# Patient Record
Sex: MALE | Race: WHITE | Employment: FULL TIME | URBAN - METROPOLITAN AREA
[De-identification: names, ages, dates, MRNs, and addresses within clinical notes are randomized per-mention and may not be internally consistent; named-entity substitution may affect disease eponyms.]

---

## 2019-05-08 ENCOUNTER — HOSPITAL ENCOUNTER (EMERGENCY)
Age: 59
Discharge: HOME | End: 2019-05-08
Attending: EMERGENCY MEDICINE
Payer: COMMERCIAL

## 2019-05-08 ENCOUNTER — APPOINTMENT (OUTPATIENT)
Dept: GENERAL RADIOLOGY | Age: 59
End: 2019-05-08
Payer: COMMERCIAL

## 2019-05-08 VITALS
TEMPERATURE: 98.1 F | OXYGEN SATURATION: 94 % | SYSTOLIC BLOOD PRESSURE: 168 MMHG | BODY MASS INDEX: 38.36 KG/M2 | HEIGHT: 76 IN | RESPIRATION RATE: 16 BRPM | DIASTOLIC BLOOD PRESSURE: 94 MMHG | WEIGHT: 315 LBS | HEART RATE: 83 BPM

## 2019-05-08 DIAGNOSIS — M54.31 SCIATICA OF RIGHT SIDE: Primary | ICD-10-CM

## 2019-05-08 PROCEDURE — 96372 THER/PROPH/DIAG INJ SC/IM: CPT

## 2019-05-08 PROCEDURE — 6360000002 HC RX W HCPCS: Performed by: EMERGENCY MEDICINE

## 2019-05-08 PROCEDURE — 72110 X-RAY EXAM L-2 SPINE 4/>VWS: CPT

## 2019-05-08 PROCEDURE — 6370000000 HC RX 637 (ALT 250 FOR IP): Performed by: EMERGENCY MEDICINE

## 2019-05-08 PROCEDURE — 99283 EMERGENCY DEPT VISIT LOW MDM: CPT

## 2019-05-08 RX ORDER — KETOROLAC TROMETHAMINE 10 MG/1
10 TABLET, FILM COATED ORAL EVERY 6 HOURS PRN
Qty: 20 TABLET | Refills: 0 | Status: SHIPPED | OUTPATIENT
Start: 2019-05-08 | End: 2019-06-04 | Stop reason: CLARIF

## 2019-05-08 RX ORDER — METHYLPREDNISOLONE 4 MG/1
TABLET ORAL
Qty: 1 KIT | Refills: 0 | Status: SHIPPED | OUTPATIENT
Start: 2019-05-08 | End: 2019-05-14

## 2019-05-08 RX ORDER — KETOROLAC TROMETHAMINE 30 MG/ML
30 INJECTION, SOLUTION INTRAMUSCULAR; INTRAVENOUS ONCE
Status: COMPLETED | OUTPATIENT
Start: 2019-05-08 | End: 2019-05-08

## 2019-05-08 RX ORDER — PREDNISONE 20 MG/1
60 TABLET ORAL ONCE
Status: COMPLETED | OUTPATIENT
Start: 2019-05-08 | End: 2019-05-08

## 2019-05-08 RX ADMIN — PREDNISONE 60 MG: 20 TABLET ORAL at 11:19

## 2019-05-08 RX ADMIN — KETOROLAC TROMETHAMINE 30 MG: 30 INJECTION, SOLUTION INTRAMUSCULAR; INTRAVENOUS at 11:19

## 2019-05-08 ASSESSMENT — PAIN DESCRIPTION - LOCATION: LOCATION: BACK;LEG;SACRUM

## 2019-05-08 ASSESSMENT — PAIN DESCRIPTION - DESCRIPTORS: DESCRIPTORS: ACHING

## 2019-05-08 ASSESSMENT — PAIN SCALES - GENERAL
PAINLEVEL_OUTOF10: 8
PAINLEVEL_OUTOF10: 8

## 2019-05-08 ASSESSMENT — PAIN DESCRIPTION - PAIN TYPE: TYPE: ACUTE PAIN

## 2019-05-08 ASSESSMENT — PAIN DESCRIPTION - ORIENTATION: ORIENTATION: RIGHT;LOWER;LEFT

## 2019-05-08 NOTE — ED PROVIDER NOTES
HPI:  5/8/19,   Time: 10:40 AM         Shaquille Mathis is a 60 y.o. male presenting to the ED for back pain, beginning over 2 weeks ago. The complaint has been persistent, moderate in severity, and worsened by movement of the back. The patient states that he fell at home a couple of weeks ago and has had the back pain since then. He states the pain radiates down the back of the legs    ROS:   Pertinent positives and negatives are stated within HPI, all other systems reviewed and are negative.  --------------------------------------------- PAST HISTORY ---------------------------------------------  Past Medical History:  has a past medical history of Hypertension. Past Surgical History:  has a past surgical history that includes Total shoulder arthroplasty. Social History:      Family History: family history is not on file. The patients home medications have been reviewed. Allergies: Patient has no known allergies. -------------------------------------------------- RESULTS -------------------------------------------------  All laboratory and radiology results have been personally reviewed by myself   LABS:  No results found for this visit on 05/08/19. RADIOLOGY:  Interpreted by Radiologist.  XR LUMBAR SPINE (MIN 4 VIEWS)   Final Result      No evidence of fracture or dislocation of the lumbar spine.                            ------------------------- NURSING NOTES AND VITALS REVIEWED ---------------------------   The nursing notes within the ED encounter and vital signs as below have been reviewed.    BP (!) 168/94   Pulse 83   Temp 98.1 °F (36.7 °C) (Oral)   Resp 16   Ht 6' 4\" (1.93 m)   Wt (!) 340 lb (154.2 kg)   SpO2 94%   BMI 41.39 kg/m²   Oxygen Saturation Interpretation: Normal      ---------------------------------------------------PHYSICAL EXAM--------------------------------------      Constitutional/General: Alert and oriented x3, well appearing, non toxic in NAD  Head:

## 2019-05-14 ENCOUNTER — OFFICE VISIT (OUTPATIENT)
Dept: FAMILY MEDICINE CLINIC | Age: 59
End: 2019-05-14
Payer: COMMERCIAL

## 2019-05-14 ENCOUNTER — HOSPITAL ENCOUNTER (OUTPATIENT)
Age: 59
Discharge: HOME | End: 2019-05-16
Payer: COMMERCIAL

## 2019-05-14 VITALS
SYSTOLIC BLOOD PRESSURE: 144 MMHG | WEIGHT: 315 LBS | DIASTOLIC BLOOD PRESSURE: 86 MMHG | HEIGHT: 76 IN | BODY MASS INDEX: 38.36 KG/M2 | OXYGEN SATURATION: 98 % | HEART RATE: 93 BPM

## 2019-05-14 DIAGNOSIS — I10 ESSENTIAL HYPERTENSION: Primary | ICD-10-CM

## 2019-05-14 DIAGNOSIS — E78.2 MIXED HYPERLIPIDEMIA: ICD-10-CM

## 2019-05-14 DIAGNOSIS — F17.200 NICOTINE DEPENDENCE, UNCOMPLICATED, UNSPECIFIED NICOTINE PRODUCT TYPE: ICD-10-CM

## 2019-05-14 DIAGNOSIS — E66.01 MORBID OBESITY WITH BMI OF 40.0-44.9, ADULT (HCC): ICD-10-CM

## 2019-05-14 DIAGNOSIS — M54.40 BACK PAIN OF LUMBAR REGION WITH SCIATICA: ICD-10-CM

## 2019-05-14 DIAGNOSIS — I10 ESSENTIAL HYPERTENSION: ICD-10-CM

## 2019-05-14 LAB
ALBUMIN SERPL-MCNC: 4.5 G/DL (ref 3.5–5.2)
ALP BLD-CCNC: 50 U/L (ref 40–129)
ALT SERPL-CCNC: 28 U/L (ref 0–40)
ANION GAP SERPL CALCULATED.3IONS-SCNC: 14 MMOL/L (ref 7–16)
AST SERPL-CCNC: 15 U/L (ref 0–39)
BILIRUB SERPL-MCNC: 0.6 MG/DL (ref 0–1.2)
BUN BLDV-MCNC: 19 MG/DL (ref 6–20)
CALCIUM SERPL-MCNC: 9.6 MG/DL (ref 8.6–10.2)
CHLORIDE BLD-SCNC: 103 MMOL/L (ref 98–107)
CHOLESTEROL, TOTAL: 236 MG/DL (ref 0–199)
CO2: 25 MMOL/L (ref 22–29)
CREAT SERPL-MCNC: 1 MG/DL (ref 0.7–1.2)
GFR AFRICAN AMERICAN: >60
GFR NON-AFRICAN AMERICAN: >60 ML/MIN/1.73
GLUCOSE BLD-MCNC: 97 MG/DL (ref 74–99)
HBA1C MFR BLD: 6.5 % (ref 4–5.6)
HCT VFR BLD CALC: 57.5 % (ref 37–54)
HDLC SERPL-MCNC: 46 MG/DL
HEMOGLOBIN: 18.6 G/DL (ref 12.5–16.5)
LDL CHOLESTEROL CALCULATED: 156 MG/DL (ref 0–99)
MCH RBC QN AUTO: 31.2 PG (ref 26–35)
MCHC RBC AUTO-ENTMCNC: 32.3 % (ref 32–34.5)
MCV RBC AUTO: 96.5 FL (ref 80–99.9)
PDW BLD-RTO: 14 FL (ref 11.5–15)
PLATELET # BLD: 204 E9/L (ref 130–450)
PMV BLD AUTO: 11.3 FL (ref 7–12)
POTASSIUM SERPL-SCNC: 4.5 MMOL/L (ref 3.5–5)
RBC # BLD: 5.96 E12/L (ref 3.8–5.8)
SODIUM BLD-SCNC: 142 MMOL/L (ref 132–146)
TOTAL PROTEIN: 7.3 G/DL (ref 6.4–8.3)
TRIGL SERPL-MCNC: 168 MG/DL (ref 0–149)
TSH SERPL DL<=0.05 MIU/L-ACNC: 2.06 UIU/ML (ref 0.27–4.2)
VITAMIN D 25-HYDROXY: 27 NG/ML (ref 30–100)
VLDLC SERPL CALC-MCNC: 34 MG/DL
WBC # BLD: 8.4 E9/L (ref 4.5–11.5)

## 2019-05-14 PROCEDURE — 4004F PT TOBACCO SCREEN RCVD TLK: CPT | Performed by: FAMILY MEDICINE

## 2019-05-14 PROCEDURE — 85027 COMPLETE CBC AUTOMATED: CPT

## 2019-05-14 PROCEDURE — 80053 COMPREHEN METABOLIC PANEL: CPT

## 2019-05-14 PROCEDURE — 3017F COLORECTAL CA SCREEN DOC REV: CPT | Performed by: FAMILY MEDICINE

## 2019-05-14 PROCEDURE — G8417 CALC BMI ABV UP PARAM F/U: HCPCS | Performed by: FAMILY MEDICINE

## 2019-05-14 PROCEDURE — 80061 LIPID PANEL: CPT

## 2019-05-14 PROCEDURE — 99204 OFFICE O/P NEW MOD 45 MIN: CPT | Performed by: FAMILY MEDICINE

## 2019-05-14 PROCEDURE — G8427 DOCREV CUR MEDS BY ELIG CLIN: HCPCS | Performed by: FAMILY MEDICINE

## 2019-05-14 PROCEDURE — 83036 HEMOGLOBIN GLYCOSYLATED A1C: CPT

## 2019-05-14 PROCEDURE — 84443 ASSAY THYROID STIM HORMONE: CPT

## 2019-05-14 PROCEDURE — 82306 VITAMIN D 25 HYDROXY: CPT

## 2019-05-14 RX ORDER — LISINOPRIL AND HYDROCHLOROTHIAZIDE 12.5; 1 MG/1; MG/1
1 TABLET ORAL DAILY
Qty: 30 TABLET | Refills: 1 | Status: SHIPPED | OUTPATIENT
Start: 2019-05-14 | End: 2019-05-14

## 2019-05-14 RX ORDER — BLOOD PRESSURE TEST KIT
KIT MISCELLANEOUS
Qty: 1 KIT | Refills: 0 | Status: SHIPPED
Start: 2019-05-14 | End: 2021-06-16

## 2019-05-14 RX ORDER — LISINOPRIL 10 MG/1
10 TABLET ORAL DAILY
Qty: 30 TABLET | Refills: 1 | Status: SHIPPED | OUTPATIENT
Start: 2019-05-14 | End: 2019-06-18

## 2019-05-14 ASSESSMENT — ENCOUNTER SYMPTOMS
COUGH: 0
ABDOMINAL DISTENTION: 0
NAUSEA: 0
VOMITING: 0
WHEEZING: 0
CHEST TIGHTNESS: 0
ABDOMINAL PAIN: 0
SINUS PRESSURE: 0
DIARRHEA: 0
EYE DISCHARGE: 0
BACK PAIN: 1
SORE THROAT: 0
RHINORRHEA: 0
CONSTIPATION: 0
EYE PAIN: 0
COLOR CHANGE: 0
SHORTNESS OF BREATH: 0

## 2019-05-14 ASSESSMENT — PATIENT HEALTH QUESTIONNAIRE - PHQ9
SUM OF ALL RESPONSES TO PHQ9 QUESTIONS 1 & 2: 0
1. LITTLE INTEREST OR PLEASURE IN DOING THINGS: 0
SUM OF ALL RESPONSES TO PHQ QUESTIONS 1-9: 0
SUM OF ALL RESPONSES TO PHQ QUESTIONS 1-9: 0
2. FEELING DOWN, DEPRESSED OR HOPELESS: 0

## 2019-05-14 NOTE — PROGRESS NOTES
[facility])  Family Medicine Outpatient        SUBJECTIVE:  CC: had concerns including New Patient (patient here today to establish care; ) and Back Pain (patient went to the ED for lower back pain radiating into left buttock; patient states the pain comes and goes). [id number] presented to the clinic for a new patient visit. Amanda Cartagena is a 60year old male presenting to the office today to establish as a new patient. He has a history of hypertension and has not been on treatment since 2016. He reports he was on a couple of medications in the past that he reports not tolerating including a \"pee pill. .. And the last one was recalled. \" On speaking to [facility] the patient was previously on Valsartan. He denies any vision changes, headaches, nausea, or vomiting. He was seen in the ED 5/8/19 reporting 2 weeks of back pain with sciatica on his right leg after tripping over his mom's walker and over the recliner to the floor. He denies any loc. In the ED a Lumbar xr was performed which showed moderate degenerative endplate changes at the thoracolumbar junction. The patient finished his steroid burst yesterday and is using Toradol prn. Lumbar XR 5/8/19  FINDINGS:       Osseous structures are well mineralized. There is no evidence of   fracture or dislocation. Vertebral body height is well-maintained. Moderate degenerative endplate changes are seen at the thoracolumbar   junction.           Impression       No evidence of fracture or dislocation of the lumbar spine. Review of Systems   Constitutional: Negative for activity change, appetite change, chills, fatigue, fever and unexpected weight change. HENT: Negative for congestion, postnasal drip, rhinorrhea, sinus pressure, sneezing and sore throat. Eyes: Negative for pain and discharge. Respiratory: Negative for cough, chest tightness, shortness of breath and wheezing. Cardiovascular: Negative for chest pain and palpitations. Gastrointestinal: Negative for abdominal distention, abdominal pain, constipation, diarrhea, nausea and vomiting. Endocrine: Negative for cold intolerance and heat intolerance. Genitourinary: Negative for decreased urine volume, frequency and urgency. Musculoskeletal: Positive for back pain. Negative for arthralgias and gait problem. Skin: Negative for color change and rash. Allergic/Immunologic: Negative for environmental allergies. Neurological: Negative for dizziness, seizures, syncope, weakness, numbness and headaches. Psychiatric/Behavioral: Negative for agitation and behavioral problems. Outpatient Medications Marked as Taking for the 5/14/19 encounter (Office Visit) with Mukund Morales MD   Medication Sig Dispense Refill    Blood Pressure KIT XL cuff 1 kit as directed 1 kit 0    lisinopril (PRINIVIL;ZESTRIL) 10 MG tablet Take 1 tablet by mouth daily 30 tablet 1    ketorolac (TORADOL) 10 MG tablet Take 1 tablet by mouth every 6 hours as needed for Pain 20 tablet 0    methylPREDNISolone (MEDROL, TERESA,) 4 MG tablet Take by mouth. 1 kit 0       Past Medical History:   Diagnosis Date    Hypertension        Past Surgical History:   Procedure Laterality Date    SHOULDER ARTHROPLASTY         History reviewed. No pertinent family history. No Known Allergies    Social History:  TOBACCO:   reports that he has been smoking cigarettes. He has never used smokeless tobacco.  ETOH:   reports that he drinks alcohol. OBJECTIVE    VS: BP (!) 144/86   Pulse 93   Ht 6' 4\" (1.93 m)   Wt (!) 348 lb 3.2 oz (157.9 kg)   SpO2 98%   BMI 42.38 kg/m²   Physical Exam   Constitutional: He is oriented to person, place, and time. He appears well-developed. No distress. Morbidly obese   HENT:   Head: Normocephalic and atraumatic. Right Ear: External ear normal.   Left Ear: External ear normal.   Mouth/Throat: Oropharynx is clear and moist.   Eyes: Pupils are equal, round, and reactive to light. EOM are normal.   Neck: Normal range of motion. Neck supple. Cardiovascular: Normal rate, regular rhythm and normal heart sounds. Exam reveals no gallop and no friction rub. Pulmonary/Chest: Effort normal and breath sounds normal.   Abdominal: Soft. Bowel sounds are normal.   Musculoskeletal: Normal range of motion. He exhibits no edema, tenderness or deformity. Neurological: He is alert and oriented to person, place, and time. No cranial nerve deficit or sensory deficit. Skin: Skin is warm and dry. He is not diaphoretic. Psychiatric: He has a normal mood and affect. His behavior is normal.         ASSESSMENT/PLAN:  1. Essential hypertension  Elevated; 186/86 initially improved to 144/86. Will obtain baseline labs at this time. Patient previously on Valsartan 160 mg/qd, but reports made him feel \"high\" and it was recalled. Presumed previously on hctz \"pee pill\" in the past as well. Will initiate Lisinopril 10 mg/qd at this time as well as order patient a XL bp cuff. Call with any concerns or f/u with bp in 3 weeks. Patient counseled on red flag symptoms and if they occur go to the ED.  - CBC; Future  - Comprehensive Metabolic Panel; Future  - Hemoglobin A1C; Future  - Lipid Panel; Future  - TSH without Reflex; Future  - Blood Pressure KIT; XL cuff 1 kit as directed  Dispense: 1 kit; Refill: 0  - lisinopril (PRINIVIL;ZESTRIL) 10 MG tablet; Take 1 tablet by mouth daily  Dispense: 30 tablet; Refill: 1    2. Nicotine dependence, uncomplicated, unspecified nicotine product type  Patient is smoking daily. Encouraged smoking cessation. In pre-contemplation phase at this time. 3. Back pain of lumbar region with sciatica  S/p mechanical fall at the end of April. Improved s/p steroid burst with prn Toradol. Benign exam today. Moderate chronic degenerative at endplate at throacolumbar junction on xr. Patient provided home exercises to perform as tolerated.  If symptoms recur or become bothersome, patient to call and will place referral to PT. Patient counseled on red flag symptoms and if they occur to go to the ED. 4. Morbid obesity with BMI of 40.0-44.9, adult ([address]  - CBC; Future  - Comprehensive Metabolic Panel; Future  - Hemoglobin A1C; Future  - Lipid Panel; Future  - TSH without Reflex; Future  - Vitamin D 25 Hydroxy; Future    5. Mixed hyperlipidemia  Lipid panel from /2017 showed cholesterol 216, hdl 41, ldl 155, tg 102. Recheck at this time. I have reviewed my findings and recommendations with Miguelito Gomez MD  5/14/2019 10:45 AM      Please note this report has been partially produced using speech recognition software  and may contain errors related to that system including grammar, punctuation and spelling as well as words and phrases that may seem inappropriate. If there are questions or concerns please feel free to contact me to clarify.

## 2019-05-14 NOTE — PATIENT INSTRUCTIONS
please click on the \"Sign Up Now\" link. Current as of: September 20, 2018  Content Version: 12.0  © 5848-8095 Healthwise, Incorporated. Care instructions adapted under license by [facility] ([facility]). If you have questions about a medical condition or this instruction, always ask your healthcare professional. [address] any warranty or liability for your use of this information.

## 2019-06-04 ENCOUNTER — OFFICE VISIT (OUTPATIENT)
Dept: FAMILY MEDICINE CLINIC | Age: 59
End: 2019-06-04
Payer: COMMERCIAL

## 2019-06-04 VITALS
WEIGHT: 315 LBS | OXYGEN SATURATION: 94 % | HEIGHT: 76 IN | HEART RATE: 57 BPM | BODY MASS INDEX: 38.36 KG/M2 | SYSTOLIC BLOOD PRESSURE: 148 MMHG | DIASTOLIC BLOOD PRESSURE: 92 MMHG | RESPIRATION RATE: 20 BRPM | TEMPERATURE: 98.5 F

## 2019-06-04 DIAGNOSIS — E78.2 MIXED HYPERLIPIDEMIA: ICD-10-CM

## 2019-06-04 DIAGNOSIS — E08.65 DIABETES MELLITUS DUE TO UNDERLYING CONDITION WITH HYPERGLYCEMIA, WITHOUT LONG-TERM CURRENT USE OF INSULIN (HCC): ICD-10-CM

## 2019-06-04 DIAGNOSIS — E55.9 VITAMIN D DEFICIENCY: ICD-10-CM

## 2019-06-04 DIAGNOSIS — F17.200 NICOTINE DEPENDENCE, UNCOMPLICATED, UNSPECIFIED NICOTINE PRODUCT TYPE: ICD-10-CM

## 2019-06-04 DIAGNOSIS — M51.34 DEGENERATIVE DISC DISEASE, THORACIC: ICD-10-CM

## 2019-06-04 DIAGNOSIS — I10 ESSENTIAL HYPERTENSION: Primary | ICD-10-CM

## 2019-06-04 DIAGNOSIS — D75.1 POLYCYTHEMIA: ICD-10-CM

## 2019-06-04 PROCEDURE — 99214 OFFICE O/P EST MOD 30 MIN: CPT | Performed by: FAMILY MEDICINE

## 2019-06-04 PROCEDURE — G8417 CALC BMI ABV UP PARAM F/U: HCPCS | Performed by: FAMILY MEDICINE

## 2019-06-04 PROCEDURE — G8427 DOCREV CUR MEDS BY ELIG CLIN: HCPCS | Performed by: FAMILY MEDICINE

## 2019-06-04 PROCEDURE — 4004F PT TOBACCO SCREEN RCVD TLK: CPT | Performed by: FAMILY MEDICINE

## 2019-06-04 PROCEDURE — 3017F COLORECTAL CA SCREEN DOC REV: CPT | Performed by: FAMILY MEDICINE

## 2019-06-04 RX ORDER — ACETAMINOPHEN 500 MG
1000 TABLET ORAL 3 TIMES DAILY PRN
Qty: 90 TABLET | Refills: 0 | Status: SHIPPED | OUTPATIENT
Start: 2019-06-04 | End: 2019-11-12 | Stop reason: ALTCHOICE

## 2019-06-04 RX ORDER — METFORMIN HYDROCHLORIDE 500 MG/1
TABLET, EXTENDED RELEASE ORAL
Qty: 120 TABLET | Refills: 1 | Status: SHIPPED | OUTPATIENT
Start: 2019-06-04 | End: 2019-10-22 | Stop reason: SDUPTHER

## 2019-06-04 RX ORDER — PRAVASTATIN SODIUM 40 MG
40 TABLET ORAL DAILY
Qty: 90 TABLET | Refills: 1 | Status: SHIPPED | OUTPATIENT
Start: 2019-06-04 | End: 2019-10-22 | Stop reason: SDUPTHER

## 2019-06-04 RX ORDER — AMLODIPINE BESYLATE 5 MG/1
5 TABLET ORAL DAILY
Qty: 30 TABLET | Refills: 3 | Status: SHIPPED | OUTPATIENT
Start: 2019-06-04 | End: 2019-10-22 | Stop reason: SDUPTHER

## 2019-06-04 RX ORDER — CHOLECALCIFEROL (VITAMIN D3) 50 MCG
2000 TABLET ORAL DAILY
Qty: 30 TABLET | Refills: 3 | Status: SHIPPED | OUTPATIENT
Start: 2019-06-04 | End: 2019-10-22 | Stop reason: SDUPTHER

## 2019-06-04 ASSESSMENT — ENCOUNTER SYMPTOMS
NAUSEA: 0
ABDOMINAL DISTENTION: 0
BACK PAIN: 1
SHORTNESS OF BREATH: 0
RHINORRHEA: 0
SORE THROAT: 0
EYE DISCHARGE: 0
WHEEZING: 0
COUGH: 0
SINUS PRESSURE: 0
CHEST TIGHTNESS: 0
VOMITING: 0
EYE PAIN: 0
ABDOMINAL PAIN: 0

## 2019-06-04 NOTE — PATIENT INSTRUCTIONS
Patient Education        Learning About Diabetes Food Guidelines  Your Care Instructions    Meal planning is important to manage diabetes. It helps keep your blood sugar at a target level (which you set with your doctor). You don't have to eat special foods. You can eat what your family eats, including sweets once in a while. But you do have to pay attention to how often you eat and how much you eat of certain foods. You may want to work with a dietitian or a certified diabetes educator (CDE) to help you plan meals and snacks. A dietitian or CDE can also help you lose weight if that is one of your goals. What should you know about eating carbs? Managing the amount of carbohydrate (carbs) you eat is an important part of healthy meals when you have diabetes. Carbohydrate is found in many foods. · Learn which foods have carbs. And learn the amounts of carbs in different foods. ? Bread, cereal, pasta, and rice have about 15 grams of carbs in a serving. A serving is 1 slice of bread (1 ounce), ½ cup of cooked cereal, or 1/3 cup of cooked pasta or rice. ? Fruits have 15 grams of carbs in a serving. A serving is 1 small fresh fruit, such as an apple or orange; ½ of a banana; ½ cup of cooked or canned fruit; ½ cup of fruit juice; 1 cup of melon or raspberries; or 2 tablespoons of dried fruit. ? Milk and no-sugar-added yogurt have 15 grams of carbs in a serving. A serving is 1 cup of milk or 2/3 cup of no-sugar-added yogurt. ? Starchy vegetables have 15 grams of carbs in a serving. A serving is ½ cup of mashed potatoes or sweet potato; 1 cup winter squash; ½ of a small baked potato; ½ cup of cooked beans; or ½ cup cooked corn or green peas. · Learn how much carbs to eat each day and at each meal. A dietitian or CDE can teach you how to keep track of the amount of carbs you eat. This is called carbohydrate counting. · If you are not sure how to count carbohydrate grams, use the Plate Method to plan meals.  It is a good, quick way to make sure that you have a balanced meal. It also helps you spread carbs throughout the day. ? Divide your plate by types of foods. Put non-starchy vegetables on half the plate, meat or other protein food on one-quarter of the plate, and a grain or starchy vegetable in the final quarter of the plate. To this you can add a small piece of fruit and 1 cup of milk or yogurt, depending on how many carbs you are supposed to eat at a meal.  · Try to eat about the same amount of carbs at each meal. Do not \"save up\" your daily allowance of carbs to eat at one meal.  · Proteins have very little or no carbs per serving. Examples of proteins are beef, chicken, turkey, fish, eggs, tofu, cheese, cottage cheese, and peanut butter. A serving size of meat is 3 ounces, which is about the size of a deck of cards. Examples of meat substitute serving sizes (equal to 1 ounce of meat) are 1/4 cup of cottage cheese, 1 egg, 1 tablespoon of peanut butter, and ½ cup of tofu. How can you eat out and still eat healthy? · Learn to estimate the serving sizes of foods that have carbohydrate. If you measure food at home, it will be easier to estimate the amount in a serving of restaurant food. · If the meal you order has too much carbohydrate (such as potatoes, corn, or baked beans), ask to have a low-carbohydrate food instead. Ask for a salad or green vegetables. · If you use insulin, check your blood sugar before and after eating out to help you plan how much to eat in the future. · If you eat more carbohydrate at a meal than you had planned, take a walk or do other exercise. This will help lower your blood sugar. What else should you know? · Limit saturated fat, such as the fat from meat and dairy products. This is a healthy choice because people who have diabetes are at higher risk of heart disease. So choose lean cuts of meat and nonfat or low-fat dairy products.  Use olive or canola oil instead of butter or shortening when cooking. · Don't skip meals. Your blood sugar may drop too low if you skip meals and take insulin or certain medicines for diabetes. · Check with your doctor before you drink alcohol. Alcohol can cause your blood sugar to drop too low. Alcohol can also cause a bad reaction if you take certain diabetes medicines. Follow-up care is a key part of your treatment and safety. Be sure to make and go to all appointments, and call your doctor if you are having problems. It's also a good idea to know your test results and keep a list of the medicines you take. Where can you learn more? Go to [email or web address] org and sign in to your [employer] account. Enter F280 in the [employer] box to learn more about \"Learning About Diabetes Food Guidelines. \"     If you do not have an account, please click on the \"Sign Up Now\" link. Current as of: July 25, 2018  Content Version: 12.0  © 2574-4752 Healthwise, Incorporated. Care instructions adapted under license by [facility] ([facility]). If you have questions about a medical condition or this instruction, always ask your healthcare professional. [place] [address] any warranty or liability for your use of this information.

## 2019-06-04 NOTE — PROGRESS NOTES
[facility])  Family Medicine Outpatient        SUBJECTIVE:  CC: had concerns including Hypertension (Pt here to follow up on HTN) and Discuss Labs (Pt also here to review lab results). [address] presented to the clinic for a routine visit. Alistair Perkins is a 60year old male presenting to the office today to follow up on his blood pressure and blood work. He reports doing well. He notes that his back pain has improved, but that it is still somewhat bothersome. He notes, \"eating Aleve\" for the pain at least 4 tabs a day. He denies any further radiation down his legs. He reports tolerating the Lisinopril initiated last appointment and taking it daily. He does not report any vision changes or headaches. Review of Systems   Constitutional: Negative for activity change, chills and fatigue. HENT: Negative for rhinorrhea, sinus pressure and sore throat. Eyes: Negative for pain and discharge. Respiratory: Negative for cough, chest tightness, shortness of breath and wheezing. Cardiovascular: Negative for chest pain and palpitations. Gastrointestinal: Negative for abdominal distention, abdominal pain, nausea and vomiting. Musculoskeletal: Positive for back pain. Negative for arthralgias and gait problem. Neurological: Negative for dizziness, seizures, syncope, weakness, numbness and headaches. Psychiatric/Behavioral: Positive for agitation. Negative for behavioral problems and suicidal ideas.        Outpatient Medications Marked as Taking for the 6/4/19 encounter (Office Visit) with Noa Clemens MD   Medication Sig Dispense Refill    metFORMIN (GLUCOPHAGE-XR) 500 MG extended release tablet Take 1 tablet bid for 2 weeks, then increase to 2 tablets bid 120 tablet 1    pravastatin (PRAVACHOL) 40 MG tablet Take 1 tablet by mouth daily 90 tablet 1    Cholecalciferol (VITAMIN D) 2000 units TABS tablet Take 1 tablet by mouth daily 30 tablet 3    acetaminophen (TYLENOL) 500 MG tablet Take 2 tablets by mouth 3 times daily as needed for Pain 90 tablet 0    amLODIPine (NORVASC) 5 MG tablet Take 1 tablet by mouth daily 30 tablet 3    Blood Pressure KIT XL cuff 1 kit as directed 1 kit 0    lisinopril (PRINIVIL;ZESTRIL) 10 MG tablet Take 1 tablet by mouth daily 30 tablet 1       I have reviewed all pertinent PMHx, PSHx, FamHx, SocialHx, medications, and allergies and updated history as appropriate. OBJECTIVE    VS: BP (!) 148/92   Pulse 57   Temp 98.5 °F (36.9 °C) (Temporal)   Resp 20   Ht 6' 4\" (1.93 m)   Wt (!) 349 lb (158.3 kg)   SpO2 94%   BMI 42.48 kg/m²   Physical Exam   Constitutional: He is oriented to person, place, and time. He appears well-developed. No distress. Morbidly obese   HENT:   Head: Normocephalic and atraumatic. Eyes: Pupils are equal, round, and reactive to light. EOM are normal.   Neck: Normal range of motion. Neck supple. Cardiovascular: Normal rate, regular rhythm and normal heart sounds. Pulmonary/Chest: Effort normal and breath sounds normal.   Abdominal: Soft. Bowel sounds are normal.   Musculoskeletal: Normal range of motion. He exhibits no tenderness. Neurological: He is alert and oriented to person, place, and time. No sensory deficit. Skin: Skin is warm and dry. He is not diaphoretic. Psychiatric: He has a normal mood and affect. His behavior is normal.       ASSESSMENT/PLAN:  1. Essential hypertension  Elevated; 162/102 initially improved to 148/92. Continue Lisinopril 10 mg/qd at this time. Will also add Norvasc 5 mg/qd. Patient counseled on red flag symptoms and if they occur to go to the ED. Patient advised to take blood pressure daily and return to the office in 2 weeks for review and further titration as needed. Cr 1.0 5/14/19. Patient advised no Nsaids and to stop smoking!  - amLODIPine (NORVASC) 5 MG tablet; Take 1 tablet by mouth daily  Dispense: 30 tablet; Refill: 3    2.  Mixed hyperlipidemia  Lipid panel 5/14/19 showed cholesterol 236, hdl 46, ldl 156, and . Patient's ascvd risk is 41.6%. Heavily counseled patient on findings and possible sequela. Patient advised to initiate lifestyle modifications. Offered to send patient to nutritionist, but he declined at this time. Initiate Pravastatin at this time. Repeat lipid panel in 6m-1 year. - pravastatin (PRAVACHOL) 40 MG tablet; Take 1 tablet by mouth daily  Dispense: 90 tablet; Refill: 1    3. Diabetes mellitus due to underlying condition with hyperglycemia, without long-term current use of insulin ([facility] [address]  hgba1c 6.5% on labs 5/2019. Patient counseled on diagnosis and all questions answered. Initiate Metformin titration at this time. Recommend repeat hgba1c and microalbumin:cr in 3m. Patient is on an ace inhibitor and statin. - metFORMIN (GLUCOPHAGE-XR) 500 MG extended release tablet; Take 1 tablet bid for 2 weeks, then increase to 2 tablets bid  Dispense: 120 tablet; Refill: 1    4. Vitamin D deficiency  Mild 27. Recommend Vitamin D 2,000 u/ qd at this time. - Cholecalciferol (VITAMIN D) 2000 units TABS tablet; Take 1 tablet by mouth daily  Dispense: 30 tablet; Refill: 3    5. Polycythemia  On record review present since 1/2017. On recent labs 5/2019 hgb 18.6 and hematocrit was 57.5. Patient saturating at 94% on RA today and is euvolemic. Most likely partial etiology from chronic smoking history. Will send patient to heme/onc for further evaluation/recommendation on specialized testing and treatment as needed. Xi Chacon MD, Hematology and Oncology, [place] ([facility])    6. Degenerative disc disease, thoracic  Patient advised to avoid Nsaids secondary to htn. Recommend prn Tylenol. Offered PT again to patient, but declined at this time secondary to improving symptoms. - acetaminophen (TYLENOL) 500 MG tablet; Take 2 tablets by mouth 3 times daily as needed for Pain  Dispense: 90 tablet; Refill: 0    7.  Nicotine dependence, uncomplicated, unspecified nicotine product type  Patient heavily counseled on smoking cessation today and importance of minimizing nicotine and managing comorbid conditions. Patient has approximately 40 pack year history and is currently smoking 1 ppd. Discussed different methods of smoking cessation and patient is not willing to consider at this time. He reports understanding and accepting the risk and possible sequela of continuing to smoke. I have reviewed my findings and recommendations with Kelly Koroma MD  6/4/2019 8:35 AM      Please note this report has been partially produced using speech recognition software  and may contain errors related to that system including grammar, punctuation and spelling as well as words and phrases that may seem inappropriate. If there are questions or concerns please feel free to contact me to clarify.

## 2019-06-18 ENCOUNTER — HOSPITAL ENCOUNTER (OUTPATIENT)
Dept: GENERAL RADIOLOGY | Age: 59
Discharge: HOME | End: 2019-06-20
Payer: COMMERCIAL

## 2019-06-18 ENCOUNTER — HOSPITAL ENCOUNTER (OUTPATIENT)
Age: 59
Discharge: HOME | End: 2019-06-20
Payer: COMMERCIAL

## 2019-06-18 ENCOUNTER — OFFICE VISIT (OUTPATIENT)
Dept: FAMILY MEDICINE CLINIC | Age: 59
End: 2019-06-18
Payer: COMMERCIAL

## 2019-06-18 VITALS
BODY MASS INDEX: 38.36 KG/M2 | WEIGHT: 315 LBS | HEART RATE: 88 BPM | SYSTOLIC BLOOD PRESSURE: 128 MMHG | HEIGHT: 76 IN | OXYGEN SATURATION: 94 % | DIASTOLIC BLOOD PRESSURE: 88 MMHG | RESPIRATION RATE: 20 BRPM

## 2019-06-18 DIAGNOSIS — Z00.00 ENCOUNTER FOR ANNUAL HEALTH EXAMINATION: ICD-10-CM

## 2019-06-18 DIAGNOSIS — I10 ESSENTIAL HYPERTENSION: ICD-10-CM

## 2019-06-18 DIAGNOSIS — Z00.00 ENCOUNTER FOR ANNUAL HEALTH EXAMINATION: Primary | ICD-10-CM

## 2019-06-18 DIAGNOSIS — I48.91 ATRIAL FIBRILLATION, UNSPECIFIED TYPE (HCC): ICD-10-CM

## 2019-06-18 DIAGNOSIS — F17.200 NICOTINE DEPENDENCE, UNCOMPLICATED, UNSPECIFIED NICOTINE PRODUCT TYPE: ICD-10-CM

## 2019-06-18 LAB
ALBUMIN SERPL-MCNC: 4.7 G/DL (ref 3.5–5.2)
ALP BLD-CCNC: 50 U/L (ref 40–129)
ALT SERPL-CCNC: 20 U/L (ref 0–40)
ANION GAP SERPL CALCULATED.3IONS-SCNC: 16 MMOL/L (ref 7–16)
AST SERPL-CCNC: 19 U/L (ref 0–39)
BASOPHILS ABSOLUTE: 0.09 E9/L (ref 0–0.2)
BASOPHILS RELATIVE PERCENT: 1.3 % (ref 0–2)
BILIRUB SERPL-MCNC: 0.5 MG/DL (ref 0–1.2)
BUN BLDV-MCNC: 16 MG/DL (ref 8–23)
CALCIUM SERPL-MCNC: 10.1 MG/DL (ref 8.6–10.2)
CHLORIDE BLD-SCNC: 104 MMOL/L (ref 98–107)
CK MB: 2.5 NG/ML (ref 0–7.7)
CO2: 23 MMOL/L (ref 22–29)
CREAT SERPL-MCNC: 0.9 MG/DL (ref 0.7–1.2)
EOSINOPHILS ABSOLUTE: 0.15 E9/L (ref 0.05–0.5)
EOSINOPHILS RELATIVE PERCENT: 2.2 % (ref 0–6)
ETHANOL: <10 MG/DL (ref 0–0.08)
GFR AFRICAN AMERICAN: >60
GFR NON-AFRICAN AMERICAN: >60 ML/MIN/1.73
GLUCOSE BLD-MCNC: 121 MG/DL (ref 74–99)
HCT VFR BLD CALC: 56.9 % (ref 37–54)
HEMOGLOBIN: 18.5 G/DL (ref 12.5–16.5)
IMMATURE GRANULOCYTES #: 0.01 E9/L
IMMATURE GRANULOCYTES %: 0.1 % (ref 0–5)
LYMPHOCYTES ABSOLUTE: 1.72 E9/L (ref 1.5–4)
LYMPHOCYTES RELATIVE PERCENT: 25.2 % (ref 20–42)
MCH RBC QN AUTO: 31.4 PG (ref 26–35)
MCHC RBC AUTO-ENTMCNC: 32.5 % (ref 32–34.5)
MCV RBC AUTO: 96.6 FL (ref 80–99.9)
MONOCYTES ABSOLUTE: 0.54 E9/L (ref 0.1–0.95)
MONOCYTES RELATIVE PERCENT: 7.9 % (ref 2–12)
NEUTROPHILS ABSOLUTE: 4.32 E9/L (ref 1.8–7.3)
NEUTROPHILS RELATIVE PERCENT: 63.3 % (ref 43–80)
PDW BLD-RTO: 13.7 FL (ref 11.5–15)
PLATELET # BLD: 193 E9/L (ref 130–450)
PMV BLD AUTO: 11.6 FL (ref 7–12)
POTASSIUM SERPL-SCNC: 4.9 MMOL/L (ref 3.5–5)
PRO-BNP: 675 PG/ML (ref 0–125)
PROSTATE SPECIFIC ANTIGEN: 1.59 NG/ML (ref 0–4)
RBC # BLD: 5.89 E12/L (ref 3.8–5.8)
SODIUM BLD-SCNC: 143 MMOL/L (ref 132–146)
TOTAL PROTEIN: 7.3 G/DL (ref 6.4–8.3)
TROPONIN: <0.01 NG/ML (ref 0–0.03)
WBC # BLD: 6.8 E9/L (ref 4.5–11.5)

## 2019-06-18 PROCEDURE — 99396 PREV VISIT EST AGE 40-64: CPT | Performed by: FAMILY MEDICINE

## 2019-06-18 PROCEDURE — 84153 ASSAY OF PSA TOTAL: CPT

## 2019-06-18 PROCEDURE — 82553 CREATINE MB FRACTION: CPT

## 2019-06-18 PROCEDURE — 80053 COMPREHEN METABOLIC PANEL: CPT

## 2019-06-18 PROCEDURE — 93000 ELECTROCARDIOGRAM COMPLETE: CPT | Performed by: FAMILY MEDICINE

## 2019-06-18 PROCEDURE — G0480 DRUG TEST DEF 1-7 CLASSES: HCPCS

## 2019-06-18 PROCEDURE — 71046 X-RAY EXAM CHEST 2 VIEWS: CPT

## 2019-06-18 PROCEDURE — 85025 COMPLETE CBC W/AUTO DIFF WBC: CPT

## 2019-06-18 PROCEDURE — 83880 ASSAY OF NATRIURETIC PEPTIDE: CPT

## 2019-06-18 PROCEDURE — 84484 ASSAY OF TROPONIN QUANT: CPT

## 2019-06-18 PROCEDURE — 99213 OFFICE O/P EST LOW 20 MIN: CPT | Performed by: FAMILY MEDICINE

## 2019-06-18 RX ORDER — LISINOPRIL 10 MG/1
5 TABLET ORAL DAILY
Qty: 30 TABLET | Refills: 1
Start: 2019-06-18 | End: 2019-06-18

## 2019-06-18 RX ORDER — METOPROLOL SUCCINATE 25 MG/1
12.5 TABLET, EXTENDED RELEASE ORAL DAILY
Qty: 45 TABLET | Refills: 1 | Status: SHIPPED | OUTPATIENT
Start: 2019-06-18 | End: 2019-10-22 | Stop reason: SDUPTHER

## 2019-06-18 RX ORDER — LISINOPRIL 10 MG/1
5 TABLET ORAL DAILY
Qty: 30 TABLET | Refills: 1
Start: 2019-06-18 | End: 2019-10-22

## 2019-07-12 ASSESSMENT — ENCOUNTER SYMPTOMS
DIARRHEA: 0
ABDOMINAL PAIN: 0
CONSTIPATION: 0
SINUS PRESSURE: 0
NAUSEA: 0
WHEEZING: 0
VOMITING: 0
SHORTNESS OF BREATH: 0
COUGH: 0

## 2019-08-27 ENCOUNTER — TELEPHONE (OUTPATIENT)
Dept: FAMILY MEDICINE CLINIC | Age: 59
End: 2019-08-27

## 2019-08-27 DIAGNOSIS — I48.91 ATRIAL FIBRILLATION, UNSPECIFIED TYPE (HCC): ICD-10-CM

## 2019-10-22 ENCOUNTER — TELEPHONE (OUTPATIENT)
Dept: FAMILY MEDICINE CLINIC | Age: 59
End: 2019-10-22

## 2019-10-22 ENCOUNTER — OFFICE VISIT (OUTPATIENT)
Dept: FAMILY MEDICINE CLINIC | Age: 59
End: 2019-10-22
Payer: COMMERCIAL

## 2019-10-22 ENCOUNTER — HOSPITAL ENCOUNTER (OUTPATIENT)
Age: 59
Discharge: HOME | End: 2019-10-24
Payer: COMMERCIAL

## 2019-10-22 VITALS
OXYGEN SATURATION: 96 % | TEMPERATURE: 98.6 F | WEIGHT: 315 LBS | BODY MASS INDEX: 38.36 KG/M2 | HEIGHT: 76 IN | RESPIRATION RATE: 18 BRPM | SYSTOLIC BLOOD PRESSURE: 126 MMHG | HEART RATE: 84 BPM | DIASTOLIC BLOOD PRESSURE: 90 MMHG

## 2019-10-22 DIAGNOSIS — E78.2 MIXED HYPERLIPIDEMIA: ICD-10-CM

## 2019-10-22 DIAGNOSIS — E55.9 VITAMIN D DEFICIENCY: ICD-10-CM

## 2019-10-22 DIAGNOSIS — E66.01 MORBID OBESITY WITH BMI OF 40.0-44.9, ADULT (HCC): ICD-10-CM

## 2019-10-22 DIAGNOSIS — I10 ESSENTIAL HYPERTENSION: ICD-10-CM

## 2019-10-22 DIAGNOSIS — I10 ESSENTIAL HYPERTENSION: Primary | ICD-10-CM

## 2019-10-22 DIAGNOSIS — E08.65 DIABETES MELLITUS DUE TO UNDERLYING CONDITION WITH HYPERGLYCEMIA, WITHOUT LONG-TERM CURRENT USE OF INSULIN (HCC): ICD-10-CM

## 2019-10-22 DIAGNOSIS — D75.1 POLYCYTHEMIA: ICD-10-CM

## 2019-10-22 DIAGNOSIS — I48.91 ATRIAL FIBRILLATION, UNSPECIFIED TYPE (HCC): ICD-10-CM

## 2019-10-22 LAB
ANION GAP SERPL CALCULATED.3IONS-SCNC: 12 MMOL/L (ref 7–16)
BUN BLDV-MCNC: 17 MG/DL (ref 8–23)
CALCIUM SERPL-MCNC: 10 MG/DL (ref 8.6–10.2)
CHLORIDE BLD-SCNC: 103 MMOL/L (ref 98–107)
CO2: 27 MMOL/L (ref 22–29)
CREAT SERPL-MCNC: 1 MG/DL (ref 0.7–1.2)
GFR AFRICAN AMERICAN: >60
GFR NON-AFRICAN AMERICAN: >60 ML/MIN/1.73
GLUCOSE BLD-MCNC: 91 MG/DL (ref 74–99)
HBA1C MFR BLD: 5.9 %
POTASSIUM SERPL-SCNC: 4.4 MMOL/L (ref 3.5–5)
SODIUM BLD-SCNC: 142 MMOL/L (ref 132–146)

## 2019-10-22 PROCEDURE — G8427 DOCREV CUR MEDS BY ELIG CLIN: HCPCS | Performed by: FAMILY MEDICINE

## 2019-10-22 PROCEDURE — 83036 HEMOGLOBIN GLYCOSYLATED A1C: CPT | Performed by: FAMILY MEDICINE

## 2019-10-22 PROCEDURE — G8417 CALC BMI ABV UP PARAM F/U: HCPCS | Performed by: FAMILY MEDICINE

## 2019-10-22 PROCEDURE — 80048 BASIC METABOLIC PNL TOTAL CA: CPT

## 2019-10-22 PROCEDURE — 4004F PT TOBACCO SCREEN RCVD TLK: CPT | Performed by: FAMILY MEDICINE

## 2019-10-22 PROCEDURE — 3017F COLORECTAL CA SCREEN DOC REV: CPT | Performed by: FAMILY MEDICINE

## 2019-10-22 PROCEDURE — 99214 OFFICE O/P EST MOD 30 MIN: CPT | Performed by: FAMILY MEDICINE

## 2019-10-22 PROCEDURE — G8484 FLU IMMUNIZE NO ADMIN: HCPCS | Performed by: FAMILY MEDICINE

## 2019-10-22 RX ORDER — LISINOPRIL 10 MG/1
5 TABLET ORAL DAILY
Qty: 45 TABLET | Refills: 1 | Status: CANCELLED | OUTPATIENT
Start: 2019-10-22 | End: 2020-04-19

## 2019-10-22 RX ORDER — PRAVASTATIN SODIUM 40 MG
40 TABLET ORAL DAILY
Qty: 90 TABLET | Refills: 1 | Status: SHIPPED
Start: 2019-10-22 | End: 2020-06-12 | Stop reason: SDUPTHER

## 2019-10-22 RX ORDER — CHOLECALCIFEROL (VITAMIN D3) 50 MCG
2000 TABLET ORAL DAILY
Qty: 30 TABLET | Refills: 3 | Status: SHIPPED
Start: 2019-10-22 | End: 2020-03-06 | Stop reason: SDUPTHER

## 2019-10-22 RX ORDER — AMLODIPINE BESYLATE 5 MG/1
5 TABLET ORAL DAILY
Qty: 90 TABLET | Refills: 1 | Status: CANCELLED | OUTPATIENT
Start: 2019-10-22 | End: 2020-04-19

## 2019-10-22 RX ORDER — AMLODIPINE BESYLATE 5 MG/1
5 TABLET ORAL DAILY
Qty: 30 TABLET | Refills: 3 | Status: SHIPPED
Start: 2019-10-22 | End: 2020-03-06 | Stop reason: SDUPTHER

## 2019-10-22 RX ORDER — METOPROLOL SUCCINATE 25 MG/1
12.5 TABLET, EXTENDED RELEASE ORAL DAILY
Qty: 45 TABLET | Refills: 1 | Status: SHIPPED
Start: 2019-10-22 | End: 2020-06-12 | Stop reason: SDUPTHER

## 2019-10-22 RX ORDER — METFORMIN HYDROCHLORIDE 500 MG/1
TABLET, EXTENDED RELEASE ORAL
Qty: 120 TABLET | Refills: 1 | Status: SHIPPED
Start: 2019-10-22 | End: 2020-06-15 | Stop reason: SDUPTHER

## 2019-10-22 ASSESSMENT — ENCOUNTER SYMPTOMS
SHORTNESS OF BREATH: 0
ABDOMINAL PAIN: 0
CONSTIPATION: 0
NAUSEA: 0
DIARRHEA: 0
WHEEZING: 0
VOMITING: 0
SINUS PRESSURE: 0
COUGH: 0

## 2019-11-12 ENCOUNTER — OFFICE VISIT (OUTPATIENT)
Dept: FAMILY MEDICINE CLINIC | Age: 59
End: 2019-11-12
Payer: COMMERCIAL

## 2019-11-12 VITALS
DIASTOLIC BLOOD PRESSURE: 84 MMHG | SYSTOLIC BLOOD PRESSURE: 132 MMHG | OXYGEN SATURATION: 95 % | HEART RATE: 79 BPM | WEIGHT: 315 LBS | HEIGHT: 76 IN | RESPIRATION RATE: 18 BRPM | TEMPERATURE: 97.7 F | BODY MASS INDEX: 38.36 KG/M2

## 2019-11-12 DIAGNOSIS — F17.200 NICOTINE DEPENDENCE, UNCOMPLICATED, UNSPECIFIED NICOTINE PRODUCT TYPE: ICD-10-CM

## 2019-11-12 DIAGNOSIS — D75.1 POLYCYTHEMIA: ICD-10-CM

## 2019-11-12 DIAGNOSIS — L91.8 SKIN TAG: ICD-10-CM

## 2019-11-12 DIAGNOSIS — I10 ESSENTIAL HYPERTENSION: Primary | ICD-10-CM

## 2019-11-12 DIAGNOSIS — H10.10 ALLERGIC CONJUNCTIVITIS, UNSPECIFIED LATERALITY: ICD-10-CM

## 2019-11-12 PROCEDURE — 3017F COLORECTAL CA SCREEN DOC REV: CPT | Performed by: FAMILY MEDICINE

## 2019-11-12 PROCEDURE — 99214 OFFICE O/P EST MOD 30 MIN: CPT | Performed by: FAMILY MEDICINE

## 2019-11-12 PROCEDURE — G8427 DOCREV CUR MEDS BY ELIG CLIN: HCPCS | Performed by: FAMILY MEDICINE

## 2019-11-12 PROCEDURE — G8484 FLU IMMUNIZE NO ADMIN: HCPCS | Performed by: FAMILY MEDICINE

## 2019-11-12 PROCEDURE — G8417 CALC BMI ABV UP PARAM F/U: HCPCS | Performed by: FAMILY MEDICINE

## 2019-11-12 PROCEDURE — 4004F PT TOBACCO SCREEN RCVD TLK: CPT | Performed by: FAMILY MEDICINE

## 2019-11-12 RX ORDER — OLOPATADINE HYDROCHLORIDE 1 MG/ML
1 SOLUTION/ DROPS OPHTHALMIC 2 TIMES DAILY PRN
Qty: 1 BOTTLE | Refills: 1 | Status: SHIPPED
Start: 2019-11-12 | End: 2020-10-23 | Stop reason: SDUPTHER

## 2019-11-12 ASSESSMENT — ENCOUNTER SYMPTOMS
COUGH: 0
VOMITING: 0
NAUSEA: 0
DIARRHEA: 0
WHEEZING: 0
SINUS PRESSURE: 0
CONSTIPATION: 0
SHORTNESS OF BREATH: 0
ABDOMINAL PAIN: 0

## 2020-01-26 NOTE — PROGRESS NOTES
[facility] Cardiology Progress Note  Dr. Otilio Lam      Referring Physician: Sommer Medina MD  CHIEF COMPLAINT:   Chief Complaint   Patient presents with    Atrial Fibrillation     6 month check. Pt has no cardiac complaints today       HISTORY OF PRESENT ILLNESS:   Patient 60years old admitted with atrial fibrillation, hypertension, diabetes mellitus, is here for follow-up appointment  Occasional palpitations, denies any chest pain, no lightheadedness or dizziness, no pedal edema, no PND, no orthopnea, no syncope, no presyncopal episodes  Functional capacity is at baseline    Past Medical History:   Diagnosis Date    Hypertension          Past Surgical History:   Procedure Laterality Date    APPENDECTOMY      SHOULDER ARTHROPLASTY      TONSILLECTOMY           Current Outpatient Medications   Medication Sig Dispense Refill    metoprolol succinate (TOPROL XL) 25 MG extended release tablet Take 0.5 tablets by mouth daily 45 tablet 1    metFORMIN (GLUCOPHAGE-XR) 500 MG extended release tablet Take 1 tablet bid for 2 weeks, then increase to 2 tablets bid 120 tablet 1    pravastatin (PRAVACHOL) 40 MG tablet Take 1 tablet by mouth daily 90 tablet 1    Cholecalciferol (VITAMIN D) 2000 units TABS tablet Take 1 tablet by mouth daily 30 tablet 3    amLODIPine (NORVASC) 5 MG tablet Take 1 tablet by mouth daily 30 tablet 3    apixaban (ELIQUIS) 5 MG TABS tablet Take 1 tablet by mouth 2 times daily 180 tablet 1    Blood Pressure KIT XL cuff 1 kit as directed 1 kit 0     No current facility-administered medications for this visit.           Allergies as of 01/31/2020    (No Known Allergies)       Social History     Socioeconomic History    Marital status: Single     Spouse name: Not on file    Number of children: Not on file    Years of education: Not on file    Highest education level: Not on file   Occupational History    Not on file   Social Needs    Financial resource strain: Not on file   [place] insecurity:     Worry: Not on file     Inability: Not on file    Transportation needs:     Medical: Not on file     Non-medical: Not on file   Tobacco Use    Smoking status: Current Every Day Smoker     Packs/day: 1.00     Years: 40.00     Pack years: 40.00     Types: Cigarettes     Start date: 6/4/1979    Smokeless tobacco: Never Used   Substance and Sexual Activity    Alcohol use: Yes     Comment: weekly.  1 cup coffee per day    Drug use: Never    Sexual activity: Not on file   Lifestyle    Physical activity:     Days per week: Not on file     Minutes per session: Not on file    Stress: Not on file   Relationships    Social connections:     Talks on phone: Not on file     Gets together: Not on file     Attends Anabaptism service: Not on file     Active member of club or organization: Not on file     Attends meetings of clubs or organizations: Not on file     Relationship status: Not on file    Intimate partner violence:     Fear of current or ex partner: Not on file     Emotionally abused: Not on file     Physically abused: Not on file     Forced sexual activity: Not on file   Other Topics Concern    Not on file   Social History Narrative    Not on file       Family History   Problem Relation Age of Onset    No Known Problems Mother        REVIEW OF SYSTEMS:     CONSTITUTIONAL:  negative for  fevers, chills, sweats and fatigue  HEENT:  negative for  tinnitus, earaches, nasal congestion and epistaxis  RESPIRATORY:  negative for  dry cough, cough with sputum, dyspnea, wheezing and hemoptysis  GASTROINTESTINAL:  negative for nausea, vomiting, diarrhea, constipation, pruritus and jaundice  HEMATOLOGIC/LYMPHATIC:  negative for easy bruising, bleeding, lymphadenopathy and petechiae  ENDOCRINE:  negative for heat intolerance, cold intolerance, tremor, hair loss and diabetic symptoms including neither polyuria nor polydipsia nor blurred vision  MUSCULOSKELETAL:  negative for  myalgias, arthralgias, joint complications   7. Other obesity due to excess calories :  low fat low calorie diet, increased physical activity and weight loss advised. 8.   Tobacco use :  Patient was counseled regarding smoking cessation    RECOMMENDATIONS:   1. Continue current treatment  2. Increase risk of bleeding due to being on anti-coagulation, symptoms and signs of bleeding discussed with patient, patient was advised to seek medical attention at the earliest symptoms or signs of bleeding. 3.  Preventive cardiology: Low-salt, low-cholesterol diet, daily exercise, adherence to diabetic diet and diabetic medications, smoking cessation were all advised. 4.  Follow-up with  as scheduled  5. Follow-up with Dr. Jacinda Esquivel in 6 months, sooner if symptomatic for any reason    I have reviewed my findings and recommendations with patient    Electronically signed by Axel Humphries MD on 2/4/2020 at 8:45 PM  NOTE: This report was transcribed using voice recognition software.  Every effort was made to ensure accuracy; however, inadvertent computerized transcription errors may be present

## 2020-01-31 ENCOUNTER — OFFICE VISIT (OUTPATIENT)
Dept: CARDIOLOGY CLINIC | Age: 60
End: 2020-01-31
Payer: COMMERCIAL

## 2020-01-31 VITALS
BODY MASS INDEX: 38.36 KG/M2 | DIASTOLIC BLOOD PRESSURE: 76 MMHG | HEIGHT: 76 IN | HEART RATE: 72 BPM | WEIGHT: 315 LBS | SYSTOLIC BLOOD PRESSURE: 128 MMHG

## 2020-01-31 PROCEDURE — 93000 ELECTROCARDIOGRAM COMPLETE: CPT | Performed by: INTERNAL MEDICINE

## 2020-01-31 PROCEDURE — 99214 OFFICE O/P EST MOD 30 MIN: CPT | Performed by: INTERNAL MEDICINE

## 2020-03-06 RX ORDER — AMLODIPINE BESYLATE 5 MG/1
5 TABLET ORAL DAILY
Qty: 90 TABLET | Refills: 0 | Status: SHIPPED
Start: 2020-03-06 | End: 2020-06-15 | Stop reason: SDUPTHER

## 2020-03-06 RX ORDER — CHOLECALCIFEROL (VITAMIN D3) 50 MCG
2000 TABLET ORAL DAILY
Qty: 90 TABLET | Refills: 0 | Status: SHIPPED | OUTPATIENT
Start: 2020-03-06

## 2020-06-12 ENCOUNTER — TELEPHONE (OUTPATIENT)
Dept: FAMILY MEDICINE CLINIC | Age: 60
End: 2020-06-12

## 2020-06-12 RX ORDER — METOPROLOL SUCCINATE 25 MG/1
12.5 TABLET, EXTENDED RELEASE ORAL DAILY
Qty: 15 TABLET | Refills: 0 | Status: SHIPPED
Start: 2020-06-12 | End: 2020-06-15 | Stop reason: SDUPTHER

## 2020-06-12 RX ORDER — PRAVASTATIN SODIUM 40 MG
40 TABLET ORAL DAILY
Qty: 30 TABLET | Refills: 0 | Status: SHIPPED
Start: 2020-06-12 | End: 2020-06-15 | Stop reason: SDUPTHER

## 2020-06-15 ENCOUNTER — VIRTUAL VISIT (OUTPATIENT)
Dept: FAMILY MEDICINE CLINIC | Age: 60
End: 2020-06-15
Payer: COMMERCIAL

## 2020-06-15 PROCEDURE — 99213 OFFICE O/P EST LOW 20 MIN: CPT | Performed by: FAMILY MEDICINE

## 2020-06-15 RX ORDER — AMLODIPINE BESYLATE 5 MG/1
5 TABLET ORAL DAILY
Qty: 30 TABLET | Refills: 3 | Status: SHIPPED
Start: 2020-06-15 | End: 2020-10-13 | Stop reason: SDUPTHER

## 2020-06-15 RX ORDER — PRAVASTATIN SODIUM 40 MG
40 TABLET ORAL DAILY
Qty: 30 TABLET | Refills: 3 | Status: SHIPPED
Start: 2020-06-15 | End: 2020-07-14 | Stop reason: SDUPTHER

## 2020-06-15 RX ORDER — METFORMIN HYDROCHLORIDE 500 MG/1
TABLET, EXTENDED RELEASE ORAL
Qty: 30 TABLET | Refills: 1 | Status: SHIPPED
Start: 2020-06-15 | End: 2020-07-31 | Stop reason: DRUGHIGH

## 2020-06-15 RX ORDER — METOPROLOL SUCCINATE 25 MG/1
12.5 TABLET, EXTENDED RELEASE ORAL DAILY
Qty: 15 TABLET | Refills: 3 | Status: SHIPPED
Start: 2020-06-15 | End: 2020-07-14 | Stop reason: SDUPTHER

## 2020-06-15 ASSESSMENT — PATIENT HEALTH QUESTIONNAIRE - PHQ9
1. LITTLE INTEREST OR PLEASURE IN DOING THINGS: 0
SUM OF ALL RESPONSES TO PHQ9 QUESTIONS 1 & 2: 0
SUM OF ALL RESPONSES TO PHQ QUESTIONS 1-9: 0
SUM OF ALL RESPONSES TO PHQ QUESTIONS 1-9: 0
2. FEELING DOWN, DEPRESSED OR HOPELESS: 0

## 2020-06-15 NOTE — PROGRESS NOTES
kit as directed 1 kit 0       I have reviewed all pertinent PMHx, PSHx, FamHx, SocialHx, medications, and allergies and updated history as appropriate. OBJECTIVE    VS: There were no vitals taken for this visit. Physical Exam  None    ASSESSMENT/PLAN:  1. Essential hypertension  - metoprolol succinate (TOPROL XL) 25 MG extended release tablet; Take 0.5 tablets by mouth daily  Dispense: 15 tablet; Refill: 3  - amLODIPine (NORVASC) 5 MG tablet; Take 1 tablet by mouth daily  Dispense: 30 tablet; Refill: 3  - Comprehensive Metabolic Panel; Future    2. Diabetes mellitus due to underlying condition with hyperglycemia, without long-term current use of insulin (HCC)  a1c 5.9% 10/22/19  - metFORMIN (GLUCOPHAGE-XR) 500 MG extended release tablet; Take 1 tablet qd with meals  Dispense: 30 tablet; Refill: 1  - Hemoglobin A1C; Future    3. Atrial fibrillation, unspecified type (HCC)  - metoprolol succinate (TOPROL XL) 25 MG extended release tablet; Take 0.5 tablets by mouth daily  Dispense: 15 tablet; Refill: 3  - apixaban (ELIQUIS) 5 MG TABS tablet; Take 1 tablet by mouth 2 times daily  Dispense: 60 tablet; Refill: 3  - CBC; Future    4. Mixed hyperlipidemia  - pravastatin (PRAVACHOL) 40 MG tablet; Take 1 tablet by mouth daily  Dispense: 30 tablet; Refill: 3  - Lipid Panel; Future    5. Vitamin D deficiency  - Vitamin D 25 Hydroxy; Future    Dede Corona is a 63 y.o. male evaluated via telephone on 6/15/2020. Consent:  He and/or health care decision maker is aware that that he may receive a bill for this telephone service, depending on his insurance coverage, and has provided verbal consent to proceed: Yes      Documentation:  I communicated with the patient and/or health care decision maker about see note. Details of this discussion including any medical advice provided: see note.       I affirm this is a Patient Initiated Episode with a Patient who has not had a related appointment within my department in the

## 2020-06-27 ASSESSMENT — ENCOUNTER SYMPTOMS
COUGH: 0
SHORTNESS OF BREATH: 0
DIARRHEA: 0
VOMITING: 0
WHEEZING: 0
NAUSEA: 0
ABDOMINAL PAIN: 0
CONSTIPATION: 0

## 2020-07-14 RX ORDER — PRAVASTATIN SODIUM 40 MG
40 TABLET ORAL DAILY
Qty: 30 TABLET | Refills: 3 | Status: SHIPPED
Start: 2020-07-14 | End: 2020-11-11 | Stop reason: SDUPTHER

## 2020-07-14 RX ORDER — METOPROLOL SUCCINATE 25 MG/1
12.5 TABLET, EXTENDED RELEASE ORAL DAILY
Qty: 15 TABLET | Refills: 3 | Status: SHIPPED
Start: 2020-07-14 | End: 2020-11-11 | Stop reason: SDUPTHER

## 2020-07-16 ENCOUNTER — HOSPITAL ENCOUNTER (OUTPATIENT)
Age: 60
Discharge: HOME | End: 2020-07-18
Payer: COMMERCIAL

## 2020-07-16 ENCOUNTER — NURSE ONLY (OUTPATIENT)
Dept: FAMILY MEDICINE CLINIC | Age: 60
End: 2020-07-16
Payer: COMMERCIAL

## 2020-07-16 LAB
ALBUMIN SERPL-MCNC: 4.5 G/DL (ref 3.5–5.2)
ALP BLD-CCNC: 56 U/L (ref 40–129)
ALT SERPL-CCNC: 19 U/L (ref 0–40)
ANION GAP SERPL CALCULATED.3IONS-SCNC: 11 MMOL/L (ref 7–16)
AST SERPL-CCNC: 19 U/L (ref 0–39)
BILIRUB SERPL-MCNC: 0.7 MG/DL (ref 0–1.2)
BUN BLDV-MCNC: 16 MG/DL (ref 8–23)
CALCIUM SERPL-MCNC: 9.6 MG/DL (ref 8.6–10.2)
CHLORIDE BLD-SCNC: 102 MMOL/L (ref 98–107)
CHOLESTEROL, TOTAL: 181 MG/DL (ref 0–199)
CO2: 27 MMOL/L (ref 22–29)
CREAT SERPL-MCNC: 1 MG/DL (ref 0.7–1.2)
GFR AFRICAN AMERICAN: >60
GFR NON-AFRICAN AMERICAN: >60 ML/MIN/1.73
GLUCOSE BLD-MCNC: 95 MG/DL (ref 74–99)
HBA1C MFR BLD: 6.6 % (ref 4–5.6)
HCT VFR BLD CALC: 54.7 % (ref 37–54)
HDLC SERPL-MCNC: 44 MG/DL
HEMOGLOBIN: 17.2 G/DL (ref 12.5–16.5)
LDL CHOLESTEROL CALCULATED: 120 MG/DL (ref 0–99)
MCH RBC QN AUTO: 31.2 PG (ref 26–35)
MCHC RBC AUTO-ENTMCNC: 31.4 % (ref 32–34.5)
MCV RBC AUTO: 99.3 FL (ref 80–99.9)
PDW BLD-RTO: 13.8 FL (ref 11.5–15)
PLATELET # BLD: 194 E9/L (ref 130–450)
PMV BLD AUTO: 11.8 FL (ref 7–12)
POTASSIUM SERPL-SCNC: 4.4 MMOL/L (ref 3.5–5)
RBC # BLD: 5.51 E12/L (ref 3.8–5.8)
SODIUM BLD-SCNC: 140 MMOL/L (ref 132–146)
TOTAL PROTEIN: 7.3 G/DL (ref 6.4–8.3)
TRIGL SERPL-MCNC: 83 MG/DL (ref 0–149)
VITAMIN D 25-HYDROXY: 52 NG/ML (ref 30–100)
VLDLC SERPL CALC-MCNC: 17 MG/DL
WBC # BLD: 7.8 E9/L (ref 4.5–11.5)

## 2020-07-16 PROCEDURE — 36415 COLL VENOUS BLD VENIPUNCTURE: CPT | Performed by: FAMILY MEDICINE

## 2020-07-16 PROCEDURE — 80053 COMPREHEN METABOLIC PANEL: CPT

## 2020-07-16 PROCEDURE — 85027 COMPLETE CBC AUTOMATED: CPT

## 2020-07-16 PROCEDURE — 82306 VITAMIN D 25 HYDROXY: CPT

## 2020-07-16 PROCEDURE — 83036 HEMOGLOBIN GLYCOSYLATED A1C: CPT

## 2020-07-16 PROCEDURE — 80061 LIPID PANEL: CPT

## 2020-07-31 RX ORDER — METFORMIN HYDROCHLORIDE 500 MG/1
TABLET, EXTENDED RELEASE ORAL
Qty: 30 TABLET | Refills: 1 | Status: SHIPPED
Start: 2020-07-31 | End: 2020-08-17 | Stop reason: SDUPTHER

## 2020-08-17 RX ORDER — METFORMIN HYDROCHLORIDE 500 MG/1
TABLET, EXTENDED RELEASE ORAL
Qty: 30 TABLET | Refills: 1 | Status: SHIPPED
Start: 2020-08-17 | End: 2020-09-21 | Stop reason: SDUPTHER

## 2020-08-17 NOTE — TELEPHONE ENCOUNTER
Patient called stating he spoke with pharmacy and they are telling him he has no refills left for Metformin

## 2020-09-10 ENCOUNTER — HOSPITAL ENCOUNTER (EMERGENCY)
Age: 60
Discharge: HOME | End: 2020-09-11
Attending: EMERGENCY MEDICINE
Payer: COMMERCIAL

## 2020-09-10 ENCOUNTER — APPOINTMENT (OUTPATIENT)
Dept: CT IMAGING | Age: 60
End: 2020-09-10
Payer: COMMERCIAL

## 2020-09-10 LAB
ALBUMIN SERPL-MCNC: 4.6 G/DL (ref 3.5–5.2)
ALP BLD-CCNC: 55 U/L (ref 40–129)
ALT SERPL-CCNC: 20 U/L (ref 0–40)
ANION GAP SERPL CALCULATED.3IONS-SCNC: 12 MMOL/L (ref 7–16)
AST SERPL-CCNC: 19 U/L (ref 0–39)
BACTERIA: ABNORMAL /HPF
BASOPHILS ABSOLUTE: 0.09 E9/L (ref 0–0.2)
BASOPHILS RELATIVE PERCENT: 0.6 % (ref 0–2)
BILIRUB SERPL-MCNC: 0.4 MG/DL (ref 0–1.2)
BILIRUBIN URINE: NEGATIVE
BLOOD, URINE: ABNORMAL
BUN BLDV-MCNC: 23 MG/DL (ref 8–23)
CALCIUM SERPL-MCNC: 9.6 MG/DL (ref 8.6–10.2)
CHLORIDE BLD-SCNC: 104 MMOL/L (ref 98–107)
CLARITY: CLEAR
CO2: 25 MMOL/L (ref 22–29)
COLOR: YELLOW
CREAT SERPL-MCNC: 1.4 MG/DL (ref 0.7–1.2)
EOSINOPHILS ABSOLUTE: 0.1 E9/L (ref 0.05–0.5)
EOSINOPHILS RELATIVE PERCENT: 0.7 % (ref 0–6)
GFR AFRICAN AMERICAN: >60
GFR NON-AFRICAN AMERICAN: 51 ML/MIN/1.73
GLUCOSE BLD-MCNC: 151 MG/DL (ref 74–99)
GLUCOSE URINE: NEGATIVE MG/DL
HCT VFR BLD CALC: 51 % (ref 37–54)
HEMOGLOBIN: 17.1 G/DL (ref 12.5–16.5)
IMMATURE GRANULOCYTES #: 0.06 E9/L
IMMATURE GRANULOCYTES %: 0.4 % (ref 0–5)
KETONES, URINE: 15 MG/DL
LEUKOCYTE ESTERASE, URINE: NEGATIVE
LYMPHOCYTES ABSOLUTE: 1.37 E9/L (ref 1.5–4)
LYMPHOCYTES RELATIVE PERCENT: 9.7 % (ref 20–42)
MCH RBC QN AUTO: 32.5 PG (ref 26–35)
MCHC RBC AUTO-ENTMCNC: 33.5 % (ref 32–34.5)
MCV RBC AUTO: 97 FL (ref 80–99.9)
MONOCYTES ABSOLUTE: 0.62 E9/L (ref 0.1–0.95)
MONOCYTES RELATIVE PERCENT: 4.4 % (ref 2–12)
NEUTROPHILS ABSOLUTE: 11.86 E9/L (ref 1.8–7.3)
NEUTROPHILS RELATIVE PERCENT: 84.2 % (ref 43–80)
NITRITE, URINE: NEGATIVE
PDW BLD-RTO: 14 FL (ref 11.5–15)
PH UA: 6 (ref 5–9)
PLATELET # BLD: 182 E9/L (ref 130–450)
PMV BLD AUTO: 10.3 FL (ref 7–12)
POTASSIUM REFLEX MAGNESIUM: 4.6 MMOL/L (ref 3.5–5)
PROTEIN UA: ABNORMAL MG/DL
RBC # BLD: 5.26 E12/L (ref 3.8–5.8)
RBC UA: ABNORMAL /HPF (ref 0–2)
SODIUM BLD-SCNC: 141 MMOL/L (ref 132–146)
SPECIFIC GRAVITY UA: 1.02 (ref 1–1.03)
TOTAL PROTEIN: 7.6 G/DL (ref 6.4–8.3)
UROBILINOGEN, URINE: 1 E.U./DL
WBC # BLD: 14.1 E9/L (ref 4.5–11.5)
WBC UA: ABNORMAL /HPF (ref 0–5)

## 2020-09-10 PROCEDURE — 85025 COMPLETE CBC W/AUTO DIFF WBC: CPT

## 2020-09-10 PROCEDURE — 80053 COMPREHEN METABOLIC PANEL: CPT

## 2020-09-10 PROCEDURE — 6360000002 HC RX W HCPCS: Performed by: EMERGENCY MEDICINE

## 2020-09-10 PROCEDURE — 74176 CT ABD & PELVIS W/O CONTRAST: CPT

## 2020-09-10 PROCEDURE — 36415 COLL VENOUS BLD VENIPUNCTURE: CPT

## 2020-09-10 PROCEDURE — 2580000003 HC RX 258: Performed by: EMERGENCY MEDICINE

## 2020-09-10 PROCEDURE — 6370000000 HC RX 637 (ALT 250 FOR IP)

## 2020-09-10 PROCEDURE — 2500000003 HC RX 250 WO HCPCS: Performed by: EMERGENCY MEDICINE

## 2020-09-10 PROCEDURE — 96374 THER/PROPH/DIAG INJ IV PUSH: CPT

## 2020-09-10 PROCEDURE — 99284 EMERGENCY DEPT VISIT MOD MDM: CPT

## 2020-09-10 PROCEDURE — 96375 TX/PRO/DX INJ NEW DRUG ADDON: CPT

## 2020-09-10 PROCEDURE — 81001 URINALYSIS AUTO W/SCOPE: CPT

## 2020-09-10 RX ORDER — OXYCODONE HYDROCHLORIDE AND ACETAMINOPHEN 5; 325 MG/1; MG/1
1 TABLET ORAL EVERY 6 HOURS PRN
Qty: 10 TABLET | Refills: 0 | Status: SHIPPED | OUTPATIENT
Start: 2020-09-10 | End: 2020-09-13

## 2020-09-10 RX ORDER — TAMSULOSIN HYDROCHLORIDE 0.4 MG/1
0.4 CAPSULE ORAL DAILY
Status: DISCONTINUED | OUTPATIENT
Start: 2020-09-11 | End: 2020-09-11 | Stop reason: HOSPADM

## 2020-09-10 RX ORDER — TAMSULOSIN HYDROCHLORIDE 0.4 MG/1
CAPSULE ORAL
Status: COMPLETED
Start: 2020-09-10 | End: 2020-09-10

## 2020-09-10 RX ORDER — FAMOTIDINE 20 MG/1
20 TABLET, FILM COATED ORAL 2 TIMES DAILY
Qty: 14 TABLET | Refills: 0 | Status: SHIPPED | OUTPATIENT
Start: 2020-09-10 | End: 2020-11-11

## 2020-09-10 RX ORDER — ONDANSETRON 4 MG/1
8 TABLET, ORALLY DISINTEGRATING ORAL EVERY 8 HOURS PRN
Qty: 20 TABLET | Refills: 0 | Status: SHIPPED | OUTPATIENT
Start: 2020-09-10 | End: 2020-11-11

## 2020-09-10 RX ORDER — KETOROLAC TROMETHAMINE 30 MG/ML
30 INJECTION, SOLUTION INTRAMUSCULAR; INTRAVENOUS ONCE
Status: COMPLETED | OUTPATIENT
Start: 2020-09-10 | End: 2020-09-10

## 2020-09-10 RX ORDER — NAPROXEN 500 MG/1
500 TABLET ORAL 2 TIMES DAILY
Qty: 14 TABLET | Refills: 0 | Status: SHIPPED | OUTPATIENT
Start: 2020-09-10 | End: 2020-11-11

## 2020-09-10 RX ORDER — ONDANSETRON 2 MG/ML
4 INJECTION INTRAMUSCULAR; INTRAVENOUS ONCE
Status: COMPLETED | OUTPATIENT
Start: 2020-09-10 | End: 2020-09-10

## 2020-09-10 RX ORDER — 0.9 % SODIUM CHLORIDE 0.9 %
1000 INTRAVENOUS SOLUTION INTRAVENOUS ONCE
Status: COMPLETED | OUTPATIENT
Start: 2020-09-10 | End: 2020-09-11

## 2020-09-10 RX ORDER — TAMSULOSIN HYDROCHLORIDE 0.4 MG/1
0.4 CAPSULE ORAL DAILY
Qty: 7 CAPSULE | Refills: 0 | Status: SHIPPED | OUTPATIENT
Start: 2020-09-10 | End: 2020-11-11

## 2020-09-10 RX ADMIN — TAMSULOSIN HYDROCHLORIDE 0.4 MG: 0.4 CAPSULE ORAL at 23:52

## 2020-09-10 RX ADMIN — FAMOTIDINE 20 MG: 10 INJECTION, SOLUTION INTRAVENOUS at 22:46

## 2020-09-10 RX ADMIN — SODIUM CHLORIDE 1000 ML: 9 INJECTION, SOLUTION INTRAVENOUS at 22:07

## 2020-09-10 RX ADMIN — KETOROLAC TROMETHAMINE 30 MG: 30 INJECTION, SOLUTION INTRAMUSCULAR at 22:16

## 2020-09-10 RX ADMIN — ONDANSETRON 4 MG: 2 INJECTION INTRAMUSCULAR; INTRAVENOUS at 22:46

## 2020-09-10 ASSESSMENT — PAIN SCALES - GENERAL
PAINLEVEL_OUTOF10: 9
PAINLEVEL_OUTOF10: 9

## 2020-09-10 ASSESSMENT — PAIN DESCRIPTION - ORIENTATION: ORIENTATION: LEFT

## 2020-09-10 ASSESSMENT — PAIN DESCRIPTION - LOCATION: LOCATION: FLANK

## 2020-09-11 VITALS
RESPIRATION RATE: 16 BRPM | HEART RATE: 88 BPM | WEIGHT: 315 LBS | OXYGEN SATURATION: 98 % | BODY MASS INDEX: 38.36 KG/M2 | DIASTOLIC BLOOD PRESSURE: 98 MMHG | HEIGHT: 76 IN | SYSTOLIC BLOOD PRESSURE: 172 MMHG | TEMPERATURE: 98.3 F

## 2020-09-11 PROCEDURE — 6370000000 HC RX 637 (ALT 250 FOR IP): Performed by: EMERGENCY MEDICINE

## 2020-09-11 RX ORDER — OXYCODONE HYDROCHLORIDE AND ACETAMINOPHEN 5; 325 MG/1; MG/1
1 TABLET ORAL ONCE
Status: COMPLETED | OUTPATIENT
Start: 2020-09-11 | End: 2020-09-11

## 2020-09-11 RX ORDER — OXYCODONE HYDROCHLORIDE AND ACETAMINOPHEN 5; 325 MG/1; MG/1
TABLET ORAL
Status: DISCONTINUED
Start: 2020-09-11 | End: 2020-09-11 | Stop reason: HOSPADM

## 2020-09-11 RX ADMIN — OXYCODONE AND ACETAMINOPHEN 1 TABLET: 5; 325 TABLET ORAL at 00:22

## 2020-09-11 ASSESSMENT — PAIN SCALES - GENERAL
PAINLEVEL_OUTOF10: 5
PAINLEVEL_OUTOF10: 5

## 2020-09-11 NOTE — ED PROVIDER NOTES
HPI:  9/10/20, Time: 11:52 PM EDT        Jimi Marques is a 63 y.o. male presenting to the ED for L flank pain, beginning 1 day ago. The complaint has been persistent, severe in severity, and worsened by nothing. Patient did have nausea and vomiting x1 episode prior to arrival.  Patient is not had any hematemesis, no melena hematochezia no diarrhea associated. No change in bladder patterns. No chest pain or shortness breath no other complaints. Patient rates his left leg pain at 9/10 severity. No relieving factors. Review of Systems:   Pertinent positives and negatives are stated within HPI, all other systems reviewed and are negative.      --------------------------------------------- PAST HISTORY ---------------------------------------------  Past Medical History:  has a past medical history of Hypertension and Irregular heart beat. Past Surgical History:  has a past surgical history that includes Total shoulder arthroplasty; Appendectomy; and Tonsillectomy. Social History:  reports that he has been smoking cigarettes. He started smoking about 41 years ago. He has a 40.00 pack-year smoking history. He has never used smokeless tobacco. He reports current alcohol use. He reports that he does not use drugs. Family History: family history includes No Known Problems in his mother. The patients home medications have been reviewed. Allergies: Patient has no known allergies.     -------------------------------------------------- RESULTS -------------------------------------------------  All laboratory and radiology results have been personally reviewed by myself   LABS:  Results for orders placed or performed during the hospital encounter of 09/10/20   CBC Auto Differential   Result Value Ref Range    WBC 14.1 (H) 4.5 - 11.5 E9/L    RBC 5.26 3.80 - 5.80 E12/L    Hemoglobin 17.1 (H) 12.5 - 16.5 g/dL    Hematocrit 51.0 37.0 - 54.0 %    MCV 97.0 80.0 - 99.9 fL    MCH 32.5 26.0 - 35.0 pg    MCHC 33.5 32.0 - 34.5 %    RDW 14.0 11.5 - 15.0 fL    Platelets 426 909 - 854 E9/L    MPV 10.3 7.0 - 12.0 fL    Neutrophils % 84.2 (H) 43.0 - 80.0 %    Immature Granulocytes % 0.4 0.0 - 5.0 %    Lymphocytes % 9.7 (L) 20.0 - 42.0 %    Monocytes % 4.4 2.0 - 12.0 %    Eosinophils % 0.7 0.0 - 6.0 %    Basophils % 0.6 0.0 - 2.0 %    Neutrophils Absolute 11.86 (H) 1.80 - 7.30 E9/L    Immature Granulocytes # 0.06 E9/L    Lymphocytes Absolute 1.37 (L) 1.50 - 4.00 E9/L    Monocytes Absolute 0.62 0.10 - 0.95 E9/L    Eosinophils Absolute 0.10 0.05 - 0.50 E9/L    Basophils Absolute 0.09 0.00 - 0.20 E9/L   Comprehensive Metabolic Panel w/ Reflex to MG   Result Value Ref Range    Sodium 141 132 - 146 mmol/L    Potassium reflex Magnesium 4.6 3.5 - 5.0 mmol/L    Chloride 104 98 - 107 mmol/L    CO2 25 22 - 29 mmol/L    Anion Gap 12 7 - 16 mmol/L    Glucose 151 (H) 74 - 99 mg/dL    BUN 23 8 - 23 mg/dL    CREATININE 1.4 (H) 0.7 - 1.2 mg/dL    GFR Non-African American 51 >=60 mL/min/1.73    GFR African American >60     Calcium 9.6 8.6 - 10.2 mg/dL    Total Protein 7.6 6.4 - 8.3 g/dL    Alb 4.6 3.5 - 5.2 g/dL    Total Bilirubin 0.4 0.0 - 1.2 mg/dL    Alkaline Phosphatase 55 40 - 129 U/L    ALT 20 0 - 40 U/L    AST 19 0 - 39 U/L   Urinalysis, reflex to microscopic   Result Value Ref Range    Color, UA Yellow Straw/Yellow    Clarity, UA Clear Clear    Glucose, Ur Negative Negative mg/dL    Bilirubin Urine Negative Negative    Ketones, Urine 15 (A) Negative mg/dL    Specific Gravity, UA 1.025 1.005 - 1.030    Blood, Urine LARGE (A) Negative    pH, UA 6.0 5.0 - 9.0    Protein, UA TRACE Negative mg/dL    Urobilinogen, Urine 1.0 <2.0 E.U./dL    Nitrite, Urine Negative Negative    Leukocyte Esterase, Urine Negative Negative   Microscopic Urinalysis   Result Value Ref Range    WBC, UA NONE 0 - 5 /HPF    RBC, UA 10-20 (A) 0 - 2 /HPF    Bacteria, UA RARE (A) None Seen /HPF       RADIOLOGY:  Interpreted by Johnathan Espino Additional Contrast? None   Final Result      1. Calculus measuring 3 mm is present within distal left ureter with   left perinephric edema and minimal left hydronephrosis. 2. Multiple cysts are associated with the left kidney measuring up to   8.9 x 7 cm. Ultrasound could be helpful for further characterization. 3. Diverticulosis without evidence of acute diverticulitis. 4. Fusiform infrarenal abdominal aortic aneurysm is present measuring   up to 3.7 cm. Follow-up recommended as clinically indicated. ------------------------- NURSING NOTES AND VITALS REVIEWED ---------------------------  The nursing notes within the ED encounter and vital signs as below have been reviewed. BP (!) 142/98   Pulse 118   Temp 98.3 °F (36.8 °C) (Infrared)   Resp 18   Ht 6' 3.5\" (1.918 m)   Wt (!) 329 lb (149.2 kg)   SpO2 98%   BMI 40.58 kg/m²   Oxygen Saturation Interpretation: Normal      ---------------------------------------------------PHYSICAL EXAM--------------------------------------      Constitutional/General: Alert and oriented x3, well appearing, non toxic in moderate distress  Head: Normocephalic and atraumatic  Eyes: PERRL, EOMI  Mouth: Oropharynx clear, handling secretions, no trismus  Neck: Supple, full ROM,   Pulmonary: Lungs clear to auscultation bilaterally, no wheezes, rales, or rhonchi. Not in respiratory distress  Cardiovascular:  Regular rate and rhythm, no murmurs, gallops, or rubs. 2+ distal pulses  Abdomen: Soft, non tender, obesely distended, no rebound tenderness no. Guarding no rigidity normal active bowel sounds. No CVA tenderness  Extremities: Moves all extremities x 4. Warm and well perfused  Skin: warm and dry without rash  Neurologic: GCS 15, cranial nerves II through XII grossly intact with no focal neurologic deficits.   No meningeal signs  Psych: Normal Affect      ------------------------------ ED COURSE/MEDICAL DECISION MAKING----------------------  Medications tamsulosin (FLOMAX) capsule 0.4 mg (0.4 mg Oral Given 9/10/20 2352)   0.9 % sodium chloride bolus (1,000 mLs Intravenous New Bag 9/10/20 2207)   ketorolac (TORADOL) injection 30 mg (30 mg Intravenous Given 9/10/20 2216)   ondansetron (ZOFRAN) injection 4 mg (4 mg Intravenous Given 9/10/20 2246)   famotidine (PEPCID) injection 20 mg (20 mg Intravenous Given 9/10/20 2246)       ED COURSE:     Medical Decision Making:   Differential Diagnoses:  UTI/pyelonephritis, ureteral calculus, diverticulitis, mesenteric adenitis, bowel obstruction, to name a few. CT study did show findings of an obstructing left ureteral calculus. Patient's pain is improved with meds administered here in the department and he is given referral to urologist for outpatient follow-up    Counseling: The emergency provider has spoken with the patient and spouse/SO and discussed todays results, in addition to providing specific details for the plan of care and counseling regarding the diagnosis and prognosis. Questions are answered at this time and they are agreeable with the plan. Controlled Substance Monitoring:  Acute and Chronic Pain Monitoring:   RX Monitoring 9/10/2020   Periodic Controlled Substance Monitoring No signs of potential drug abuse or diversion identified.         --------------------------------- IMPRESSION AND DISPOSITION ---------------------------------    IMPRESSION  1. Kidney stone        DISPOSITION  Disposition: Discharge to home  Patient condition is stable      NOTE: This report was transcribed using voice recognition software.  Every effort was made to ensure accuracy; however, inadvertent computerized transcription errors may be present        Ingrid Shaffer MD  09/10/20 [address] [facility],[address] [facility], [place]  09/10/20 [id number]

## 2020-09-21 ENCOUNTER — TELEPHONE (OUTPATIENT)
Dept: FAMILY MEDICINE CLINIC | Age: 60
End: 2020-09-21

## 2020-09-21 RX ORDER — METFORMIN HYDROCHLORIDE 500 MG/1
TABLET, EXTENDED RELEASE ORAL
Qty: 180 TABLET | Refills: 0 | Status: SHIPPED
Start: 2020-09-21 | End: 2020-12-16 | Stop reason: SDUPTHER

## 2020-10-13 RX ORDER — AMLODIPINE BESYLATE 5 MG/1
5 TABLET ORAL DAILY
Qty: 90 TABLET | Refills: 0 | Status: SHIPPED
Start: 2020-10-13 | End: 2021-01-11 | Stop reason: SDUPTHER

## 2020-10-24 RX ORDER — OLOPATADINE HYDROCHLORIDE 1 MG/ML
1 SOLUTION/ DROPS OPHTHALMIC 2 TIMES DAILY PRN
Qty: 5 ML | Refills: 1 | Status: SHIPPED
Start: 2020-10-24 | End: 2021-02-04 | Stop reason: CLARIF

## 2020-10-29 ENCOUNTER — TELEPHONE (OUTPATIENT)
Dept: FAMILY MEDICINE CLINIC | Age: 60
End: 2020-10-29

## 2020-10-29 NOTE — TELEPHONE ENCOUNTER
Patient called stating he has not taken Eliquis for 2 days. Patient states medication needs a prior authorization.  Patient wants to know if it is ok that he has not taken

## 2020-10-30 NOTE — TELEPHONE ENCOUNTER
-This MA attempted to return call to pt - no answer. This MA left message for pt advising per Dr. Jesse Davis he must continue taking the Eliquis and we will initiate the PA for an urgent approval.     -PA submitted though CoverMyMeds.    -This MA then spoke with Arturo at [address] who was able to verify the Rx was covered on their end. They will fill the Rx ASAP. -This MA then attempted to return call to pt. No answer. This MA left message for pt advising the Rx PA was approved and confirmed with the pharmacy. Advised his pharmacy is preparing his Rx and will have it available ASAP for him.      Shannen Mariano     Devine: [id number]    PA Case ID: [id number]    Outcome  Approved  - today    [id number]    Status:Approved    Review Type:Prior Auth    Coverage Start Date:09/30/2020    Coverage End Date:10/30/2021    Drug  Eliquis 5MG OR TABS    Form  [employer] Electronic PA Form    Electronically signed by Zaynab Wolfe MA on 10/30/20 at 3:27 PM EDT

## 2020-10-30 NOTE — TELEPHONE ENCOUNTER
No  He needs to take to prevent a heart attack or stroke  Get on eliquis or we will have to give him coumadin

## 2020-11-03 NOTE — TELEPHONE ENCOUNTER
PA completed. See other telephone encounter.     Electronically signed by Lisbeth Alonso MA on 11/3/20 at 7:41 AM EST

## 2020-11-11 ENCOUNTER — OFFICE VISIT (OUTPATIENT)
Dept: CARDIOLOGY CLINIC | Age: 60
End: 2020-11-11
Payer: COMMERCIAL

## 2020-11-11 VITALS
BODY MASS INDEX: 42.66 KG/M2 | WEIGHT: 315 LBS | SYSTOLIC BLOOD PRESSURE: 124 MMHG | DIASTOLIC BLOOD PRESSURE: 84 MMHG | HEART RATE: 66 BPM | HEIGHT: 72 IN

## 2020-11-11 PROCEDURE — 99214 OFFICE O/P EST MOD 30 MIN: CPT | Performed by: INTERNAL MEDICINE

## 2020-11-11 PROCEDURE — G8427 DOCREV CUR MEDS BY ELIG CLIN: HCPCS | Performed by: INTERNAL MEDICINE

## 2020-11-11 PROCEDURE — G8484 FLU IMMUNIZE NO ADMIN: HCPCS | Performed by: INTERNAL MEDICINE

## 2020-11-11 PROCEDURE — G8417 CALC BMI ABV UP PARAM F/U: HCPCS | Performed by: INTERNAL MEDICINE

## 2020-11-11 PROCEDURE — 4004F PT TOBACCO SCREEN RCVD TLK: CPT | Performed by: INTERNAL MEDICINE

## 2020-11-11 PROCEDURE — 93000 ELECTROCARDIOGRAM COMPLETE: CPT | Performed by: INTERNAL MEDICINE

## 2020-11-11 PROCEDURE — 3017F COLORECTAL CA SCREEN DOC REV: CPT | Performed by: INTERNAL MEDICINE

## 2020-11-11 RX ORDER — METOPROLOL SUCCINATE 25 MG/1
12.5 TABLET, EXTENDED RELEASE ORAL DAILY
Qty: 15 TABLET | Refills: 3 | Status: SHIPPED
Start: 2020-11-11 | End: 2021-02-04 | Stop reason: SDUPTHER

## 2020-11-11 RX ORDER — SILDENAFIL 100 MG/1
100 TABLET, FILM COATED ORAL PRN
Qty: 30 TABLET | Refills: 3 | Status: SHIPPED
Start: 2020-11-11 | End: 2020-11-11 | Stop reason: DRUGHIGH

## 2020-11-11 RX ORDER — SILDENAFIL 100 MG/1
100 TABLET, FILM COATED ORAL PRN
Qty: 30 TABLET | Refills: 3 | Status: SHIPPED | OUTPATIENT
Start: 2020-11-11

## 2020-11-11 RX ORDER — PRAVASTATIN SODIUM 40 MG
40 TABLET ORAL DAILY
Qty: 30 TABLET | Refills: 3 | Status: SHIPPED
Start: 2020-11-11 | End: 2021-02-04 | Stop reason: SDUPTHER

## 2020-11-11 RX ORDER — SILDENAFIL 100 MG/1
100 TABLET, FILM COATED ORAL PRN
Qty: 30 TABLET | Refills: 3 | Status: SHIPPED | OUTPATIENT
Start: 2020-11-11 | End: 2020-11-11 | Stop reason: SDUPTHER

## 2020-11-11 RX ORDER — SILDENAFIL 100 MG/1
100 TABLET, FILM COATED ORAL PRN
Qty: 30 TABLET | Refills: 3 | Status: SHIPPED | OUTPATIENT
Start: 2020-11-11 | End: 2020-11-11 | Stop reason: DRUGHIGH

## 2020-11-11 NOTE — PROGRESS NOTES
[facility] Cardiology Progress Note  Dr. Can Chen      Referring Physician: Homa Callaway MD  CHIEF COMPLAINT:   Chief Complaint   Patient presents with    Atrial Fibrillation     6 month check . Patient denies any cp sob or dizziness. Kidney stones went to ER. HISTORY OF PRESENT ILLNESS:   Patient 63years old admitted with atrial fibrillation, hypertension, diabetes mellitus, is here for follow-up appointment  Occasional palpitations, denies any chest pain, no lightheadedness or dizziness, no pedal edema, no PND, no orthopnea, no syncope, no presyncopal episodes  Complains of erectile dysfunction    Functional capacity is at baseline    Past Medical History:   Diagnosis Date    Hypertension     Irregular heart beat          Past Surgical History:   Procedure Laterality Date    APPENDECTOMY      SHOULDER ARTHROPLASTY      TONSILLECTOMY           Current Outpatient Medications   Medication Sig Dispense Refill    apixaban (ELIQUIS) 5 MG TABS tablet Take 1 tablet by mouth 2 times daily 180 tablet 0    olopatadine (PATANOL) 0.1 % ophthalmic solution Place 1 drop into both eyes 2 times daily as needed for Allergies 5 mL 1    amLODIPine (NORVASC) 5 MG tablet Take 1 tablet by mouth daily 90 tablet 0    metFORMIN (GLUCOPHAGE-XR) 500 MG extended release tablet Take 1 tablet BID with meals 180 tablet 0    metoprolol succinate (TOPROL XL) 25 MG extended release tablet Take 0.5 tablets by mouth daily 15 tablet 3    pravastatin (PRAVACHOL) 40 MG tablet Take 1 tablet by mouth daily 30 tablet 3    Cholecalciferol (VITAMIN D) 50 MCG (2000 UT) TABS tablet Take 1 tablet by mouth daily 90 tablet 0    Blood Pressure KIT XL cuff 1 kit as directed 1 kit 0     No current facility-administered medications for this visit.           Allergies as of 11/11/2020    (No Known Allergies)       Social History     Socioeconomic History    Marital status: Single     Spouse name: Not on file    Number of children: Not cold intolerance, tremor, hair loss and diabetic symptoms including neither polyuria nor polydipsia nor blurred vision  MUSCULOSKELETAL:  negative for  myalgias, arthralgias, joint swelling, stiff joints and decreased range of motion  NEUROLOGICAL:  negative for memory problems, speech problems, visual disturbance, dysphagia, weakness and numbness      PHYSICAL EXAM:   Constitutional:  Awake, alert cooperative, no apparent distress, and appears stated age. HEENT:  Moist and pink mucous membranes, normocephalic, without obvious abnormality, atraumatic, normal ears and nose. NECK:  Supple, symmetrical, trachea midline, no JVD, no adenopathy, thyroid symmetric, not enlarged and no tenderness, good carotid upstroke bilaterally, no carotid bruit, skin normal.  LUNGS: No increased work of breathing, good air exchange, clear to auscultation bilaterally, no crackles or wheezing. Cardiovascular: Normal apical impulse, irregularly irregular, normal S1 and S2, no S3, 3/6 systolic murmur at the apex, 3/6 systolic murmur at the left lower sternal border, no pedal edema, good carotid upstroke bilaterally, no carotid bruit, no JVD, no abdominal pulsating masses. ABDOMEN: Soft, nontender, no hepatomegaly, no splenomegaly, bowel sound positive. CHEST:  Expands symmetrically, nontender to palpation. Musculoskeletal:  No clubbing or cyanosis. No redness, warmth, or swelling of the joints. Neurological: Alert, awake, and oriented X3   SKIN: No bruises, no bleeding, normal skin color, texture, turgor and no redness, warmth or swelling.       /84 (Site: Right Upper Arm, Position: Sitting, Cuff Size: Medium Adult)   Pulse 66   Ht 6' (1.829 m)   Wt (!) 325 lb (147.4 kg)   BMI 44.08 kg/m²     DATA:  I personally reviewed the visit EKG with the following interpretation: Atrial fibrillation, controlled ventricular response      EKG 1/31/20 Atrial fibrillation with controlled ventricular response    ECHO: 6/25/19 Normal left ventricular systolic function with stage II diastolic dysfunction. Mild to moderate Mitral Regurgitation. Mild Tricuspid Regurgitation with Mild Pulmonary HTN. Mild LVH    Cardiology Labs: BMP:    Lab Results   Component Value Date     09/10/2020    K 4.6 09/10/2020     09/10/2020    CO2 25 09/10/2020    BUN 23 09/10/2020    CREATININE 1.4 09/10/2020     CMP:    Lab Results   Component Value Date     09/10/2020    K 4.6 09/10/2020     09/10/2020    CO2 25 09/10/2020    BUN 23 09/10/2020    CREATININE 1.4 09/10/2020    PROT 7.6 09/10/2020     CBC:    Lab Results   Component Value Date    WBC 14.1 09/10/2020    RBC 5.26 09/10/2020    HGB 17.1 09/10/2020    HCT 51.0 09/10/2020    MCV 97.0 09/10/2020    RDW 14.0 09/10/2020     09/10/2020     PT/INR:  No results found for: PTINR  PT/INR Warfarin:  No components found for: PTPATWAR, PTINRWAR  PTT:  No results found for: APTT  PTT Heparin:  No components found for: APTTHEP  Magnesium:  No results found for: MG  TSH:    Lab Results   Component Value Date    TSH 2.060 05/14/2019     TROPONIN:  No components found for: TROP  BNP:  No results found for: BNP  FASTING LIPID PANEL:    Lab Results   Component Value Date    CHOL 181 07/16/2020    HDL 44 07/16/2020    TRIG 83 07/16/2020     No orders to display     I have personally reviewed the laboratory, cardiac diagnostic and radiographic testing as outlined above:      IMPRESSION:  1. Persistent atrial fibrillation :  controlled ventricular response, [id number] vasc score 2, would benefit from chronic anticoagulation for probable prevention against thromboembolic event, will start on Eliquis  2. Mild to moderate mitral valve regurgitation  3. Mild tricuspid valve regurgitation  4. Essential (primary) hypertension:  controlled, continue current treatment. 5. Hyperlipidemia, unspecified   6. Type 2 diabetes mellitus without complications   7. Erectile dysfunction: will start Viagra  8. Tobacco use :  Patient was counseled regarding smoking cessation    RECOMMENDATIONS:   1. Continue current treatment  2. Sildenafil 100 mg, by mouth daily as needed  3. Increase risk of bleeding due to being on anti-coagulation, symptoms and signs of bleeding discussed with patient, patient was advised to seek medical attention at the earliest symptoms or signs of bleeding. 4.  Preventive cardiology: Low-salt, low-cholesterol diet, daily exercise, adherence to diabetic diet and diabetic medications, smoking cessation were all advised. 5.  Follow-up with  as scheduled  6. Follow-up with Dr. Severiano Hoe in 6 months, sooner if symptomatic for any reason    I have reviewed my findings and recommendations with patient    Electronically signed by Mandy Ferrell MD on 11/11/2020 at 9:05 AM  NOTE: This report was transcribed using voice recognition software.  Every effort was made to ensure accuracy; however, inadvertent computerized transcription errors may be present

## 2020-12-16 RX ORDER — METFORMIN HYDROCHLORIDE 500 MG/1
TABLET, EXTENDED RELEASE ORAL
Qty: 60 TABLET | Refills: 0 | Status: SHIPPED
Start: 2020-12-16 | End: 2021-01-11 | Stop reason: SDUPTHER

## 2020-12-16 NOTE — TELEPHONE ENCOUNTER
Patient called the clinical care line requesting medication refill. Chart reviewed and medication sent to the pharmacy.   Electronically signed by Gómez Cornejo on 12/16/2020 at 3:24 PM

## 2021-01-11 DIAGNOSIS — I10 ESSENTIAL HYPERTENSION: ICD-10-CM

## 2021-01-11 DIAGNOSIS — E08.65 DIABETES MELLITUS DUE TO UNDERLYING CONDITION WITH HYPERGLYCEMIA, WITHOUT LONG-TERM CURRENT USE OF INSULIN (HCC): ICD-10-CM

## 2021-01-11 RX ORDER — METFORMIN HYDROCHLORIDE 500 MG/1
TABLET, EXTENDED RELEASE ORAL
Qty: 60 TABLET | Refills: 0 | Status: SHIPPED
Start: 2021-01-11 | End: 2021-02-04 | Stop reason: SDUPTHER

## 2021-01-11 RX ORDER — AMLODIPINE BESYLATE 5 MG/1
5 TABLET ORAL DAILY
Qty: 30 TABLET | Refills: 0 | Status: SHIPPED
Start: 2021-01-11 | End: 2021-02-04 | Stop reason: SDUPTHER

## 2021-01-11 NOTE — TELEPHONE ENCOUNTER
Patient called the clinical care line requesting medication refill. Chart reviewed and medication sent to the pharmacy.   Electronically signed by Allen Hubbard on 1/11/2021 at 3:41 PM

## 2021-02-01 DIAGNOSIS — I48.91 ATRIAL FIBRILLATION, UNSPECIFIED TYPE (HCC): ICD-10-CM

## 2021-02-04 ENCOUNTER — OFFICE VISIT (OUTPATIENT)
Dept: FAMILY MEDICINE CLINIC | Age: 61
End: 2021-02-04
Payer: COMMERCIAL

## 2021-02-04 VITALS
DIASTOLIC BLOOD PRESSURE: 92 MMHG | BODY MASS INDEX: 42.66 KG/M2 | HEART RATE: 78 BPM | OXYGEN SATURATION: 95 % | RESPIRATION RATE: 18 BRPM | SYSTOLIC BLOOD PRESSURE: 158 MMHG | HEIGHT: 72 IN | WEIGHT: 315 LBS | TEMPERATURE: 98.6 F

## 2021-02-04 DIAGNOSIS — I10 ESSENTIAL HYPERTENSION: ICD-10-CM

## 2021-02-04 DIAGNOSIS — N28.1 KIDNEY CYSTS: ICD-10-CM

## 2021-02-04 DIAGNOSIS — E78.2 MIXED HYPERLIPIDEMIA: ICD-10-CM

## 2021-02-04 DIAGNOSIS — I71.9 AORTIC ANEURYSM WITHOUT RUPTURE, UNSPECIFIED PORTION OF AORTA (HCC): ICD-10-CM

## 2021-02-04 DIAGNOSIS — K40.20 NON-RECURRENT BILATERAL INGUINAL HERNIA WITHOUT OBSTRUCTION OR GANGRENE: ICD-10-CM

## 2021-02-04 DIAGNOSIS — F17.200 NICOTINE DEPENDENCE, UNCOMPLICATED, UNSPECIFIED NICOTINE PRODUCT TYPE: ICD-10-CM

## 2021-02-04 DIAGNOSIS — Z87.442 HISTORY OF KIDNEY STONES: ICD-10-CM

## 2021-02-04 DIAGNOSIS — N52.9 ERECTILE DYSFUNCTION, UNSPECIFIED ERECTILE DYSFUNCTION TYPE: ICD-10-CM

## 2021-02-04 DIAGNOSIS — I48.91 ATRIAL FIBRILLATION, UNSPECIFIED TYPE (HCC): Primary | ICD-10-CM

## 2021-02-04 DIAGNOSIS — E08.65 DIABETES MELLITUS DUE TO UNDERLYING CONDITION WITH HYPERGLYCEMIA, WITHOUT LONG-TERM CURRENT USE OF INSULIN (HCC): ICD-10-CM

## 2021-02-04 LAB
ALBUMIN SERPL-MCNC: 4.3 G/DL (ref 3.5–5.2)
ALP BLD-CCNC: 50 U/L (ref 40–129)
ALT SERPL-CCNC: 15 U/L (ref 0–40)
ANION GAP SERPL CALCULATED.3IONS-SCNC: 14 MMOL/L (ref 7–16)
AST SERPL-CCNC: 13 U/L (ref 0–39)
BASOPHILS ABSOLUTE: 0.08 E9/L (ref 0–0.2)
BASOPHILS RELATIVE PERCENT: 1.3 % (ref 0–2)
BILIRUB SERPL-MCNC: 0.5 MG/DL (ref 0–1.2)
BUN BLDV-MCNC: 16 MG/DL (ref 8–23)
CALCIUM SERPL-MCNC: 9.4 MG/DL (ref 8.6–10.2)
CHLORIDE BLD-SCNC: 103 MMOL/L (ref 98–107)
CHOLESTEROL, TOTAL: 171 MG/DL (ref 0–199)
CHP ED QC CHECK: NORMAL
CO2: 25 MMOL/L (ref 22–29)
CREAT SERPL-MCNC: 1 MG/DL (ref 0.7–1.2)
EOSINOPHILS ABSOLUTE: 0.18 E9/L (ref 0.05–0.5)
EOSINOPHILS RELATIVE PERCENT: 2.8 % (ref 0–6)
GFR AFRICAN AMERICAN: >60
GFR NON-AFRICAN AMERICAN: >60 ML/MIN/1.73
GLUCOSE BLD-MCNC: 157 MG/DL (ref 74–99)
GLUCOSE BLD-MCNC: 193 MG/DL
HBA1C MFR BLD: 6.5 %
HCT VFR BLD CALC: 52.1 % (ref 37–54)
HDLC SERPL-MCNC: 48 MG/DL
HEMOGLOBIN: 16.4 G/DL (ref 12.5–16.5)
IMMATURE GRANULOCYTES #: 0.01 E9/L
IMMATURE GRANULOCYTES %: 0.2 % (ref 0–5)
LDL CHOLESTEROL CALCULATED: 110 MG/DL (ref 0–99)
LYMPHOCYTES ABSOLUTE: 1.58 E9/L (ref 1.5–4)
LYMPHOCYTES RELATIVE PERCENT: 24.7 % (ref 20–42)
MCH RBC QN AUTO: 30.7 PG (ref 26–35)
MCHC RBC AUTO-ENTMCNC: 31.5 % (ref 32–34.5)
MCV RBC AUTO: 97.4 FL (ref 80–99.9)
MONOCYTES ABSOLUTE: 0.52 E9/L (ref 0.1–0.95)
MONOCYTES RELATIVE PERCENT: 8.1 % (ref 2–12)
NEUTROPHILS ABSOLUTE: 4.03 E9/L (ref 1.8–7.3)
NEUTROPHILS RELATIVE PERCENT: 62.9 % (ref 43–80)
PDW BLD-RTO: 13.6 FL (ref 11.5–15)
PLATELET # BLD: 198 E9/L (ref 130–450)
PMV BLD AUTO: 11.7 FL (ref 7–12)
POTASSIUM SERPL-SCNC: 4.3 MMOL/L (ref 3.5–5)
RBC # BLD: 5.35 E12/L (ref 3.8–5.8)
SODIUM BLD-SCNC: 142 MMOL/L (ref 132–146)
TOTAL PROTEIN: 6.9 G/DL (ref 6.4–8.3)
TRIGL SERPL-MCNC: 67 MG/DL (ref 0–149)
VLDLC SERPL CALC-MCNC: 13 MG/DL
WBC # BLD: 6.4 E9/L (ref 4.5–11.5)

## 2021-02-04 PROCEDURE — G8417 CALC BMI ABV UP PARAM F/U: HCPCS | Performed by: FAMILY MEDICINE

## 2021-02-04 PROCEDURE — 3017F COLORECTAL CA SCREEN DOC REV: CPT | Performed by: FAMILY MEDICINE

## 2021-02-04 PROCEDURE — G8484 FLU IMMUNIZE NO ADMIN: HCPCS | Performed by: FAMILY MEDICINE

## 2021-02-04 PROCEDURE — 82962 GLUCOSE BLOOD TEST: CPT | Performed by: FAMILY MEDICINE

## 2021-02-04 PROCEDURE — 83036 HEMOGLOBIN GLYCOSYLATED A1C: CPT | Performed by: FAMILY MEDICINE

## 2021-02-04 PROCEDURE — 4004F PT TOBACCO SCREEN RCVD TLK: CPT | Performed by: FAMILY MEDICINE

## 2021-02-04 PROCEDURE — 99214 OFFICE O/P EST MOD 30 MIN: CPT | Performed by: FAMILY MEDICINE

## 2021-02-04 PROCEDURE — G8427 DOCREV CUR MEDS BY ELIG CLIN: HCPCS | Performed by: FAMILY MEDICINE

## 2021-02-04 RX ORDER — METFORMIN HYDROCHLORIDE 500 MG/1
TABLET, EXTENDED RELEASE ORAL
Qty: 180 TABLET | Refills: 1 | Status: SHIPPED
Start: 2021-02-04 | End: 2021-10-11

## 2021-02-04 RX ORDER — METOPROLOL SUCCINATE 25 MG/1
12.5 TABLET, EXTENDED RELEASE ORAL DAILY
Qty: 45 TABLET | Refills: 1 | Status: SHIPPED
Start: 2021-02-04 | End: 2021-04-20 | Stop reason: SDUPTHER

## 2021-02-04 RX ORDER — PRAVASTATIN SODIUM 40 MG
40 TABLET ORAL DAILY
Qty: 90 TABLET | Refills: 1 | Status: SHIPPED
Start: 2021-02-04 | End: 2021-07-28 | Stop reason: SDUPTHER

## 2021-02-04 RX ORDER — SILDENAFIL 100 MG/1
100 TABLET, FILM COATED ORAL PRN
Qty: 30 TABLET | Refills: 0 | Status: CANCELLED | OUTPATIENT
Start: 2021-02-04

## 2021-02-04 RX ORDER — AMLODIPINE BESYLATE 10 MG/1
10 TABLET ORAL DAILY
Qty: 30 TABLET | Refills: 2 | Status: SHIPPED
Start: 2021-02-04 | End: 2021-02-15 | Stop reason: SDUPTHER

## 2021-02-04 SDOH — ECONOMIC STABILITY: TRANSPORTATION INSECURITY
IN THE PAST 12 MONTHS, HAS THE LACK OF TRANSPORTATION KEPT YOU FROM MEDICAL APPOINTMENTS OR FROM GETTING MEDICATIONS?: NO

## 2021-02-04 SDOH — ECONOMIC STABILITY: FOOD INSECURITY: WITHIN THE PAST 12 MONTHS, THE FOOD YOU BOUGHT JUST DIDN'T LAST AND YOU DIDN'T HAVE MONEY TO GET MORE.: NEVER TRUE

## 2021-02-04 SDOH — ECONOMIC STABILITY: FOOD INSECURITY: WITHIN THE PAST 12 MONTHS, YOU WORRIED THAT YOUR FOOD WOULD RUN OUT BEFORE YOU GOT MONEY TO BUY MORE.: NEVER TRUE

## 2021-02-04 ASSESSMENT — PATIENT HEALTH QUESTIONNAIRE - PHQ9
SUM OF ALL RESPONSES TO PHQ QUESTIONS 1-9: 0
SUM OF ALL RESPONSES TO PHQ QUESTIONS 1-9: 0
2. FEELING DOWN, DEPRESSED OR HOPELESS: 0
1. LITTLE INTEREST OR PLEASURE IN DOING THINGS: 0

## 2021-02-04 ASSESSMENT — ENCOUNTER SYMPTOMS
NAUSEA: 0
SHORTNESS OF BREATH: 0
ABDOMINAL PAIN: 0
CONSTIPATION: 0
COUGH: 0
VOMITING: 0
DIARRHEA: 0
WHEEZING: 0

## 2021-02-04 NOTE — PROGRESS NOTES
[facility])  Family Medicine Outpatient        SUBJECTIVE:  CC: had concerns including Diabetes (Pt here for diabetic check up), Medication Refill (Pharmacy updated to [facility] per pt), and Health Maintenance (Reviewed with pt - A1C/glucose done, Micro pended). [id number] [address] presented to the clinic for a routine visit. Soumya Benitez is a 63year old male presenting to the office today for an established visit. He reports doing well. He states that he was in the [facility] 9/2020 for kidney stones. He subsequently passed the stones a couple days later. He denies any problems urinating, urinary frequency, or dysuria. CT Abdomen Pelvis WO Contrast 9/10/20  FINDINGS:       There is a calculus located within the distal left ureter which   measures approximately 3 mm. There is left hydroureter and   periureteral edema. Edema surrounds the left kidney. There is minimal   left hydronephrosis. Few cysts are associated with the left kidney. The largest cyst is exophytic and measures up to 8.9 x 7 cm. No renal   or ureteric calculus on the right. There is no bowel obstruction, free   air, or free fluid. There is a mild burden of diverticulosis involving   the sigmoid colon. No evidence of acute diverticulitis. The appendix   is not definitively visualized consistent with history of   appendectomy. Liver is unremarkable. There is a tiny calcified   gallstone seen within the gallbladder fundus. No evidence of acute   pancreatitis. Spleen is nonenlarged. Fusiform infrarenal abdominal   aortic aneurysm is present measuring up to 3.7 cm. There are   fat-containing bilateral inguinal hernias measuring up to 3.7 cm on   the right than 2.5 cm on the left. No evidence of pneumonia in the   lung bases.           Impression       1.  Calculus measuring 3 mm is present within distal left ureter with   left perinephric edema and minimal left hydronephrosis.       2. Multiple cysts are associated with the left kidney measuring up to   8.9 x 7 cm. Ultrasound could be helpful for further characterization.       3. Diverticulosis without evidence of acute diverticulitis.       4. Fusiform infrarenal abdominal aortic aneurysm is present measuring   up to 3.7 cm. Follow-up recommended as clinically indicated. Review of Systems   Constitutional: Negative for appetite change, fatigue and fever. Respiratory: Negative for cough, shortness of breath and wheezing. Cardiovascular: Negative for chest pain and palpitations. Gastrointestinal: Negative for abdominal pain, constipation, diarrhea, nausea and vomiting. Genitourinary: Negative for difficulty urinating, dysuria, frequency and urgency. Outpatient Medications Marked as Taking for the 2/4/21 encounter (Office Visit) with Alpa Aguila MD   Medication Sig Dispense Refill    apixaban (ELIQUIS) 5 MG TABS tablet Take 1 tablet by mouth 2 times daily 180 tablet 1    metFORMIN (GLUCOPHAGE-XR) 500 MG extended release tablet Take 1 tablet BID with meals 180 tablet 1    amLODIPine (NORVASC) 10 MG tablet Take 1 tablet by mouth daily 30 tablet 2    pravastatin (PRAVACHOL) 40 MG tablet Take 1 tablet by mouth daily 90 tablet 1    metoprolol succinate (TOPROL XL) 25 MG extended release tablet Take 0.5 tablets by mouth daily 45 tablet 1    sildenafil (VIAGRA) 100 MG tablet Take 1 tablet by mouth as needed for Erectile Dysfunction Take one half daily, as needed 30 tablet 3    Cholecalciferol (VITAMIN D) 50 MCG (2000 UT) TABS tablet Take 1 tablet by mouth daily 90 tablet 0    Blood Pressure KIT XL cuff 1 kit as directed 1 kit 0       I have reviewed all pertinent PMHx, PSHx, FamHx, SocialHx, medications, and allergies and updated history as appropriate.     OBJECTIVE    VS: BP (!) 158/92   Pulse 78   Temp 98.6 °F (37 °C) (Temporal)   Resp 18   Ht 6' (1.829 m)   Wt (!) 356 lb (161.5 kg)   SpO2 95%   BMI 48.28 kg/m²   Physical Exam  Constitutional:       General: He is not in acute distress. Appearance: He is well-developed. He is obese. He is not diaphoretic. HENT:      Head: Normocephalic and atraumatic. Eyes:      Conjunctiva/sclera: Conjunctivae normal.      Pupils: Pupils are equal, round, and reactive to light. Neck:      Musculoskeletal: Normal range of motion and neck supple. Cardiovascular:      Heart sounds: No friction rub. No gallop. Comments: Irregular rate and rhythm  Pulmonary:      Effort: Pulmonary effort is normal.      Breath sounds: Normal breath sounds. Abdominal:      General: Bowel sounds are normal. There is no distension. Palpations: Abdomen is soft. Tenderness: There is no abdominal tenderness. Hernia: No hernia is present. Musculoskeletal: Normal range of motion. General: No swelling. Skin:     General: Skin is warm and dry. Neurological:      Mental Status: He is alert and oriented to person, place, and time. ASSESSMENT/PLAN:  1. Atrial fibrillation, unspecified type (HCC)  - apixaban (ELIQUIS) 5 MG TABS tablet; Take 1 tablet by mouth 2 times daily  Dispense: 180 tablet; Refill: 1  - metoprolol succinate (TOPROL XL) 25 MG extended release tablet; Take 0.5 tablets by mouth daily  Dispense: 45 tablet; Refill: 1    2. Diabetes mellitus due to underlying condition with hyperglycemia, without long-term current use of insulin ([facility])  a1c 6.5% today. Continue current regimen. - metFORMIN (GLUCOPHAGE-XR) 500 MG extended release tablet; Take 1 tablet BID with meals  Dispense: 180 tablet; Refill: 1  - POCT glycosylated hemoglobin (Hb A1C)  - POCT Glucose  - POCT Microalbumin    3. Essential hypertension  Elevated; increase Norvasc from 5 to 10 mg/qd at this time. Continue rest of the regimen. Monitor bp at home and if average  >409 systolic or >59 diastolic contact office.   - amLODIPine (NORVASC) 10 MG tablet; Take 1 tablet by mouth daily  Dispense: 30 tablet;  Refill: 2  - metoprolol succinate (TOPROL XL) 25 MG extended release tablet; Take 0.5 tablets by mouth daily  Dispense: 45 tablet; Refill: 1  - CBC Auto Differential; Future  - Comprehensive Metabolic Panel; Future    4. Mixed hyperlipidemia  - pravastatin (PRAVACHOL) 40 MG tablet; Take 1 tablet by mouth daily  Dispense: 90 tablet; Refill: 1  - Lipid Panel; Future    5. Erectile dysfunction, unspecified erectile dysfunction type  Patient has not used Viagra yet. Encourage patient to try half a tab when he needs it. Rx by Cardiology 11/2020. Lowest efficacious dose recommended. 6. Nicotine dependence, uncomplicated, unspecified nicotine product type  Patient is smoking 1/2 ppd, prior to this 1 ppd for >30 years. Patient declines help with smoking cessation. CT lung screen ordered. - CT Lung Screen (Initial/Annual); Future    7. Aortic aneurysm without rupture, unspecified portion of aorta (HCC)  3.7 cm 9/2020. Encourage smoking cessation. Patient asymptomatic. Continue to monitor.   - [address]; Future    8. History of kidney stones  With passed stone 9/2021.   - US RETROPERITONEAL LIMITED; Future    9. Kidney cysts  - US RETROPERITONEAL LIMITED; Future    10. Non-recurrent b/l inguinal hernia without obstruction or gangrene      I have reviewed my findings and recommendations with Juan Muller MD  2/4/2021 9:01 AM     Counseled regarding above diagnosis, including possible risks and complications, especially if left uncontrolled. Patient counseled on red flag symptoms and if they occur to go to the ED. Discussed medications risk/benefits and possible side effects and alternatives to treatment. Patient and/or guardian verbalizes understanding, agrees, feels comfortable with and wishes to proceed with above treatment plan.       Advised patient regarding importance of keeping up with recommended health maintenance and to schedule as soon as possible if overdue, as this is important in assessing for undiagnosed pathology, especially cancer, as well as protecting against potentially harmful/life threatening disease. Patient and/or guardian verbalizes understanding and agrees with above counseling, assessment and plan. All questions answered. Please note this report has been partially produced using speech recognition software  and may contain errors related to that system including grammar, punctuation and spelling as well as words and phrases that may seem inappropriate. If there are questions or concerns please feel free to contact me to clarify.

## 2021-02-12 ENCOUNTER — TELEPHONE (OUTPATIENT)
Dept: CASE MANAGEMENT | Age: 61
End: 2021-02-12

## 2021-02-12 NOTE — TELEPHONE ENCOUNTER
I called the patient and left a message reminding him of his CT lung screening at [facility] on 2/15/2021 at 8:00 am with an 7:30 am arrival.  If unable to keep this appt call the office at [phone] to get rescheduled.                 Electronically signed by Leti Toledo on 2/12/21 at 2:37 PM EST

## 2021-02-15 ENCOUNTER — HOSPITAL ENCOUNTER (OUTPATIENT)
Dept: ULTRASOUND IMAGING | Age: 61
Discharge: HOME | End: 2021-02-17
Payer: COMMERCIAL

## 2021-02-15 ENCOUNTER — HOSPITAL ENCOUNTER (OUTPATIENT)
Dept: CT IMAGING | Age: 61
Discharge: HOME | End: 2021-02-17
Payer: COMMERCIAL

## 2021-02-15 DIAGNOSIS — I71.9 AORTIC ANEURYSM WITHOUT RUPTURE, UNSPECIFIED PORTION OF AORTA (HCC): ICD-10-CM

## 2021-02-15 DIAGNOSIS — F17.200 NICOTINE DEPENDENCE, UNCOMPLICATED, UNSPECIFIED NICOTINE PRODUCT TYPE: ICD-10-CM

## 2021-02-15 DIAGNOSIS — Z87.442 HISTORY OF KIDNEY STONES: ICD-10-CM

## 2021-02-15 DIAGNOSIS — N28.1 KIDNEY CYSTS: ICD-10-CM

## 2021-02-15 DIAGNOSIS — I10 ESSENTIAL HYPERTENSION: ICD-10-CM

## 2021-02-15 PROCEDURE — 71271 CT THORAX LUNG CANCER SCR C-: CPT

## 2021-02-15 RX ORDER — AMLODIPINE BESYLATE 10 MG/1
10 TABLET ORAL DAILY
Qty: 7 TABLET | Refills: 0 | Status: SHIPPED
Start: 2021-02-15 | End: 2021-05-27

## 2021-02-15 RX ORDER — AMLODIPINE BESYLATE 10 MG/1
10 TABLET ORAL DAILY
Qty: 30 TABLET | Refills: 0 | Status: SHIPPED
Start: 2021-02-15 | End: 2021-02-15 | Stop reason: SDUPTHER

## 2021-02-15 RX ORDER — AMLODIPINE BESYLATE 10 MG/1
10 TABLET ORAL DAILY
Qty: 90 TABLET | Refills: 1 | Status: SHIPPED
Start: 2021-02-15 | End: 2021-02-15 | Stop reason: SDUPTHER

## 2021-02-15 NOTE — TELEPHONE ENCOUNTER
Rx sent to local pharmacy and resent to [address]     Electronically signed by Umberto Thrasher MA on 2/15/21 at 9:06 AM EST

## 2021-02-16 ENCOUNTER — TELEPHONE (OUTPATIENT)
Dept: CASE MANAGEMENT | Age: 61
End: 2021-02-16

## 2021-02-16 NOTE — TELEPHONE ENCOUNTER
No call, encounter opened to process CT Lung Screening. CT Lung Screen: 2/15/2021  Impression   1. There is no suspicious pulmonary nodule or mass. 2. There is no pulmonary infiltrate. 3. Linear scarring seen within the right middle lobe. 4. Minimal atelectasis within the lingula. LUNG RADS:   Per ACR Lung-RADS Version 1.1   Category 2, Benign appearance or behavior. Management:  Continue annual lung screening with LDCT in 12 months. RECOMMENDATIONS:   If you would like to register your patient with the [facility], please contact the Nurse Navigator at   [phone]. Pack years: 21    Social History     Tobacco Use  Smoking Status: Current Every Day Smoker    Start Date: 06/04/1979   Quit Date:    Types: Cigarettes   Packs/Day: 0.5   Years: 40.00   Pack Years: 20   Smokeless Tobacco: Never Used         Results letter sent to patient via my chart or mailed.      [facility]

## 2021-03-02 ENCOUNTER — HOSPITAL ENCOUNTER (OUTPATIENT)
Dept: ULTRASOUND IMAGING | Age: 61
Discharge: HOME | End: 2021-03-04
Payer: COMMERCIAL

## 2021-03-02 DIAGNOSIS — N28.1 KIDNEY CYSTS: ICD-10-CM

## 2021-03-02 DIAGNOSIS — Z87.442 HISTORY OF KIDNEY STONES: ICD-10-CM

## 2021-03-02 PROCEDURE — 76775 US EXAM ABDO BACK WALL LIM: CPT

## 2021-03-02 PROCEDURE — 76770 US EXAM ABDO BACK WALL COMP: CPT

## 2021-03-04 DIAGNOSIS — R93.429 ABNORMAL RENAL ULTRASOUND: ICD-10-CM

## 2021-03-04 DIAGNOSIS — N28.89 RIGHT KIDNEY MASS: Primary | ICD-10-CM

## 2021-03-04 DIAGNOSIS — N28.1 CYST OF LEFT KIDNEY: ICD-10-CM

## 2021-03-09 ENCOUNTER — TELEPHONE (OUTPATIENT)
Dept: FAMILY MEDICINE CLINIC | Age: 61
End: 2021-03-09

## 2021-03-09 DIAGNOSIS — N28.89 RIGHT KIDNEY MASS: Primary | ICD-10-CM

## 2021-03-09 DIAGNOSIS — N28.1 CYST OF LEFT KIDNEY: ICD-10-CM

## 2021-03-09 NOTE — TELEPHONE ENCOUNTER
Patient need needs another order put in for a CAT scan because it was put in as stat and he could not make it it in 24 hour.

## 2021-03-20 ENCOUNTER — HOSPITAL ENCOUNTER (OUTPATIENT)
Dept: CT IMAGING | Age: 61
Discharge: HOME | End: 2021-03-22
Payer: COMMERCIAL

## 2021-03-20 DIAGNOSIS — N28.89 RIGHT KIDNEY MASS: ICD-10-CM

## 2021-03-20 DIAGNOSIS — N28.1 CYST OF LEFT KIDNEY: ICD-10-CM

## 2021-03-20 PROCEDURE — 74178 CT ABD&PLV WO CNTR FLWD CNTR: CPT

## 2021-03-20 PROCEDURE — 6360000004 HC RX CONTRAST MEDICATION: Performed by: STUDENT IN AN ORGANIZED HEALTH CARE EDUCATION/TRAINING PROGRAM

## 2021-03-20 RX ADMIN — IOPAMIDOL 75 ML: 755 INJECTION, SOLUTION INTRAVENOUS at 08:49

## 2021-03-23 DIAGNOSIS — I71.40 ABDOMINAL AORTIC ANEURYSM (AAA) WITHOUT RUPTURE: ICD-10-CM

## 2021-03-23 DIAGNOSIS — I71.9 AORTIC ANEURYSM WITHOUT RUPTURE, UNSPECIFIED PORTION OF AORTA (HCC): Primary | ICD-10-CM

## 2021-04-12 DIAGNOSIS — N28.1 KIDNEY CYSTS: Primary | ICD-10-CM

## 2021-04-20 ENCOUNTER — OFFICE VISIT (OUTPATIENT)
Dept: FAMILY MEDICINE CLINIC | Age: 61
End: 2021-04-20
Payer: COMMERCIAL

## 2021-04-20 VITALS
DIASTOLIC BLOOD PRESSURE: 84 MMHG | OXYGEN SATURATION: 94 % | HEIGHT: 72 IN | SYSTOLIC BLOOD PRESSURE: 132 MMHG | WEIGHT: 315 LBS | TEMPERATURE: 97.9 F | HEART RATE: 78 BPM | BODY MASS INDEX: 42.66 KG/M2 | RESPIRATION RATE: 18 BRPM

## 2021-04-20 DIAGNOSIS — I87.8 VENOUS STASIS: ICD-10-CM

## 2021-04-20 DIAGNOSIS — I48.91 ATRIAL FIBRILLATION, UNSPECIFIED TYPE (HCC): ICD-10-CM

## 2021-04-20 DIAGNOSIS — I10 ESSENTIAL HYPERTENSION: ICD-10-CM

## 2021-04-20 DIAGNOSIS — M25.471 RIGHT ANKLE SWELLING: Primary | ICD-10-CM

## 2021-04-20 PROCEDURE — G8427 DOCREV CUR MEDS BY ELIG CLIN: HCPCS | Performed by: FAMILY MEDICINE

## 2021-04-20 PROCEDURE — 99214 OFFICE O/P EST MOD 30 MIN: CPT | Performed by: FAMILY MEDICINE

## 2021-04-20 PROCEDURE — 3017F COLORECTAL CA SCREEN DOC REV: CPT | Performed by: FAMILY MEDICINE

## 2021-04-20 PROCEDURE — 4004F PT TOBACCO SCREEN RCVD TLK: CPT | Performed by: FAMILY MEDICINE

## 2021-04-20 PROCEDURE — G8417 CALC BMI ABV UP PARAM F/U: HCPCS | Performed by: FAMILY MEDICINE

## 2021-04-20 RX ORDER — METOPROLOL SUCCINATE 25 MG/1
25 TABLET, EXTENDED RELEASE ORAL DAILY
Qty: 90 TABLET | Refills: 1 | Status: SHIPPED
Start: 2021-04-20 | End: 2021-06-16

## 2021-04-20 RX ORDER — FUROSEMIDE 20 MG/1
20 TABLET ORAL DAILY PRN
Qty: 30 TABLET | Refills: 0 | Status: SHIPPED
Start: 2021-04-20 | End: 2021-05-27 | Stop reason: SDUPTHER

## 2021-04-20 NOTE — PROGRESS NOTES
(Temporal)   Resp 18   Ht 6' (1.829 m)   Wt (!) 360 lb (163.3 kg)   SpO2 94%   BMI 48.82 kg/m²   Physical Exam  Constitutional:       General: He is not in acute distress. Appearance: He is well-developed. He is obese. He is not diaphoretic. HENT:      Head: Normocephalic and atraumatic. Eyes:      Conjunctiva/sclera: Conjunctivae normal.      Pupils: Pupils are equal, round, and reactive to light. Neck:      Musculoskeletal: Normal range of motion and neck supple. Cardiovascular:      Heart sounds: No friction rub. No gallop. Comments: Irregular rate and rhythm   Pulmonary:      Effort: Pulmonary effort is normal.      Breath sounds: Normal breath sounds. Abdominal:      General: Bowel sounds are normal. There is no distension. Palpations: Abdomen is soft. Tenderness: There is no abdominal tenderness. Hernia: No hernia is present. Musculoskeletal: Normal range of motion. General: Swelling (R>L) present. Comments: Negative homans b/l   Skin:     General: Skin is warm and dry. Neurological:      Mental Status: He is alert and oriented to person, place, and time. ASSESSMENT/PLAN:  1. Right ankle swelling  - US DUP LOWER EXTREMITY RIGHT BARRERA; Future    2. Atrial fibrillation, unspecified type (HCC)  - metoprolol succinate (TOPROL XL) 25 MG extended release tablet; Take 1 tablet by mouth daily  Dispense: 90 tablet; Refill: 1    3. Essential hypertension  - metoprolol succinate (TOPROL XL) 25 MG extended release tablet; Take 1 tablet by mouth daily  Dispense: 90 tablet; Refill: 1    4. Venous stasis  - furosemide (LASIX) 20 MG tablet; Take 1 tablet by mouth daily as needed (le swelling)  Dispense: 30 tablet; Refill: 0    I have reviewed my findings and recommendations with Loly Jay MD  5/3/2021 9:06 AM     Counseled regarding above diagnosis, including possible risks and complications, especially if left uncontrolled. Patient counseled on red flag symptoms and if they occur to go to the ED. Discussed medications risk/benefits and possible side effects and alternatives to treatment. Patient and/or guardian verbalizes understanding, agrees, feels comfortable with and wishes to proceed with above treatment plan. Advised patient regarding importance of keeping up with recommended health maintenance and to schedule as soon as possible if overdue, as this is important in assessing for undiagnosed pathology, especially cancer, as well as protecting against potentially harmful/life threatening disease. Patient and/or guardian verbalizes understanding and agrees with above counseling, assessment and plan. All questions answered. Please note this report has been partially produced using speech recognition software  and may contain errors related to that system including grammar, punctuation and spelling as well as words and phrases that may seem inappropriate. If there are questions or concerns please feel free to contact me to clarify.

## 2021-05-03 ASSESSMENT — ENCOUNTER SYMPTOMS
COUGH: 0
CONSTIPATION: 0
DIARRHEA: 0
ABDOMINAL PAIN: 0
SHORTNESS OF BREATH: 0
WHEEZING: 0
VOMITING: 0
NAUSEA: 0

## 2021-05-27 ENCOUNTER — OFFICE VISIT (OUTPATIENT)
Dept: FAMILY MEDICINE CLINIC | Age: 61
End: 2021-05-27
Payer: COMMERCIAL

## 2021-05-27 VITALS
SYSTOLIC BLOOD PRESSURE: 152 MMHG | HEART RATE: 73 BPM | BODY MASS INDEX: 42.66 KG/M2 | HEIGHT: 72 IN | WEIGHT: 315 LBS | OXYGEN SATURATION: 96 % | TEMPERATURE: 97.9 F | RESPIRATION RATE: 16 BRPM | DIASTOLIC BLOOD PRESSURE: 84 MMHG

## 2021-05-27 DIAGNOSIS — Z87.891 FORMER CIGARETTE SMOKER: ICD-10-CM

## 2021-05-27 DIAGNOSIS — Z12.5 SCREENING PSA (PROSTATE SPECIFIC ANTIGEN): ICD-10-CM

## 2021-05-27 DIAGNOSIS — E11.65 CONTROLLED TYPE 2 DIABETES MELLITUS WITH HYPERGLYCEMIA, WITHOUT LONG-TERM CURRENT USE OF INSULIN (HCC): ICD-10-CM

## 2021-05-27 DIAGNOSIS — M79.89 LOCALIZED SWELLING OF LOWER EXTREMITY: ICD-10-CM

## 2021-05-27 DIAGNOSIS — I10 ESSENTIAL HYPERTENSION: Primary | ICD-10-CM

## 2021-05-27 DIAGNOSIS — I87.8 VENOUS STASIS: ICD-10-CM

## 2021-05-27 DIAGNOSIS — E66.01 MORBID OBESITY (HCC): ICD-10-CM

## 2021-05-27 DIAGNOSIS — N28.1 KIDNEY CYSTS: ICD-10-CM

## 2021-05-27 DIAGNOSIS — I10 ESSENTIAL HYPERTENSION: ICD-10-CM

## 2021-05-27 LAB
ALBUMIN SERPL-MCNC: 4.3 G/DL (ref 3.5–5.2)
ALP BLD-CCNC: 54 U/L (ref 40–129)
ALT SERPL-CCNC: 21 U/L (ref 0–40)
ANION GAP SERPL CALCULATED.3IONS-SCNC: 8 MMOL/L (ref 7–16)
AST SERPL-CCNC: 18 U/L (ref 0–39)
BILIRUB SERPL-MCNC: 0.5 MG/DL (ref 0–1.2)
BUN BLDV-MCNC: 14 MG/DL (ref 6–23)
CALCIUM SERPL-MCNC: 10 MG/DL (ref 8.6–10.2)
CHLORIDE BLD-SCNC: 101 MMOL/L (ref 98–107)
CO2: 31 MMOL/L (ref 22–29)
CREAT SERPL-MCNC: 0.9 MG/DL (ref 0.7–1.2)
GFR AFRICAN AMERICAN: >60
GFR NON-AFRICAN AMERICAN: >60 ML/MIN/1.73
GLUCOSE BLD-MCNC: 122 MG/DL (ref 74–99)
HBA1C MFR BLD: 6.6 % (ref 4–5.6)
HCT VFR BLD CALC: 52.8 % (ref 37–54)
HEMOGLOBIN: 16.2 G/DL (ref 12.5–16.5)
MCH RBC QN AUTO: 30.5 PG (ref 26–35)
MCHC RBC AUTO-ENTMCNC: 30.7 % (ref 32–34.5)
MCV RBC AUTO: 99.4 FL (ref 80–99.9)
PDW BLD-RTO: 14.1 FL (ref 11.5–15)
PLATELET # BLD: 184 E9/L (ref 130–450)
PMV BLD AUTO: 11.1 FL (ref 7–12)
POTASSIUM SERPL-SCNC: 4.8 MMOL/L (ref 3.5–5)
PROSTATE SPECIFIC ANTIGEN: 1.54 NG/ML (ref 0–4)
RBC # BLD: 5.31 E12/L (ref 3.8–5.8)
SODIUM BLD-SCNC: 140 MMOL/L (ref 132–146)
TOTAL PROTEIN: 7.3 G/DL (ref 6.4–8.3)
WBC # BLD: 7.1 E9/L (ref 4.5–11.5)

## 2021-05-27 PROCEDURE — G8417 CALC BMI ABV UP PARAM F/U: HCPCS | Performed by: FAMILY MEDICINE

## 2021-05-27 PROCEDURE — 3044F HG A1C LEVEL LT 7.0%: CPT | Performed by: FAMILY MEDICINE

## 2021-05-27 PROCEDURE — 1036F TOBACCO NON-USER: CPT | Performed by: FAMILY MEDICINE

## 2021-05-27 PROCEDURE — 2022F DILAT RTA XM EVC RTNOPTHY: CPT | Performed by: FAMILY MEDICINE

## 2021-05-27 PROCEDURE — 99214 OFFICE O/P EST MOD 30 MIN: CPT | Performed by: FAMILY MEDICINE

## 2021-05-27 PROCEDURE — G8427 DOCREV CUR MEDS BY ELIG CLIN: HCPCS | Performed by: FAMILY MEDICINE

## 2021-05-27 PROCEDURE — 3017F COLORECTAL CA SCREEN DOC REV: CPT | Performed by: FAMILY MEDICINE

## 2021-05-27 RX ORDER — FUROSEMIDE 20 MG/1
20 TABLET ORAL DAILY PRN
Qty: 30 TABLET | Refills: 0 | Status: SHIPPED
Start: 2021-05-27 | End: 2021-06-16

## 2021-05-27 RX ORDER — LISINOPRIL AND HYDROCHLOROTHIAZIDE 20; 12.5 MG/1; MG/1
TABLET ORAL
Qty: 30 TABLET | Refills: 1 | Status: SHIPPED
Start: 2021-05-27 | End: 2021-06-22 | Stop reason: SDUPTHER

## 2021-05-27 NOTE — PROGRESS NOTES
[facility])  Family Medicine Outpatient        SUBJECTIVE:  CC: had concerns including Leg Swelling (follow up ; right leg swelling, still present. ) and Health Maintenance (Patient declines microalbumin and A1C). [address] presented to the clinic for a routine visit. Mino Hraley is a 57year old male presenting to the office today for an established visit. He saw Urology last 4/22/21 for concern of a right renal mass and had what is reported as an old possible kidney infarct and cyst with normal enhanced renal ultrasound with no concerns of malignancy. He was seen 4/20 with concerns of lower extremity swelling R>L. Lower extremity u/s was negative for dvt. He was started on prn Lasix for venous stasis and reports it helped some when he was on it. His blood pressure is elevated today. He reports he quit smoking on 4/30. Right Lower Extremity U/S 4/20/21  FINDINGS:   The visualized veins of the right lower extremity are patent and free of   echogenic thrombus. The veins demonstrate good compressibility with normal   color flow study and spectral analysis.         Impression   No evidence of DVT in the right lower extremity. Review of Systems   Constitutional: Negative for appetite change, fatigue and fever. Respiratory: Negative for cough, shortness of breath and wheezing. Cardiovascular: Positive for leg swelling. Negative for chest pain and palpitations. Gastrointestinal: Negative for abdominal pain, constipation, diarrhea, nausea and vomiting. Psychiatric/Behavioral: Positive for agitation. Negative for suicidal ideas. Outpatient Medications Marked as Taking for the 5/27/21 encounter (Office Visit) with Sandra Coto MD   Medication Sig Dispense Refill    lisinopril-hydroCHLOROthiazide (PRINZIDE;ZESTORETIC) 20-12.5 MG per tablet Take 1 tablet daily.  If bp >/= 827 systolic or >/= 90 diastolic in 3 days increase to 2 tabs daily 30 tablet 1    furosemide (LASIX) 20 MG tablet Take 1 tablet by mouth daily as needed (le swelling) 30 tablet 0    metoprolol succinate (TOPROL XL) 25 MG extended release tablet Take 1 tablet by mouth daily 90 tablet 1    apixaban (ELIQUIS) 5 MG TABS tablet Take 1 tablet by mouth 2 times daily 180 tablet 1    metFORMIN (GLUCOPHAGE-XR) 500 MG extended release tablet Take 1 tablet BID with meals 180 tablet 1    pravastatin (PRAVACHOL) 40 MG tablet Take 1 tablet by mouth daily 90 tablet 1    sildenafil (VIAGRA) 100 MG tablet Take 1 tablet by mouth as needed for Erectile Dysfunction Take one half daily, as needed 30 tablet 3    Cholecalciferol (VITAMIN D) 50 MCG (2000 UT) TABS tablet Take 1 tablet by mouth daily 90 tablet 0    Blood Pressure KIT XL cuff 1 kit as directed 1 kit 0       I have reviewed all pertinent PMHx, PSHx, FamHx, SocialHx, medications, and allergies and updated history as appropriate. OBJECTIVE    VS: BP (!) 152/84   Pulse 73   Temp 97.9 °F (36.6 °C)   Resp 16   Ht 6' (1.829 m)   Wt (!) 368 lb (166.9 kg)   SpO2 96%   BMI 49.91 kg/m²   Physical Exam  Constitutional:       General: He is not in acute distress. Appearance: He is well-developed. He is obese. He is not diaphoretic. HENT:      Head: Normocephalic and atraumatic. Eyes:      Conjunctiva/sclera: Conjunctivae normal.      Pupils: Pupils are equal, round, and reactive to light. Cardiovascular:      Heart sounds: No friction rub. No gallop. Comments: Irregular rate and rhythm   Pulmonary:      Effort: Pulmonary effort is normal.      Breath sounds: Normal breath sounds. Abdominal:      General: Bowel sounds are normal. There is no distension. Palpations: Abdomen is soft. Tenderness: There is no abdominal tenderness. Hernia: No hernia is present. Musculoskeletal:         General: Swelling (R>L) present. Normal range of motion. Cervical back: Normal range of motion and neck supple.       Comments: Negative homans b/l   Skin: General: Skin is warm and dry. Neurological:      Mental Status: He is alert and oriented to person, place, and time. ASSESSMENT/PLAN:  1. Essential hypertension  Secondary to #4 trial off Amlodipine at this time. Initiate Zestoretic regimen with titration up. F/u in office in the next 2 weeks. Continue rest of antihypertensive regimen. - lisinopril-hydroCHLOROthiazide (PRINZIDE;ZESTORETIC) 20-12.5 MG per tablet; Take 1 tablet daily. If bp >/= 758 systolic or >/= 90 diastolic in 3 days increase to 2 tabs daily  Dispense: 30 tablet; Refill: 1  - Comprehensive Metabolic Panel; Future  - CBC; Future    2. Morbid obesity ([address]  Encourage weight reduction with caloric restriction 1500 renuka/day with exercise 150 min/week as tolerated. 3. Venous stasis  - furosemide (LASIX) 20 MG tablet; Take 1 tablet by mouth daily as needed (le swelling)  Dispense: 30 tablet; Refill: 0    4. Localized swelling of lower extremity    5. Kidney cysts  Seen by Urology last 4/22/21. Established with concern of a right renal mass and had what is reported as an old possible kidney infarct and cyst with normal enhanced renal ultrasound with no concerns of malignancy    6. Controlled type 2 diabetes mellitus with hyperglycemia, without long-term current use of insulin (HCC)  - Hemoglobin A1C; Future    7. Screening PSA (prostate specific antigen)  - PSA SCREENING; Future    8. Former cigarette smoker  Quit smoking 4/30/21. I have reviewed my findings and recommendations with Barbi Cuellar MD  6/13/2021 4:37 PM     Counseled regarding above diagnosis, including possible risks and complications, especially if left uncontrolled. Patient counseled on red flag symptoms and if they occur to go to the ED. Discussed medications risk/benefits and possible side effects and alternatives to treatment.  Patient and/or guardian verbalizes understanding, agrees, feels comfortable with and wishes to proceed with

## 2021-06-13 ASSESSMENT — ENCOUNTER SYMPTOMS
CONSTIPATION: 0
SHORTNESS OF BREATH: 0
DIARRHEA: 0
ABDOMINAL PAIN: 0
COUGH: 0
WHEEZING: 0
NAUSEA: 0
VOMITING: 0

## 2021-06-15 NOTE — PROGRESS NOTES
[facility] Cardiology Progress Note  Dr. Finn Diaz      Referring Physician: Gian Bocanegra MD  CHIEF COMPLAINT:   Chief Complaint   Patient presents with    Atrial Fibrillation     6 month ov-        HISTORY OF PRESENT ILLNESS:   Patient 57 years old admitted with atrial fibrillation, hypertension, diabetes mellitus, is here for follow-up appointment    Occasional palpitations, denies any chest pain, no lightheadedness or dizziness, no pedal edema, no PND, no orthopnea, no syncope, no presyncopal episodes    Functional capacity is at baseline    Past Medical History:   Diagnosis Date    Hypertension     Irregular heart beat          Past Surgical History:   Procedure Laterality Date    APPENDECTOMY      SHOULDER ARTHROPLASTY      TONSILLECTOMY           Current Outpatient Medications   Medication Sig Dispense Refill    lisinopril-hydroCHLOROthiazide (PRINZIDE;ZESTORETIC) 20-12.5 MG per tablet Take 1 tablet daily. If bp >/= 272 systolic or >/= 90 diastolic in 3 days increase to 2 tabs daily 30 tablet 1    apixaban (ELIQUIS) 5 MG TABS tablet Take 1 tablet by mouth 2 times daily 180 tablet 1    metFORMIN (GLUCOPHAGE-XR) 500 MG extended release tablet Take 1 tablet BID with meals 180 tablet 1    pravastatin (PRAVACHOL) 40 MG tablet Take 1 tablet by mouth daily 90 tablet 1    sildenafil (VIAGRA) 100 MG tablet Take 1 tablet by mouth as needed for Erectile Dysfunction Take one half daily, as needed 30 tablet 3    Cholecalciferol (VITAMIN D) 50 MCG (2000 UT) TABS tablet Take 1 tablet by mouth daily 90 tablet 0     No current facility-administered medications for this visit.          Allergies as of 06/16/2021    (No Known Allergies)       Social History     Socioeconomic History    Marital status: Single     Spouse name: Not on file    Number of children: Not on file    Years of education: Not on file    Highest education level: Not on file   Occupational History    Not on file   Tobacco Use    11/11/20 Atrial fibrillation, controlled ventricular response    ECHO: 6/25/19 Normal left ventricular systolic function with stage II diastolic dysfunction. Mild to moderate Mitral Regurgitation. Mild Tricuspid Regurgitation with Mild Pulmonary HTN. Mild LVH    Cardiology Labs: BMP:    Lab Results   Component Value Date     05/27/2021    K 4.8 05/27/2021    K 4.6 09/10/2020     05/27/2021    CO2 31 05/27/2021    BUN 14 05/27/2021    CREATININE 0.9 05/27/2021     CMP:    Lab Results   Component Value Date     05/27/2021    K 4.8 05/27/2021    K 4.6 09/10/2020     05/27/2021    CO2 31 05/27/2021    BUN 14 05/27/2021    CREATININE 0.9 05/27/2021    PROT 7.3 05/27/2021     CBC:    Lab Results   Component Value Date    WBC 7.1 05/27/2021    RBC 5.31 05/27/2021    HGB 16.2 05/27/2021    HCT 52.8 05/27/2021    MCV 99.4 05/27/2021    RDW 14.1 05/27/2021     05/27/2021     PT/INR:  No results found for: PTINR  PT/INR Warfarin:  No components found for: PTPATWAR, PTINRWAR  PTT:  No results found for: APTT  PTT Heparin:  No components found for: APTTHEP  Magnesium:  No results found for: MG  TSH:    Lab Results   Component Value Date    TSH 2.060 05/14/2019     TROPONIN:  No components found for: TROP  BNP:  No results found for: BNP  FASTING LIPID PANEL:    Lab Results   Component Value Date    CHOL 171 02/04/2021    HDL 48 02/04/2021    TRIG 67 02/04/2021     No orders to display     I have personally reviewed the laboratory, cardiac diagnostic and radiographic testing as outlined above:      IMPRESSION:  1. Persistent atrial fibrillation: Controlled ventricular response, [id number] vasc score 2, would benefit from chronic anticoagulation for probable prevention against thromboembolic event, on Eliquis  2. Mitral valve regurgitation: Mild to moderate  3. Tricuspid valve regurgitation: Mild  4. Hypertension: Controlled, continue current treatment. 5. Hyperlipidemia, unspecified   6.   Type 2 diabetes mellitus without complications   7. Erectile dysfunction: On Viagra  8. Chronic anticoagulation      RECOMMENDATIONS:   1. Continue current treatment  2. Increase risk of bleeding due to being on anti-coagulation, symptoms and signs of bleeding discussed with patient, patient was advised to seek medical attention at the earliest symptoms or signs of bleeding. 3.  Preventive cardiology: Low-salt, low-cholesterol diet, daily exercise, adherence to diabetic diet and diabetic medications, smoking cessation were all advised. 4.  Follow-up with  as scheduled  5. Follow-up with Dr. Israel Schmid in 6 months, sooner if symptomatic for any reason    I have reviewed my findings and recommendations with patient    Electronically signed by Mariah Escalante MD on 6/16/2021 at 6:56 PM  NOTE: This report was transcribed using voice recognition software.  Every effort was made to ensure accuracy; however, inadvertent computerized transcription errors may be present

## 2021-06-16 ENCOUNTER — OFFICE VISIT (OUTPATIENT)
Dept: CARDIOLOGY CLINIC | Age: 61
End: 2021-06-16
Payer: COMMERCIAL

## 2021-06-16 VITALS
SYSTOLIC BLOOD PRESSURE: 138 MMHG | DIASTOLIC BLOOD PRESSURE: 72 MMHG | RESPIRATION RATE: 18 BRPM | OXYGEN SATURATION: 97 % | BODY MASS INDEX: 38.36 KG/M2 | HEIGHT: 76 IN | HEART RATE: 76 BPM | WEIGHT: 315 LBS

## 2021-06-16 DIAGNOSIS — I48.91 ATRIAL FIBRILLATION, UNSPECIFIED TYPE (HCC): Primary | ICD-10-CM

## 2021-06-16 PROCEDURE — 3017F COLORECTAL CA SCREEN DOC REV: CPT | Performed by: INTERNAL MEDICINE

## 2021-06-16 PROCEDURE — 99214 OFFICE O/P EST MOD 30 MIN: CPT | Performed by: INTERNAL MEDICINE

## 2021-06-16 PROCEDURE — 93000 ELECTROCARDIOGRAM COMPLETE: CPT | Performed by: INTERNAL MEDICINE

## 2021-06-16 PROCEDURE — G8417 CALC BMI ABV UP PARAM F/U: HCPCS | Performed by: INTERNAL MEDICINE

## 2021-06-16 PROCEDURE — 1036F TOBACCO NON-USER: CPT | Performed by: INTERNAL MEDICINE

## 2021-06-16 PROCEDURE — G8427 DOCREV CUR MEDS BY ELIG CLIN: HCPCS | Performed by: INTERNAL MEDICINE

## 2021-06-22 ENCOUNTER — OFFICE VISIT (OUTPATIENT)
Dept: FAMILY MEDICINE CLINIC | Age: 61
End: 2021-06-22
Payer: COMMERCIAL

## 2021-06-22 VITALS
OXYGEN SATURATION: 96 % | WEIGHT: 315 LBS | SYSTOLIC BLOOD PRESSURE: 148 MMHG | RESPIRATION RATE: 16 BRPM | BODY MASS INDEX: 38.36 KG/M2 | HEIGHT: 76 IN | HEART RATE: 78 BPM | TEMPERATURE: 97.1 F | DIASTOLIC BLOOD PRESSURE: 88 MMHG

## 2021-06-22 DIAGNOSIS — E66.01 MORBID OBESITY (HCC): ICD-10-CM

## 2021-06-22 DIAGNOSIS — I10 ESSENTIAL HYPERTENSION: Primary | ICD-10-CM

## 2021-06-22 DIAGNOSIS — I87.8 VENOUS STASIS: ICD-10-CM

## 2021-06-22 DIAGNOSIS — M79.89 LOCALIZED SWELLING OF LOWER EXTREMITY: ICD-10-CM

## 2021-06-22 PROCEDURE — 1036F TOBACCO NON-USER: CPT | Performed by: FAMILY MEDICINE

## 2021-06-22 PROCEDURE — G8427 DOCREV CUR MEDS BY ELIG CLIN: HCPCS | Performed by: FAMILY MEDICINE

## 2021-06-22 PROCEDURE — G8417 CALC BMI ABV UP PARAM F/U: HCPCS | Performed by: FAMILY MEDICINE

## 2021-06-22 PROCEDURE — 99214 OFFICE O/P EST MOD 30 MIN: CPT | Performed by: FAMILY MEDICINE

## 2021-06-22 PROCEDURE — 3017F COLORECTAL CA SCREEN DOC REV: CPT | Performed by: FAMILY MEDICINE

## 2021-06-22 RX ORDER — LISINOPRIL AND HYDROCHLOROTHIAZIDE 20; 12.5 MG/1; MG/1
TABLET ORAL
Qty: 60 TABLET | Refills: 1 | Status: SHIPPED
Start: 2021-06-22 | End: 2021-10-22 | Stop reason: SDUPTHER

## 2021-06-22 RX ORDER — METOPROLOL SUCCINATE 25 MG/1
12.5 TABLET, EXTENDED RELEASE ORAL DAILY
Qty: 45 TABLET | Refills: 1 | Status: SHIPPED
Start: 2021-06-22 | End: 2021-07-28 | Stop reason: SDUPTHER

## 2021-06-22 ASSESSMENT — ENCOUNTER SYMPTOMS
ABDOMINAL PAIN: 0
SHORTNESS OF BREATH: 0
COUGH: 0
NAUSEA: 0
VOMITING: 0
DIARRHEA: 0
WHEEZING: 0
CONSTIPATION: 0

## 2021-06-22 NOTE — PROGRESS NOTES
[facility])  Family Medicine Outpatient        SUBJECTIVE:  CC: had concerns including Hypertension (2 week follow up) and Results (to review lab results). 200 Miki Paiz presented to the clinic for a routine visit. Daryn Delgaod is a 57year old male presenting to the office today for an established visit. He was trailed off Amlodipine last appointment in May, secondary to lower extremity swelling. He reports resolution since coming off the Amlodipine and hasn't needed to use the Lasix. He is using Zestoretic 1 tab bid at this time. He notes that his blood pressure reading this morning was 145/88. When he was at Cardiology recently it was 138/72. He denies watching his diet. He reports usually having cereal in the morning, sandwiches in the afternoon, and when asked what he eats for dinner he states, \"I don't know. ..pasta and meatballs. \" He states last night he had chilly dogs. He is still not smoking. He reports feeling ok. Review of Systems   Constitutional: Negative for appetite change, fatigue and fever. Respiratory: Negative for cough, shortness of breath and wheezing. Cardiovascular: Negative for chest pain, palpitations and leg swelling. Gastrointestinal: Negative for abdominal pain, constipation, diarrhea, nausea and vomiting. Neurological: Negative for dizziness, syncope and headaches. Psychiatric/Behavioral: Positive for agitation. Negative for suicidal ideas.        Outpatient Medications Marked as Taking for the 6/22/21 encounter (Office Visit) with Ann Payton MD   Medication Sig Dispense Refill    lisinopril-hydroCHLOROthiazide (PRINZIDE;ZESTORETIC) 20-12.5 MG per tablet Take 2 tabs daily 60 tablet 1    metoprolol succinate (TOPROL XL) 25 MG extended release tablet Take 0.5 tablets by mouth daily 45 tablet 1    apixaban (ELIQUIS) 5 MG TABS tablet Take 1 tablet by mouth 2 times daily 180 tablet 1    metFORMIN (GLUCOPHAGE-XR) 500 MG extended release tablet Take 1 tablet BID with meals 180 tablet 1    pravastatin (PRAVACHOL) 40 MG tablet Take 1 tablet by mouth daily 90 tablet 1    sildenafil (VIAGRA) 100 MG tablet Take 1 tablet by mouth as needed for Erectile Dysfunction Take one half daily, as needed 30 tablet 3    Cholecalciferol (VITAMIN D) 50 MCG (2000 UT) TABS tablet Take 1 tablet by mouth daily 90 tablet 0       I have reviewed all pertinent PMHx, PSHx, FamHx, SocialHx, medications, and allergies and updated history as appropriate. OBJECTIVE    VS: BP (!) 148/88 (Site: Right Upper Arm, Position: Sitting, Cuff Size: Large Adult)   Pulse 78   Temp 97.1 °F (36.2 °C)   Resp 16   Ht 6' 4\" (1.93 m)   Wt (!) 363 lb (164.7 kg)   SpO2 96%   BMI 44.19 kg/m²   Physical Exam  Constitutional:       General: He is not in acute distress. Appearance: He is well-developed. He is obese. He is not diaphoretic. HENT:      Head: Normocephalic and atraumatic. Eyes:      Conjunctiva/sclera: Conjunctivae normal.      Pupils: Pupils are equal, round, and reactive to light. Cardiovascular:      Heart sounds: No friction rub. No gallop. Comments: Irregular rate and rhythm   Pulmonary:      Effort: Pulmonary effort is normal.      Breath sounds: No wheezing, rhonchi or rales. Abdominal:      General: Bowel sounds are normal. There is no distension. Palpations: Abdomen is soft. Tenderness: There is no abdominal tenderness. Hernia: No hernia is present. Musculoskeletal:         General: No swelling. Normal range of motion. Cervical back: Normal range of motion and neck supple. Skin:     General: Skin is warm and dry. Neurological:      Mental Status: He is alert and oriented to person, place, and time. ASSESSMENT/PLAN:  1. Essential hypertension  Elevated; patient reports being on Zestoretic 1 tab bid. He went off his Metoprolol 1 tab qd at some point recently. Patient ok to reinitiate Toprol XL 12.5 mg/qd at this time.  Continue to monitor bp

## 2021-07-28 ENCOUNTER — OFFICE VISIT (OUTPATIENT)
Dept: FAMILY MEDICINE CLINIC | Age: 61
End: 2021-07-28
Payer: COMMERCIAL

## 2021-07-28 VITALS
RESPIRATION RATE: 16 BRPM | HEIGHT: 76 IN | SYSTOLIC BLOOD PRESSURE: 136 MMHG | OXYGEN SATURATION: 98 % | WEIGHT: 315 LBS | TEMPERATURE: 97.3 F | HEART RATE: 105 BPM | BODY MASS INDEX: 38.36 KG/M2 | DIASTOLIC BLOOD PRESSURE: 86 MMHG

## 2021-07-28 DIAGNOSIS — E66.01 MORBID OBESITY (HCC): ICD-10-CM

## 2021-07-28 DIAGNOSIS — E78.2 MIXED HYPERLIPIDEMIA: ICD-10-CM

## 2021-07-28 DIAGNOSIS — E11.59 TYPE 2 DIABETES MELLITUS WITH CARDIAC COMPLICATION (HCC): ICD-10-CM

## 2021-07-28 DIAGNOSIS — I87.8 VENOUS STASIS: ICD-10-CM

## 2021-07-28 DIAGNOSIS — I10 ESSENTIAL HYPERTENSION: ICD-10-CM

## 2021-07-28 DIAGNOSIS — M54.31 RIGHT SCIATIC NERVE PAIN: ICD-10-CM

## 2021-07-28 DIAGNOSIS — I48.0 PAROXYSMAL ATRIAL FIBRILLATION (HCC): Primary | ICD-10-CM

## 2021-07-28 DIAGNOSIS — R53.83 FATIGUE, UNSPECIFIED TYPE: ICD-10-CM

## 2021-07-28 LAB — HBA1C MFR BLD: 6.4 %

## 2021-07-28 PROCEDURE — 1036F TOBACCO NON-USER: CPT | Performed by: FAMILY MEDICINE

## 2021-07-28 PROCEDURE — 83036 HEMOGLOBIN GLYCOSYLATED A1C: CPT | Performed by: FAMILY MEDICINE

## 2021-07-28 PROCEDURE — 99214 OFFICE O/P EST MOD 30 MIN: CPT | Performed by: FAMILY MEDICINE

## 2021-07-28 PROCEDURE — G8417 CALC BMI ABV UP PARAM F/U: HCPCS | Performed by: FAMILY MEDICINE

## 2021-07-28 PROCEDURE — 2022F DILAT RTA XM EVC RTNOPTHY: CPT | Performed by: FAMILY MEDICINE

## 2021-07-28 PROCEDURE — G8427 DOCREV CUR MEDS BY ELIG CLIN: HCPCS | Performed by: FAMILY MEDICINE

## 2021-07-28 PROCEDURE — 3017F COLORECTAL CA SCREEN DOC REV: CPT | Performed by: FAMILY MEDICINE

## 2021-07-28 PROCEDURE — 3044F HG A1C LEVEL LT 7.0%: CPT | Performed by: FAMILY MEDICINE

## 2021-07-28 RX ORDER — METOPROLOL SUCCINATE 25 MG/1
25 TABLET, EXTENDED RELEASE ORAL DAILY
Qty: 90 TABLET | Refills: 1 | Status: SHIPPED
Start: 2021-07-28 | End: 2022-01-10 | Stop reason: SDUPTHER

## 2021-07-28 RX ORDER — PRAVASTATIN SODIUM 40 MG
40 TABLET ORAL DAILY
Qty: 90 TABLET | Refills: 1 | Status: SHIPPED
Start: 2021-07-28 | End: 2022-01-10 | Stop reason: SDUPTHER

## 2021-07-28 ASSESSMENT — ENCOUNTER SYMPTOMS
TROUBLE SWALLOWING: 0
EYE REDNESS: 0
SINUS PRESSURE: 0
CHOKING: 0
DIARRHEA: 0
BLOOD IN STOOL: 0
ALLERGIC/IMMUNOLOGIC NEGATIVE: 1
SHORTNESS OF BREATH: 0
COLOR CHANGE: 0
FACIAL SWELLING: 0
RECTAL PAIN: 0
RHINORRHEA: 0
RESPIRATORY NEGATIVE: 1
ORTHOPNEA: 0
PHOTOPHOBIA: 0
ANAL BLEEDING: 0
VOICE CHANGE: 0
APNEA: 0
COUGH: 0
WHEEZING: 0
VISUAL CHANGE: 0
SORE THROAT: 0
EYE DISCHARGE: 0
EYE PAIN: 0
EYE ITCHING: 0
BLURRED VISION: 0
STRIDOR: 0
ABDOMINAL DISTENTION: 0
SINUS PAIN: 0
NAUSEA: 0
GASTROINTESTINAL NEGATIVE: 1
CHEST TIGHTNESS: 0
VOMITING: 0
CONSTIPATION: 0

## 2021-07-28 NOTE — PROGRESS NOTES
[id number] Artie  is a 57 y.o. male. HPI/Chief C/O:  Chief Complaint   Patient presents with    Back Pain     Sciatic pain x 2-3 weeks     No Known Allergies  The patient is here for a medication list and treatment planning review  We will go over our care planning goals as well as take care of all refills  We will set up labs as well     Hypertension  This is a chronic problem. The current episode started more than 1 year ago. The problem is controlled. Associated symptoms include malaise/fatigue. Pertinent negatives include no anxiety, blurred vision, chest pain, headaches, neck pain, orthopnea, palpitations, peripheral edema, PND, shortness of breath or sweats. Risk factors for coronary artery disease include obesity, male gender, diabetes mellitus and dyslipidemia. Past treatments include lifestyle changes, beta blockers, ACE inhibitors and diuretics. The current treatment provides significant improvement. Compliance problems include diet, exercise and psychosocial issues. Hypertensive end-organ damage includes kidney disease (kidney stones and cysts) and CAD/MI (H/O atrial fibrillation). There is no history of angina, CVA, heart failure, left ventricular hypertrophy, PVD (H/O AAA) or retinopathy. There is no history of chronic renal disease, coarctation of the aorta, hyperaldosteronism, hypercortisolism, hyperparathyroidism, a hypertension causing med, pheochromocytoma, renovascular disease, sleep apnea or a thyroid problem. Diabetes  He presents for his follow-up diabetic visit. He has type 2 diabetes mellitus. Pertinent negatives for hypoglycemia include no confusion, dizziness, headaches, nervousness/anxiousness, pallor, seizures, speech difficulty, sweats or tremors. Pertinent negatives for diabetes include no blurred vision, no chest pain, no fatigue, no foot paresthesias, no foot ulcerations, no polydipsia, no polyphagia, no polyuria, no visual change, no weakness and no weight loss. Diabetic complications include heart disease and impotence. Pertinent negatives for diabetic complications include no autonomic neuropathy, CVA, nephropathy, peripheral neuropathy, PVD (H/O AAA) or retinopathy. Current diabetic treatment includes diet and oral agent (monotherapy). He is compliant with treatment some of the time. He is following a generally unhealthy diet. An ACE inhibitor/angiotensin II receptor blocker is being taken. ROS:  Review of Systems   Constitutional: Positive for malaise/fatigue. Negative for activity change, appetite change, chills, diaphoresis, fatigue, unexpected weight change and weight loss. HENT: Negative. Negative for congestion, dental problem, drooling, ear discharge, ear pain, facial swelling, hearing loss, mouth sores, nosebleeds, postnasal drip, rhinorrhea, sinus pressure, sinus pain, sneezing, sore throat, tinnitus, trouble swallowing and voice change. Eyes: Negative for blurred vision, photophobia, pain, discharge, redness, itching and visual disturbance. Respiratory: Negative. Negative for apnea, cough, choking, chest tightness, shortness of breath, wheezing and stridor. Cardiovascular: Negative. Negative for chest pain, palpitations, orthopnea, leg swelling and PND. Gastrointestinal: Negative. Negative for abdominal distention, anal bleeding, blood in stool, constipation, diarrhea, nausea, rectal pain and vomiting. Endocrine: Negative. Negative for cold intolerance, heat intolerance, polydipsia, polyphagia and polyuria. Genitourinary: Positive for impotence. Negative for decreased urine volume, difficulty urinating, discharge, enuresis, flank pain, frequency, genital sores, hematuria, penile pain, penile swelling, scrotal swelling, testicular pain and urgency. Musculoskeletal: Positive for arthralgias and myalgias. Negative for gait problem, joint swelling, neck pain and neck stiffness. Skin: Negative.   Negative for color change, pallor, rash and wound. Allergic/Immunologic: Negative. Negative for environmental allergies, food allergies and immunocompromised state. Neurological: Negative for dizziness, tremors, seizures, syncope, facial asymmetry, speech difficulty, weakness, light-headedness and headaches. Hematological: Negative. Negative for adenopathy. Does not bruise/bleed easily. Psychiatric/Behavioral: Negative. Negative for agitation, behavioral problems, confusion, decreased concentration, dysphoric mood, hallucinations, self-injury, sleep disturbance and suicidal ideas. The patient is not nervous/anxious and is not hyperactive. Past Medical/Surgical Hx;  Reviewed with patient      Diagnosis Date    Hypertension     Irregular heart beat      Past Surgical History:   Procedure Laterality Date    APPENDECTOMY      SHOULDER ARTHROPLASTY      TONSILLECTOMY         Past Family Hx:  Reviewed with patient      Problem Relation Age of Onset    No Known Problems Mother        Social Hx:  Reviewed with patient  Social History     Tobacco Use    Smoking status: Former Smoker     Packs/day: 0.50     Years: 40.00     Pack years: 20.00     Types: Cigarettes     Start date: 1979     Quit date: 2021     Years since quittin.2    Smokeless tobacco: Never Used   Substance Use Topics    Alcohol use: Yes     Comment: weekly. 1 cup coffee per day       OBJECTIVE  /86   Pulse 105   Temp 97.3 °F (36.3 °C)   Resp 16   Ht 6' 4\" (1.93 m)   Wt (!) 364 lb (165.1 kg)   SpO2 98%   BMI 44.31 kg/m²     Problem List:  Ion Tobias does not have any pertinent problems on file. PHYS EX:  Physical Exam  Vitals and nursing note reviewed. Constitutional:       General: He is not in acute distress. Appearance: Normal appearance. He is well-developed. He is obese. He is not ill-appearing, toxic-appearing or diaphoretic. HENT:      Head: Normocephalic and atraumatic.       Right Ear: External ear normal. There is no impacted cerumen. Left Ear: External ear normal. There is no impacted cerumen. Nose: Nose normal. No congestion or rhinorrhea. Mouth/Throat:      Mouth: Mucous membranes are moist.      Pharynx: Oropharynx is clear. No oropharyngeal exudate or posterior oropharyngeal erythema. Eyes:      General: No scleral icterus. Right eye: No discharge. Left eye: No discharge. Neck:      Thyroid: No thyromegaly. Vascular: No carotid bruit. Trachea: No tracheal deviation. Cardiovascular:      Rate and Rhythm: Normal rate. Rhythm irregular. Pulses: Normal pulses. Heart sounds: Normal heart sounds. No murmur heard. No friction rub. No gallop. Pulmonary:      Effort: Pulmonary effort is normal. No respiratory distress. Breath sounds: Normal breath sounds. No stridor. No wheezing, rhonchi or rales. Chest:      Chest wall: No tenderness. Abdominal:      General: Bowel sounds are normal. There is no distension. Palpations: Abdomen is soft. There is no mass. Tenderness: There is no abdominal tenderness. There is no right CVA tenderness, left CVA tenderness, guarding or rebound. Hernia: No hernia is present. Genitourinary:     Penis: No tenderness. Musculoskeletal:         General: No swelling, tenderness, deformity or signs of injury. Normal range of motion. Cervical back: Normal range of motion and neck supple. No rigidity. No muscular tenderness. Right lower leg: Edema present. Left lower leg: Edema present. Lymphadenopathy:      Cervical: No cervical adenopathy. Skin:     General: Skin is warm. Coloration: Skin is not jaundiced or pale. Findings: No bruising, erythema, lesion or rash. Neurological:      General: No focal deficit present. Mental Status: He is alert and oriented to person, place, and time. Cranial Nerves: No cranial nerve deficit. Sensory: Sensory deficit (right sciatic) present.       Motor: No weakness or abnormal muscle tone. Coordination: Coordination normal.      Gait: Gait normal.      Deep Tendon Reflexes: Reflexes are normal and symmetric. Reflexes normal.         ASSESSMENT/PLAN  Rowena Fox was seen today for back pain. Diagnoses and all orders for this visit:    Paroxysmal atrial fibrillation ([address]  -     Basic Metabolic Panel; Future  -     CBC Auto Differential; Future    ---VASCULAR PANEL  A) asa, plavix, aggrenox  B) ELIQUIS, pletal, tzd, STATIN  C) ACE, HCTZ, folic, ccb  D) cannikinumab, fish oils     ---CARDIAC---ELIQUIS, ACE, BETA, STATIN, HCTZ, ( ccb )    Mixed hyperlipidemia  -     pravastatin (PRAVACHOL) 40 MG tablet; Take 1 tablet by mouth daily  -     Basic Metabolic Panel; Future  -     Lipid Panel; Future  -     CBC Auto Differential; Future  --Mediterranean diet, exercise, weight loss, vitamins    We have a long talk on cholesterol and importance of lowering it       Essential hypertension ---controlled   --patient is instructed on low to moderate sodium ( 2 to 2.5 grams ), daily    Also to increase potassium in the diet to about 3.5 grams daily    Literature is provided     -     metoprolol succinate (TOPROL XL) 25 MG extended release tablet; Take 1 tablet by mouth daily  -     Basic Metabolic Panel; Future  -     CBC Auto Differential; Future    Right sciatic nerve pain  -     Basic Metabolic Panel; Future  -     CBC Auto Differential; Future  -     diclofenac sodium (VOLTAREN) 1 % GEL; Apply 2 g topically 4 times daily as needed for Pain    Morbid obesity (HCC)  -     Basic Metabolic Panel; Future  -     CBC Auto Differential; Future  --talk diet and weight loss    Venous stasis  -     Basic Metabolic Panel; Future  -     CBC Auto Differential; Future    Fatigue, unspecified type  -     TSH without Reflex; Future  -     Uric Acid; Future  -     Basic Metabolic Panel;  Future  -     CBC Auto Differential; Future    Type 2 diabetes mellitus with cardiac complication (HCC)  - POCT glycosylated hemoglobin (Hb A1C)  -     Basic Metabolic Panel; Future  -     CBC Auto Differential; Future  -     Hemoglobin A1C; Future  -     Microalbumin, Ur; Future        Outpatient Encounter Medications as of 7/28/2021   Medication Sig Dispense Refill    pravastatin (PRAVACHOL) 40 MG tablet Take 1 tablet by mouth daily 90 tablet 1    metoprolol succinate (TOPROL XL) 25 MG extended release tablet Take 1 tablet by mouth daily 90 tablet 1    diclofenac sodium (VOLTAREN) 1 % GEL Apply 2 g topically 4 times daily as needed for Pain 100 g 3    lisinopril-hydroCHLOROthiazide (PRINZIDE;ZESTORETIC) 20-12.5 MG per tablet Take 2 tabs daily 60 tablet 1    apixaban (ELIQUIS) 5 MG TABS tablet Take 1 tablet by mouth 2 times daily 180 tablet 1    metFORMIN (GLUCOPHAGE-XR) 500 MG extended release tablet Take 1 tablet BID with meals 180 tablet 1    sildenafil (VIAGRA) 100 MG tablet Take 1 tablet by mouth as needed for Erectile Dysfunction Take one half daily, as needed 30 tablet 3    Cholecalciferol (VITAMIN D) 50 MCG (2000 UT) TABS tablet Take 1 tablet by mouth daily 90 tablet 0    [DISCONTINUED] metoprolol succinate (TOPROL XL) 25 MG extended release tablet Take 0.5 tablets by mouth daily 45 tablet 1    [DISCONTINUED] pravastatin (PRAVACHOL) 40 MG tablet Take 1 tablet by mouth daily 90 tablet 1     No facility-administered encounter medications on file as of 7/28/2021. Return in about 6 weeks (around 9/8/2021).         Reviewed recent labs related to Nabeel's current problems      Discussed importance of regular Health Maintenance follow up  Health Maintenance   Topic    Hepatitis C screen     Pneumococcal 0-64 years Vaccine (1 of 2 - PPSV23)    Diabetic foot exam     Diabetic retinal exam     COVID-19 Vaccine (1)    HIV screen     Diabetic microalbuminuria test     DTaP/Tdap/Td vaccine (1 - Tdap)    Shingles Vaccine (1 of 2)    Flu vaccine (1)    Lipid screen     Low dose CT lung screening  A1C test (Diabetic or Prediabetic)     Potassium monitoring     Creatinine monitoring     Colon cancer screen colonoscopy     Hepatitis A vaccine     Hib vaccine     Meningococcal (ACWY) vaccine

## 2021-07-28 NOTE — PATIENT INSTRUCTIONS

## 2021-07-29 DIAGNOSIS — M54.31 RIGHT SCIATIC NERVE PAIN: ICD-10-CM

## 2021-07-29 NOTE — TELEPHONE ENCOUNTER
Pt called requesting for Voltaren Gel to be sent to [address] [place] [address] in [facility], he does not want to wait for it to come from [address] Rx sent.     Electronically signed by Andre Richter MA on 7/29/21 at 3:44 PM EDT

## 2021-08-27 DIAGNOSIS — I48.91 ATRIAL FIBRILLATION, UNSPECIFIED TYPE (HCC): ICD-10-CM

## 2021-09-01 RX ORDER — APIXABAN 5 MG/1
TABLET, FILM COATED ORAL
Qty: 180 TABLET | Refills: 1 | Status: SHIPPED
Start: 2021-09-01 | End: 2022-01-10 | Stop reason: SDUPTHER

## 2021-10-09 DIAGNOSIS — E08.65 DIABETES MELLITUS DUE TO UNDERLYING CONDITION WITH HYPERGLYCEMIA, WITHOUT LONG-TERM CURRENT USE OF INSULIN (HCC): ICD-10-CM

## 2021-10-11 RX ORDER — METFORMIN HYDROCHLORIDE 500 MG/1
TABLET, EXTENDED RELEASE ORAL
Qty: 180 TABLET | Refills: 0 | Status: SHIPPED
Start: 2021-10-11 | End: 2022-01-10 | Stop reason: SDUPTHER

## 2021-10-22 DIAGNOSIS — I10 ESSENTIAL HYPERTENSION: ICD-10-CM

## 2021-10-22 RX ORDER — LISINOPRIL AND HYDROCHLOROTHIAZIDE 20; 12.5 MG/1; MG/1
TABLET ORAL
Qty: 180 TABLET | Refills: 1 | Status: SHIPPED
Start: 2021-10-22 | End: 2022-01-10 | Stop reason: SDUPTHER

## 2021-10-22 NOTE — TELEPHONE ENCOUNTER
Rx sent to [facility].     Electronically signed by Shalonda Blake MA on 10/22/21 at 3:37 PM EDT

## 2021-10-22 NOTE — TELEPHONE ENCOUNTER
Pt called in he needs lisinopril-hydroCHLOROthiazide (PRINZIDE;ZESTORETIC) 20-12.5 MG per tablet [facility]

## 2022-01-10 ENCOUNTER — OFFICE VISIT (OUTPATIENT)
Dept: FAMILY MEDICINE CLINIC | Age: 62
End: 2022-01-10
Payer: COMMERCIAL

## 2022-01-10 VITALS
HEART RATE: 86 BPM | HEIGHT: 76 IN | DIASTOLIC BLOOD PRESSURE: 84 MMHG | OXYGEN SATURATION: 94 % | TEMPERATURE: 97.9 F | SYSTOLIC BLOOD PRESSURE: 138 MMHG | RESPIRATION RATE: 22 BRPM | WEIGHT: 315 LBS | BODY MASS INDEX: 38.36 KG/M2

## 2022-01-10 DIAGNOSIS — E08.65 DIABETES MELLITUS DUE TO UNDERLYING CONDITION WITH HYPERGLYCEMIA, WITHOUT LONG-TERM CURRENT USE OF INSULIN (HCC): ICD-10-CM

## 2022-01-10 DIAGNOSIS — R53.83 FATIGUE, UNSPECIFIED TYPE: ICD-10-CM

## 2022-01-10 DIAGNOSIS — I71.40 ABDOMINAL AORTIC ANEURYSM (AAA) WITHOUT RUPTURE: ICD-10-CM

## 2022-01-10 DIAGNOSIS — N28.1 KIDNEY CYSTS: ICD-10-CM

## 2022-01-10 DIAGNOSIS — I10 ESSENTIAL HYPERTENSION: Primary | ICD-10-CM

## 2022-01-10 DIAGNOSIS — G89.29 CHRONIC PAIN OF RIGHT KNEE: ICD-10-CM

## 2022-01-10 DIAGNOSIS — I73.9 PVD (PERIPHERAL VASCULAR DISEASE) (HCC): ICD-10-CM

## 2022-01-10 DIAGNOSIS — E78.2 MIXED HYPERLIPIDEMIA: ICD-10-CM

## 2022-01-10 DIAGNOSIS — M25.561 CHRONIC PAIN OF RIGHT KNEE: ICD-10-CM

## 2022-01-10 DIAGNOSIS — Z12.5 SCREENING PSA (PROSTATE SPECIFIC ANTIGEN): ICD-10-CM

## 2022-01-10 PROCEDURE — G8427 DOCREV CUR MEDS BY ELIG CLIN: HCPCS | Performed by: FAMILY MEDICINE

## 2022-01-10 PROCEDURE — 1036F TOBACCO NON-USER: CPT | Performed by: FAMILY MEDICINE

## 2022-01-10 PROCEDURE — G8484 FLU IMMUNIZE NO ADMIN: HCPCS | Performed by: FAMILY MEDICINE

## 2022-01-10 PROCEDURE — 99214 OFFICE O/P EST MOD 30 MIN: CPT | Performed by: FAMILY MEDICINE

## 2022-01-10 PROCEDURE — G8417 CALC BMI ABV UP PARAM F/U: HCPCS | Performed by: FAMILY MEDICINE

## 2022-01-10 PROCEDURE — 3017F COLORECTAL CA SCREEN DOC REV: CPT | Performed by: FAMILY MEDICINE

## 2022-01-10 RX ORDER — METFORMIN HYDROCHLORIDE 500 MG/1
TABLET, EXTENDED RELEASE ORAL
Qty: 180 TABLET | Refills: 0 | Status: SHIPPED
Start: 2022-01-10 | End: 2022-04-01

## 2022-01-10 RX ORDER — METOPROLOL SUCCINATE 25 MG/1
25 TABLET, EXTENDED RELEASE ORAL DAILY
Qty: 90 TABLET | Refills: 1 | Status: SHIPPED
Start: 2022-01-10 | End: 2022-07-11

## 2022-01-10 RX ORDER — PRAVASTATIN SODIUM 40 MG
40 TABLET ORAL DAILY
Qty: 90 TABLET | Refills: 1 | Status: SHIPPED
Start: 2022-01-10 | End: 2022-04-08

## 2022-01-10 RX ORDER — LISINOPRIL AND HYDROCHLOROTHIAZIDE 20; 12.5 MG/1; MG/1
TABLET ORAL
Qty: 180 TABLET | Refills: 1 | Status: SHIPPED | OUTPATIENT
Start: 2022-01-10

## 2022-01-10 ASSESSMENT — ENCOUNTER SYMPTOMS
SINUS PAIN: 0
SORE THROAT: 0
GASTROINTESTINAL NEGATIVE: 1
ORTHOPNEA: 0
EYE PAIN: 0
BACK PAIN: 1
WHEEZING: 0
RESPIRATORY NEGATIVE: 1
ALLERGIC/IMMUNOLOGIC NEGATIVE: 1
COUGH: 0
CONSTIPATION: 0
COLOR CHANGE: 0
ABDOMINAL PAIN: 0
EYE ITCHING: 0
CHOKING: 0
VISUAL CHANGE: 0
EYE REDNESS: 0
VOICE CHANGE: 0
PHOTOPHOBIA: 0
APNEA: 0
BLURRED VISION: 0
VOMITING: 0
CHEST TIGHTNESS: 0
SHORTNESS OF BREATH: 0
TROUBLE SWALLOWING: 0
FACIAL SWELLING: 0
RHINORRHEA: 0
EYE DISCHARGE: 0
ANAL BLEEDING: 0
STRIDOR: 0
BLOOD IN STOOL: 0
DIARRHEA: 0
NAUSEA: 0
SINUS PRESSURE: 0
RECTAL PAIN: 0
ABDOMINAL DISTENTION: 0

## 2022-01-10 NOTE — PATIENT INSTRUCTIONS
Patient Education        Low Sodium Diet (2,000 Milligram): Care Instructions  Overview     Limiting sodium can be an important part of managing some health problems. The most common source of sodium is salt. People get most of the salt in their diet from canned, prepared, and packaged foods. Fast food and restaurant meals also are very high in sodium. Your doctor will probably limit your sodium to less than 2,000 milligrams (mg) a day. This limit counts all the sodium in prepared and packaged foods and any salt you add to your food. Follow-up care is a key part of your treatment and safety. Be sure to make and go to all appointments, and call your doctor if you are having problems. It's also a good idea to know your test results and keep a list of the medicines you take. How can you care for yourself at home? Read food labels  · Read labels on cans and food packages. The labels tell you how much sodium is in each serving. Make sure that you look at the serving size. If you eat more than the serving size, you have eaten more sodium. · Food labels also tell you the Percent Daily Value for sodium. Choose products with low Percent Daily Values for sodium. · Be aware that sodium can come in forms other than salt, including monosodium glutamate (MSG), sodium citrate, and sodium bicarbonate (baking soda). MSG is often added to Asian food. When you eat out, you can sometimes ask for food without MSG or added salt. Buy low-sodium foods  · Buy foods that are labeled \"unsalted\" (no salt added), \"sodium-free\" (less than 5 mg of sodium per serving), or \"low-sodium\" (140 mg or less of sodium per serving). Foods labeled \"reduced-sodium\" and \"light sodium\" may still have too much sodium. Be sure to read the label to see how much sodium you are getting. · Buy fresh vegetables, or frozen vegetables without added sauces. Buy low-sodium versions of canned vegetables, soups, and other canned goods.   Prepare low-sodium meals  · Cut back on the amount of salt you use in cooking. This will help you adjust to the taste. Do not add salt after cooking. One teaspoon of salt has about 2,300 mg of sodium. · Take the salt shaker off the table. · Flavor your food with garlic, lemon juice, onion, vinegar, herbs, and spices. Do not use soy sauce, lite soy sauce, steak sauce, onion salt, garlic salt, celery salt, or ketchup on your food. · Use low-sodium salad dressings, sauces, and ketchup. Or make your own salad dressings and sauces without adding salt. · Use less salt (or none) when recipes call for it. You can often use half the salt a recipe calls for without losing flavor. Other foods such as rice, pasta, and grains do not need added salt. · Rinse canned vegetables, and cook them in fresh water. This removes somebut not allof the salt. · Avoid water that is naturally high in sodium or that has been treated with water softeners, which add sodium. If you buy bottled water, read the label and choose a sodium-free brand. Avoid high-sodium foods  · Avoid eating:  ? Smoked, cured, salted, and canned meat, fish, and poultry. ? Ham, north, hot dogs, and luncheon meats. ? Regular, hard, and processed cheese and regular peanut butter. ? Crackers with salted tops, and other salted snack foods such as pretzels, chips, and salted popcorn. ? Frozen prepared meals, unless labeled low-sodium. ? Canned and dried soups, broths, and bouillon, unless labeled sodium-free or low-sodium. ? Canned vegetables, unless labeled sodium-free or low-sodium. ? [place] fries, pizza, tacos, and other fast foods. ? Pickles, olives, ketchup, and other condiments, especially soy sauce, unless labeled sodium-free or low-sodium. Where can you learn more? Go to https://linda.[employer] org and sign in to your [employer] account. Enter C344 in the [employer] box to learn more about \"Low Sodium Diet (2,000 Milligram): Care Instructions. \" If you do not have an account, please click on the \"Sign Up Now\" link. Current as of: September 8, 2021               Content Version: 13.1  © 2006-2021 [employer]. Care instructions adapted under license by [facility] ([facility]). If you have questions about a medical condition or this instruction, always ask your healthcare professional. [address] any warranty or liability for your use of this information.

## 2022-01-10 NOTE — PROGRESS NOTES
[address]  is a 57 y.o. male. HPI/Chief C/O:  Chief Complaint   Patient presents with    Leg Swelling     Pt c/o of swelling in right leg    Hypertension     follow up     No Known Allergies  The patient is here for a medication list and treatment planning review  We will go over our care planning goals as well as take care of all refills  We will set up labs as well   C/O pain and swelling to his legs, his right knee and right leg hurt     Hypertension  This is a chronic problem. The current episode started more than 1 year ago. The problem is controlled. Associated symptoms include malaise/fatigue. Pertinent negatives include no anxiety, blurred vision, chest pain, headaches, neck pain, orthopnea, palpitations, peripheral edema, PND, shortness of breath or sweats. Risk factors for coronary artery disease include obesity, male gender, diabetes mellitus and dyslipidemia. Past treatments include lifestyle changes, beta blockers, ACE inhibitors and diuretics. The current treatment provides significant improvement. Compliance problems include diet, exercise and psychosocial issues. Hypertensive end-organ damage includes kidney disease (kidney stones and cysts) and CAD/MI (H/O atrial fibrillation). There is no history of angina, CVA, heart failure, left ventricular hypertrophy, PVD (H/O AAA) or retinopathy. There is no history of chronic renal disease, coarctation of the aorta, hyperaldosteronism, hypercortisolism, hyperparathyroidism, a hypertension causing med, pheochromocytoma, renovascular disease, sleep apnea or a thyroid problem. Diabetes  He presents for his follow-up diabetic visit. He has type 2 diabetes mellitus. Pertinent negatives for hypoglycemia include no confusion, dizziness, headaches, nervousness/anxiousness, pallor, seizures, speech difficulty, sweats or tremors.  Pertinent negatives for diabetes include no blurred vision, no chest pain, no fatigue, no foot paresthesias, no foot ulcerations, no polydipsia, no polyphagia, no polyuria, no visual change, no weakness and no weight loss. Diabetic complications include heart disease and impotence. Pertinent negatives for diabetic complications include no autonomic neuropathy, CVA, nephropathy, peripheral neuropathy, PVD (H/O AAA) or retinopathy. Current diabetic treatment includes diet and oral agent (monotherapy). He is compliant with treatment some of the time. He is following a generally unhealthy diet. An ACE inhibitor/angiotensin II receptor blocker is being taken. ROS:  Review of Systems   Constitutional: Positive for malaise/fatigue. Negative for activity change, appetite change, chills, diaphoresis, fatigue, fever, unexpected weight change and weight loss. HENT: Negative. Negative for congestion, dental problem, drooling, ear discharge, ear pain, facial swelling, hearing loss, mouth sores, nosebleeds, postnasal drip, rhinorrhea, sinus pressure, sinus pain, sneezing, sore throat, tinnitus, trouble swallowing and voice change. Eyes: Negative for blurred vision, photophobia, pain, discharge, redness, itching and visual disturbance. Respiratory: Negative. Negative for apnea, cough, choking, chest tightness, shortness of breath, wheezing and stridor. Cardiovascular: Positive for leg swelling. Negative for chest pain, palpitations, orthopnea and PND. Gastrointestinal: Negative. Negative for abdominal distention, abdominal pain, anal bleeding, blood in stool, constipation, diarrhea, nausea, rectal pain and vomiting. Endocrine: Negative. Negative for cold intolerance, heat intolerance, polydipsia, polyphagia and polyuria. Genitourinary: Positive for impotence. Negative for decreased urine volume, difficulty urinating, dysuria, enuresis, flank pain, frequency, genital sores, hematuria, penile discharge, penile pain, penile swelling, scrotal swelling, testicular pain and urgency.    Musculoskeletal: Positive for arthralgias (right knee, right leg), back pain and myalgias. Negative for gait problem, joint swelling, neck pain and neck stiffness. Skin: Negative. Negative for color change, pallor, rash and wound. Allergic/Immunologic: Negative. Negative for environmental allergies, food allergies and immunocompromised state. Neurological: Positive for numbness. Negative for dizziness, tremors, seizures, syncope, facial asymmetry, speech difficulty, weakness, light-headedness and headaches. Hematological: Negative. Negative for adenopathy. Does not bruise/bleed easily. Psychiatric/Behavioral: Negative. Negative for agitation, behavioral problems, confusion, decreased concentration, dysphoric mood, hallucinations, self-injury, sleep disturbance and suicidal ideas. The patient is not nervous/anxious and is not hyperactive. Past Medical/Surgical Hx;  Reviewed with patient      Diagnosis Date    Hypertension     Irregular heart beat      Past Surgical History:   Procedure Laterality Date    APPENDECTOMY      SHOULDER ARTHROPLASTY      TONSILLECTOMY         Past Family Hx:  Reviewed with patient      Problem Relation Age of Onset    No Known Problems Mother        Social Hx:  Reviewed with patient  Social History     Tobacco Use    Smoking status: Former Smoker     Packs/day: 0.50     Years: 40.00     Pack years: 20.00     Types: Cigarettes     Start date: 1979     Quit date: 2021     Years since quittin.6    Smokeless tobacco: Never Used   Substance Use Topics    Alcohol use: Yes     Comment: weekly. 1 cup coffee per day       OBJECTIVE  /84   Pulse 86   Temp 97.9 °F (36.6 °C)   Resp 22   Ht 6' 4\" (1.93 m)   Wt (!) 385 lb (174.6 kg)   SpO2 94%   BMI 46.86 kg/m²     Problem List:  Ana María Martini does not have any pertinent problems on file. PHYS EX:  Physical Exam  Vitals and nursing note reviewed. Constitutional:       General: He is not in acute distress.      Appearance: Normal appearance. He is well-developed. He is obese. He is not ill-appearing, toxic-appearing or diaphoretic. HENT:      Head: Normocephalic and atraumatic. Right Ear: External ear normal. There is no impacted cerumen. Left Ear: External ear normal. There is no impacted cerumen. Nose: Nose normal. No congestion or rhinorrhea. Mouth/Throat:      Mouth: Mucous membranes are moist.      Pharynx: Oropharynx is clear. No oropharyngeal exudate or posterior oropharyngeal erythema. Eyes:      General: No scleral icterus. Right eye: No discharge. Left eye: No discharge. Neck:      Thyroid: No thyromegaly. Vascular: No carotid bruit. Trachea: No tracheal deviation. Cardiovascular:      Rate and Rhythm: Normal rate. Rhythm irregular. Pulses: Normal pulses. Heart sounds: Normal heart sounds. No murmur heard. No friction rub. No gallop. Pulmonary:      Effort: Pulmonary effort is normal. No respiratory distress. Breath sounds: Normal breath sounds. No stridor. No wheezing, rhonchi or rales. Chest:      Chest wall: No tenderness. Abdominal:      General: Bowel sounds are normal. There is no distension. Palpations: Abdomen is soft. There is no mass. Tenderness: There is no abdominal tenderness. There is no right CVA tenderness, left CVA tenderness, guarding or rebound. Hernia: No hernia is present. Genitourinary:     Penis: No tenderness. Musculoskeletal:         General: No swelling, tenderness, deformity or signs of injury. Normal range of motion. Cervical back: Normal range of motion and neck supple. No rigidity. No muscular tenderness. Right lower leg: Edema present. Left lower leg: Edema present. Lymphadenopathy:      Cervical: No cervical adenopathy. Skin:     General: Skin is warm. Coloration: Skin is not jaundiced or pale. Findings: No bruising, erythema, lesion or rash.    Neurological:      General: No focal deficit present. Mental Status: He is alert and oriented to person, place, and time. Cranial Nerves: No cranial nerve deficit. Sensory: Sensory deficit (into his right foot) present. Motor: No weakness or abnormal muscle tone. Coordination: Coordination normal.      Gait: Gait normal.      Deep Tendon Reflexes: Reflexes are normal and symmetric. Reflexes normal.         ASSESSMENT/PLAN  Rubina Bertrand was seen today for leg swelling and hypertension. Diagnoses and all orders for this visit:    Essential hypertension ---controlled   --patient is instructed on low to moderate sodium ( 2 to 2.5 grams ), daily    Also to increase potassium in the diet to about 3.5 grams daily    Literature is provided     ---VASCULAR PANEL  A) asa, plavix, aggrenox  B) ELIQUIS, pletal, tzd, STATIN  C) ACE, HCTZ, folic, ccb  D) cannikinumab, fish oils     ---CARDIAC---ELIQUIS, ACE, BETA, STATIN, HCTZ, ( ccb )    -     lisinopril-hydroCHLOROthiazide (PRINZIDE;ZESTORETIC) 20-12.5 MG per tablet; Take 2 tabs daily  -     metoprolol succinate (TOPROL XL) 25 MG extended release tablet; Take 1 tablet by mouth daily  -     Comprehensive Metabolic Panel; Future  -     CBC Auto Differential; Future    Diabetes mellitus due to underlying condition with hyperglycemia, without long-term current use of insulin (HCC)  -     metFORMIN (GLUCOPHAGE-XR) 500 MG extended release tablet; TAKE 1 TABLET TWICE A DAY WITH MEALS  -     Comprehensive Metabolic Panel; Future  -     CBC Auto Differential; Future    Mixed hyperlipidemia  -     pravastatin (PRAVACHOL) 40 MG tablet; Take 1 tablet by mouth daily  -     Comprehensive Metabolic Panel; Future  -     Lipid Panel; Future  -     CBC Auto Differential; Future  --Mediterranean diet, exercise, weight loss, vitamins    We have a long talk on cholesterol and importance of lowering it       Chronic pain of right knee  -     XR KNEE RIGHT (1-2 VIEWS);  Future  -     [address], DO, [facility], [place]  -     XR TIBIA FIBULA RIGHT (2 VIEWS); Future  -     Comprehensive Metabolic Panel; Future  -     CBC Auto Differential; Future  --PLAN--inject right and left knees  x 2                1/2 cc xylocaine plus 1 cc depo medrol                Omt/ultra--Rx        Abdominal aortic aneurysm (AAA) without rupture (HCC)  -     Comprehensive Metabolic Panel; Future  -     CBC Auto Differential; Future  Long talk on treatment and prevention  Literature is given       Kidney cysts  -     US RETROPERITONEAL LIMITED; Future  -     Comprehensive Metabolic Panel; Future  -     CBC Auto Differential; Future  Long talk on treatment and prevention  Literature is given       PVD (peripheral vascular disease) ([facility] [address]  -     US DUP LOWER EXTREMITY LEFT ARTERIES; Future  -     US DUP LOWER EXTREMITY RIGHT ARTERIES; Future  -     Comprehensive Metabolic Panel; Future  -     CBC Auto Differential; Future  Long talk on treatment and prevention  Literature is given       Fatigue, unspecified type  -     TSH without Reflex; Future  -     Uric Acid; Future  -     Comprehensive Metabolic Panel; Future  -     CBC Auto Differential; Future    Screening PSA (prostate specific antigen)  -     PSA screening;  Future    Other orders  -     apixaban (ELIQUIS) 5 MG TABS tablet; TAKE 1 TABLET TWICE A DAY        Outpatient Encounter Medications as of 1/10/2022   Medication Sig Dispense Refill    lisinopril-hydroCHLOROthiazide (PRINZIDE;ZESTORETIC) 20-12.5 MG per tablet Take 2 tabs daily 180 tablet 1    metFORMIN (GLUCOPHAGE-XR) 500 MG extended release tablet TAKE 1 TABLET TWICE A DAY WITH MEALS 180 tablet 0    apixaban (ELIQUIS) 5 MG TABS tablet TAKE 1 TABLET TWICE A  tablet 1    pravastatin (PRAVACHOL) 40 MG tablet Take 1 tablet by mouth daily 90 tablet 1    metoprolol succinate (TOPROL XL) 25 MG extended release tablet Take 1 tablet by mouth daily 90 tablet 1    diclofenac sodium (VOLTAREN) 1 % GEL Apply 2 g topically 4 times daily as needed for Pain 100 g 3    sildenafil (VIAGRA) 100 MG tablet Take 1 tablet by mouth as needed for Erectile Dysfunction Take one half daily, as needed 30 tablet 3    Cholecalciferol (VITAMIN D) 50 MCG (2000 UT) TABS tablet Take 1 tablet by mouth daily 90 tablet 0    [DISCONTINUED] lisinopril-hydroCHLOROthiazide (PRINZIDE;ZESTORETIC) 20-12.5 MG per tablet Take 2 tabs daily 180 tablet 1    [DISCONTINUED] metFORMIN (GLUCOPHAGE-XR) 500 MG extended release tablet TAKE 1 TABLET TWICE A DAY WITH MEALS 180 tablet 0    [DISCONTINUED] ELIQUIS 5 MG TABS tablet TAKE 1 TABLET TWICE A  tablet 1    [DISCONTINUED] pravastatin (PRAVACHOL) 40 MG tablet Take 1 tablet by mouth daily 90 tablet 1    [DISCONTINUED] metoprolol succinate (TOPROL XL) 25 MG extended release tablet Take 1 tablet by mouth daily 90 tablet 1     No facility-administered encounter medications on file as of 1/10/2022. No follow-ups on file.         Reviewed recent labs related to Nabeel's current problems      Discussed importance of regular Health Maintenance follow up  Health Maintenance   Topic    Hepatitis C screen     COVID-19 Vaccine (1)    Pneumococcal 0-64 years Vaccine (1 of 2 - PPSV23)    Diabetic foot exam     HIV screen     Diabetic microalbuminuria test     Diabetic retinal exam     DTaP/Tdap/Td vaccine (1 - Tdap)    Shingles Vaccine (1 of 2)    Flu vaccine (1)    Lipid screen     Depression Screen     Low dose CT lung screening     Potassium monitoring     Creatinine monitoring     A1C test (Diabetic or Prediabetic)     Colon cancer screen colonoscopy     Hepatitis A vaccine     Hib vaccine     Meningococcal (ACWY) vaccine

## 2022-01-11 ENCOUNTER — HOSPITAL ENCOUNTER (OUTPATIENT)
Age: 62
Discharge: HOME | End: 2022-01-11
Payer: COMMERCIAL

## 2022-01-11 ENCOUNTER — HOSPITAL ENCOUNTER (OUTPATIENT)
Age: 62
Discharge: HOME | End: 2022-01-13
Payer: COMMERCIAL

## 2022-01-11 ENCOUNTER — TELEPHONE (OUTPATIENT)
Dept: FAMILY MEDICINE CLINIC | Age: 62
End: 2022-01-11

## 2022-01-11 ENCOUNTER — HOSPITAL ENCOUNTER (OUTPATIENT)
Dept: GENERAL RADIOLOGY | Age: 62
Discharge: HOME | End: 2022-01-13
Payer: COMMERCIAL

## 2022-01-11 DIAGNOSIS — G89.29 CHRONIC PAIN OF RIGHT KNEE: ICD-10-CM

## 2022-01-11 DIAGNOSIS — Z12.5 SCREENING PSA (PROSTATE SPECIFIC ANTIGEN): ICD-10-CM

## 2022-01-11 DIAGNOSIS — M25.561 CHRONIC PAIN OF RIGHT KNEE: ICD-10-CM

## 2022-01-11 DIAGNOSIS — R22.43 LOCALIZED SWELLING OF BOTH LOWER LEGS: Primary | ICD-10-CM

## 2022-01-11 DIAGNOSIS — E08.65 DIABETES MELLITUS DUE TO UNDERLYING CONDITION WITH HYPERGLYCEMIA, WITHOUT LONG-TERM CURRENT USE OF INSULIN (HCC): ICD-10-CM

## 2022-01-11 DIAGNOSIS — I71.40 ABDOMINAL AORTIC ANEURYSM (AAA) WITHOUT RUPTURE: ICD-10-CM

## 2022-01-11 DIAGNOSIS — I10 ESSENTIAL HYPERTENSION: ICD-10-CM

## 2022-01-11 DIAGNOSIS — N28.1 KIDNEY CYSTS: ICD-10-CM

## 2022-01-11 DIAGNOSIS — E78.2 MIXED HYPERLIPIDEMIA: ICD-10-CM

## 2022-01-11 DIAGNOSIS — I73.9 PVD (PERIPHERAL VASCULAR DISEASE) (HCC): ICD-10-CM

## 2022-01-11 DIAGNOSIS — R53.83 FATIGUE, UNSPECIFIED TYPE: ICD-10-CM

## 2022-01-11 LAB
ALBUMIN SERPL-MCNC: 4.7 G/DL (ref 3.5–5.2)
ALP BLD-CCNC: 56 U/L (ref 40–129)
ALT SERPL-CCNC: 18 U/L (ref 0–40)
ANION GAP SERPL CALCULATED.3IONS-SCNC: 11 MMOL/L (ref 7–16)
AST SERPL-CCNC: 17 U/L (ref 0–39)
BASOPHILS ABSOLUTE: 0.07 E9/L (ref 0–0.2)
BASOPHILS RELATIVE PERCENT: 1 % (ref 0–2)
BILIRUB SERPL-MCNC: 0.7 MG/DL (ref 0–1.2)
BUN BLDV-MCNC: 22 MG/DL (ref 6–23)
CALCIUM SERPL-MCNC: 10.3 MG/DL (ref 8.6–10.2)
CHLORIDE BLD-SCNC: 101 MMOL/L (ref 98–107)
CHOLESTEROL, TOTAL: 163 MG/DL (ref 0–199)
CO2: 29 MMOL/L (ref 22–29)
CREAT SERPL-MCNC: 1.2 MG/DL (ref 0.7–1.2)
EOSINOPHILS ABSOLUTE: 0.15 E9/L (ref 0.05–0.5)
EOSINOPHILS RELATIVE PERCENT: 2.1 % (ref 0–6)
GFR AFRICAN AMERICAN: >60
GFR NON-AFRICAN AMERICAN: >60 ML/MIN/1.73
GLUCOSE BLD-MCNC: 117 MG/DL (ref 74–99)
HCT VFR BLD CALC: 46.1 % (ref 37–54)
HDLC SERPL-MCNC: 50 MG/DL
HEMOGLOBIN: 15.4 G/DL (ref 12.5–16.5)
IMMATURE GRANULOCYTES #: 0.01 E9/L
IMMATURE GRANULOCYTES %: 0.1 % (ref 0–5)
LDL CHOLESTEROL CALCULATED: 96 MG/DL (ref 0–99)
LYMPHOCYTES ABSOLUTE: 1.54 E9/L (ref 1.5–4)
LYMPHOCYTES RELATIVE PERCENT: 21.5 % (ref 20–42)
MCH RBC QN AUTO: 31.6 PG (ref 26–35)
MCHC RBC AUTO-ENTMCNC: 33.4 % (ref 32–34.5)
MCV RBC AUTO: 94.5 FL (ref 80–99.9)
MONOCYTES ABSOLUTE: 0.58 E9/L (ref 0.1–0.95)
MONOCYTES RELATIVE PERCENT: 8.1 % (ref 2–12)
NEUTROPHILS ABSOLUTE: 4.8 E9/L (ref 1.8–7.3)
NEUTROPHILS RELATIVE PERCENT: 67.2 % (ref 43–80)
PDW BLD-RTO: 13.3 FL (ref 11.5–15)
PLATELET # BLD: 192 E9/L (ref 130–450)
PMV BLD AUTO: 10.4 FL (ref 7–12)
POTASSIUM SERPL-SCNC: 5.1 MMOL/L (ref 3.5–5)
PROSTATE SPECIFIC ANTIGEN: 1.55 NG/ML (ref 0–4)
RBC # BLD: 4.88 E12/L (ref 3.8–5.8)
SODIUM BLD-SCNC: 141 MMOL/L (ref 132–146)
TOTAL PROTEIN: 7.7 G/DL (ref 6.4–8.3)
TRIGL SERPL-MCNC: 86 MG/DL (ref 0–149)
TSH SERPL DL<=0.05 MIU/L-ACNC: 1.76 UIU/ML (ref 0.27–4.2)
URIC ACID, SERUM: 5.8 MG/DL (ref 3.4–7)
VLDLC SERPL CALC-MCNC: 17 MG/DL
WBC # BLD: 7.2 E9/L (ref 4.5–11.5)

## 2022-01-11 PROCEDURE — 73560 X-RAY EXAM OF KNEE 1 OR 2: CPT

## 2022-01-11 PROCEDURE — 80061 LIPID PANEL: CPT

## 2022-01-11 PROCEDURE — G0103 PSA SCREENING: HCPCS

## 2022-01-11 PROCEDURE — 85025 COMPLETE CBC W/AUTO DIFF WBC: CPT

## 2022-01-11 PROCEDURE — 80053 COMPREHEN METABOLIC PANEL: CPT

## 2022-01-11 PROCEDURE — 36415 COLL VENOUS BLD VENIPUNCTURE: CPT

## 2022-01-11 PROCEDURE — 73590 X-RAY EXAM OF LOWER LEG: CPT

## 2022-01-11 PROCEDURE — 84550 ASSAY OF BLOOD/URIC ACID: CPT

## 2022-01-11 PROCEDURE — 84443 ASSAY THYROID STIM HORMONE: CPT

## 2022-01-12 ENCOUNTER — HOSPITAL ENCOUNTER (OUTPATIENT)
Dept: ULTRASOUND IMAGING | Age: 62
Discharge: HOME | End: 2022-01-12
Payer: COMMERCIAL

## 2022-01-12 ENCOUNTER — APPOINTMENT (OUTPATIENT)
Dept: ULTRASOUND IMAGING | Age: 62
End: 2022-01-12
Payer: COMMERCIAL

## 2022-01-12 DIAGNOSIS — I73.9 PVD (PERIPHERAL VASCULAR DISEASE) (HCC): ICD-10-CM

## 2022-01-12 DIAGNOSIS — N28.1 KIDNEY CYSTS: ICD-10-CM

## 2022-01-12 DIAGNOSIS — R22.43 LOCALIZED SWELLING OF BOTH LOWER LEGS: ICD-10-CM

## 2022-01-12 PROCEDURE — 93970 EXTREMITY STUDY: CPT

## 2022-01-12 PROCEDURE — 93925 LOWER EXTREMITY STUDY: CPT

## 2022-01-12 PROCEDURE — 76775 US EXAM ABDO BACK WALL LIM: CPT

## 2022-01-13 ENCOUNTER — OFFICE VISIT (OUTPATIENT)
Dept: ORTHOPEDIC SURGERY | Age: 62
End: 2022-01-13
Payer: COMMERCIAL

## 2022-01-13 VITALS — HEIGHT: 76 IN | BODY MASS INDEX: 38.36 KG/M2 | TEMPERATURE: 98 F | WEIGHT: 315 LBS

## 2022-01-13 DIAGNOSIS — M17.11 PRIMARY OSTEOARTHRITIS OF RIGHT KNEE: ICD-10-CM

## 2022-01-13 DIAGNOSIS — T14.8XXA CONTUSION OF BONE: ICD-10-CM

## 2022-01-13 DIAGNOSIS — S83.206A TEAR OF MENISCUS OF RIGHT KNEE, UNSPECIFIED MENISCUS, UNSPECIFIED TEAR TYPE, UNSPECIFIED WHETHER OLD OR CURRENT TEAR: Primary | ICD-10-CM

## 2022-01-13 PROCEDURE — G8417 CALC BMI ABV UP PARAM F/U: HCPCS | Performed by: ORTHOPAEDIC SURGERY

## 2022-01-13 PROCEDURE — G8427 DOCREV CUR MEDS BY ELIG CLIN: HCPCS | Performed by: ORTHOPAEDIC SURGERY

## 2022-01-13 PROCEDURE — 1036F TOBACCO NON-USER: CPT | Performed by: ORTHOPAEDIC SURGERY

## 2022-01-13 PROCEDURE — G8484 FLU IMMUNIZE NO ADMIN: HCPCS | Performed by: ORTHOPAEDIC SURGERY

## 2022-01-13 PROCEDURE — 99203 OFFICE O/P NEW LOW 30 MIN: CPT | Performed by: ORTHOPAEDIC SURGERY

## 2022-01-13 PROCEDURE — 3017F COLORECTAL CA SCREEN DOC REV: CPT | Performed by: ORTHOPAEDIC SURGERY

## 2022-01-13 NOTE — PROGRESS NOTES
Chief Complaint   Patient presents with    Knee Pain     Right leg pain over the last two weeks. Pain travles to distal from knee to above knee. Knee very stiff to move. No injury is known. Pain also back of calf. Dr. Emeli He gave him cream to use but did not help. HPI:    Patient is 57 y.o. male complaining chronic, atraumatic, insidious onset Right leg pain over the last two weeks. Pain travles to distal from knee to above knee. Knee very stiff to move. No injury is known. Pain also back of calf. Dr. Emeli He gave him cream to use but did not help. Reneelucila Blakely He admits to stiffness, deep, aching pain, swelling, difficulty with stairs and ambulating far distances. He admits to gross instability specifically in his right knee. Previous treatments include rest, ice, and anti-inflammatory medication and HEP without much relief. ROS:    Skin: (-) rash,(-) psoriasis,(-) eczema, (-)skin cancer. Neurologic: (-)numbness, (-)tingling, (-)headaches, (-) LOC. Cardiovascular: (-) Chest pain, (-) swelling in legs/feet, (-) SOB, (-) cramping in legs/feet with walking.     All other review of systems negative except stated above or in HPI      Past Medical History:   Diagnosis Date    Hypertension     Irregular heart beat      Past Surgical History:   Procedure Laterality Date    APPENDECTOMY      SHOULDER ARTHROPLASTY      TONSILLECTOMY         Current Outpatient Medications:     lisinopril-hydroCHLOROthiazide (PRINZIDE;ZESTORETIC) 20-12.5 MG per tablet, Take 2 tabs daily, Disp: 180 tablet, Rfl: 1    metFORMIN (GLUCOPHAGE-XR) 500 MG extended release tablet, TAKE 1 TABLET TWICE A DAY WITH MEALS, Disp: 180 tablet, Rfl: 0    apixaban (ELIQUIS) 5 MG TABS tablet, TAKE 1 TABLET TWICE A DAY, Disp: 180 tablet, Rfl: 1    pravastatin (PRAVACHOL) 40 MG tablet, Take 1 tablet by mouth daily, Disp: 90 tablet, Rfl: 1    metoprolol succinate (TOPROL XL) 25 MG extended release tablet, Take 1 tablet by mouth daily, Disp: 90 tablet, Rfl: 1    diclofenac sodium (VOLTAREN) 1 % GEL, Apply 2 g topically 4 times daily as needed for Pain, Disp: 100 g, Rfl: 3    sildenafil (VIAGRA) 100 MG tablet, Take 1 tablet by mouth as needed for Erectile Dysfunction Take one half daily, as needed, Disp: 30 tablet, Rfl: 3    Cholecalciferol (VITAMIN D) 50 MCG (2000 UT) TABS tablet, Take 1 tablet by mouth daily, Disp: 90 tablet, Rfl: 0  No Known Allergies  Social History     Socioeconomic History    Marital status: Single     Spouse name: Not on file    Number of children: Not on file    Years of education: Not on file    Highest education level: Not on file   Occupational History    Not on file   Tobacco Use    Smoking status: Former Smoker     Packs/day: 0.50     Years: 40.00     Pack years: 20.00     Types: Cigarettes     Start date: 1979     Quit date: 2021     Years since quittin.7    Smokeless tobacco: Never Used   Vaping Use    Vaping Use: Never used   Substance and Sexual Activity    Alcohol use: Yes     Comment: weekly. 1 cup coffee per day    Drug use: Never    Sexual activity: Not on file   Other Topics Concern    Not on file   Social History Narrative    Not on file     Social Determinants of Health     Financial Resource Strain: Low Risk     Difficulty of Paying Living Expenses: Not hard at all   Food Insecurity: No Food Insecurity    Worried About Running Out of Food in the Last Year: Never true    Jazmyne of Food in the Last Year: Never true   Transportation Needs: No Transportation Needs    Lack of Transportation (Medical): No    Lack of Transportation (Non-Medical):  No   Physical Activity:     Days of Exercise per Week: Not on file    Minutes of Exercise per Session: Not on file   Stress:     Feeling of Stress : Not on file   Social Connections:     Frequency of Communication with Friends and Family: Not on file    Frequency of Social Gatherings with Friends and Family: Not on file    Attends Nondenominational Services: Not on file    Active Member of Clubs or Organizations: Not on file    Attends Club or Organization Meetings: Not on file    Marital Status: Not on file   Intimate Partner Violence:     Fear of Current or Ex-Partner: Not on file    Emotionally Abused: Not on file    Physically Abused: Not on file    Sexually Abused: Not on file   Housing Stability:     Unable to Pay for Housing in the Last Year: Not on file    Number of [place] in the Last Year: Not on file    Unstable Housing in the Last Year: Not on file     Family History   Problem Relation Age of Onset    No Known Problems Mother            Physical Exam:    Temp 98 °F (36.7 °C)   Ht 6' 4\" (1.93 m)   Wt (!) 385 lb (174.6 kg)   BMI 46.86 kg/m²     GENERAL: alert, appears stated age, cooperative, no acute distress    HEENT: Head is normocephalic, atraumatic. PERRLA. SKIN: Clean, dry, intact. There is not any cellulitis or cutaneous lesions noted in the lower extremities except noted in MSK    PULMONARY: breathing is regular and unlabored, no acute distress    CV: The bilateral upper and lower extremities are warm and well-perfused with brisk capillary refill. 2+ pulses UE but 1+ LE bilateral.     PSYCHIATRY: Pleasant mood, appropriate behavior, follows commands    NEURO: Sensation is intact distally with light touch with no alteration. Motor exam of the lower extremities show quadriceps, hamstrings, foot dorsiflexion and plantarflexion grossly intact 5/5. LYMPH: No lymphedema present distally in upper or lower extremity. MUSCULOSKELETAL:    Right Knee Exam:    mild effusion noted. No erythema/induration/fluctuance. Posterior medial joint line TTP. Stable to varus and valgus at 0 and 30 degrees of flexion. Negative Lachman's and posterior drawer. Negative patellar grind test and J sign. Compartments soft and compressible throughout leg. Active range of motion 0-120 with pain.   Positive Kelechi's positive Apley's, gait is antalgic    Patient does not have any signs of cellulitis but there is chronic atrophic changes noted to the lower extremities bilaterally with pitting edema. Patient does have significantly overgrown toenails with mild fungal appearing lesions also. Imaging:  XR KNEE RIGHT (1-2 VIEWS)    Result Date: 1/11/2022  EXAMINATION: 2 XRAY VIEWS OF THE RIGHT TIBIA AND FIBULA; TWO XRAY VIEWS OF THE RIGHT KNEE 1/11/2022 12:25 pm COMPARISON: None. HISTORY: ORDERING SYSTEM PROVIDED HISTORY: Chronic pain of right knee FINDINGS: Moderate right knee joint effusion. Mild osteoarthritis at the medial weight-bearing and patellofemoral compartments. No fracture or dislocation. No additional significant abnormal findings related to the more distal tibia or fibula. There is a moderate-sized plantar heel spur. Mild osteoarthritis at the right knee with moderate joint effusion. Plantar heel spur. XR TIBIA FIBULA RIGHT (2 VIEWS)    Result Date: 1/11/2022  EXAMINATION: 2 XRAY VIEWS OF THE RIGHT TIBIA AND FIBULA; TWO XRAY VIEWS OF THE RIGHT KNEE 1/11/2022 12:25 pm COMPARISON: None. HISTORY: ORDERING SYSTEM PROVIDED HISTORY: Chronic pain of right knee FINDINGS: Moderate right knee joint effusion. Mild osteoarthritis at the medial weight-bearing and patellofemoral compartments. No fracture or dislocation. No additional significant abnormal findings related to the more distal tibia or fibula. There is a moderate-sized plantar heel spur. Mild osteoarthritis at the right knee with moderate joint effusion. Plantar heel spur. Leslee Councilman was seen today for knee pain. Diagnoses and all orders for this visit:    Tear of meniscus of right knee, unspecified meniscus, unspecified tear type, unspecified whether old or current tear  -     MRI KNEE RIGHT WO CONTRAST; Future    Primary osteoarthritis of right knee    Contusion of bone  -     MRI TIBIA FIBULA RIGHT WO CONTRAST; Future        Patient seen and examined. X-rays reviewed. Patient has little to no arthritis noted on x-rays today. However patient's main complaint is instability of symptomatic knee. Exam and history is consistent with possible meniscus tear. MRI recommended for further evaluation management.   Follow-up after MRI        Shakir Ta DO  1/13/22

## 2022-01-14 DIAGNOSIS — N28.1 KIDNEY CYSTS: Primary | ICD-10-CM

## 2022-02-08 ENCOUNTER — HOSPITAL ENCOUNTER (OUTPATIENT)
Dept: MRI IMAGING | Age: 62
Discharge: HOME | End: 2022-02-10
Payer: COMMERCIAL

## 2022-02-08 DIAGNOSIS — T14.8XXA CONTUSION OF BONE: ICD-10-CM

## 2022-02-08 DIAGNOSIS — T15.90XA FOREIGN BODY IN EYE, UNSPECIFIED LATERALITY, INITIAL ENCOUNTER: ICD-10-CM

## 2022-02-08 DIAGNOSIS — S83.206A TEAR OF MENISCUS OF RIGHT KNEE, UNSPECIFIED MENISCUS, UNSPECIFIED TEAR TYPE, UNSPECIFIED WHETHER OLD OR CURRENT TEAR: ICD-10-CM

## 2022-02-08 PROCEDURE — 70030 X-RAY EYE FOR FOREIGN BODY: CPT

## 2022-02-08 PROCEDURE — 73718 MRI LOWER EXTREMITY W/O DYE: CPT

## 2022-02-08 PROCEDURE — 73721 MRI JNT OF LWR EXTRE W/O DYE: CPT

## 2022-02-10 ENCOUNTER — OFFICE VISIT (OUTPATIENT)
Dept: ORTHOPEDIC SURGERY | Age: 62
End: 2022-02-10
Payer: COMMERCIAL

## 2022-02-10 VITALS — WEIGHT: 315 LBS | BODY MASS INDEX: 38.36 KG/M2 | HEIGHT: 76 IN | TEMPERATURE: 98 F

## 2022-02-10 DIAGNOSIS — T14.8XXA CONTUSION OF BONE: ICD-10-CM

## 2022-02-10 DIAGNOSIS — S82.141A CLOSED FRACTURE OF RIGHT TIBIAL PLATEAU, INITIAL ENCOUNTER: Primary | ICD-10-CM

## 2022-02-10 DIAGNOSIS — Z71.82 EXERCISE COUNSELING: ICD-10-CM

## 2022-02-10 DIAGNOSIS — S83.206A TEAR OF MENISCUS OF RIGHT KNEE, UNSPECIFIED MENISCUS, UNSPECIFIED TEAR TYPE, UNSPECIFIED WHETHER OLD OR CURRENT TEAR: ICD-10-CM

## 2022-02-10 DIAGNOSIS — M19.079 OSTEOARTHRITIS OF TOE JOINT, UNSPECIFIED LATERALITY: ICD-10-CM

## 2022-02-10 PROCEDURE — 99214 OFFICE O/P EST MOD 30 MIN: CPT | Performed by: ORTHOPAEDIC SURGERY

## 2022-02-10 PROCEDURE — G8484 FLU IMMUNIZE NO ADMIN: HCPCS | Performed by: ORTHOPAEDIC SURGERY

## 2022-02-10 PROCEDURE — G8427 DOCREV CUR MEDS BY ELIG CLIN: HCPCS | Performed by: ORTHOPAEDIC SURGERY

## 2022-02-10 PROCEDURE — G8417 CALC BMI ABV UP PARAM F/U: HCPCS | Performed by: ORTHOPAEDIC SURGERY

## 2022-02-10 PROCEDURE — 1036F TOBACCO NON-USER: CPT | Performed by: ORTHOPAEDIC SURGERY

## 2022-02-10 PROCEDURE — 3017F COLORECTAL CA SCREEN DOC REV: CPT | Performed by: ORTHOPAEDIC SURGERY

## 2022-02-10 RX ORDER — HYDROCODONE BITARTRATE AND ACETAMINOPHEN 5; 325 MG/1; MG/1
1 TABLET ORAL EVERY 12 HOURS PRN
Qty: 14 TABLET | Refills: 0 | Status: SHIPPED | OUTPATIENT
Start: 2022-02-10 | End: 2022-02-17

## 2022-02-10 NOTE — PROGRESS NOTES
Chief Complaint   Patient presents with    Knee Pain     Right knee and leg MRI follow up. Pain has remained the same. HPI:    Patient is 57 y.o. male complaining chronic, atraumatic, insidious onset Right leg pain over the last two weeks. Pain travles to distal from knee to above knee. Knee very stiff to move. No injury is known. Pain also back of calf. Dr. Bolanos gave him cream to use but did not help. Rometta Favre He admits to stiffness, deep, aching pain, swelling, difficulty with stairs and ambulating far distances. He admits to gross instability specifically in his right knee. Previous treatments include rest, ice, and anti-inflammatory medication and HEP without much relief. Follows up after MRI. ROS:    Skin: (-) rash,(-) psoriasis,(-) eczema, (-)skin cancer. Neurologic: (-)numbness, (-)tingling, (-)headaches, (-) LOC. Cardiovascular: (-) Chest pain, (-) swelling in legs/feet, (-) SOB, (-) cramping in legs/feet with walking.     All other review of systems negative except stated above or in HPI      Past Medical History:   Diagnosis Date    Hypertension     Irregular heart beat      Past Surgical History:   Procedure Laterality Date    APPENDECTOMY      SHOULDER ARTHROPLASTY      TONSILLECTOMY         Current Outpatient Medications:     apixaban (ELIQUIS) 5 MG TABS tablet, TAKE 1 TABLET TWICE A DAY, Disp: 60 tablet, Rfl: 0    lisinopril-hydroCHLOROthiazide (PRINZIDE;ZESTORETIC) 20-12.5 MG per tablet, Take 2 tabs daily, Disp: 180 tablet, Rfl: 1    metFORMIN (GLUCOPHAGE-XR) 500 MG extended release tablet, TAKE 1 TABLET TWICE A DAY WITH MEALS, Disp: 180 tablet, Rfl: 0    pravastatin (PRAVACHOL) 40 MG tablet, Take 1 tablet by mouth daily, Disp: 90 tablet, Rfl: 1    metoprolol succinate (TOPROL XL) 25 MG extended release tablet, Take 1 tablet by mouth daily, Disp: 90 tablet, Rfl: 1    diclofenac sodium (VOLTAREN) 1 % GEL, Apply 2 g topically 4 times daily as needed for Pain, Disp: 100 g, Rfl: 3    sildenafil (VIAGRA) 100 MG tablet, Take 1 tablet by mouth as needed for Erectile Dysfunction Take one half daily, as needed, Disp: 30 tablet, Rfl: 3    Cholecalciferol (VITAMIN D) 50 MCG ( [place]) TABS tablet, Take 1 tablet by mouth daily, Disp: 90 tablet, Rfl: 0  No Known Allergies  Social History     Socioeconomic History    Marital status: Single     Spouse name: Not on file    Number of children: Not on file    Years of education: Not on file    Highest education level: Not on file   Occupational History    Not on file   Tobacco Use    Smoking status: Former Smoker     Packs/day: 0.50     Years: 40.00     Pack years: 20.00     Types: Cigarettes     Start date: 1979     Quit date: 2021     Years since quittin.8    Smokeless tobacco: Never Used   Vaping Use    Vaping Use: Never used   Substance and Sexual Activity    Alcohol use: Yes     Comment: weekly. 1 cup coffee per day    Drug use: Never    Sexual activity: Not on file   Other Topics Concern    Not on file   Social History Narrative    Not on file     Social Determinants of Health     Financial Resource Strain:     Difficulty of Paying Living Expenses: Not on file   Food Insecurity:     Worried About Running Out of Food in the Last Year: Not on file    Jazmyne of Food in the Last Year: Not on file   Transportation Needs:     Lack of Transportation (Medical): Not on file    Lack of Transportation (Non-Medical):  Not on file   Physical Activity:     Days of Exercise per Week: Not on file    Minutes of Exercise per Session: Not on file   Stress:     Feeling of Stress : Not on file   Social Connections:     Frequency of Communication with Friends and Family: Not on file    Frequency of Social Gatherings with Friends and Family: Not on file    Attends [employer] Services: Not on file    Active Member of Clubs or Organizations: Not on file    Attends Club or Organization Meetings: Not on file    Marital Status: Not on file   Intimate Partner Violence:     Fear of Current or Ex-Partner: Not on file    Emotionally Abused: Not on file    Physically Abused: Not on file    Sexually Abused: Not on file   Housing Stability:     Unable to Pay for Housing in the Last Year: Not on file    Number of [place] in the Last Year: Not on file    Unstable Housing in the Last Year: Not on file     Family History   Problem Relation Age of Onset    No Known Problems Mother            Physical Exam:    Temp 98 °F (36.7 °C)   Ht 6' 4\" (1.93 m)   Wt (!) 385 lb (174.6 kg)   BMI 46.86 kg/m²     GENERAL: alert, appears stated age, cooperative, no acute distress    HEENT: Head is normocephalic, atraumatic. PERRLA. SKIN: Clean, dry, intact. There is not any cellulitis or cutaneous lesions noted in the lower extremities except noted in MSK    PULMONARY: breathing is regular and unlabored, no acute distress    CV: The bilateral upper and lower extremities are warm and well-perfused with brisk capillary refill. 2+ pulses UE but 1+ LE bilateral.     PSYCHIATRY: Pleasant mood, appropriate behavior, follows commands    NEURO: Sensation is intact distally with light touch with no alteration. Motor exam of the lower extremities show quadriceps, hamstrings, foot dorsiflexion and plantarflexion grossly intact 5/5. LYMPH: No lymphedema present distally in upper or lower extremity. MUSCULOSKELETAL:    Right Knee Exam:    mild effusion noted. No erythema/induration/fluctuance. Posterior medial joint line TTP. Stable to varus and valgus at 0 and 30 degrees of flexion. Negative Lachman's and posterior drawer. Negative patellar grind test and J sign. Compartments soft and compressible throughout leg. Active range of motion 0-120 with pain. Positive Kelechi's positive Apley's, gait is antalgic    Patient does not have any signs of cellulitis but there is chronic atrophic changes noted to the lower extremities bilaterally with pitting edema. Patient does have significantly overgrown toenails with mild fungal appearing lesions also. no change since last visit. Imaging:  XR KNEE RIGHT (1-2 VIEWS)    Result Date: 1/11/2022  EXAMINATION: 2 XRAY VIEWS OF THE RIGHT TIBIA AND FIBULA; TWO XRAY VIEWS OF THE RIGHT KNEE 1/11/2022 12:25 pm COMPARISON: None. HISTORY: ORDERING SYSTEM PROVIDED HISTORY: Chronic pain of right knee FINDINGS: Moderate right knee joint effusion. Mild osteoarthritis at the medial weight-bearing and patellofemoral compartments. No fracture or dislocation. No additional significant abnormal findings related to the more distal tibia or fibula. There is a moderate-sized plantar heel spur. Mild osteoarthritis at the right knee with moderate joint effusion. Plantar heel spur. XR TIBIA FIBULA RIGHT (2 VIEWS)    Result Date: 1/11/2022  EXAMINATION: 2 XRAY VIEWS OF THE RIGHT TIBIA AND FIBULA; TWO XRAY VIEWS OF THE RIGHT KNEE 1/11/2022 12:25 pm COMPARISON: None. HISTORY: ORDERING SYSTEM PROVIDED HISTORY: Chronic pain of right knee FINDINGS: Moderate right knee joint effusion. Mild osteoarthritis at the medial weight-bearing and patellofemoral compartments. No fracture or dislocation. No additional significant abnormal findings related to the more distal tibia or fibula. There is a moderate-sized plantar heel spur. Mild osteoarthritis at the right knee with moderate joint effusion. Plantar heel spur.      MRI KNEE RIGHT WO CONTRAST    Result Date: 2/9/2022  EXAMINATION: MRI OF THE RIGHT KNEE WITHOUT CONTRAST, 2/8/2022 1:30 pm TECHNIQUE: Multiplanar multisequence MRI of the right knee was performed without the administration of intravenous contrast. COMPARISON: 01/11/2022 HISTORY: ORDERING SYSTEM PROVIDED HISTORY: Tear of meniscus of right knee, unspecified meniscus, unspecified tear type, unspecified whether old or current tear FINDINGS: MENISCI: Complete radial tear of the posterior root attachment of the medial meniscus with severe displacement of the meniscal fragment (series 5, image 23 and series 7, image 24). There is additional horizontal and oblique tearing of the posterior horn and body of the medial meniscus with superimposed small radial tear at the posterior horn body junction (series 3, image 21 and series 5, image 22). The lateral meniscus appears intact. CRUCIATE LIGAMENTS: T2 hyperintense signal within the anterior cruciate ligament favors mucoid degeneration. The PCL appears intact. EXTENSOR MECHANISM: Fragmentation of the tibial tubercle at the insertion of the patellar tendon is likely chronic/due to remote injury. No discrete tear of the quadriceps or patellar tendon. There is a small amount of fluid within the deep infrapatellar bursa. Small amount of prepatellar and infrapatellar subcutaneous edema, nonspecific. LATERAL COLLATERAL LIGAMENT COMPLEX: The popliteus tendon, biceps femoris tendon, fibular collateral ligament and iliotibial band are intact. T2 hyperintense signal within the popliteus muscle, nonspecific. MEDIAL COLLATERAL LIGAMENT COMPLEX: There is some periligamentous edema without discrete tear. KNEE JOINT: No discrete full-thickness cartilage loss of the anterior compartment. Focal full-thickness cartilage defect involving the posterior nonweightbearing lateral femoral condyle with underlying bony reactive changes. Patchy areas of full-thickness cartilage loss involving the posterior nonweightbearing medial femoral cartilage. High-grade cartilage loss involving the central and posterior weight-bearing femoral cartilage within the medial compartment. Moderate-sized joint effusion with reactive synovitis. Moderate tibiofibular arthrosis is noted with tiny ganglion cyst. BONE MARROW: Linear T1 and T2 hypointense signal within the subchondral region of the medial tibial plateau with extensive underlying marrow edema. No discrete articular surface collapse is identified.  SOFT TISSUES: Diffuse soft tissue edema, nonspecific. Muscle edema within the lateral head of the gastrocnemius. Findings concerning for subchondral insufficiency fracture of the medial tibial plateau. No visualized articular surface collapse. Complex tear of the medial meniscus. Multifocal high-grade chondrosis, detailed above. Moderate-sized joint effusion, likely reactive. Findings which may represent deep infrapatellar bursitis in the appropriate clinical setting. Mild edema within the popliteus and lateral head of the gastrocnemius muscles may represent low-grade strain. XR EYE FOREIGN BODY    Result Date: 2/8/2022  EXAMINATION: TWO XRAY VIEWS OF THE ORBITS 2/8/2022 COMPARISON: None. HISTORY: ORDERING SYSTEM PROVIDED HISTORY: Foreign body in eye, unspecified laterality, initial encounter FINDINGS: No evidence of radiopaque foreign body within the orbits. No other acute abnormalities noted. No evidence of metallic foreign body within the orbits. MRI TIBIA FIBULA RIGHT WO CONTRAST    Result Date: 2/9/2022  EXAMINATION: MRI OF THE RIGHT TIBIA/FIBULA WITHOUT CONTRAST 2/8/2022 3:44 pm TECHNIQUE: Multiplanar multisequence MRI of the right tibia/fibula was performed without the administration of intravenous contrast. COMPARISON: Radiographs of the tibia and fibula on 01/11/2022 HISTORY: ORDERING SYSTEM PROVIDED HISTORY: Contusion of bone FINDINGS: Knee findings are reported separately. Please see dedicated report. SOFT TISSUES: Muscle bulk appears mildly atrophic. Focal edema within the popliteus and peroneal musculature noted. Trace muscle edema within the soleus. No discrete retracted full-thickness tendon tear. Moderate diffuse subcutaneous edema, nonspecific. OSSEOUS STRUCTURES: The study is not optimized for evaluation for internal derangement or ligamentous injury. Proximal tibial and fibular findings are reported separately. No other discrete areas of marrow edema. No discrete T1 fracture line.   No discrete ankle effusion is identified on this limited study. Knee findings reported separately. Please see dedicated report. No discrete osseous contusion identified. Low-grade strains of the popliteus, peroneal, and soleus musculature. Circumferential subcutaneous edema, nonspecific. [facility] was seen today for knee pain. Diagnoses and all orders for this visit:    Closed fracture of right tibial plateau, initial encounter  -     HYDROcodone-acetaminophen (NORCO) 5-325 MG per tablet; Take 1 tablet by mouth every 12 hours as needed for Pain for up to 7 days. Contusion of bone  -     HYDROcodone-acetaminophen (NORCO) 5-325 MG per tablet; Take 1 tablet by mouth every 12 hours as needed for Pain for up to 7 days. Tear of meniscus of right knee, unspecified meniscus, unspecified tear type, unspecified whether old or current tear    Exercise counseling    Osteoarthritis of toe joint, unspecified laterality  -     Va Finch, DPM, Podiatry, [place]        Patient seen and examined. X-rays reviewed. Patient has little to no arthritis noted on x-rays today. However patient's main complaint is instability of symptomatic knee. Exam and history is consistent with possible meniscus tear. MRI recommended for further evaluation management. Follow-up after MRI  Patient seen and examined. MRI reviewed with patient in detail. Natural history and course discussed with patient in long discussion  Treatment options discussed with patient in detail including risks and benefits. Patient should do well with conservative management as patient would like to avoid surgery at this time. In a 15 minute assessment and discussion, patient was provided and fitted for a removable knee brace distributed and applied. He was educated in detail how to use brace and the importance of use as well as range of motion and HEP exercises.      Referral to podiatry for evaluation of toenails      In this 15 minute conversation during the visit, Patient was informed of the potential for addiction of long term use of narcotic medication for pain control. I encouraged the patient to gradually lessen the frequency of the dosage due to the long term addictive effects. Patient verbalized understanding and states will try to cut back as much as possible. Simone Vital DO          25 minutes was spent with patient. 50% or greater was spent counseling the patient.

## 2022-02-15 ENCOUNTER — TELEPHONE (OUTPATIENT)
Dept: FAMILY MEDICINE CLINIC | Age: 62
End: 2022-02-15

## 2022-02-15 NOTE — TELEPHONE ENCOUNTER
I called patient and LMOM informing returning call. Informed Dr. Magdi Pisano is only accepting immediate family members of current patients, msg must still be sent and if accepted appt will be May/June. Requested call back.

## 2022-02-15 NOTE — TELEPHONE ENCOUNTER
----- Message from Elisa Anival sent at 2/15/2022  4:03 PM EST -----  Subject: Appointment Request    Reason for Call: New Patient Request Appointment    QUESTIONS  Type of Appointment? New Patient/New to Provider  Reason for appointment request? No appointments available during search  Additional Information for Provider? Patient is requesting to establish   with Dr. Vamshi Ferrer please advise. Thanks  ---------------------------------------------------------------------------  --------------  Rowan SANDERS  What is the best way for the office to contact you? OK to leave message on   voicemail  Preferred Call Back Phone Number? [phone]  ---------------------------------------------------------------------------  --------------  SCRIPT ANSWERS  Relationship to Patient? Self  Specialty Confirmation? Primary Care  Is this the first appointment to establish care for a ? No  Have you been diagnosed with, awaiting test results for, or told that you   are suspected of having COVID-19 (Coronavirus)? (If patient has tested   negative or was tested as a requirement for work, school, or travel and   not based on symptoms, answer no)? No  Within the past 10 days have you developed any of the following symptoms   (answer no if symptoms have been present longer than 10 days or began   more than 10 days ago)? Fever or Chills, Cough, Shortness of breath or   difficulty breathing, Loss of taste or smell, Sore throat, Nasal   congestion, Sneezing or runny nose, Fatigue or generalized body aches   (answer no if pain is specific to a body part e.g. back pain), Diarrhea,   Headache? No  Have you had close contact with someone with COVID-19 in the last 7 days? 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  Yes

## 2022-02-25 DIAGNOSIS — N28.1 KIDNEY CYSTS: Primary | ICD-10-CM

## 2022-02-28 ENCOUNTER — OFFICE VISIT (OUTPATIENT)
Dept: PODIATRY | Age: 62
End: 2022-02-28
Payer: COMMERCIAL

## 2022-02-28 VITALS — BODY MASS INDEX: 38.36 KG/M2 | HEIGHT: 76 IN | WEIGHT: 315 LBS

## 2022-02-28 DIAGNOSIS — G60.8 HEREDITARY SENSORY NEUROPATHY: ICD-10-CM

## 2022-02-28 DIAGNOSIS — E08.65 DIABETES MELLITUS DUE TO UNDERLYING CONDITION WITH HYPERGLYCEMIA, WITHOUT LONG-TERM CURRENT USE OF INSULIN (HCC): ICD-10-CM

## 2022-02-28 DIAGNOSIS — M79.671 RIGHT FOOT PAIN: Primary | ICD-10-CM

## 2022-02-28 DIAGNOSIS — B35.1 TINEA UNGUIUM: ICD-10-CM

## 2022-02-28 DIAGNOSIS — Z79.01 ENCOUNTER FOR CURRENT LONG-TERM USE OF ANTICOAGULANTS: ICD-10-CM

## 2022-02-28 PROCEDURE — G8484 FLU IMMUNIZE NO ADMIN: HCPCS | Performed by: PODIATRIST

## 2022-02-28 PROCEDURE — 3017F COLORECTAL CA SCREEN DOC REV: CPT | Performed by: PODIATRIST

## 2022-02-28 PROCEDURE — 11721 DEBRIDE NAIL 6 OR MORE: CPT | Performed by: PODIATRIST

## 2022-02-28 PROCEDURE — G8417 CALC BMI ABV UP PARAM F/U: HCPCS | Performed by: PODIATRIST

## 2022-02-28 PROCEDURE — 99203 OFFICE O/P NEW LOW 30 MIN: CPT | Performed by: PODIATRIST

## 2022-02-28 PROCEDURE — G8427 DOCREV CUR MEDS BY ELIG CLIN: HCPCS | Performed by: PODIATRIST

## 2022-02-28 PROCEDURE — 1036F TOBACCO NON-USER: CPT | Performed by: PODIATRIST

## 2022-02-28 NOTE — PROGRESS NOTES
New patient in office with c/o lump right foot. Sx x years. C/o discoloration 4 toes right foot. States this stated when he had increased edema to his right leg. Xray obtained prior to apt. CC:   Diabetic foot exam and painful elongated toenails 1-5 right and left    HPI:   This pleasant 77-year-old male diabetic patient referred me today for diabetic foot ankle exam.  History of Eliquis. No recent injury. Occasional right foot pain but no significant right foot pain today. Did have radiographs right foot. Diabetic foot exam and painful elongated toenails 1-5 right and left. No additional pedal complaints at this time. ROS:  Const: Denies constitutional symptoms  Musculo: Denies symptoms other than stated above  Skin: Denies symptoms other than stated above      Current Outpatient Medications:     apixaban (ELIQUIS) 5 MG TABS tablet, TAKE 1 TABLET TWICE A DAY, Disp: 60 tablet, Rfl: 0    lisinopril-hydroCHLOROthiazide (PRINZIDE;ZESTORETIC) 20-12.5 MG per tablet, Take 2 tabs daily, Disp: 180 tablet, Rfl: 1    metFORMIN (GLUCOPHAGE-XR) 500 MG extended release tablet, TAKE 1 TABLET TWICE A DAY WITH MEALS, Disp: 180 tablet, Rfl: 0    pravastatin (PRAVACHOL) 40 MG tablet, Take 1 tablet by mouth daily, Disp: 90 tablet, Rfl: 1    metoprolol succinate (TOPROL XL) 25 MG extended release tablet, Take 1 tablet by mouth daily, Disp: 90 tablet, Rfl: 1    diclofenac sodium (VOLTAREN) 1 % GEL, Apply 2 g topically 4 times daily as needed for Pain, Disp: 100 g, Rfl: 3    sildenafil (VIAGRA) 100 MG tablet, Take 1 tablet by mouth as needed for Erectile Dysfunction Take one half daily, as needed, Disp: 30 tablet, Rfl: 3    Cholecalciferol (VITAMIN D) 50 MCG (2000 [place]) TABS tablet, Take 1 tablet by mouth daily, Disp: 90 tablet, Rfl: 0  No Known Allergies    Past Medical History:   Diagnosis Date    Hypertension     Irregular heart beat        There were no vitals filed for this visit.        Work History/Social History: Foot and ankle history:     Focused Lower Extremity Physical Exam:      Neurovascular examination:    Dorsalis Pedis palpable bilateral.  Posterior tibialis weakly palpable bilateral.    Capillary Refill Time:  Immediate return  Hair growth:  Symmetrical and bilateral   Skin:  Not atrophic  Edema: Mild edema bilateral feet. Mild edema bilateral ankles. Neurologic:  Light touch diminished bilateral.  Warm to coolness bilateral distal toes  Decreased epicritic sensation     Musculoskeletal/ Orthopedic examination:    Equinis: present bilateral  Dorsiflexion, plantarflexion, inversion, eversion bilateral 5 out of 5 muscle strength  Wiggling toes  Negative Homans  Decreased dorsiflexion plantarflexion first metatarsophalangeal joint right great toe. No pain plantar first metatarsal.  There is no tenderness with range of motion first metatarsophalangeal joint right great toe. Dermatology examination:    Toenails 1 through 5 bilateral thickened, elongated, dystrophic, mycotic with subungual debris. Web spaces 1 through 4 bilateral clean dry and intact. No open wounds. No erythema. Assessment and Plan:  Yulia Shane was seen today for foot pain, numbness and toe pain. Diagnoses and all orders for this visit:    Right foot pain  -     XR FOOT RIGHT (MIN 3 VIEWS); Future    Tinea unguium    Diabetes mellitus due to underlying condition with hyperglycemia, without long-term current use of insulin (HCC)    Hereditary sensory neuropathy    Encounter for current long-term use of anticoagulants          Diabetic foot and ankle examination performed today  Formal debridement toenails 1 through 5 right and left with manual debridement for thickness and overall length. Radiographs reviewed right foot. Degenerative changes seen at the 1st metatarsophalangeal joint with calcaneal   spurring.  Minimal degenerative changes seen within the midfoot.  No acute   bony abnormality.      History long-term anticoagulant therapy with Eliquis. I will follow-up 2 months diabetic exam.    Return in about 2 months (around 4/28/2022). Seen By:  Gopal Omalley DPM      Document was created using voice recognition software. Note was reviewed however may contain grammatical errors.

## 2022-02-28 NOTE — LETTER
Dr. Marybeth Gilford     Patient: Laron Chairez  YOB: 1960  Date of Visit: 3/7/2022     Dear Dr. Malika Varela,    Thank you for referring Laron Chairez to me for evaluation. [facility] was seen today right foot pain and diabetic exam.  Radiographs reviewed does have degenerative changes first metatarsophalangeal joint. I did also perform diabetic debridement all toenails today. Tolerated procedure well. We will follow-up in 2 months. Once again, thank you very much for this kind referral and allowing me to participate in the care of this patient. If you have questions, please do not hesitate to call me.     Sincerely,        Jillian Simpson, [place] [address] [facility] [id number]  Phone: [phone]  Fax: [phone]

## 2022-03-02 ENCOUNTER — TELEPHONE (OUTPATIENT)
Dept: FAMILY MEDICINE CLINIC | Age: 62
End: 2022-03-02

## 2022-03-10 ENCOUNTER — OFFICE VISIT (OUTPATIENT)
Dept: ORTHOPEDIC SURGERY | Age: 62
End: 2022-03-10
Payer: COMMERCIAL

## 2022-03-10 VITALS — HEIGHT: 76 IN | TEMPERATURE: 98 F | WEIGHT: 315 LBS | BODY MASS INDEX: 38.36 KG/M2

## 2022-03-10 DIAGNOSIS — S82.141A CLOSED FRACTURE OF RIGHT TIBIAL PLATEAU, INITIAL ENCOUNTER: Primary | ICD-10-CM

## 2022-03-10 DIAGNOSIS — T14.8XXA CONTUSION OF BONE: ICD-10-CM

## 2022-03-10 PROCEDURE — 1036F TOBACCO NON-USER: CPT | Performed by: ORTHOPAEDIC SURGERY

## 2022-03-10 PROCEDURE — 3017F COLORECTAL CA SCREEN DOC REV: CPT | Performed by: ORTHOPAEDIC SURGERY

## 2022-03-10 PROCEDURE — 99213 OFFICE O/P EST LOW 20 MIN: CPT | Performed by: ORTHOPAEDIC SURGERY

## 2022-03-10 PROCEDURE — G8427 DOCREV CUR MEDS BY ELIG CLIN: HCPCS | Performed by: ORTHOPAEDIC SURGERY

## 2022-03-10 PROCEDURE — G8417 CALC BMI ABV UP PARAM F/U: HCPCS | Performed by: ORTHOPAEDIC SURGERY

## 2022-03-10 PROCEDURE — G8484 FLU IMMUNIZE NO ADMIN: HCPCS | Performed by: ORTHOPAEDIC SURGERY

## 2022-03-10 NOTE — PROGRESS NOTES
Chief Complaint   Patient presents with    Knee Pain     Right knee pain follow up. Pain in knee has improved since previous visit. HPI:    Patient is 57 y.o. male complaining chronic, atraumatic, insidious onset Right leg pain over the last 6 weeks. Pain travles to distal from knee to above knee. Knee very stiff to move. No injury is known. Pain also back of calf. Dr. Olga Rodriguez gave him cream to use but did not help. Oblong Colder He admits to stiffness, deep, aching pain, swelling, difficulty with stairs and ambulating far distances. He admits to gross instability specifically in his right knee. Previous treatments include rest, ice, and anti-inflammatory medication and HEP without much relief. Right knee pain follow up. Pain in knee has improved since previous visit. ROS:    Skin: (-) rash,(-) psoriasis,(-) eczema, (-)skin cancer. Neurologic: (-)numbness, (-)tingling, (-)headaches, (-) LOC. Cardiovascular: (-) Chest pain, (-) swelling in legs/feet, (-) SOB, (-) cramping in legs/feet with walking.     All other review of systems negative except stated above or in HPI      Past Medical History:   Diagnosis Date    Hypertension     Irregular heart beat      Past Surgical History:   Procedure Laterality Date    APPENDECTOMY      SHOULDER ARTHROPLASTY      TONSILLECTOMY         Current Outpatient Medications:     apixaban (ELIQUIS) 5 MG TABS tablet, TAKE 1 TABLET TWICE A DAY, Disp: 60 tablet, Rfl: 0    lisinopril-hydroCHLOROthiazide (PRINZIDE;ZESTORETIC) 20-12.5 MG per tablet, Take 2 tabs daily, Disp: 180 tablet, Rfl: 1    metFORMIN (GLUCOPHAGE-XR) 500 MG extended release tablet, TAKE 1 TABLET TWICE A DAY WITH MEALS, Disp: 180 tablet, Rfl: 0    pravastatin (PRAVACHOL) 40 MG tablet, Take 1 tablet by mouth daily, Disp: 90 tablet, Rfl: 1    metoprolol succinate (TOPROL XL) 25 MG extended release tablet, Take 1 tablet by mouth daily, Disp: 90 tablet, Rfl: 1    diclofenac sodium (VOLTAREN) 1 % GEL, Apply 2 g topically 4 times daily as needed for Pain, Disp: 100 g, Rfl: 3    sildenafil (VIAGRA) 100 MG tablet, Take 1 tablet by mouth as needed for Erectile Dysfunction Take one half daily, as needed, Disp: 30 tablet, Rfl: 3    Cholecalciferol (VITAMIN D) 50 MCG ( [place]) TABS tablet, Take 1 tablet by mouth daily, Disp: 90 tablet, Rfl: 0  No Known Allergies  Social History     Socioeconomic History    Marital status: Single     Spouse name: Not on file    Number of children: Not on file    Years of education: Not on file    Highest education level: Not on file   Occupational History    Not on file   Tobacco Use    Smoking status: Former Smoker     Packs/day: 0.50     Years: 40.00     Pack years: 20.00     Types: Cigarettes     Start date: 1979     Quit date: 2021     Years since quittin.8    Smokeless tobacco: Never Used   Vaping Use    Vaping Use: Never used   Substance and Sexual Activity    Alcohol use: Yes     Comment: weekly. 1 cup coffee per day    Drug use: Never    Sexual activity: Not on file   Other Topics Concern    Not on file   Social History Narrative    Not on file     Social Determinants of Health     Financial Resource Strain:     Difficulty of Paying Living Expenses: Not on file   Food Insecurity:     Worried About Running Out of Food in the Last Year: Not on file    Jazmyne of Food in the Last Year: Not on file   Transportation Needs:     Lack of Transportation (Medical): Not on file    Lack of Transportation (Non-Medical):  Not on file   Physical Activity:     Days of Exercise per Week: Not on file    Minutes of Exercise per Session: Not on file   Stress:     Feeling of Stress : Not on file   Social Connections:     Frequency of Communication with Friends and Family: Not on file    Frequency of Social Gatherings with Friends and Family: Not on file    Attends [employer]oration[employer] Services: Not on file    Active Member of Clubs or Organizations: Not on file    Attends [employer] or Organization Meetings: Not on file    Marital Status: Not on file   Intimate Partner Violence:     Fear of Current or Ex-Partner: Not on file    Emotionally Abused: Not on file    Physically Abused: Not on file    Sexually Abused: Not on file   Housing Stability:     Unable to Pay for Housing in the Last Year: Not on file    Number of [place] in the Last Year: Not on file    Unstable Housing in the Last Year: Not on file     Family History   Problem Relation Age of Onset    No Known Problems Mother            Physical Exam:    Temp 98 °F (36.7 °C)   Ht 6' 4\" (1.93 m)   Wt (!) 385 lb (174.6 kg)   BMI 46.86 kg/m²     GENERAL: alert, appears stated age, cooperative, no acute distress    HEENT: Head is normocephalic, atraumatic. PERRLA. SKIN: Clean, dry, intact. There is not any cellulitis or cutaneous lesions noted in the lower extremities except noted in MSK    PULMONARY: breathing is regular and unlabored, no acute distress    CV: The bilateral upper and lower extremities are warm and well-perfused with brisk capillary refill. 2+ pulses UE but 1+ LE bilateral.     PSYCHIATRY: Pleasant mood, appropriate behavior, follows commands    NEURO: Sensation is intact distally with light touch with no alteration. Motor exam of the lower extremities show quadriceps, hamstrings, foot dorsiflexion and plantarflexion grossly intact 5/5. LYMPH: No lymphedema present distally in upper or lower extremity. MUSCULOSKELETAL:    Right Knee Exam:    mild effusion noted. No erythema/induration/fluctuance. Posterior medial joint line TTP. Stable to varus and valgus at 0 and 30 degrees of flexion. Negative Lachman's and posterior drawer. Negative patellar grind test and J sign. Compartments soft and compressible throughout leg. Active range of motion 0-120 with pain. Positive Kelechi's positive Apley's, gait is antalgic    Good improvement since last visit.     Imaging:  XR KNEE RIGHT (1-2 VIEWS)    Result Date: 1/11/2022  EXAMINATION: 2 XRAY VIEWS OF THE RIGHT TIBIA AND FIBULA; TWO XRAY VIEWS OF THE RIGHT KNEE 1/11/2022 12:25 pm COMPARISON: None. HISTORY: ORDERING SYSTEM PROVIDED HISTORY: Chronic pain of right knee FINDINGS: Moderate right knee joint effusion. Mild osteoarthritis at the medial weight-bearing and patellofemoral compartments. No fracture or dislocation. No additional significant abnormal findings related to the more distal tibia or fibula. There is a moderate-sized plantar heel spur. Mild osteoarthritis at the right knee with moderate joint effusion. Plantar heel spur. XR TIBIA FIBULA RIGHT (2 VIEWS)    Result Date: 1/11/2022  EXAMINATION: 2 XRAY VIEWS OF THE RIGHT TIBIA AND FIBULA; TWO XRAY VIEWS OF THE RIGHT KNEE 1/11/2022 12:25 pm COMPARISON: None. HISTORY: ORDERING SYSTEM PROVIDED HISTORY: Chronic pain of right knee FINDINGS: Moderate right knee joint effusion. Mild osteoarthritis at the medial weight-bearing and patellofemoral compartments. No fracture or dislocation. No additional significant abnormal findings related to the more distal tibia or fibula. There is a moderate-sized plantar heel spur. Mild osteoarthritis at the right knee with moderate joint effusion. Plantar heel spur. MRI KNEE RIGHT WO CONTRAST    Result Date: 2/9/2022  EXAMINATION: MRI OF THE RIGHT KNEE WITHOUT CONTRAST, 2/8/2022 1:30 pm TECHNIQUE: Multiplanar multisequence MRI of the right knee was performed without the administration of intravenous contrast. COMPARISON: 01/11/2022 HISTORY: ORDERING SYSTEM PROVIDED HISTORY: Tear of meniscus of right knee, unspecified meniscus, unspecified tear type, unspecified whether old or current tear FINDINGS: MENISCI: Complete radial tear of the posterior root attachment of the medial meniscus with severe displacement of the meniscal fragment (series 5, image 23 and series 7, image 24).   There is additional horizontal and oblique tearing of the posterior horn and body of the medial meniscus with superimposed small radial tear at the posterior horn body junction (series 3, image 21 and series 5, image 22). The lateral meniscus appears intact. CRUCIATE LIGAMENTS: T2 hyperintense signal within the anterior cruciate ligament favors mucoid degeneration. The PCL appears intact. EXTENSOR MECHANISM: Fragmentation of the tibial tubercle at the insertion of the patellar tendon is likely chronic/due to remote injury. No discrete tear of the quadriceps or patellar tendon. There is a small amount of fluid within the deep infrapatellar bursa. Small amount of prepatellar and infrapatellar subcutaneous edema, nonspecific. LATERAL COLLATERAL LIGAMENT COMPLEX: The popliteus tendon, biceps femoris tendon, fibular collateral ligament and iliotibial band are intact. T2 hyperintense signal within the popliteus muscle, nonspecific. MEDIAL COLLATERAL LIGAMENT COMPLEX: There is some periligamentous edema without discrete tear. KNEE JOINT: No discrete full-thickness cartilage loss of the anterior compartment. Focal full-thickness cartilage defect involving the posterior nonweightbearing lateral femoral condyle with underlying bony reactive changes. Patchy areas of full-thickness cartilage loss involving the posterior nonweightbearing medial femoral cartilage. High-grade cartilage loss involving the central and posterior weight-bearing femoral cartilage within the medial compartment. Moderate-sized joint effusion with reactive synovitis. Moderate tibiofibular arthrosis is noted with tiny ganglion cyst. BONE MARROW: Linear T1 and T2 hypointense signal within the subchondral region of the medial tibial plateau with extensive underlying marrow edema. No discrete articular surface collapse is identified. SOFT TISSUES: Diffuse soft tissue edema, nonspecific. Muscle edema within the lateral head of the gastrocnemius.      Findings concerning for subchondral insufficiency fracture of the medial tibial plateau. No visualized articular surface collapse. Complex tear of the medial meniscus. Multifocal high-grade chondrosis, detailed above. Moderate-sized joint effusion, likely reactive. Findings which may represent deep infrapatellar bursitis in the appropriate clinical setting. Mild edema within the popliteus and lateral head of the gastrocnemius muscles may represent low-grade strain. XR EYE FOREIGN BODY    Result Date: 2/8/2022  EXAMINATION: TWO XRAY VIEWS OF THE ORBITS 2/8/2022 COMPARISON: None. HISTORY: ORDERING SYSTEM PROVIDED HISTORY: Foreign body in eye, unspecified laterality, initial encounter FINDINGS: No evidence of radiopaque foreign body within the orbits. No other acute abnormalities noted. No evidence of metallic foreign body within the orbits. MRI TIBIA FIBULA RIGHT WO CONTRAST    Result Date: 2/9/2022  EXAMINATION: MRI OF THE RIGHT TIBIA/FIBULA WITHOUT CONTRAST 2/8/2022 3:44 pm TECHNIQUE: Multiplanar multisequence MRI of the right tibia/fibula was performed without the administration of intravenous contrast. COMPARISON: Radiographs of the tibia and fibula on 01/11/2022 HISTORY: ORDERING SYSTEM PROVIDED HISTORY: Contusion of bone FINDINGS: Knee findings are reported separately. Please see dedicated report. SOFT TISSUES: Muscle bulk appears mildly atrophic. Focal edema within the popliteus and peroneal musculature noted. Trace muscle edema within the soleus. No discrete retracted full-thickness tendon tear. Moderate diffuse subcutaneous edema, nonspecific. OSSEOUS STRUCTURES: The study is not optimized for evaluation for internal derangement or ligamentous injury. Proximal tibial and fibular findings are reported separately. No other discrete areas of marrow edema. No discrete T1 fracture line. No discrete ankle effusion is identified on this limited study. Knee findings reported separately. Please see dedicated report.  No discrete osseous contusion identified. Low-grade strains of the popliteus, peroneal, and soleus musculature. Circumferential subcutaneous edema, nonspecific. [facility] was seen today for knee pain. Diagnoses and all orders for this visit:    Closed fracture of right tibial plateau, initial encounter    Contusion of bone        Patient seen and examined. X-rays reviewed. Patient has little to no arthritis noted on x-rays today. However patient's main complaint is instability of symptomatic knee. Exam and history is consistent with possible meniscus tear. MRI recommended for further evaluation management. Follow-up after MRI  Patient seen and examined. MRI reviewed with patient in detail. Natural history and course discussed with patient in long discussion  Treatment options discussed with patient in detail including risks and benefits. Patient should do well with conservative management as patient would like to avoid surgery at this time.            Tish Cranker, DO

## 2022-03-14 NOTE — TELEPHONE ENCOUNTER
Patient called in stating that he seen Dr. Shon Calderon on 1/10/2022. Patient requested his PCP be changed to Dr. Shon Calderon.

## 2022-04-01 DIAGNOSIS — E08.65 DIABETES MELLITUS DUE TO UNDERLYING CONDITION WITH HYPERGLYCEMIA, WITHOUT LONG-TERM CURRENT USE OF INSULIN (HCC): ICD-10-CM

## 2022-04-01 RX ORDER — METFORMIN HYDROCHLORIDE 500 MG/1
TABLET, EXTENDED RELEASE ORAL
Qty: 180 TABLET | Refills: 3 | Status: SHIPPED | OUTPATIENT
Start: 2022-04-01

## 2022-04-04 NOTE — PROGRESS NOTES
[facility] Cardiology Progress Note  Dr. Florina Zarate      Referring Physician: Anne Marie Seen, DO  CHIEF COMPLAINT:   Chief Complaint   Patient presents with    Atrial Fibrillation     6 month        HISTORY OF PRESENT ILLNESS:   Patient 57 years old admitted with atrial fibrillation, hypertension, diabetes mellitus, is here for follow-up appointment    Occasional palpitations, denies any chest pain, no lightheadedness or dizziness, no pedal edema, no PND, no orthopnea, no syncope, no presyncopal episodes    Functional capacity is at baseline    Past Medical History:   Diagnosis Date    Hypertension     Irregular heart beat          Past Surgical History:   Procedure Laterality Date    APPENDECTOMY      SHOULDER ARTHROPLASTY      TONSILLECTOMY           Current Outpatient Medications   Medication Sig Dispense Refill    apixaban (ELIQUIS) 5 MG TABS tablet TAKE 1 TABLET TWICE A  tablet 3    metFORMIN (GLUCOPHAGE-XR) 500 MG extended release tablet TAKE 1 TABLET TWICE A DAY WITH MEALS 180 tablet 3    lisinopril-hydroCHLOROthiazide (PRINZIDE;ZESTORETIC) 20-12.5 MG per tablet Take 2 tabs daily 180 tablet 1    pravastatin (PRAVACHOL) 40 MG tablet Take 1 tablet by mouth daily 90 tablet 1    metoprolol succinate (TOPROL XL) 25 MG extended release tablet Take 1 tablet by mouth daily 90 tablet 1    sildenafil (VIAGRA) 100 MG tablet Take 1 tablet by mouth as needed for Erectile Dysfunction Take one half daily, as needed 30 tablet 3    Cholecalciferol (VITAMIN D) 50 MCG (2000 UT) TABS tablet Take 1 tablet by mouth daily 90 tablet 0    diclofenac sodium (VOLTAREN) 1 % GEL Apply 2 g topically 4 times daily as needed for Pain (Patient not taking: Reported on 4/5/2022) 100 g 3     No current facility-administered medications for this visit.          Allergies as of 04/05/2022    (No Known Allergies)       Social History     Socioeconomic History    Marital status: Single     Spouse name: Not on file    Number of children: Not on file    Years of education: Not on file    Highest education level: Not on file   Occupational History    Not on file   Tobacco Use    Smoking status: Former Smoker     Packs/day: 0.50     Years: 40.00     Pack years: 20.00     Types: Cigarettes     Start date: 1979     Quit date: 2021     Years since quittin.9    Smokeless tobacco: Never Used   Vaping Use    Vaping Use: Never used   Substance and Sexual Activity    Alcohol use: Yes     Comment: weekly. 1 cup coffee per day    Drug use: Never    Sexual activity: Not on file   Other Topics Concern    Not on file   Social History Narrative    Not on file     Social Determinants of Health     Financial Resource Strain:     Difficulty of Paying Living Expenses: Not on file   Food Insecurity:     Worried About Running Out of Food in the Last Year: Not on file    Jazmyne of Food in the Last Year: Not on file   Transportation Needs:     Lack of Transportation (Medical): Not on file    Lack of Transportation (Non-Medical):  Not on file   Physical Activity:     Days of Exercise per Week: Not on file    Minutes of Exercise per Session: Not on file   Stress:     Feeling of Stress : Not on file   Social Connections:     Frequency of Communication with Friends and Family: Not on file    Frequency of Social Gatherings with Friends and Family: Not on file    Attends [employer] Services: Not on file    Active Member of [address] [place] [address] or Organizations: Not on file    Attends Club or Organization Meetings: Not on file    Marital Status: Not on file   Intimate Partner Violence:     Fear of Current or Ex-Partner: Not on file    Emotionally Abused: Not on file    Physically Abused: Not on file    Sexually Abused: Not on file   Housing Stability:     Unable to Pay for Housing in the Last Year: Not on file    Number of [place] in the Last Year: Not on file    Unstable Housing in the Last Year: Not on file       Family History   Problem Relation Age of Onset    No Known Problems Mother        REVIEW OF SYSTEMS:     CONSTITUTIONAL:  negative for  fevers, chills, sweats and fatigue  HEENT:  negative for  tinnitus, earaches, nasal congestion and epistaxis  RESPIRATORY:  negative for  dry cough, cough with sputum, dyspnea, wheezing and hemoptysis  GASTROINTESTINAL:  negative for nausea, vomiting, diarrhea, constipation, pruritus and jaundice  HEMATOLOGIC/LYMPHATIC:  negative for easy bruising, bleeding, lymphadenopathy and petechiae  ENDOCRINE:  negative for heat intolerance, cold intolerance, tremor, hair loss and diabetic symptoms including neither polyuria nor polydipsia nor blurred vision  MUSCULOSKELETAL:  negative for  myalgias, arthralgias, joint swelling, stiff joints and decreased range of motion  NEUROLOGICAL:  negative for memory problems, speech problems, visual disturbance, dysphagia, weakness and numbness      PHYSICAL EXAM:   Constitutional:  Awake, alert cooperative, no apparent distress, and appears stated age. HEENT:  Moist and pink mucous membranes, normocephalic, without obvious abnormality, atraumatic, normal ears and nose. NECK:  Supple, symmetrical, trachea midline, no JVD, no adenopathy, thyroid symmetric, not enlarged and no tenderness, good carotid upstroke bilaterally, no carotid bruit, skin normal.  LUNGS: No increased work of breathing, good air exchange, clear to auscultation bilaterally, no crackles or wheezing. Cardiovascular: Normal apical impulse, irregularly irregular, normal S1 and S2, no S3, 3/6 systolic murmur at the apex, 3/6 systolic murmur at the left lower sternal border, no pedal edema, good carotid upstroke bilaterally, no carotid bruit, no JVD, no abdominal pulsating masses. ABDOMEN: Soft, nontender, no hepatomegaly, no splenomegaly, bowel sound positive. CHEST:  Expands symmetrically, nontender to palpation. Musculoskeletal:  No clubbing or cyanosis.   No redness, warmth, or swelling of the joints. Neurological: Alert, awake, and oriented X3   SKIN: No bruises, no bleeding, normal skin color, texture, turgor and no redness, warmth or swelling. /80   Pulse 73   Resp 16   Ht 6' 4\" (1.93 m)   Wt (!) 354 lb 6.4 oz (160.8 kg)   BMI 43.14 kg/m²     DATA:  I personally reviewed the visit EKG with the following interpretation: Atrial fibrillation, controlled ventricular response, low voltage QRS    EKG 6/16/21 Atrial fibrillation, controlled ventricular response, low voltage QRS, nonspecific T wave changes    ECHO: 6/25/19 Normal left ventricular systolic function with stage II diastolic dysfunction. Mild to moderate Mitral Regurgitation. Mild Tricuspid Regurgitation with Mild Pulmonary HTN.   Mild LVH    Cardiology Labs: BMP:    Lab Results   Component Value Date     01/11/2022    K 5.1 01/11/2022    K 4.6 09/10/2020     01/11/2022    CO2 29 01/11/2022    BUN 22 01/11/2022    CREATININE 1.2 01/11/2022     CMP:    Lab Results   Component Value Date     01/11/2022    K 5.1 01/11/2022    K 4.6 09/10/2020     01/11/2022    CO2 29 01/11/2022    BUN 22 01/11/2022    CREATININE 1.2 01/11/2022    PROT 7.7 01/11/2022     CBC:    Lab Results   Component Value Date    WBC 7.2 01/11/2022    RBC 4.88 01/11/2022    HGB 15.4 01/11/2022    HCT 46.1 01/11/2022    MCV 94.5 01/11/2022    RDW 13.3 01/11/2022     01/11/2022     PT/INR:  No results found for: PTINR  PT/INR Warfarin:  No components found for: PTPATWAR, PTINRWAR  PTT:  No results found for: APTT  PTT Heparin:  No components found for: APTTHEP  Magnesium:  No results found for: MG  TSH:    Lab Results   Component Value Date    TSH 1.760 01/11/2022     TROPONIN:  No components found for: TROP  BNP:  No results found for: BNP  FASTING LIPID PANEL:    Lab Results   Component Value Date    CHOL 163 01/11/2022    HDL 50 01/11/2022    TRIG 86 01/11/2022     No orders to display     I have personally reviewed the laboratory, cardiac diagnostic and radiographic testing as outlined above:      IMPRESSION:  1. Persistent atrial fibrillation: Controlled ventricular response, [id number] vasc score 2, on Eliquis  2. Mitral valve regurgitation: Mild to moderate  3. Tricuspid valve regurgitation: Mild  4. Hypertension: Controlled, continue current treatment. 5. Hyperlipidemia, unspecified   6. Type 2 diabetes mellitus without complications   7. Erectile dysfunction: On Viagra  8. Chronic anticoagulation      RECOMMENDATIONS:   1. Continue current treatment  2. Increase risk of bleeding due to being on anti-coagulation, symptoms and signs of bleeding discussed with patient, patient was advised to seek medical attention at the earliest symptoms or signs of bleeding. 3.  Preventive cardiology: Low-salt, low-cholesterol diet, daily exercise, adherence to diabetic diet and diabetic medications, smoking cessation were all advised. 4.  Follow-up with  as scheduled  5. Follow-up with Dr. Pascual Cardona in 6 months, sooner if symptomatic for any reason    I have reviewed my findings and recommendations with patient    Electronically signed by Sanjay Chung MD on 4/5/2022 at 8:48 AM  NOTE: This report was transcribed using voice recognition software.  Every effort was made to ensure accuracy; however, inadvertent computerized transcription errors may be present

## 2022-04-05 ENCOUNTER — OFFICE VISIT (OUTPATIENT)
Dept: CARDIOLOGY CLINIC | Age: 62
End: 2022-04-05
Payer: COMMERCIAL

## 2022-04-05 VITALS
HEIGHT: 76 IN | BODY MASS INDEX: 38.36 KG/M2 | SYSTOLIC BLOOD PRESSURE: 118 MMHG | RESPIRATION RATE: 16 BRPM | HEART RATE: 73 BPM | DIASTOLIC BLOOD PRESSURE: 80 MMHG | WEIGHT: 315 LBS

## 2022-04-05 DIAGNOSIS — I48.91 ATRIAL FIBRILLATION, UNSPECIFIED TYPE (HCC): Primary | ICD-10-CM

## 2022-04-05 PROCEDURE — 3017F COLORECTAL CA SCREEN DOC REV: CPT | Performed by: INTERNAL MEDICINE

## 2022-04-05 PROCEDURE — 1036F TOBACCO NON-USER: CPT | Performed by: INTERNAL MEDICINE

## 2022-04-05 PROCEDURE — 99214 OFFICE O/P EST MOD 30 MIN: CPT | Performed by: INTERNAL MEDICINE

## 2022-04-05 PROCEDURE — G8427 DOCREV CUR MEDS BY ELIG CLIN: HCPCS | Performed by: INTERNAL MEDICINE

## 2022-04-05 PROCEDURE — G8417 CALC BMI ABV UP PARAM F/U: HCPCS | Performed by: INTERNAL MEDICINE

## 2022-04-05 PROCEDURE — 93000 ELECTROCARDIOGRAM COMPLETE: CPT | Performed by: INTERNAL MEDICINE

## 2022-04-06 DIAGNOSIS — E78.2 MIXED HYPERLIPIDEMIA: ICD-10-CM

## 2022-04-07 ENCOUNTER — OFFICE VISIT (OUTPATIENT)
Dept: ORTHOPEDIC SURGERY | Age: 62
End: 2022-04-07
Payer: COMMERCIAL

## 2022-04-07 VITALS — HEIGHT: 76 IN | WEIGHT: 315 LBS | BODY MASS INDEX: 38.36 KG/M2

## 2022-04-07 DIAGNOSIS — S83.206A TEAR OF MENISCUS OF RIGHT KNEE, UNSPECIFIED MENISCUS, UNSPECIFIED TEAR TYPE, UNSPECIFIED WHETHER OLD OR CURRENT TEAR: ICD-10-CM

## 2022-04-07 DIAGNOSIS — T14.8XXA CONTUSION OF BONE: ICD-10-CM

## 2022-04-07 DIAGNOSIS — S82.141A CLOSED FRACTURE OF RIGHT TIBIAL PLATEAU, INITIAL ENCOUNTER: Primary | ICD-10-CM

## 2022-04-07 PROCEDURE — 1036F TOBACCO NON-USER: CPT | Performed by: ORTHOPAEDIC SURGERY

## 2022-04-07 PROCEDURE — G8427 DOCREV CUR MEDS BY ELIG CLIN: HCPCS | Performed by: ORTHOPAEDIC SURGERY

## 2022-04-07 PROCEDURE — 3017F COLORECTAL CA SCREEN DOC REV: CPT | Performed by: ORTHOPAEDIC SURGERY

## 2022-04-07 PROCEDURE — 99213 OFFICE O/P EST LOW 20 MIN: CPT | Performed by: ORTHOPAEDIC SURGERY

## 2022-04-07 PROCEDURE — G8417 CALC BMI ABV UP PARAM F/U: HCPCS | Performed by: ORTHOPAEDIC SURGERY

## 2022-04-07 NOTE — PROGRESS NOTES
Chief Complaint   Patient presents with    Knee Pain     Right knee follow up. He has seen further improvements in terms of pain and function. HPI:    Patient is 57 y.o. male complaining chronic, atraumatic, insidious onset Right leg pain over the last 6 weeks. Pain travles to distal from knee to above knee. Knee very stiff to move. No injury is known. Pain also back of calf. Dr. Austin Rm gave him cream to use but did not help. Akira Victor He admits to stiffness, deep, aching pain, swelling, difficulty with stairs and ambulating far distances. He admits to gross instability specifically in his right knee. Previous treatments include rest, ice, and anti-inflammatory medication and HEP without much relief. Right knee pain follow up. Pain in knee has improved since previous visit. ROS:    Skin: (-) rash,(-) psoriasis,(-) eczema, (-)skin cancer. Neurologic: (-)numbness, (-)tingling, (-)headaches, (-) LOC. Cardiovascular: (-) Chest pain, (-) swelling in legs/feet, (-) SOB, (-) cramping in legs/feet with walking.     All other review of systems negative except stated above or in HPI      Past Medical History:   Diagnosis Date    Hypertension     Irregular heart beat      Past Surgical History:   Procedure Laterality Date    APPENDECTOMY      SHOULDER ARTHROPLASTY      TONSILLECTOMY         Current Outpatient Medications:     apixaban (ELIQUIS) 5 MG TABS tablet, TAKE 1 TABLET TWICE A DAY, Disp: 180 tablet, Rfl: 3    metFORMIN (GLUCOPHAGE-XR) 500 MG extended release tablet, TAKE 1 TABLET TWICE A DAY WITH MEALS, Disp: 180 tablet, Rfl: 3    lisinopril-hydroCHLOROthiazide (PRINZIDE;ZESTORETIC) 20-12.5 MG per tablet, Take 2 tabs daily, Disp: 180 tablet, Rfl: 1    pravastatin (PRAVACHOL) 40 MG tablet, Take 1 tablet by mouth daily, Disp: 90 tablet, Rfl: 1    metoprolol succinate (TOPROL XL) 25 MG extended release tablet, Take 1 tablet by mouth daily, Disp: 90 tablet, Rfl: 1    diclofenac sodium (VOLTAREN) 1 % GEL, Apply 2 g topically 4 times daily as needed for Pain (Patient not taking: Reported on 2022), Disp: 100 g, Rfl: 3    sildenafil (VIAGRA) 100 MG tablet, Take 1 tablet by mouth as needed for Erectile Dysfunction Take one half daily, as needed, Disp: 30 tablet, Rfl: 3    Cholecalciferol (VITAMIN D) 50 MCG (2000 UT) TABS tablet, Take 1 tablet by mouth daily, Disp: 90 tablet, Rfl: 0  No Known Allergies  Social History     Socioeconomic History    Marital status: Single     Spouse name: Not on file    Number of children: Not on file    Years of education: Not on file    Highest education level: Not on file   Occupational History    Not on file   Tobacco Use    Smoking status: Former Smoker     Packs/day: 0.50     Years: 40.00     Pack years: 20.00     Types: Cigarettes     Start date: 1979     Quit date: 2021     Years since quittin.9    Smokeless tobacco: Never Used   Vaping Use    Vaping Use: Never used   Substance and Sexual Activity    Alcohol use: Yes     Comment: weekly. 1 cup coffee per day    Drug use: Never    Sexual activity: Not on file   Other Topics Concern    Not on file   Social History Narrative    Not on file     Social Determinants of Health     Financial Resource Strain:     Difficulty of Paying Living Expenses: Not on file   Food Insecurity:     Worried About Running Out of Food in the Last Year: Not on file    Jazmyne of Food in the Last Year: Not on file   Transportation Needs:     Lack of Transportation (Medical): Not on file    Lack of Transportation (Non-Medical):  Not on file   Physical Activity:     Days of Exercise per Week: Not on file    Minutes of Exercise per Session: Not on file   Stress:     Feeling of Stress : Not on file   Social Connections:     Frequency of Communication with Friends and Family: Not on file    Frequency of Social Gatherings with Friends and Family: Not on file    Attends [employer] Services: Not on file   [employer] of [employer]s or Organizations: Not on file    Attends Club or Organization Meetings: Not on file    Marital Status: Not on file   Intimate Partner Violence:     Fear of Current or Ex-Partner: Not on file    Emotionally Abused: Not on file    Physically Abused: Not on file    Sexually Abused: Not on file   Housing Stability:     Unable to Pay for Housing in the Last Year: Not on file    Number of [place] in the Last Year: Not on file    Unstable Housing in the Last Year: Not on file     Family History   Problem Relation Age of Onset    No Known Problems Mother            Physical Exam:    Ht 6' 4\" (1.93 m)   Wt (!) 354 lb (160.6 kg)   BMI 43.09 kg/m²     HEENT: Head is normocephalic, atraumatic. PERRLA. SKIN: Clean, dry, intact. There is not any cellulitis or cutaneous lesions noted in the lower extremities     PULMONARY: breathing is regular and unlabored, no acute distress     CV: The bilateral lower extremities are warm and well-perfused with brisk capillary refill. 2+ pulses LE bilateral.     ABDOMINAL: Nontender, nondistended     PSYCHIATRY: Pleasant mood, appropriate behavior, follows commands     NEURO: Sensation is intact distally with light touch with no alteration. Motor exam of the lower extremities show quadriceps, hamstrings, foot dorsiflexion and plantarflexion grossly intact 5/5. MUSCULOSKELETAL:    Right Knee Exam:    mild effusion noted. No erythema/induration/fluctuance. Posterior medial joint line TTP. Stable to varus and valgus at 0 and 30 degrees of flexion. Negative Lachman's and posterior drawer. Negative patellar grind test and J sign. Compartments soft and compressible throughout leg. Active range of motion 0-120 with pain.   Positive Kelechi's positive Apley's, gait is antalgic    Good improvement since last visit    Imaging:  XR KNEE RIGHT (1-2 VIEWS)    Result Date: 1/11/2022  EXAMINATION: 2 XRAY VIEWS OF THE RIGHT TIBIA AND FIBULA; TWO XRAY VIEWS OF THE RIGHT KNEE 1/11/2022 12:25 pm COMPARISON: None. HISTORY: ORDERING SYSTEM PROVIDED HISTORY: Chronic pain of right knee FINDINGS: Moderate right knee joint effusion. Mild osteoarthritis at the medial weight-bearing and patellofemoral compartments. No fracture or dislocation. No additional significant abnormal findings related to the more distal tibia or fibula. There is a moderate-sized plantar heel spur. Mild osteoarthritis at the right knee with moderate joint effusion. Plantar heel spur. XR TIBIA FIBULA RIGHT (2 VIEWS)    Result Date: 1/11/2022  EXAMINATION: 2 XRAY VIEWS OF THE RIGHT TIBIA AND FIBULA; TWO XRAY VIEWS OF THE RIGHT KNEE 1/11/2022 12:25 pm COMPARISON: None. HISTORY: ORDERING SYSTEM PROVIDED HISTORY: Chronic pain of right knee FINDINGS: Moderate right knee joint effusion. Mild osteoarthritis at the medial weight-bearing and patellofemoral compartments. No fracture or dislocation. No additional significant abnormal findings related to the more distal tibia or fibula. There is a moderate-sized plantar heel spur. Mild osteoarthritis at the right knee with moderate joint effusion. Plantar heel spur. MRI KNEE RIGHT WO CONTRAST    Result Date: 2/9/2022  EXAMINATION: MRI OF THE RIGHT KNEE WITHOUT CONTRAST, 2/8/2022 1:30 pm TECHNIQUE: Multiplanar multisequence MRI of the right knee was performed without the administration of intravenous contrast. COMPARISON: 01/11/2022 HISTORY: ORDERING SYSTEM PROVIDED HISTORY: Tear of meniscus of right knee, unspecified meniscus, unspecified tear type, unspecified whether old or current tear FINDINGS: MENISCI: Complete radial tear of the posterior root attachment of the medial meniscus with severe displacement of the meniscal fragment (series 5, image 23 and series 7, image 24).   There is additional horizontal and oblique tearing of the posterior horn and body of the medial meniscus with superimposed small radial tear at the posterior horn body junction (series 3, image 21 and series 5, image 22). The lateral meniscus appears intact. CRUCIATE LIGAMENTS: T2 hyperintense signal within the anterior cruciate ligament favors mucoid degeneration. The PCL appears intact. EXTENSOR MECHANISM: Fragmentation of the tibial tubercle at the insertion of the patellar tendon is likely chronic/due to remote injury. No discrete tear of the quadriceps or patellar tendon. There is a small amount of fluid within the deep infrapatellar bursa. Small amount of prepatellar and infrapatellar subcutaneous edema, nonspecific. LATERAL COLLATERAL LIGAMENT COMPLEX: The popliteus tendon, biceps femoris tendon, fibular collateral ligament and iliotibial band are intact. T2 hyperintense signal within the popliteus muscle, nonspecific. MEDIAL COLLATERAL LIGAMENT COMPLEX: There is some periligamentous edema without discrete tear. KNEE JOINT: No discrete full-thickness cartilage loss of the anterior compartment. Focal full-thickness cartilage defect involving the posterior nonweightbearing lateral femoral condyle with underlying bony reactive changes. Patchy areas of full-thickness cartilage loss involving the posterior nonweightbearing medial femoral cartilage. High-grade cartilage loss involving the central and posterior weight-bearing femoral cartilage within the medial compartment. Moderate-sized joint effusion with reactive synovitis. Moderate tibiofibular arthrosis is noted with tiny ganglion cyst. BONE MARROW: Linear T1 and T2 hypointense signal within the subchondral region of the medial tibial plateau with extensive underlying marrow edema. No discrete articular surface collapse is identified. SOFT TISSUES: Diffuse soft tissue edema, nonspecific. Muscle edema within the lateral head of the gastrocnemius. Findings concerning for subchondral insufficiency fracture of the medial tibial plateau. No visualized articular surface collapse. Complex tear of the medial meniscus.  Multifocal high-grade chondrosis, detailed above. Moderate-sized joint effusion, likely reactive. Findings which may represent deep infrapatellar bursitis in the appropriate clinical setting. Mild edema within the popliteus and lateral head of the gastrocnemius muscles may represent low-grade strain. MRI TIBIA FIBULA RIGHT WO CONTRAST    Result Date: 2/9/2022  EXAMINATION: MRI OF THE RIGHT TIBIA/FIBULA WITHOUT CONTRAST 2/8/2022 3:44 pm TECHNIQUE: Multiplanar multisequence MRI of the right tibia/fibula was performed without the administration of intravenous contrast. COMPARISON: Radiographs of the tibia and fibula on 01/11/2022 HISTORY: ORDERING SYSTEM PROVIDED HISTORY: Contusion of bone FINDINGS: Knee findings are reported separately. Please see dedicated report. SOFT TISSUES: Muscle bulk appears mildly atrophic. Focal edema within the popliteus and peroneal musculature noted. Trace muscle edema within the soleus. No discrete retracted full-thickness tendon tear. Moderate diffuse subcutaneous edema, nonspecific. OSSEOUS STRUCTURES: The study is not optimized for evaluation for internal derangement or ligamentous injury. Proximal tibial and fibular findings are reported separately. No other discrete areas of marrow edema. No discrete T1 fracture line. No discrete ankle effusion is identified on this limited study. Knee findings reported separately. Please see dedicated report. No discrete osseous contusion identified. Low-grade strains of the popliteus, peroneal, and soleus musculature. Circumferential subcutaneous edema, nonspecific. Parish Paredes was seen today for knee pain. Diagnoses and all orders for this visit:    Closed fracture of right tibial plateau, initial encounter    Contusion of bone    Tear of meniscus of right knee, unspecified meniscus, unspecified tear type, unspecified whether old or current tear        Patient seen and examined. X-rays reviewed.   Patient has little to no arthritis noted on x-rays today. However patient's main complaint is instability of symptomatic knee. Exam and history is consistent with possible meniscus tear. MRI recommended for further evaluation management. Follow-up after MRI  MRI and x-ray reviewed with patient in detail. Natural history and course discussed with patient in long discussion  Treatment options discussed with patient in detail including risks and benefits. Patient should do well with conservative management as patient would like to avoid surgery at this time.            Natasha Ann, DO

## 2022-04-08 RX ORDER — PRAVASTATIN SODIUM 40 MG
TABLET ORAL
Qty: 90 TABLET | Refills: 3 | Status: SHIPPED | OUTPATIENT
Start: 2022-04-08

## 2022-05-02 ENCOUNTER — PROCEDURE VISIT (OUTPATIENT)
Dept: PODIATRY | Age: 62
End: 2022-05-02
Payer: COMMERCIAL

## 2022-05-02 VITALS — BODY MASS INDEX: 38.36 KG/M2 | HEIGHT: 76 IN | WEIGHT: 315 LBS

## 2022-05-02 DIAGNOSIS — G60.8 HEREDITARY SENSORY NEUROPATHY: ICD-10-CM

## 2022-05-02 DIAGNOSIS — B35.1 TINEA UNGUIUM: Primary | ICD-10-CM

## 2022-05-02 DIAGNOSIS — E08.65 DIABETES MELLITUS DUE TO UNDERLYING CONDITION WITH HYPERGLYCEMIA, WITHOUT LONG-TERM CURRENT USE OF INSULIN (HCC): ICD-10-CM

## 2022-05-02 DIAGNOSIS — Z79.01 ENCOUNTER FOR CURRENT LONG-TERM USE OF ANTICOAGULANTS: ICD-10-CM

## 2022-05-02 PROCEDURE — 11721 DEBRIDE NAIL 6 OR MORE: CPT | Performed by: PODIATRIST

## 2022-05-02 PROCEDURE — 11056 PARNG/CUTG B9 HYPRKR LES 2-4: CPT | Performed by: PODIATRIST

## 2022-05-02 NOTE — PROGRESS NOTES
Filiberto Diehl is here today for a diabetic foot exam and nail care. his PCP is Nabil Parikh DO last OV was 01/10/2022. CC:   Diabetic foot exam and painful elongated toenails 1-5 right and left    HPI:   Follow-up diabetic foot ankle exam  Continues Eliquis long-term anticoagulant therapy. No calf pain. No significant foot or ankle pain today. No additional pedal complaints. ROS:  Const: Denies constitutional symptoms  Musculo: Denies symptoms other than stated above  Skin: Denies symptoms other than stated above      Current Outpatient Medications:     pravastatin (PRAVACHOL) 40 MG tablet, TAKE 1 TABLET DAILY, Disp: 90 tablet, Rfl: 3    apixaban (ELIQUIS) 5 MG TABS tablet, TAKE 1 TABLET TWICE A DAY, Disp: 180 tablet, Rfl: 3    metFORMIN (GLUCOPHAGE-XR) 500 MG extended release tablet, TAKE 1 TABLET TWICE A DAY WITH MEALS, Disp: 180 tablet, Rfl: 3    lisinopril-hydroCHLOROthiazide (PRINZIDE;ZESTORETIC) 20-12.5 MG per tablet, Take 2 tabs daily, Disp: 180 tablet, Rfl: 1    metoprolol succinate (TOPROL XL) 25 MG extended release tablet, Take 1 tablet by mouth daily, Disp: 90 tablet, Rfl: 1    diclofenac sodium (VOLTAREN) 1 % GEL, Apply 2 g topically 4 times daily as needed for Pain (Patient not taking: Reported on 4/5/2022), Disp: 100 g, Rfl: 3    sildenafil (VIAGRA) 100 MG tablet, Take 1 tablet by mouth as needed for Erectile Dysfunction Take one half daily, as needed, Disp: 30 tablet, Rfl: 3    Cholecalciferol (VITAMIN D) 50 MCG (2000 UT) TABS tablet, Take 1 tablet by mouth daily, Disp: 90 tablet, Rfl: 0  No Known Allergies    Past Medical History:   Diagnosis Date    Hypertension     Irregular heart beat        There were no vitals filed for this visit. Work History/Social History:    Foot and ankle history:     Focused Lower Extremity Physical Exam:      Neurovascular examination:    Dorsalis Pedis palpable bilateral.  Posterior tibialis weakly palpable bilateral.    Capillary Refill Time:  Immediate return  Hair growth:  Symmetrical and bilateral   Skin:  Not atrophic  Edema: Mild edema bilateral feet. Mild edema bilateral ankles. Neurologic:  Light touch diminished bilateral.  Warm to coolness bilateral distal toes  Decreased epicritic sensation     Musculoskeletal/ Orthopedic examination:    Equinis: present bilateral  Dorsiflexion, plantarflexion, inversion, eversion bilateral 5 out of 5 muscle strength  Wiggling toes  Negative Homans  Decreased dorsiflexion plantarflexion first metatarsophalangeal joint right great toe. No pain to palpation foot or ankle. Dermatology examination:    Toenails 1 through 5 bilateral thickened, elongated, dystrophic, mycotic with subungual debris. Web spaces 1 through 4 bilateral clean dry and intact. Hyperkeratotic tissue plantar fifth metatarsal bilateral.  No open wounds. Assessment and Plan:  Lynsey Alicea was seen today for callouses, nail problem and diabetes. Diagnoses and all orders for this visit:    Tinea unguium    Diabetes mellitus due to underlying condition with hyperglycemia, without long-term current use of insulin ([facility] [address]    Hereditary sensory neuropathy    Encounter for current long-term use of anticoagulants          Follow-up diabetic foot and ankle examination   Formal debridement toenails 1 through 5 right and left with manual debridement for thickness and overall length. I did pare hyperkeratotic tissue plantar fifth metatarsal bilateral with #15 blade. Tolerated procedure well. History long-term anticoagulant therapy with Eliquis. Follow-up in office 2 months        Return in about 2 months (around 7/2/2022). Seen By:  Arnel Hargrove DPM      Document was created using voice recognition software. Note was reviewed however may contain grammatical errors.

## 2022-07-08 DIAGNOSIS — I10 ESSENTIAL HYPERTENSION: ICD-10-CM

## 2022-07-11 ENCOUNTER — PROCEDURE VISIT (OUTPATIENT)
Dept: PODIATRY | Age: 62
End: 2022-07-11
Payer: COMMERCIAL

## 2022-07-11 VITALS — HEIGHT: 76 IN | BODY MASS INDEX: 38.36 KG/M2 | WEIGHT: 315 LBS

## 2022-07-11 DIAGNOSIS — E11.9 TYPE 2 DIABETES MELLITUS WITHOUT COMPLICATION, UNSPECIFIED WHETHER LONG TERM INSULIN USE (HCC): ICD-10-CM

## 2022-07-11 DIAGNOSIS — L84 CORNS AND CALLOSITIES: ICD-10-CM

## 2022-07-11 DIAGNOSIS — B35.1 TINEA UNGUIUM: Primary | ICD-10-CM

## 2022-07-11 DIAGNOSIS — Z79.01 ENCOUNTER FOR CURRENT LONG-TERM USE OF ANTICOAGULANTS: ICD-10-CM

## 2022-07-11 DIAGNOSIS — G60.8 HEREDITARY SENSORY NEUROPATHY: ICD-10-CM

## 2022-07-11 PROCEDURE — 11721 DEBRIDE NAIL 6 OR MORE: CPT | Performed by: PODIATRIST

## 2022-07-11 PROCEDURE — 11056 PARNG/CUTG B9 HYPRKR LES 2-4: CPT | Performed by: PODIATRIST

## 2022-07-11 RX ORDER — METOPROLOL SUCCINATE 25 MG/1
TABLET, EXTENDED RELEASE ORAL
Qty: 90 TABLET | Refills: 3 | Status: SHIPPED | OUTPATIENT
Start: 2022-07-11

## 2022-07-11 NOTE — PROGRESS NOTES
Levi Zabala is here today for nail care. his PCP is Vinnie Parikh DO last OV was 01/10/2022. CC:   Diabetic foot exam and painful elongated toenails 1-5 right and left    HPI:   Follow-up diabetic foot ankle exam.  Follow-up Eliquis long-term anticoagulant therapy. No calf pain no open wounds. No additional pedal complaints. ROS:  Const: Denies constitutional symptoms  Musculo: Denies symptoms other than stated above  Skin: Denies symptoms other than stated above      Current Outpatient Medications:     pravastatin (PRAVACHOL) 40 MG tablet, TAKE 1 TABLET DAILY, Disp: 90 tablet, Rfl: 3    apixaban (ELIQUIS) 5 MG TABS tablet, TAKE 1 TABLET TWICE A DAY, Disp: 180 tablet, Rfl: 3    metFORMIN (GLUCOPHAGE-XR) 500 MG extended release tablet, TAKE 1 TABLET TWICE A DAY WITH MEALS, Disp: 180 tablet, Rfl: 3    lisinopril-hydroCHLOROthiazide (PRINZIDE;ZESTORETIC) 20-12.5 MG per tablet, Take 2 tabs daily, Disp: 180 tablet, Rfl: 1    metoprolol succinate (TOPROL XL) 25 MG extended release tablet, Take 1 tablet by mouth daily, Disp: 90 tablet, Rfl: 1    diclofenac sodium (VOLTAREN) 1 % GEL, Apply 2 g topically 4 times daily as needed for Pain (Patient not taking: Reported on 4/5/2022), Disp: 100 g, Rfl: 3    sildenafil (VIAGRA) 100 MG tablet, Take 1 tablet by mouth as needed for Erectile Dysfunction Take one half daily, as needed, Disp: 30 tablet, Rfl: 3    Cholecalciferol (VITAMIN D) 50 MCG (2000 UT) TABS tablet, Take 1 tablet by mouth daily, Disp: 90 tablet, Rfl: 0  No Known Allergies    Past Medical History:   Diagnosis Date    Hypertension     Irregular heart beat        There were no vitals filed for this visit. Work History/Social History:    Foot and ankle history:     Focused Lower Extremity Physical Exam:      Neurovascular examination:    Dorsalis Pedis palpable bilateral.  Posterior tibialis weakly palpable bilateral.    Capillary Refill Time:  Immediate return  Hair growth: Symmetrical and bilateral   Skin:  Not atrophic  Edema: Mild edema bilateral feet. Mild edema bilateral ankles. Neurologic:  Light touch diminished bilateral.  Warm to coolness bilateral distal toes  Decreased epicritic sensation     Musculoskeletal/ Orthopedic examination:    Equinis: present bilateral  Dorsiflexion, plantarflexion, inversion, eversion bilateral 5 out of 5 muscle strength  Wiggling toes  Negative Homans  No pain foot or ankle. No ankle pain. Dermatology examination:    Toenails 1 through 5 bilateral thickened, elongated, dystrophic, mycotic with subungual debris. Web spaces 1 through 4 bilateral clean dry and intact. Hyperkeratotic tissue fifth metatarsal bilateral.  No open wounds. No erythema. Assessment and Plan:  Nikki Piña was seen today for callouses and nail problem. Diagnoses and all orders for this visit:    Tinea unguium    Hereditary sensory neuropathy    Encounter for current long-term use of anticoagulants    Corns and callosities    Type 2 diabetes mellitus without complication, unspecified whether long term insulin use ([facility] [address]          Follow-up diabetic foot and ankle examination   Formal debridement toenails 1 through 5 right and left with manual debridement for thickness and overall length. Paring hyperkeratotic tissue plantar fifth metatarsal bilateral with #15 blade. Continues long-term anticoagulant therapy with Eliquis. Stressed importance avoiding barefoot. Follow-up 2 months      Return in about 2 months (around 9/11/2022). Seen By:  Mamie Meehan DPM      Document was created using voice recognition software. Note was reviewed however may contain grammatical errors.

## 2022-09-12 ENCOUNTER — PROCEDURE VISIT (OUTPATIENT)
Dept: PODIATRY | Age: 62
End: 2022-09-12
Payer: COMMERCIAL

## 2022-09-12 VITALS — BODY MASS INDEX: 38.36 KG/M2 | WEIGHT: 315 LBS | HEIGHT: 76 IN

## 2022-09-12 DIAGNOSIS — G60.8 HEREDITARY SENSORY NEUROPATHY: ICD-10-CM

## 2022-09-12 DIAGNOSIS — E11.9 TYPE 2 DIABETES MELLITUS WITHOUT COMPLICATION, UNSPECIFIED WHETHER LONG TERM INSULIN USE (HCC): ICD-10-CM

## 2022-09-12 DIAGNOSIS — Z79.01 ENCOUNTER FOR CURRENT LONG-TERM USE OF ANTICOAGULANTS: ICD-10-CM

## 2022-09-12 DIAGNOSIS — B35.1 TINEA UNGUIUM: Primary | ICD-10-CM

## 2022-09-12 DIAGNOSIS — L84 CORNS AND CALLOSITIES: ICD-10-CM

## 2022-09-12 PROCEDURE — 11721 DEBRIDE NAIL 6 OR MORE: CPT | Performed by: PODIATRIST

## 2022-09-12 PROCEDURE — 11056 PARNG/CUTG B9 HYPRKR LES 2-4: CPT | Performed by: PODIATRIST

## 2022-09-12 NOTE — PROGRESS NOTES
Ferny Wood is here today for nail care. his PCP is Curt Closs Catterlin, DO last OV was 01/10/2022. CC:   Diabetic foot exam and painful elongated toenails 1-5 right and left    HPI:   Follow-up diabetic foot ankle exam.  No open wounds. No foot or ankle pain. No additional pedal complaints. ROS:  Const: Denies constitutional symptoms  Musculo: Denies symptoms other than stated above  Skin: Denies symptoms other than stated above      Current Outpatient Medications:     metoprolol succinate (TOPROL XL) 25 MG extended release tablet, TAKE 1 TABLET DAILY, Disp: 90 tablet, Rfl: 3    pravastatin (PRAVACHOL) 40 MG tablet, TAKE 1 TABLET DAILY, Disp: 90 tablet, Rfl: 3    apixaban (ELIQUIS) 5 MG TABS tablet, TAKE 1 TABLET TWICE A DAY, Disp: 180 tablet, Rfl: 3    metFORMIN (GLUCOPHAGE-XR) 500 MG extended release tablet, TAKE 1 TABLET TWICE A DAY WITH MEALS, Disp: 180 tablet, Rfl: 3    lisinopril-hydroCHLOROthiazide (PRINZIDE;ZESTORETIC) 20-12.5 MG per tablet, Take 2 tabs daily, Disp: 180 tablet, Rfl: 1    diclofenac sodium (VOLTAREN) 1 % GEL, Apply 2 g topically 4 times daily as needed for Pain (Patient not taking: Reported on 4/5/2022), Disp: 100 g, Rfl: 3    sildenafil (VIAGRA) 100 MG tablet, Take 1 tablet by mouth as needed for Erectile Dysfunction Take one half daily, as needed, Disp: 30 tablet, Rfl: 3    Cholecalciferol (VITAMIN D) 50 MCG (2000 UT) TABS tablet, Take 1 tablet by mouth daily, Disp: 90 tablet, Rfl: 0  No Known Allergies    Past Medical History:   Diagnosis Date    Hypertension     Irregular heart beat        There were no vitals filed for this visit. Work History/Social History:    Foot and ankle history:     Focused Lower Extremity Physical Exam:      Neurovascular examination:    Dorsalis Pedis palpable bilateral.  Posterior tibialis weakly palpable bilateral.    Capillary Refill Time:  Immediate return  Hair growth:  Symmetrical and bilateral   Skin:  Not atrophic  Edema: Mild edema bilateral feet. Mild edema bilateral ankles. Neurologic:  Light touch diminished bilateral.  Warm to coolness bilateral distal toes  Decreased epicritic sensation     Musculoskeletal/ Orthopedic examination:    Equinis: present bilateral  Dorsiflexion, plantarflexion, inversion, eversion bilateral 5 out of 5 muscle strength  Wiggling toes  Negative Homans. No pain foot or ankle. Dermatology examination:    Toenails 1 through 5 bilateral thickened, elongated, dystrophic, mycotic with subungual debris. Web spaces 1 through 4 bilateral clean dry and intact. Hyperkeratotic tissue fifth metatarsal head bilateral.  No erythema. No open wounds. Assessment and Plan:  Nikki Piña was seen today for callouses, nail problem and diabetes. Diagnoses and all orders for this visit:    Tinea unguium    Corns and callosities    Hereditary sensory neuropathy    Encounter for current long-term use of anticoagulants    Type 2 diabetes mellitus without complication, unspecified whether long term insulin use ([facility] [address]        Follow-up diabetic foot and ankle examination   Formal debridement toenails 1 through 5 right and left with manual debridement for thickness and overall length. Paring hyperkeratotic tissue plantar fifth metatarsal bilateral with #15 blade. Continues long-term anticoagulant therapy with Eliquis. Avoid barefoot. Follow-up in office 2 months. Return in about 2 months (around 11/12/2022). Seen By:  Mamie Meehan DPM      Document was created using voice recognition software. Note was reviewed however may contain grammatical errors.

## 2022-11-14 ENCOUNTER — PROCEDURE VISIT (OUTPATIENT)
Dept: PODIATRY | Age: 62
End: 2022-11-14
Payer: COMMERCIAL

## 2022-11-14 VITALS — BODY MASS INDEX: 38.36 KG/M2 | HEIGHT: 76 IN | WEIGHT: 315 LBS

## 2022-11-14 DIAGNOSIS — L84 CORNS AND CALLOSITIES: ICD-10-CM

## 2022-11-14 DIAGNOSIS — B35.1 TINEA UNGUIUM: Primary | ICD-10-CM

## 2022-11-14 DIAGNOSIS — Z79.01 ENCOUNTER FOR CURRENT LONG-TERM USE OF ANTICOAGULANTS: ICD-10-CM

## 2022-11-14 DIAGNOSIS — G60.8 HEREDITARY SENSORY NEUROPATHY: ICD-10-CM

## 2022-11-14 DIAGNOSIS — E11.9 TYPE 2 DIABETES MELLITUS WITHOUT COMPLICATION, UNSPECIFIED WHETHER LONG TERM INSULIN USE (HCC): ICD-10-CM

## 2022-11-14 PROCEDURE — 11056 PARNG/CUTG B9 HYPRKR LES 2-4: CPT | Performed by: PODIATRIST

## 2022-11-14 PROCEDURE — 11721 DEBRIDE NAIL 6 OR MORE: CPT | Performed by: PODIATRIST

## 2022-11-14 NOTE — PROGRESS NOTES
Emily Terry is here today for nail care. his PCP is Brie Parikh DO last OV was 03/28/2022. CC:   Diabetic foot exam and painful elongated toenails 1-5 right and left    HPI:   Follow-up diabetic foot ankle exam.  [place] toenails 1-5 right left. History anticoagulated with Eliquis. No open wounds. No additional pedal complaints. ROS:  Const: Denies constitutional symptoms  Musculo: Denies symptoms other than stated above  Skin: Denies symptoms other than stated above      Current Outpatient Medications:     metoprolol succinate (TOPROL XL) 25 MG extended release tablet, TAKE 1 TABLET DAILY, Disp: 90 tablet, Rfl: 3    pravastatin (PRAVACHOL) 40 MG tablet, TAKE 1 TABLET DAILY, Disp: 90 tablet, Rfl: 3    apixaban (ELIQUIS) 5 MG TABS tablet, TAKE 1 TABLET TWICE A DAY, Disp: 180 tablet, Rfl: 3    metFORMIN (GLUCOPHAGE-XR) 500 MG extended release tablet, TAKE 1 TABLET TWICE A DAY WITH MEALS, Disp: 180 tablet, Rfl: 3    lisinopril-hydroCHLOROthiazide (PRINZIDE;ZESTORETIC) 20-12.5 MG per tablet, Take 2 tabs daily, Disp: 180 tablet, Rfl: 1    diclofenac sodium (VOLTAREN) 1 % GEL, Apply 2 g topically 4 times daily as needed for Pain (Patient not taking: Reported on 4/5/2022), Disp: 100 g, Rfl: 3    sildenafil (VIAGRA) 100 MG tablet, Take 1 tablet by mouth as needed for Erectile Dysfunction Take one half daily, as needed, Disp: 30 tablet, Rfl: 3    Cholecalciferol (VITAMIN D) 50 MCG (2000 UT) TABS tablet, Take 1 tablet by mouth daily, Disp: 90 tablet, Rfl: 0  No Known Allergies    Past Medical History:   Diagnosis Date    Hypertension     Irregular heart beat        There were no vitals filed for this visit. Work History/Social History:    Foot and ankle history:     Focused Lower Extremity Physical Exam:      Neurovascular examination:    Dorsalis Pedis palpable bilateral.  Posterior tibialis weakly palpable bilateral.    Capillary Refill Time:  Immediate return  Hair growth:  Symmetrical and bilateral   Skin:  Not atrophic  Edema: Mild edema bilateral feet. Mild edema bilateral ankles. Neurologic:  Light touch diminished bilateral.  Warm to coolness bilateral distal toes  Decreased epicritic sensation     Musculoskeletal/ Orthopedic examination:    Equinis: present bilateral  Dorsiflexion, plantarflexion, inversion, eversion bilateral 5 out of 5 muscle strength  Wiggling toes pain-free. Negative Homans. No pain foot or ankle. Dermatology examination:    Toenails 1 through 5 bilateral thickened, elongated, dystrophic, mycotic with subungual debris. Web spaces 1 through 4 bilateral clean dry and intact. Hyperkeratotic tissue noted fifth metatarsal bilateral.  No open wounds. Assessment and Plan:  Gwen Ness was seen today for callouses and nail problem. Diagnoses and all orders for this visit:    Tinea unguium    Corns and callosities    Hereditary sensory neuropathy    Encounter for current long-term use of anticoagulants    Type 2 diabetes mellitus without complication, unspecified whether long term insulin use ([facility] [address]        Follow-up diabetic exam  History anticoagulant therapy with Eliquis  Paring hyperkeratotic tissue fifth metatarsal bilateral #15 blade. Formal debridement toenails 1 through 5 right and left with manual debridement for thickness and overall length. Follow-up 2 months diabetic foot and ankle exam          Return in about 2 months (around 1/14/2023). Seen By:  Scott Seymour DPM      Document was created using voice recognition software. Note was reviewed however may contain grammatical errors.

## 2023-01-16 ENCOUNTER — PROCEDURE VISIT (OUTPATIENT)
Dept: PODIATRY | Age: 63
End: 2023-01-16

## 2023-01-16 VITALS — HEIGHT: 76 IN | WEIGHT: 315 LBS | BODY MASS INDEX: 38.36 KG/M2

## 2023-01-16 DIAGNOSIS — E11.9 TYPE 2 DIABETES MELLITUS WITHOUT COMPLICATION, UNSPECIFIED WHETHER LONG TERM INSULIN USE (HCC): ICD-10-CM

## 2023-01-16 DIAGNOSIS — G60.8 HEREDITARY SENSORY NEUROPATHY: ICD-10-CM

## 2023-01-16 DIAGNOSIS — B35.1 TINEA UNGUIUM: Primary | ICD-10-CM

## 2023-01-16 DIAGNOSIS — Z79.01 ENCOUNTER FOR CURRENT LONG-TERM USE OF ANTICOAGULANTS: ICD-10-CM

## 2023-01-16 DIAGNOSIS — L84 CORNS AND CALLOSITIES: ICD-10-CM

## 2023-01-16 NOTE — PROGRESS NOTES
Delano Mathur is here today for nail care. his PCP is Tierra Parikh DO last OV was 03/28/2022. CC:   Diabetic foot exam and painful elongated toenails 1-5 right and left    HPI:   Follow-up diabetic foot ankle exam.  No open wounds per  History Eliquis. No calf pain no foot or ankle pain today. No additional pedal complaints. ROS:  Const: Denies constitutional symptoms  Musculo: Denies symptoms other than stated above  Skin: Denies symptoms other than stated above      Current Outpatient Medications:     metoprolol succinate (TOPROL XL) 25 MG extended release tablet, TAKE 1 TABLET DAILY, Disp: 90 tablet, Rfl: 3    pravastatin (PRAVACHOL) 40 MG tablet, TAKE 1 TABLET DAILY, Disp: 90 tablet, Rfl: 3    apixaban (ELIQUIS) 5 MG TABS tablet, TAKE 1 TABLET TWICE A DAY, Disp: 180 tablet, Rfl: 3    metFORMIN (GLUCOPHAGE-XR) 500 MG extended release tablet, TAKE 1 TABLET TWICE A DAY WITH MEALS, Disp: 180 tablet, Rfl: 3    lisinopril-hydroCHLOROthiazide (PRINZIDE;ZESTORETIC) 20-12.5 MG per tablet, Take 2 tabs daily, Disp: 180 tablet, Rfl: 1    diclofenac sodium (VOLTAREN) 1 % GEL, Apply 2 g topically 4 times daily as needed for Pain (Patient not taking: Reported on 4/5/2022), Disp: 100 g, Rfl: 3    sildenafil (VIAGRA) 100 MG tablet, Take 1 tablet by mouth as needed for Erectile Dysfunction Take one half daily, as needed, Disp: 30 tablet, Rfl: 3    Cholecalciferol (VITAMIN D) 50 MCG (2000 UT) TABS tablet, Take 1 tablet by mouth daily, Disp: 90 tablet, Rfl: 0  No Known Allergies    Past Medical History:   Diagnosis Date    Hypertension     Irregular heart beat        There were no vitals filed for this visit. Work History/Social History:    Foot and ankle history:     Focused Lower Extremity Physical Exam:      Neurovascular examination:    Dorsalis Pedis palpable bilateral.  Posterior tibialis weakly palpable bilateral.    Capillary Refill Time:  Immediate return  Hair growth:  Symmetrical and bilateral Skin:  Not atrophic  Edema: Mild edema bilateral feet. Mild edema bilateral ankles. Neurologic:  Light touch diminished bilateral.  Warm to coolness bilateral distal toes  Decreased epicritic sensation     Musculoskeletal/ Orthopedic examination:    Equinis: present bilateral  Dorsiflexion, plantarflexion, inversion, eversion bilateral 5 out of 5 muscle strength  Negative Homans    Dermatology examination:    Toenails 1 through 5 bilateral thickened, elongated, dystrophic, mycotic with subungual debris. Web spaces 1 through 4 bilateral clean dry and intact. No open skin lesions or abrasions foot or ankle. Assessment and Plan:  Jung Ruiz was seen today for callouses and nail problem. Diagnoses and all orders for this visit:    Tinea unguium    Corns and callosities    Hereditary sensory neuropathy    Encounter for current long-term use of anticoagulants    Type 2 diabetes mellitus without complication, unspecified whether long term insulin use ([facility] [address]      Follow-up diabetic foot ankle exam  History Eliquis. Formal debridement toenails 1 through 5 right and left with manual debridement for thickness and overall length. I will follow-up in office 2 months to monitor overall progression. Return in about 2 months (around 3/16/2023). Seen By:  Wally Nielson DPM      Document was created using voice recognition software. Note was reviewed however may contain grammatical errors.

## 2023-02-07 DIAGNOSIS — I10 ESSENTIAL HYPERTENSION: ICD-10-CM

## 2023-02-08 RX ORDER — LISINOPRIL AND HYDROCHLOROTHIAZIDE 20; 12.5 MG/1; MG/1
TABLET ORAL
Qty: 180 TABLET | Refills: 3 | Status: SHIPPED | OUTPATIENT
Start: 2023-02-08

## 2023-03-20 ENCOUNTER — PROCEDURE VISIT (OUTPATIENT)
Dept: PODIATRY | Age: 63
End: 2023-03-20
Payer: COMMERCIAL

## 2023-03-20 VITALS — BODY MASS INDEX: 38.36 KG/M2 | WEIGHT: 315 LBS | HEIGHT: 76 IN

## 2023-03-20 DIAGNOSIS — L84 CORNS AND CALLOSITIES: ICD-10-CM

## 2023-03-20 DIAGNOSIS — B35.1 TINEA UNGUIUM: ICD-10-CM

## 2023-03-20 DIAGNOSIS — G60.8 HEREDITARY SENSORY NEUROPATHY: ICD-10-CM

## 2023-03-20 DIAGNOSIS — E11.9 TYPE 2 DIABETES MELLITUS WITHOUT COMPLICATION, UNSPECIFIED WHETHER LONG TERM INSULIN USE (HCC): Primary | ICD-10-CM

## 2023-03-20 DIAGNOSIS — Z79.01 ENCOUNTER FOR CURRENT LONG-TERM USE OF ANTICOAGULANTS: ICD-10-CM

## 2023-03-20 PROCEDURE — 11056 PARNG/CUTG B9 HYPRKR LES 2-4: CPT | Performed by: PODIATRIST

## 2023-03-20 PROCEDURE — 11721 DEBRIDE NAIL 6 OR MORE: CPT | Performed by: PODIATRIST

## 2023-03-20 NOTE — PROGRESS NOTES
bilateral   Skin:  Not atrophic  Edema: Mild edema bilateral feet. Mild edema bilateral ankles. Neurologic:  Light touch diminished bilateral.  Warm to coolness bilateral distal toes  Decreased epicritic sensation     Musculoskeletal/ Orthopedic examination:    Equinis: present bilateral  Dorsiflexion, plantarflexion, inversion, eversion bilateral 5 out of 5 muscle strength  Negative Homans  No pain foot or ankle    Dermatology examination:    Toenails 1 through 5 bilateral thickened, elongated, dystrophic, mycotic with subungual debris. Web spaces 1 through 4 bilateral clean dry and intact. Hyperkeratotic tissue fifth metatarsal head bilateral.  No open wounds. Assessment and Plan:  Erik Giles was seen today for callouses and nail problem. Diagnoses and all orders for this visit:    Type 2 diabetes mellitus without complication, unspecified whether long term insulin use (HCC)    Tinea unguium    Corns and callosities    Hereditary sensory neuropathy    Encounter for current long-term use of anticoagulants      Follow-up diabetic foot ankle exam  History Eliquis  Formal debridement toenails 1 through 5 right and left with manual debridement for thickness and overall length. Paring hyperkeratotic tissue fifth metatarsal bilateral.  Tolerated well. Does have a new appointment with a new primary care physician Dr. Wagner Sides 3/29/2023. Appreciate input. Follow-up 2 months      Return in about 2 months (around 5/20/2023). Seen By:  Yahir Garza DPM      Document was created using voice recognition software. Note was reviewed however may contain grammatical errors.

## 2023-03-29 ENCOUNTER — OFFICE VISIT (OUTPATIENT)
Dept: FAMILY MEDICINE CLINIC | Age: 63
End: 2023-03-29
Payer: COMMERCIAL

## 2023-03-29 VITALS
RESPIRATION RATE: 20 BRPM | HEIGHT: 76 IN | OXYGEN SATURATION: 97 % | HEART RATE: 78 BPM | TEMPERATURE: 97.8 F | DIASTOLIC BLOOD PRESSURE: 76 MMHG | BODY MASS INDEX: 38.36 KG/M2 | WEIGHT: 315 LBS | SYSTOLIC BLOOD PRESSURE: 136 MMHG

## 2023-03-29 DIAGNOSIS — H10.431 CHRONIC FOLLICULAR CONJUNCTIVITIS OF RIGHT EYE: ICD-10-CM

## 2023-03-29 DIAGNOSIS — Z11.4 SCREENING FOR HIV WITHOUT PRESENCE OF RISK FACTORS: ICD-10-CM

## 2023-03-29 DIAGNOSIS — Z76.89 ENCOUNTER TO ESTABLISH CARE: Primary | ICD-10-CM

## 2023-03-29 DIAGNOSIS — I48.91 ATRIAL FIBRILLATION, UNSPECIFIED TYPE (HCC): ICD-10-CM

## 2023-03-29 DIAGNOSIS — I10 ESSENTIAL HYPERTENSION: ICD-10-CM

## 2023-03-29 DIAGNOSIS — Z11.59 ENCOUNTER FOR HEPATITIS C SCREENING TEST FOR LOW RISK PATIENT: ICD-10-CM

## 2023-03-29 DIAGNOSIS — I71.40 ABDOMINAL AORTIC ANEURYSM (AAA) 3.0 CM TO 5.5 CM IN DIAMETER IN MALE (HCC): ICD-10-CM

## 2023-03-29 DIAGNOSIS — E78.2 MIXED HYPERLIPIDEMIA: ICD-10-CM

## 2023-03-29 DIAGNOSIS — E55.9 VITAMIN D DEFICIENCY: ICD-10-CM

## 2023-03-29 DIAGNOSIS — E66.01 MORBID OBESITY (HCC): ICD-10-CM

## 2023-03-29 DIAGNOSIS — N28.1 CYST OF LEFT KIDNEY: ICD-10-CM

## 2023-03-29 DIAGNOSIS — Z13.29 SCREENING FOR THYROID DISORDER: ICD-10-CM

## 2023-03-29 DIAGNOSIS — E08.65 DIABETES MELLITUS DUE TO UNDERLYING CONDITION WITH HYPERGLYCEMIA, WITHOUT LONG-TERM CURRENT USE OF INSULIN (HCC): ICD-10-CM

## 2023-03-29 LAB
CREATININE URINE POCT: NORMAL
HBA1C MFR BLD: 6.6 %
MICROALBUMIN/CREAT 24H UR: NORMAL MG/G{CREAT}
MICROALBUMIN/CREAT UR-RTO: NORMAL

## 2023-03-29 PROCEDURE — 83036 HEMOGLOBIN GLYCOSYLATED A1C: CPT | Performed by: NEUROMUSCULOSKELETAL MEDICINE & OMM

## 2023-03-29 PROCEDURE — G8484 FLU IMMUNIZE NO ADMIN: HCPCS | Performed by: NEUROMUSCULOSKELETAL MEDICINE & OMM

## 2023-03-29 PROCEDURE — 82044 UR ALBUMIN SEMIQUANTITATIVE: CPT | Performed by: NEUROMUSCULOSKELETAL MEDICINE & OMM

## 2023-03-29 PROCEDURE — 3078F DIAST BP <80 MM HG: CPT | Performed by: NEUROMUSCULOSKELETAL MEDICINE & OMM

## 2023-03-29 PROCEDURE — 3017F COLORECTAL CA SCREEN DOC REV: CPT | Performed by: NEUROMUSCULOSKELETAL MEDICINE & OMM

## 2023-03-29 PROCEDURE — G8417 CALC BMI ABV UP PARAM F/U: HCPCS | Performed by: NEUROMUSCULOSKELETAL MEDICINE & OMM

## 2023-03-29 PROCEDURE — 1036F TOBACCO NON-USER: CPT | Performed by: NEUROMUSCULOSKELETAL MEDICINE & OMM

## 2023-03-29 PROCEDURE — 99204 OFFICE O/P NEW MOD 45 MIN: CPT | Performed by: NEUROMUSCULOSKELETAL MEDICINE & OMM

## 2023-03-29 PROCEDURE — G8427 DOCREV CUR MEDS BY ELIG CLIN: HCPCS | Performed by: NEUROMUSCULOSKELETAL MEDICINE & OMM

## 2023-03-29 PROCEDURE — 3075F SYST BP GE 130 - 139MM HG: CPT | Performed by: NEUROMUSCULOSKELETAL MEDICINE & OMM

## 2023-03-29 RX ORDER — TOBRAMYCIN 3 MG/ML
1 SOLUTION/ DROPS OPHTHALMIC EVERY 6 HOURS
Qty: 5 ML | Refills: 0 | Status: SHIPPED | OUTPATIENT
Start: 2023-03-29 | End: 2023-06-12

## 2023-03-29 SDOH — ECONOMIC STABILITY: HOUSING INSECURITY
IN THE LAST 12 MONTHS, WAS THERE A TIME WHEN YOU DID NOT HAVE A STEADY PLACE TO SLEEP OR SLEPT IN A SHELTER (INCLUDING NOW)?: NO

## 2023-03-29 SDOH — ECONOMIC STABILITY: INCOME INSECURITY: HOW HARD IS IT FOR YOU TO PAY FOR THE VERY BASICS LIKE FOOD, HOUSING, MEDICAL CARE, AND HEATING?: NOT HARD AT ALL

## 2023-03-29 SDOH — ECONOMIC STABILITY: FOOD INSECURITY: WITHIN THE PAST 12 MONTHS, YOU WORRIED THAT YOUR FOOD WOULD RUN OUT BEFORE YOU GOT MONEY TO BUY MORE.: NEVER TRUE

## 2023-03-29 SDOH — ECONOMIC STABILITY: FOOD INSECURITY: WITHIN THE PAST 12 MONTHS, THE FOOD YOU BOUGHT JUST DIDN'T LAST AND YOU DIDN'T HAVE MONEY TO GET MORE.: NEVER TRUE

## 2023-03-29 ASSESSMENT — ENCOUNTER SYMPTOMS
COUGH: 0
EYE ITCHING: 0
WHEEZING: 0
CHEST TIGHTNESS: 0
SHORTNESS OF BREATH: 0
CONSTIPATION: 0
EYE DISCHARGE: 1
ABDOMINAL PAIN: 0
EYE PAIN: 0
BLOOD IN STOOL: 0
NAUSEA: 0
ABDOMINAL DISTENTION: 0
CHOKING: 0
EYE REDNESS: 1
DIARRHEA: 0

## 2023-03-29 ASSESSMENT — PATIENT HEALTH QUESTIONNAIRE - PHQ9
1. LITTLE INTEREST OR PLEASURE IN DOING THINGS: 0
SUM OF ALL RESPONSES TO PHQ QUESTIONS 1-9: 0
2. FEELING DOWN, DEPRESSED OR HOPELESS: 0
SUM OF ALL RESPONSES TO PHQ QUESTIONS 1-9: 0
SUM OF ALL RESPONSES TO PHQ9 QUESTIONS 1 & 2: 0
SUM OF ALL RESPONSES TO PHQ QUESTIONS 1-9: 0
SUM OF ALL RESPONSES TO PHQ QUESTIONS 1-9: 0

## 2023-03-29 NOTE — PROGRESS NOTES
uncontrolled. Counseled regarding the possible side effects, risks, benefits and alternatives to treatment; patient and/or guardian verbalizes understanding, agrees, feels comfortable with and wishes to proceed withabove treatment plan. Advised patient to call with any new medication issues, and read all Rx infofrom pharmacy to assure aware of all possible risks and side effects of medication before taking. age and gender appropriate health screening exams and vaccinations. Advised patient regarding importance of keeping up with recommended health maintenance and to schedule as soon as possible if overdue, as this isimportant in assessing for undiagnosed pathology, especially cancer, as well as protecting against potentially harmful/life threatening disease. Patient and/or guardian verbalizes understanding andagrees with above counseling, assessment and plan. All questions answered. An electronic signature was used to authenticate this note.     --Dylon Becker, DO

## 2023-03-29 NOTE — ASSESSMENT & PLAN NOTE
Have patient continue vitamin D3 2000 units daily, await vitamin D level with upcoming fasting blood work.

## 2023-03-29 NOTE — ASSESSMENT & PLAN NOTE
Stable, will monitor as needed. Seen by urology in the past with no procedures scheduled or planned.

## 2023-03-29 NOTE — ASSESSMENT & PLAN NOTE
Last ultrasound noted in 2021. We will reorder an abdominal ultrasound in regards to his abdominal aortic aneurysm. EXAMINATION:   RETROPERITONEAL ULTRASOUND OF THE AORTA   3/2/2021   COMPARISON:   None   HISTORY:   ORDERING SYSTEM PROVIDED HISTORY: Aortic aneurysm without rupture,   unspecified portion of aorta [facility])   TECHNOLOGIST PROVIDED HISTORY:   This procedure can be scheduled via [facility].  Access your [employer] account by   visiting [email or web address]. FINDINGS:   Proximal abdominal aorta is 2.5 x 2.8 cm   Mid 2.7 x 2.6 cm   Distally 3.8 x 3.2 cm   There is some calcification noted.  Normal vascular flow on pulsed and color   Doppler evaluation   Right common iliac 1.4 x 1.6 cm   Left common iliac 1.4 x 1.8 cm       Impression   Distal aortic aneurysm 3.8 x 3.2 cm.  Continued follow-up recommended.

## 2023-03-29 NOTE — ASSESSMENT & PLAN NOTE
Patient is compliant with Pravachol 40 mg daily. We will check lipid panel with upcoming fasting blood work and treat accordingly.

## 2023-03-29 NOTE — ASSESSMENT & PLAN NOTE
On exam his heart rate was irregularly irregular. He has been compliant with taking his Eliquis 5 mg 1 by mouth twice a day.

## 2023-03-29 NOTE — ASSESSMENT & PLAN NOTE
We will give patient a Tobrex ophthalmic solution 1 drop to the affected eye 4 times a day for 7 removed. 5 to wear no refills dispensed.

## 2023-03-29 NOTE — ASSESSMENT & PLAN NOTE
Blood pressure stable at 136/76. We will continue same antihypertensive regimen. Currently he is on Toprol-XL 25 mg daily, and Prinzide 20-12.5 mg 2 tablets daily.

## 2023-04-17 DIAGNOSIS — N17.9 AKI (ACUTE KIDNEY INJURY) (HCC): Primary | ICD-10-CM

## 2023-04-18 ENCOUNTER — HOSPITAL ENCOUNTER (OUTPATIENT)
Dept: ULTRASOUND IMAGING | Age: 63
Discharge: HOME | End: 2023-04-20
Payer: COMMERCIAL

## 2023-04-18 DIAGNOSIS — I71.40 ABDOMINAL AORTIC ANEURYSM (AAA) 3.0 CM TO 5.5 CM IN DIAMETER IN MALE (HCC): ICD-10-CM

## 2023-04-18 DIAGNOSIS — I71.40 ABDOMINAL AORTIC ANEURYSM (AAA), UNSPECIFIED PART, UNSPECIFIED WHETHER RUPTURED (HCC): Primary | ICD-10-CM

## 2023-04-18 PROCEDURE — 76775 US EXAM ABDO BACK WALL LIM: CPT

## 2023-04-18 NOTE — RESULT ENCOUNTER NOTE
Let patient know based on his abdominal ultrasound his infrarenal abdominal aortic aneurysm increased in size to 5 cm. I just placed an urgent referral to vascular surgery at [facility] Dr. Uyen Davis for evaluation and treatment options.

## 2023-04-19 ENCOUNTER — TELEPHONE (OUTPATIENT)
Dept: FAMILY MEDICINE CLINIC | Age: 63
End: 2023-04-19

## 2023-04-19 ENCOUNTER — TELEPHONE (OUTPATIENT)
Dept: VASCULAR SURGERY | Age: 63
End: 2023-04-19

## 2023-04-19 NOTE — TELEPHONE ENCOUNTER
Pt returned call about lab results and was told to repeat labs in 2 weeks. Was also advised to return call from surgeon office to schedule.

## 2023-04-19 NOTE — TELEPHONE ENCOUNTER
Received urgent referral from Dr. Juan F Velásquez for 5 cm AAA, left message for patient to schedule appointment with Dr. Julianna Sofia.

## 2023-04-29 ENCOUNTER — HOSPITAL ENCOUNTER (OUTPATIENT)
Age: 63
Discharge: HOME | End: 2023-04-29
Payer: COMMERCIAL

## 2023-04-29 DIAGNOSIS — N17.9 AKI (ACUTE KIDNEY INJURY) (HCC): ICD-10-CM

## 2023-04-29 LAB
ANION GAP SERPL CALCULATED.3IONS-SCNC: 8 MMOL/L (ref 7–16)
BUN SERPL-MCNC: 24 MG/DL (ref 6–23)
CALCIUM SERPL-MCNC: 9.8 MG/DL (ref 8.6–10.2)
CHLORIDE SERPL-SCNC: 103 MMOL/L (ref 98–107)
CO2 SERPL-SCNC: 29 MMOL/L (ref 22–29)
CREAT SERPL-MCNC: 1.3 MG/DL (ref 0.7–1.2)
GLUCOSE SERPL-MCNC: 145 MG/DL (ref 74–99)
POTASSIUM SERPL-SCNC: 4.9 MMOL/L (ref 3.5–5)
SODIUM SERPL-SCNC: 140 MMOL/L (ref 132–146)

## 2023-04-29 PROCEDURE — 36415 COLL VENOUS BLD VENIPUNCTURE: CPT

## 2023-04-29 PROCEDURE — 80048 BASIC METABOLIC PNL TOTAL CA: CPT

## 2023-05-01 DIAGNOSIS — E78.2 MIXED HYPERLIPIDEMIA: ICD-10-CM

## 2023-05-01 DIAGNOSIS — N28.9 RENAL INSUFFICIENCY: Primary | ICD-10-CM

## 2023-05-02 RX ORDER — PRAVASTATIN SODIUM 40 MG
TABLET ORAL
Qty: 90 TABLET | Refills: 3 | OUTPATIENT
Start: 2023-05-02

## 2023-05-04 ENCOUNTER — OFFICE VISIT (OUTPATIENT)
Dept: VASCULAR SURGERY | Age: 63
End: 2023-05-04
Payer: COMMERCIAL

## 2023-05-04 VITALS — WEIGHT: 315 LBS | BODY MASS INDEX: 39.17 KG/M2 | HEIGHT: 75 IN

## 2023-05-04 DIAGNOSIS — R09.89 DECREASED DORSALIS PEDIS PULSE: ICD-10-CM

## 2023-05-04 DIAGNOSIS — F17.220 CHEWING TOBACCO NICOTINE DEPENDENCE WITHOUT COMPLICATION: Primary | ICD-10-CM

## 2023-05-04 DIAGNOSIS — I71.43 INFRARENAL ABDOMINAL AORTIC ANEURYSM (AAA) WITHOUT RUPTURE (HCC): ICD-10-CM

## 2023-05-04 PROCEDURE — G8417 CALC BMI ABV UP PARAM F/U: HCPCS | Performed by: SURGERY

## 2023-05-04 PROCEDURE — 99204 OFFICE O/P NEW MOD 45 MIN: CPT | Performed by: SURGERY

## 2023-05-04 PROCEDURE — G8427 DOCREV CUR MEDS BY ELIG CLIN: HCPCS | Performed by: SURGERY

## 2023-05-04 PROCEDURE — 1036F TOBACCO NON-USER: CPT | Performed by: SURGERY

## 2023-05-04 PROCEDURE — 3017F COLORECTAL CA SCREEN DOC REV: CPT | Performed by: SURGERY

## 2023-05-04 NOTE — PROGRESS NOTES
Chief Complaint:   Chief Complaint   Patient presents with    Consultation     New pt. AAA         HPI: Patient came to the office, for the evaluation of abdominal aortic aneurysm that recently was found to have increased in size compared to the studies that were done in the last 2 years, denies any abdominal pain or back pain    Patient currently weighs 340 pounds, trying to lose some weight    Patient chews tobacco, used to smoke in the past      Patient denies any focal lateralizing neurological symptoms like loss of speech, vision or loss of function of extremity    Patient can walk a few blocks with no difficulty, and denies any symptoms of rest pain    No Known Allergies    Current Outpatient Medications   Medication Sig Dispense Refill    tobramycin (TOBREX) 0.3 % ophthalmic solution Place 1 drop into the right eye in the morning and 1 drop at noon and 1 drop in the evening and 1 drop before bedtime. 5 mL 0    lisinopril-hydroCHLOROthiazide (PRINZIDE;ZESTORETIC) 20-12.5 MG per tablet TAKE 2 TABLETS DAILY 180 tablet 3    metoprolol succinate (TOPROL XL) 25 MG extended release tablet TAKE 1 TABLET DAILY 90 tablet 3    pravastatin (PRAVACHOL) 40 MG tablet TAKE 1 TABLET DAILY 90 tablet 3    apixaban (ELIQUIS) 5 MG TABS tablet TAKE 1 TABLET TWICE A  tablet 3    metFORMIN (GLUCOPHAGE-XR) 500 MG extended release tablet TAKE 1 TABLET TWICE A DAY WITH MEALS 180 tablet 3    sildenafil (VIAGRA) 100 MG tablet Take 1 tablet by mouth as needed for Erectile Dysfunction Take one half daily, as needed 30 tablet 3    Cholecalciferol (VITAMIN D) 50 MCG (2000 UT) TABS tablet Take 1 tablet by mouth daily 90 tablet 0     No current facility-administered medications for this visit.        Past Medical History:   Diagnosis Date    AAA (abdominal aortic aneurysm) (HCC)     Decreased dorsalis pedis pulse 5/4/2023    Hypertension     Infrarenal abdominal aortic aneurysm (AAA) without rupture ([facility] [address] 5/4/2023    After accounting for

## 2023-05-08 ENCOUNTER — TELEPHONE (OUTPATIENT)
Dept: FAMILY MEDICINE CLINIC | Age: 63
End: 2023-05-08

## 2023-05-08 NOTE — TELEPHONE ENCOUNTER
Pt called and received his labs and he also has been to heart doctor and they went over the ultrasound

## 2023-05-10 DIAGNOSIS — E78.2 MIXED HYPERLIPIDEMIA: ICD-10-CM

## 2023-05-10 DIAGNOSIS — I10 ESSENTIAL HYPERTENSION: ICD-10-CM

## 2023-05-10 DIAGNOSIS — E08.65 DIABETES MELLITUS DUE TO UNDERLYING CONDITION WITH HYPERGLYCEMIA, WITHOUT LONG-TERM CURRENT USE OF INSULIN (HCC): ICD-10-CM

## 2023-05-10 RX ORDER — LISINOPRIL AND HYDROCHLOROTHIAZIDE 20; 12.5 MG/1; MG/1
TABLET ORAL
Qty: 180 TABLET | Refills: 3 | Status: SHIPPED | OUTPATIENT
Start: 2023-05-10

## 2023-05-10 RX ORDER — METFORMIN HYDROCHLORIDE 500 MG/1
500 TABLET, EXTENDED RELEASE ORAL 2 TIMES DAILY WITH MEALS
Qty: 180 TABLET | Refills: 3 | Status: SHIPPED | OUTPATIENT
Start: 2023-05-10

## 2023-05-10 RX ORDER — PRAVASTATIN SODIUM 40 MG
40 TABLET ORAL DAILY
Qty: 90 TABLET | Refills: 3 | Status: SHIPPED | OUTPATIENT
Start: 2023-05-10

## 2023-05-10 NOTE — TELEPHONE ENCOUNTER
Prescriptions of lisinopril with hydrochlorothiazide, Pravachol, and metformin sent to pharmacy at [facility] home delivery in [place], [place]. 90-day scripts were forwarded with 3 refills on each prescription.

## 2023-05-10 NOTE — TELEPHONE ENCOUNTER
lisinopril-hydroCHLOROthiazide (PRINZIDE;ZESTORETIC) 20-12.5 MG per tablet   pravastatin (PRAVACHOL) 40 MG tablet   metFORMIN (GLUCOPHAGE-XR) 500 MG extended release tablet   Pt needs [facility]illto [facility]

## 2023-05-20 ENCOUNTER — HOSPITAL ENCOUNTER (OUTPATIENT)
Age: 63
Discharge: HOME | End: 2023-05-20
Payer: COMMERCIAL

## 2023-05-20 DIAGNOSIS — N28.9 RENAL INSUFFICIENCY: ICD-10-CM

## 2023-05-20 LAB
ANION GAP SERPL CALCULATED.3IONS-SCNC: 12 MMOL/L (ref 7–16)
BUN SERPL-MCNC: 29 MG/DL (ref 6–23)
CALCIUM SERPL-MCNC: 9.9 MG/DL (ref 8.6–10.2)
CHLORIDE SERPL-SCNC: 100 MMOL/L (ref 98–107)
CO2 SERPL-SCNC: 27 MMOL/L (ref 22–29)
CREAT SERPL-MCNC: 1.4 MG/DL (ref 0.7–1.2)
GLUCOSE SERPL-MCNC: 118 MG/DL (ref 74–99)
POTASSIUM SERPL-SCNC: 4.8 MMOL/L (ref 3.5–5)
SODIUM SERPL-SCNC: 139 MMOL/L (ref 132–146)

## 2023-05-20 PROCEDURE — 80048 BASIC METABOLIC PNL TOTAL CA: CPT

## 2023-05-20 PROCEDURE — 36415 COLL VENOUS BLD VENIPUNCTURE: CPT

## 2023-05-22 ENCOUNTER — PROCEDURE VISIT (OUTPATIENT)
Dept: PODIATRY | Age: 63
End: 2023-05-22
Payer: COMMERCIAL

## 2023-05-22 VITALS — HEIGHT: 75 IN | WEIGHT: 315 LBS | BODY MASS INDEX: 39.17 KG/M2

## 2023-05-22 DIAGNOSIS — G60.8 HEREDITARY SENSORY NEUROPATHY: ICD-10-CM

## 2023-05-22 DIAGNOSIS — B35.1 TINEA UNGUIUM: ICD-10-CM

## 2023-05-22 DIAGNOSIS — E11.9 TYPE 2 DIABETES MELLITUS WITHOUT COMPLICATION, UNSPECIFIED WHETHER LONG TERM INSULIN USE (HCC): Primary | ICD-10-CM

## 2023-05-22 DIAGNOSIS — Z79.01 ENCOUNTER FOR CURRENT LONG-TERM USE OF ANTICOAGULANTS: ICD-10-CM

## 2023-05-22 PROCEDURE — 11721 DEBRIDE NAIL 6 OR MORE: CPT | Performed by: PODIATRIST

## 2023-05-22 NOTE — PROGRESS NOTES
Cheryl Escamilla is here today for a diabetic foot exam and nail care. his PCP is Richard Canada DO last OV was 03/29/2023. CC:   Diabetic foot exam and painful elongated toenails 1-5 right and left    HPI:   Follow-up diabetic foot ankle exam pare  History Eliquis. Denies calf pain. No open wounds or skin lesions foot or ankle. No additional pedal complaints. ROS:  Const: Denies constitutional symptoms  Musculo: Denies symptoms other than stated above  Skin: Denies symptoms other than stated above      Current Outpatient Medications:     lisinopril-hydroCHLOROthiazide (PRINZIDE;ZESTORETIC) 20-12.5 MG per tablet, TAKE 2 TABLETS DAILY, Disp: 180 tablet, Rfl: 3    pravastatin (PRAVACHOL) 40 MG tablet, Take 1 tablet by mouth daily, Disp: 90 tablet, Rfl: 3    metFORMIN (GLUCOPHAGE-XR) 500 MG extended release tablet, Take 1 tablet by mouth 2 times daily (with meals), Disp: 180 tablet, Rfl: 3    tobramycin (TOBREX) 0.3 % ophthalmic solution, Place 1 drop into the right eye in the morning and 1 drop at noon and 1 drop in the evening and 1 drop before bedtime. , Disp: 5 mL, Rfl: 0    metoprolol succinate (TOPROL XL) 25 MG extended release tablet, TAKE 1 TABLET DAILY, Disp: 90 tablet, Rfl: 3    apixaban (ELIQUIS) 5 MG TABS tablet, TAKE 1 TABLET TWICE A DAY, Disp: 180 tablet, Rfl: 3    sildenafil (VIAGRA) 100 MG tablet, Take 1 tablet by mouth as needed for Erectile Dysfunction Take one half daily, as needed, Disp: 30 tablet, Rfl: 3    Cholecalciferol (VITAMIN D) 50 MCG (2000 UT) TABS tablet, Take 1 tablet by mouth daily, Disp: 90 tablet, Rfl: 0  No Known Allergies    Past Medical History:   Diagnosis Date    AAA (abdominal aortic aneurysm) (HCC)     Decreased dorsalis pedis pulse 5/4/2023    Hypertension     Infrarenal abdominal aortic aneurysm (AAA) without rupture ([facility] [address] 5/4/2023    After accounting for the tortuosity, approximately 4 cm x 4.5 cm abdominal aortic aneurysm noted on the ultrasound, April 2023

## 2023-05-26 ENCOUNTER — TELEPHONE (OUTPATIENT)
Dept: VASCULAR SURGERY | Age: 63
End: 2023-05-26

## 2023-05-31 ENCOUNTER — HOSPITAL ENCOUNTER (OUTPATIENT)
Dept: CARDIOLOGY | Age: 63
Discharge: HOME | End: 2023-05-31
Payer: COMMERCIAL

## 2023-05-31 DIAGNOSIS — R09.89 DECREASED DORSALIS PEDIS PULSE: ICD-10-CM

## 2023-05-31 PROCEDURE — 93923 UPR/LXTR ART STDY 3+ LVLS: CPT

## 2023-06-01 ENCOUNTER — TELEPHONE (OUTPATIENT)
Dept: VASCULAR SURGERY | Age: 63
End: 2023-06-01

## 2023-06-01 NOTE — TELEPHONE ENCOUNTER
Discussed with patient regarding the ankle-brachial index, normal with normal triphasic ankle Doppler tracings and good arterial flow to both feet, informed him that the numbness is having the right foot probably due to neurological etiology and not from a circulation problem and to keep the appointment to see me back in 6 months to monitor of the abdominal aortic aneurysm

## 2023-06-01 NOTE — PROCEDURES
[address]                  [address] [facility],  [facility]                                VASCULAR REPORT    PATIENT NAME: Finn Aguilar                   :        1960  MED REC NO:   [id number]                            ROOM:  ACCOUNT NO:   [de-identified]                           ADMIT DATE: 2023  PROVIDER:     Chino Meadows MD    DATE OF PROCEDURE:  2023    LOWER EXTREMITY ARTERIAL DOPPLER STUDY    INDICATION:  Numbness of the right foot. FINDINGS:  The lower extremity arterial Doppler study revealed that the  patient has satisfactory arterial circulation with normal ankle-arm  index, triphasic ankle Doppler tracings, and good arterial flow to both  feet based upon the pulse volume recordings of the metatarsals. IMPRESSION:  Normal lower extremity arterial Doppler study with good  arterial flow to both lower extremities.         Sarah Franz MD    D: 2023 6:02:36       T: 2023 6:05:02     [id number]  Job#: [id number]     Doc#: [id number]    CC:  Wendy Kennedy DO

## 2023-08-07 ENCOUNTER — PROCEDURE VISIT (OUTPATIENT)
Dept: PODIATRY | Age: 63
End: 2023-08-07
Payer: COMMERCIAL

## 2023-08-07 VITALS — HEIGHT: 75 IN | WEIGHT: 315 LBS | BODY MASS INDEX: 39.17 KG/M2

## 2023-08-07 DIAGNOSIS — Z79.01 ENCOUNTER FOR CURRENT LONG-TERM USE OF ANTICOAGULANTS: ICD-10-CM

## 2023-08-07 DIAGNOSIS — L84 CORNS AND CALLOSITIES: ICD-10-CM

## 2023-08-07 DIAGNOSIS — G60.8 HEREDITARY SENSORY NEUROPATHY: ICD-10-CM

## 2023-08-07 DIAGNOSIS — B35.1 TINEA UNGUIUM: ICD-10-CM

## 2023-08-07 DIAGNOSIS — E11.9 TYPE 2 DIABETES MELLITUS WITHOUT COMPLICATION, UNSPECIFIED WHETHER LONG TERM INSULIN USE (HCC): Primary | ICD-10-CM

## 2023-08-07 PROCEDURE — 11721 DEBRIDE NAIL 6 OR MORE: CPT | Performed by: PODIATRIST

## 2023-08-07 NOTE — PROGRESS NOTES
Aba Richards is here today for nail care. his PCP is Richard Canada DO last OV was 03/29/2023. CC:   Diabetic foot exam and painful elongated toenails 1-5 right and left    HPI:   Follow-up diabetic foot ankle exam.  Long toenails both feet. No open wounds. History Eliquis. ROS:  Const: Denies constitutional symptoms  Musculo: Denies symptoms other than stated above  Skin: Denies symptoms other than stated above      Current Outpatient Medications:     apixaban (ELIQUIS) 5 MG TABS tablet, TAKE 1 TABLET TWICE A DAY, Disp: 180 tablet, Rfl: 3    lisinopril-hydroCHLOROthiazide (PRINZIDE;ZESTORETIC) 20-12.5 MG per tablet, TAKE 2 TABLETS DAILY, Disp: 180 tablet, Rfl: 3    pravastatin (PRAVACHOL) 40 MG tablet, Take 1 tablet by mouth daily, Disp: 90 tablet, Rfl: 3    metFORMIN (GLUCOPHAGE-XR) 500 MG extended release tablet, Take 1 tablet by mouth 2 times daily (with meals), Disp: 180 tablet, Rfl: 3    metoprolol succinate (TOPROL XL) 25 MG extended release tablet, TAKE 1 TABLET DAILY, Disp: 90 tablet, Rfl: 3    sildenafil (VIAGRA) 100 MG tablet, Take 1 tablet by mouth as needed for Erectile Dysfunction Take one half daily, as needed, Disp: 30 tablet, Rfl: 3    Cholecalciferol (VITAMIN D) 50 MCG (2000 UT) TABS tablet, Take 1 tablet by mouth daily, Disp: 90 tablet, Rfl: 0  No Known Allergies    Past Medical History:   Diagnosis Date    AAA (abdominal aortic aneurysm) (HCC)     Decreased dorsalis pedis pulse 5/4/2023    Hypertension     Infrarenal abdominal aortic aneurysm (AAA) without rupture ([address]) 5/4/2023    After accounting for the tortuosity, approximately 4 cm x 4.5 cm abdominal aortic aneurysm noted on the ultrasound, April 2023    Irregular heart beat        There were no vitals filed for this visit. Work History/Social History:    Foot and ankle history:     Focused Lower Extremity Physical Exam:      Neurovascular examination:    Dorsalis Pedis palpable bilateral.  Posterior tibialis weakly

## 2023-09-29 ENCOUNTER — OFFICE VISIT (OUTPATIENT)
Dept: FAMILY MEDICINE CLINIC | Age: 63
End: 2023-09-29
Payer: COMMERCIAL

## 2023-09-29 VITALS
OXYGEN SATURATION: 96 % | RESPIRATION RATE: 18 BRPM | WEIGHT: 315 LBS | HEIGHT: 75 IN | DIASTOLIC BLOOD PRESSURE: 78 MMHG | BODY MASS INDEX: 39.17 KG/M2 | SYSTOLIC BLOOD PRESSURE: 133 MMHG | TEMPERATURE: 97.3 F | HEART RATE: 68 BPM

## 2023-09-29 DIAGNOSIS — E08.65 DIABETES MELLITUS DUE TO UNDERLYING CONDITION WITH HYPERGLYCEMIA, WITHOUT LONG-TERM CURRENT USE OF INSULIN (HCC): Primary | ICD-10-CM

## 2023-09-29 DIAGNOSIS — I48.91 ATRIAL FIBRILLATION, UNSPECIFIED TYPE (HCC): ICD-10-CM

## 2023-09-29 DIAGNOSIS — E78.2 MIXED HYPERLIPIDEMIA: ICD-10-CM

## 2023-09-29 DIAGNOSIS — I10 ESSENTIAL HYPERTENSION: ICD-10-CM

## 2023-09-29 DIAGNOSIS — I71.40 ABDOMINAL AORTIC ANEURYSM (AAA) 3.0 CM TO 5.5 CM IN DIAMETER IN MALE (HCC): ICD-10-CM

## 2023-09-29 DIAGNOSIS — E55.9 VITAMIN D DEFICIENCY: ICD-10-CM

## 2023-09-29 DIAGNOSIS — K63.5 POLYP OF SIGMOID COLON, UNSPECIFIED TYPE: ICD-10-CM

## 2023-09-29 LAB — HBA1C MFR BLD: 6.4 %

## 2023-09-29 PROCEDURE — G8417 CALC BMI ABV UP PARAM F/U: HCPCS | Performed by: NEUROMUSCULOSKELETAL MEDICINE & OMM

## 2023-09-29 PROCEDURE — 3017F COLORECTAL CA SCREEN DOC REV: CPT | Performed by: NEUROMUSCULOSKELETAL MEDICINE & OMM

## 2023-09-29 PROCEDURE — G8427 DOCREV CUR MEDS BY ELIG CLIN: HCPCS | Performed by: NEUROMUSCULOSKELETAL MEDICINE & OMM

## 2023-09-29 PROCEDURE — 83036 HEMOGLOBIN GLYCOSYLATED A1C: CPT | Performed by: NEUROMUSCULOSKELETAL MEDICINE & OMM

## 2023-09-29 PROCEDURE — 3078F DIAST BP <80 MM HG: CPT | Performed by: NEUROMUSCULOSKELETAL MEDICINE & OMM

## 2023-09-29 PROCEDURE — 3075F SYST BP GE 130 - 139MM HG: CPT | Performed by: NEUROMUSCULOSKELETAL MEDICINE & OMM

## 2023-09-29 PROCEDURE — 1036F TOBACCO NON-USER: CPT | Performed by: NEUROMUSCULOSKELETAL MEDICINE & OMM

## 2023-09-29 PROCEDURE — 99214 OFFICE O/P EST MOD 30 MIN: CPT | Performed by: NEUROMUSCULOSKELETAL MEDICINE & OMM

## 2023-09-29 ASSESSMENT — ENCOUNTER SYMPTOMS
DIARRHEA: 0
NAUSEA: 0
WHEEZING: 0
BACK PAIN: 0
BLOOD IN STOOL: 0
ABDOMINAL DISTENTION: 0
SHORTNESS OF BREATH: 0
CONSTIPATION: 0
CHOKING: 0
ABDOMINAL PAIN: 0
CHEST TIGHTNESS: 0
VOMITING: 0
COUGH: 0

## 2023-09-29 NOTE — PROGRESS NOTES
Lunette Galeazzi (:  1960) is a 58 y.o. male,Established patient, here for evaluation of the following chief complaint(s):  Hypertension (6 mo follow up/) and Diabetes         ASSESSMENT/PLAN:  1. Diabetes mellitus due to underlying condition with hyperglycemia, without long-term current use of insulin (HCC)  Comments:  Well-controlled and stable with hemoglobin A1c at today's visit 6.4. Continue same diabetic regimen. Orders:  -     POCT glycosylated hemoglobin (Hb A1C)  -     Comprehensive Metabolic Panel; Future  2. Essential hypertension  Comments:  Blood pressure optimal control and stable with a reading of 133/78. We will continue same antihypertensive regimen which consists of Prinzide and Toprol-XL. 3. Mixed hyperlipidemia  Comments:  Last lipid panel in  shows an LDL of 87, and triglycerides 62. Continue Pravachol at 40 mg daily. Stable and well-controlled lipid panel. Orders:  -     Lipid Panel; Future  4. Vitamin D deficiency  Comments:  Patient's last vitamin D level was 37 in . Continue patient on vitamin D3 2000 IUs daily. We will check vitamin D level with upcoming blood work. Orders:  -     Vitamin D 25 Hydroxy; Future  5. Atrial fibrillation, unspecified type [facility])  Comments:  Patient sees and follows Dr. Kathy Adame, [facility] cardiologist regarding his atrial fibs. He is compliant with taking his oral anticoagulant Eliquis 5 mg twice a day  6. Abdominal aortic aneurysm (AAA) 3.0 cm to 5.5 cm in diameter in male [facility])  Comments:  Patient being seen by Dr. Vicente Kelly, vascular surgeon at [facility] regarding his abdominal aortic aneurysm. Next office visit in . [place] before OV. Orders:  -     CBC with Auto Differential; Future  7. Polyp of sigmoid colon, unspecified type  Comments:  Patient had colonoscopy with Dr. Angeline Dowell finding polyps hyperplastic needed repeat in 3 years patient noncompliant with this. Referral made to GI.   Orders:  Brock Hyatt

## 2023-10-09 ENCOUNTER — PROCEDURE VISIT (OUTPATIENT)
Dept: PODIATRY | Age: 63
End: 2023-10-09
Payer: COMMERCIAL

## 2023-10-09 VITALS — WEIGHT: 315 LBS | HEIGHT: 75 IN | BODY MASS INDEX: 39.17 KG/M2

## 2023-10-09 DIAGNOSIS — G60.8 HEREDITARY SENSORY NEUROPATHY: ICD-10-CM

## 2023-10-09 DIAGNOSIS — I10 ESSENTIAL HYPERTENSION: ICD-10-CM

## 2023-10-09 DIAGNOSIS — E11.9 TYPE 2 DIABETES MELLITUS WITHOUT COMPLICATION, UNSPECIFIED WHETHER LONG TERM INSULIN USE (HCC): Primary | ICD-10-CM

## 2023-10-09 DIAGNOSIS — Z79.01 ENCOUNTER FOR CURRENT LONG-TERM USE OF ANTICOAGULANTS: ICD-10-CM

## 2023-10-09 DIAGNOSIS — B35.1 TINEA UNGUIUM: ICD-10-CM

## 2023-10-09 PROCEDURE — 11721 DEBRIDE NAIL 6 OR MORE: CPT | Performed by: PODIATRIST

## 2023-10-09 RX ORDER — METOPROLOL SUCCINATE 25 MG/1
25 TABLET, EXTENDED RELEASE ORAL DAILY
Qty: 90 TABLET | Refills: 1 | Status: SHIPPED | OUTPATIENT
Start: 2023-10-09

## 2023-10-09 NOTE — PROGRESS NOTES
Dorsalis Pedis palpable bilateral.  Posterior tibialis weakly palpable bilateral.    Capillary Refill Time:  Immediate return  Hair growth:  Symmetrical and bilateral   Skin:  Not atrophic  Edema: Mild edema bilateral feet. Mild edema bilateral ankles. Neurologic:  Light touch diminished bilateral.  Warm to coolness bilateral distal toes  Decreased epicritic sensation     Musculoskeletal/ Orthopedic examination:    Equinis: present bilateral  Dorsiflexion, plantarflexion, inversion, eversion bilateral 5 out of 5 muscle strength  Negative Homans  No pain foot or ankle bilateral    Dermatology examination:    Toenails 1 through 5 bilateral thickened, elongated, dystrophic, mycotic with subungual debris. Web spaces 1 through 4 bilateral clean dry and intact. No open skin lesions or abrasions. Assessment and Plan:  Haja Calderon was seen today for callouses and nail problem. Diagnoses and all orders for this visit:    Type 2 diabetes mellitus without complication, unspecified whether long term insulin use (HCC)    Tinea unguium    Hereditary sensory neuropathy    Encounter for current long-term use of anticoagulants        Follow-up diabetic foot ankle exam  History anticoagulant therapy with Eliquis  Formal debridement toenails 1 through 5 right and left with manual debridement for thickness and overall length. Avoid barefoot. Continue supportive walking shoe. Follow-up 2 months. Return in about 2 months (around 12/9/2023). Seen By:  Yevgeniy Cantrell DPM      Document was created using voice recognition software. Note was reviewed however may contain grammatical errors.

## 2023-10-09 NOTE — TELEPHONE ENCOUNTER
Patient requesting refill    [facility] [address], [address] [facility] [phone]       metoprolol succinate (TOPROL XL) 25 MG extended release tablet

## 2023-10-14 ENCOUNTER — HOSPITAL ENCOUNTER (OUTPATIENT)
Age: 63
Discharge: HOME | End: 2023-10-14
Payer: COMMERCIAL

## 2023-10-14 DIAGNOSIS — E55.9 VITAMIN D DEFICIENCY: ICD-10-CM

## 2023-10-14 DIAGNOSIS — E08.65 DIABETES MELLITUS DUE TO UNDERLYING CONDITION WITH HYPERGLYCEMIA, WITHOUT LONG-TERM CURRENT USE OF INSULIN (HCC): ICD-10-CM

## 2023-10-14 DIAGNOSIS — I71.40 ABDOMINAL AORTIC ANEURYSM (AAA) 3.0 CM TO 5.5 CM IN DIAMETER IN MALE (HCC): ICD-10-CM

## 2023-10-14 DIAGNOSIS — E78.2 MIXED HYPERLIPIDEMIA: ICD-10-CM

## 2023-10-14 LAB
25(OH)D3 SERPL-MCNC: 38.9 NG/ML (ref 30–100)
ALBUMIN SERPL-MCNC: 4.1 G/DL (ref 3.5–5.2)
ALP SERPL-CCNC: 52 U/L (ref 40–129)
ALT SERPL-CCNC: 9 U/L (ref 0–40)
ANION GAP SERPL CALCULATED.3IONS-SCNC: 6 MMOL/L (ref 7–16)
AST SERPL-CCNC: 12 U/L (ref 0–39)
BASOPHILS # BLD: 0.07 K/UL (ref 0–0.2)
BASOPHILS NFR BLD: 1 % (ref 0–2)
BILIRUB SERPL-MCNC: 0.5 MG/DL (ref 0–1.2)
BUN SERPL-MCNC: 21 MG/DL (ref 6–23)
CALCIUM SERPL-MCNC: 10 MG/DL (ref 8.6–10.2)
CHLORIDE SERPL-SCNC: 104 MMOL/L (ref 98–107)
CHOLEST SERPL-MCNC: 165 MG/DL
CO2 SERPL-SCNC: 32 MMOL/L (ref 22–29)
CREAT SERPL-MCNC: 1.2 MG/DL (ref 0.7–1.2)
EOSINOPHIL # BLD: 0.19 K/UL (ref 0.05–0.5)
EOSINOPHILS RELATIVE PERCENT: 3 % (ref 0–6)
ERYTHROCYTE [DISTWIDTH] IN BLOOD BY AUTOMATED COUNT: 13.1 % (ref 11.5–15)
GFR SERPL CREATININE-BSD FRML MDRD: >60 ML/MIN/1.73M2
GLUCOSE SERPL-MCNC: 135 MG/DL (ref 74–99)
HCT VFR BLD AUTO: 46.6 % (ref 37–54)
HDLC SERPL-MCNC: 46 MG/DL
HGB BLD-MCNC: 15.2 G/DL (ref 12.5–16.5)
IMM GRANULOCYTES # BLD AUTO: <0.03 K/UL (ref 0–0.58)
IMM GRANULOCYTES NFR BLD: 0 % (ref 0–5)
LDLC SERPL CALC-MCNC: 104 MG/DL
LYMPHOCYTES NFR BLD: 1.31 K/UL (ref 1.5–4)
LYMPHOCYTES RELATIVE PERCENT: 22 % (ref 20–42)
MCH RBC QN AUTO: 30.8 PG (ref 26–35)
MCHC RBC AUTO-ENTMCNC: 32.6 G/DL (ref 32–34.5)
MCV RBC AUTO: 94.5 FL (ref 80–99.9)
MONOCYTES NFR BLD: 0.46 K/UL (ref 0.1–0.95)
MONOCYTES NFR BLD: 8 % (ref 2–12)
NEUTROPHILS NFR BLD: 66 % (ref 43–80)
NEUTS SEG NFR BLD: 3.91 K/UL (ref 1.8–7.3)
PLATELET # BLD AUTO: 177 K/UL (ref 130–450)
PMV BLD AUTO: 10.1 FL (ref 7–12)
POTASSIUM SERPL-SCNC: 4.8 MMOL/L (ref 3.5–5)
PROT SERPL-MCNC: 7 G/DL (ref 6.4–8.3)
RBC # BLD AUTO: 4.93 M/UL (ref 3.8–5.8)
SODIUM SERPL-SCNC: 142 MMOL/L (ref 132–146)
TRIGL SERPL-MCNC: 76 MG/DL
VLDLC SERPL CALC-MCNC: 15 MG/DL
WBC OTHER # BLD: 6 K/UL (ref 4.5–11.5)

## 2023-10-14 PROCEDURE — 80053 COMPREHEN METABOLIC PANEL: CPT

## 2023-10-14 PROCEDURE — 85025 COMPLETE CBC W/AUTO DIFF WBC: CPT

## 2023-10-14 PROCEDURE — 36415 COLL VENOUS BLD VENIPUNCTURE: CPT

## 2023-10-14 PROCEDURE — 80061 LIPID PANEL: CPT

## 2023-10-14 PROCEDURE — 82306 VITAMIN D 25 HYDROXY: CPT

## 2023-10-16 DIAGNOSIS — E78.2 MIXED HYPERLIPIDEMIA: ICD-10-CM

## 2023-10-16 NOTE — TELEPHONE ENCOUNTER
----- Message from [employer] DO Jayna sent at 10/14/2023  6:22 PM EDT -----  Reviewing patient's lab work:    Lipid panel shows LDL of 104, triglycerides 76, and HDL 46. Patient is currently taking Pravachol 40 mg daily. However, he is diabetic and his LDL should be below 70. I would suggest the patient that we increase the Pravachol to 80 mg daily. If he is accepting of this recommendation I will forward a new dose of the Pravachol to his pharmacy. Please let me know. Pending is still his vitamin D level.

## 2023-10-17 NOTE — TELEPHONE ENCOUNTER
Patient is ok with increasing medication. He would prefer to take 40 mg twice a day as he currently cuts his pills in half and takes them twice a day.         [address] [facility], [address] [facility] [address] - f [phone]     pravastatin (PRAVACHOL) 40 MG tablet

## 2023-10-20 DIAGNOSIS — I71.43 INFRARENAL ABDOMINAL AORTIC ANEURYSM (AAA) WITHOUT RUPTURE (HCC): Primary | ICD-10-CM

## 2023-10-23 RX ORDER — PRAVASTATIN SODIUM 40 MG
40 TABLET ORAL DAILY
Qty: 90 TABLET | Refills: 3 | Status: SHIPPED | OUTPATIENT
Start: 2023-10-23

## 2023-11-02 ENCOUNTER — OFFICE VISIT (OUTPATIENT)
Dept: VASCULAR SURGERY | Age: 63
End: 2023-11-02
Payer: COMMERCIAL

## 2023-11-02 ENCOUNTER — HOSPITAL ENCOUNTER (OUTPATIENT)
Dept: CARDIOLOGY | Age: 63
Discharge: HOME | End: 2023-11-04
Payer: COMMERCIAL

## 2023-11-02 VITALS — WEIGHT: 315 LBS | BODY MASS INDEX: 43.75 KG/M2

## 2023-11-02 DIAGNOSIS — R09.89 DECREASED DORSALIS PEDIS PULSE: ICD-10-CM

## 2023-11-02 DIAGNOSIS — I71.43 INFRARENAL ABDOMINAL AORTIC ANEURYSM (AAA) WITHOUT RUPTURE (HCC): ICD-10-CM

## 2023-11-02 PROCEDURE — G8484 FLU IMMUNIZE NO ADMIN: HCPCS | Performed by: SURGERY

## 2023-11-02 PROCEDURE — 93978 VASCULAR STUDY: CPT

## 2023-11-02 PROCEDURE — G8427 DOCREV CUR MEDS BY ELIG CLIN: HCPCS | Performed by: SURGERY

## 2023-11-02 PROCEDURE — 99213 OFFICE O/P EST LOW 20 MIN: CPT | Performed by: SURGERY

## 2023-11-02 PROCEDURE — 3017F COLORECTAL CA SCREEN DOC REV: CPT | Performed by: SURGERY

## 2023-11-02 PROCEDURE — 1036F TOBACCO NON-USER: CPT | Performed by: SURGERY

## 2023-11-02 PROCEDURE — G8417 CALC BMI ABV UP PARAM F/U: HCPCS | Performed by: SURGERY

## 2023-11-02 NOTE — PROGRESS NOTES
History   Problem Relation Age of Onset    No Known Problems Mother        Social History     Socioeconomic History    Marital status: Single     Spouse name: Not on file    Number of children: Not on file    Years of education: Not on file    Highest education level: Not on file   Occupational History    Not on file   Tobacco Use    Smoking status: Former     Packs/day: 0.50     Years: 40.00     Additional pack years: 0.00     Total pack years: 20.00     Types: Cigarettes     Start date: 1979     Quit date: 2021     Years since quittin.5    Smokeless tobacco: Never   Vaping Use    Vaping Use: Never used   Substance and Sexual Activity    Alcohol use: Yes     Comment: weekly. 1 cup coffee per day    Drug use: Never    Sexual activity: Not on file   Other Topics Concern    Not on file   Social History Narrative    Not on file     Social Determinants of Health     Financial Resource Strain: Low Risk  (3/29/2023)    Overall Financial Resource Strain (CARDIA)     Difficulty of Paying Living Expenses: Not hard at all   Food Insecurity: No Food Insecurity (3/29/2023)    Hunger Vital Sign     Worried About Running Out of Food in the Last Year: Never true     [address] in the Last Year: Never true   Transportation Needs: Unknown (3/29/2023)    PRAPARE - Transportation     Lack of Transportation (Medical): Not on file     Lack of Transportation (Non-Medical): No   Physical Activity: Not on file   Stress: Not on file   Social Connections: Not on file   Intimate Partner Violence: Not on file   Housing Stability: Unknown (3/29/2023)    Housing Stability Vital Sign     Unable to Pay for Housing in the Last Year: Not on file     Number of Places Lived in the Last Year: Not on file     Unstable Housing in the Last Year: No       Review of Systems:    Eyes:  No blurring, diplopia or vision loss. Respiratory:  No cough, pleuritic chest pain, dyspnea, or wheezing.     Cardiovascular: No angina, palpitations

## 2023-11-02 NOTE — PROGRESS NOTES
[facility] Heart & Vascular Lab - [facility]    This is a pre read worksheet - prior to official physician interpretation    Eden Alba  1960  Date of study: 11/2/23    Indication for study:  AAA  Study :  Aortic Ultrasound    Diameter Aorta:     Proximal:   cm     Mid:   cm     Distal:  4.3 x 4.3 cm     Right iliac: 1.4 x 1.5 cm     Left iliac: 1.4 x 1.5 cm    Additional comments: slightly limited d/t depth      LAST STUDY   4/18/2023   5 cm

## 2023-11-09 ENCOUNTER — OFFICE VISIT (OUTPATIENT)
Dept: CARDIOLOGY CLINIC | Age: 63
End: 2023-11-09
Payer: COMMERCIAL

## 2023-11-09 VITALS
BODY MASS INDEX: 38.36 KG/M2 | HEART RATE: 65 BPM | DIASTOLIC BLOOD PRESSURE: 72 MMHG | WEIGHT: 315 LBS | HEIGHT: 76 IN | SYSTOLIC BLOOD PRESSURE: 124 MMHG | RESPIRATION RATE: 16 BRPM

## 2023-11-09 DIAGNOSIS — I48.91 ATRIAL FIBRILLATION, UNSPECIFIED TYPE (HCC): Primary | ICD-10-CM

## 2023-11-09 PROCEDURE — 99214 OFFICE O/P EST MOD 30 MIN: CPT | Performed by: INTERNAL MEDICINE

## 2023-11-09 PROCEDURE — G8427 DOCREV CUR MEDS BY ELIG CLIN: HCPCS | Performed by: INTERNAL MEDICINE

## 2023-11-09 PROCEDURE — 1036F TOBACCO NON-USER: CPT | Performed by: INTERNAL MEDICINE

## 2023-11-09 PROCEDURE — G8417 CALC BMI ABV UP PARAM F/U: HCPCS | Performed by: INTERNAL MEDICINE

## 2023-11-09 PROCEDURE — G8484 FLU IMMUNIZE NO ADMIN: HCPCS | Performed by: INTERNAL MEDICINE

## 2023-11-09 PROCEDURE — 93000 ELECTROCARDIOGRAM COMPLETE: CPT | Performed by: INTERNAL MEDICINE

## 2023-11-09 PROCEDURE — 3078F DIAST BP <80 MM HG: CPT | Performed by: INTERNAL MEDICINE

## 2023-11-09 PROCEDURE — 3074F SYST BP LT 130 MM HG: CPT | Performed by: INTERNAL MEDICINE

## 2023-11-09 PROCEDURE — 3017F COLORECTAL CA SCREEN DOC REV: CPT | Performed by: INTERNAL MEDICINE

## 2023-11-09 NOTE — PROGRESS NOTES
[facility] Cardiology Progress Note  Dr. Delia Gillespie      Referring Physician: Dayday Shaw DO  CHIEF COMPLAINT:   Chief Complaint   Patient presents with    Atrial Fibrillation    1 Year Follow Up       HISTORY OF PRESENT ILLNESS:   Patient 57 years old admitted with atrial fibrillation, hypertension, diabetes mellitus, is here for follow-up appointment    Occasional palpitations, denies any chest pain, no lightheadedness or dizziness, no pedal edema, no PND, no orthopnea, no syncope, no presyncopal episodes    Functional capacity is at baseline    Past Medical History:   Diagnosis Date    AAA (abdominal aortic aneurysm) (HCC)     Decreased dorsalis pedis pulse 5/4/2023    Hypertension     Infrarenal abdominal aortic aneurysm (AAA) without rupture ([address]) 5/4/2023    After accounting for the tortuosity, approximately 4 cm x 4.5 cm abdominal aortic aneurysm noted on the ultrasound, April 2023    Irregular heart beat          Past Surgical History:   Procedure Laterality Date    APPENDECTOMY      COLONOSCOPY      SHOULDER ARTHROPLASTY      TONSILLECTOMY           Current Outpatient Medications   Medication Sig Dispense Refill    pravastatin (PRAVACHOL) 40 MG tablet Take 1 tablet by mouth daily 90 tablet 3    metoprolol succinate (TOPROL XL) 25 MG extended release tablet Take 1 tablet by mouth daily 90 tablet 1    apixaban (ELIQUIS) 5 MG TABS tablet TAKE 1 TABLET TWICE A  tablet 3    lisinopril-hydroCHLOROthiazide (PRINZIDE;ZESTORETIC) 20-12.5 MG per tablet TAKE 2 TABLETS DAILY 180 tablet 3    metFORMIN (GLUCOPHAGE-XR) 500 MG extended release tablet Take 1 tablet by mouth 2 times daily (with meals) 180 tablet 3    sildenafil (VIAGRA) 100 MG tablet Take 1 tablet by mouth as needed for Erectile Dysfunction Take one half daily, as needed 30 tablet 3    Cholecalciferol (VITAMIN D) 50 MCG (2000 UT) TABS tablet Take 1 tablet by mouth daily 90 tablet 0     No current facility-administered medications for this

## 2023-12-11 ENCOUNTER — PROCEDURE VISIT (OUTPATIENT)
Dept: PODIATRY | Age: 63
End: 2023-12-11
Payer: COMMERCIAL

## 2023-12-11 VITALS — WEIGHT: 315 LBS | HEIGHT: 75 IN | BODY MASS INDEX: 39.17 KG/M2

## 2023-12-11 DIAGNOSIS — E11.9 TYPE 2 DIABETES MELLITUS WITHOUT COMPLICATION, UNSPECIFIED WHETHER LONG TERM INSULIN USE (HCC): Primary | ICD-10-CM

## 2023-12-11 DIAGNOSIS — G60.8 HEREDITARY SENSORY NEUROPATHY: ICD-10-CM

## 2023-12-11 DIAGNOSIS — Z79.01 ENCOUNTER FOR CURRENT LONG-TERM USE OF ANTICOAGULANTS: ICD-10-CM

## 2023-12-11 DIAGNOSIS — B35.1 TINEA UNGUIUM: ICD-10-CM

## 2023-12-11 PROCEDURE — 11721 DEBRIDE NAIL 6 OR MORE: CPT | Performed by: PODIATRIST

## 2023-12-11 NOTE — PROGRESS NOTES
Patient in today for nail care. Patient does not have any complaints of pain at this time. Patient's PCP is Sophia Allen DO date of last ov 09/29/2023. Kenrick Penaloza LPN       CC:   Diabetic foot exam and painful elongated toenails 1-5 right and left    HPI:   Follow-up diabetic foot ankle exam  History anticoagulant therapy with Eliquis. Denies calf pain. No open wounds. ROS:  Const: Denies constitutional symptoms  Musculo: Denies symptoms other than stated above  Skin: Denies symptoms other than stated above      Current Outpatient Medications:     pravastatin (PRAVACHOL) 40 MG tablet, Take 1 tablet by mouth daily, Disp: 90 tablet, Rfl: 3    metoprolol succinate (TOPROL XL) 25 MG extended release tablet, Take 1 tablet by mouth daily, Disp: 90 tablet, Rfl: 1    apixaban (ELIQUIS) 5 MG TABS tablet, TAKE 1 TABLET TWICE A DAY, Disp: 180 tablet, Rfl: 3    lisinopril-hydroCHLOROthiazide (PRINZIDE;ZESTORETIC) 20-12.5 MG per tablet, TAKE 2 TABLETS DAILY, Disp: 180 tablet, Rfl: 3    metFORMIN (GLUCOPHAGE-XR) 500 MG extended release tablet, Take 1 tablet by mouth 2 times daily (with meals), Disp: 180 tablet, Rfl: 3    sildenafil (VIAGRA) 100 MG tablet, Take 1 tablet by mouth as needed for Erectile Dysfunction Take one half daily, as needed, Disp: 30 tablet, Rfl: 3    Cholecalciferol (VITAMIN D) 50 MCG (2000 UT) TABS tablet, Take 1 tablet by mouth daily, Disp: 90 tablet, Rfl: 0  No Known Allergies    Past Medical History:   Diagnosis Date    AAA (abdominal aortic aneurysm) (HCC)     Decreased dorsalis pedis pulse 5/4/2023    Hypertension     Infrarenal abdominal aortic aneurysm (AAA) without rupture ([address]) 5/4/2023    After accounting for the tortuosity, approximately 4 cm x 4.5 cm abdominal aortic aneurysm noted on the ultrasound, April 2023    Irregular heart beat        There were no vitals filed for this visit. Work History/Social History:    Foot and ankle history:     Focused Lower Extremity

## 2024-01-12 ENCOUNTER — OFFICE VISIT (OUTPATIENT)
Dept: FAMILY MEDICINE CLINIC | Age: 64
End: 2024-01-12

## 2024-01-12 VITALS
DIASTOLIC BLOOD PRESSURE: 76 MMHG | HEIGHT: 75 IN | WEIGHT: 315 LBS | OXYGEN SATURATION: 95 % | RESPIRATION RATE: 18 BRPM | SYSTOLIC BLOOD PRESSURE: 111 MMHG | BODY MASS INDEX: 39.17 KG/M2 | TEMPERATURE: 97.3 F | HEART RATE: 74 BPM

## 2024-01-12 DIAGNOSIS — E55.9 VITAMIN D DEFICIENCY: ICD-10-CM

## 2024-01-12 DIAGNOSIS — E08.65 DIABETES MELLITUS DUE TO UNDERLYING CONDITION WITH HYPERGLYCEMIA, WITHOUT LONG-TERM CURRENT USE OF INSULIN (HCC): Primary | ICD-10-CM

## 2024-01-12 DIAGNOSIS — Z80.0 FAMILY HISTORY OF MALIGNANT NEOPLASM OF COLON IN FATHER: ICD-10-CM

## 2024-01-12 DIAGNOSIS — I48.21 PERMANENT ATRIAL FIBRILLATION (HCC): ICD-10-CM

## 2024-01-12 DIAGNOSIS — I10 ESSENTIAL HYPERTENSION: ICD-10-CM

## 2024-01-12 DIAGNOSIS — E66.01 MORBID OBESITY (HCC): ICD-10-CM

## 2024-01-12 DIAGNOSIS — E78.2 MIXED HYPERLIPIDEMIA: ICD-10-CM

## 2024-01-12 DIAGNOSIS — I71.40 ABDOMINAL AORTIC ANEURYSM (AAA) 3.0 CM TO 5.5 CM IN DIAMETER IN MALE (HCC): ICD-10-CM

## 2024-01-12 DIAGNOSIS — Z87.891 FORMER SMOKER: ICD-10-CM

## 2024-01-12 LAB — HBA1C MFR BLD: 6.5 %

## 2024-01-12 RX ORDER — METOPROLOL SUCCINATE 25 MG/1
25 TABLET, EXTENDED RELEASE ORAL DAILY
Qty: 90 TABLET | Refills: 2 | Status: SHIPPED | OUTPATIENT
Start: 2024-01-12

## 2024-01-12 ASSESSMENT — ENCOUNTER SYMPTOMS
NAUSEA: 0
VOMITING: 0
CHOKING: 0
BACK PAIN: 0
SHORTNESS OF BREATH: 0
DIARRHEA: 0
ABDOMINAL DISTENTION: 0
CONSTIPATION: 0
BLOOD IN STOOL: 0
ABDOMINAL PAIN: 0
CHEST TIGHTNESS: 0
COUGH: 0

## 2024-01-12 NOTE — PROGRESS NOTES
Nabeel Appiah (:  1960) is a 63 y.o. male,Established patient, here for evaluation of the following chief complaint(s):  Diabetes (3 mo follow up) and Hypertension         ASSESSMENT/PLAN:  1. Diabetes mellitus due to underlying condition with hyperglycemia, without long-term current use of insulin (HCC)  Comments:  A1c done in the office today was 6.5.  Well-controlled.  Continue same diabetic regimen  Orders:  -     POCT glycosylated hemoglobin (Hb A1C)  -     Comprehensive Metabolic Panel; Future  -     Hemoglobin A1C; Future  2. Essential hypertension  Comments:  Chronic condition that is stable blood pressure reading today 111/76, continue same antihypertensive regimen.  Orders:  -     metoprolol succinate (TOPROL XL) 25 MG extended release tablet; Take 1 tablet by mouth daily, Disp-90 tablet, R-2Normal  -     CBC with Auto Differential; Future  3. Permanent atrial fibrillation (HCC)  Comments:  Patient seeing cardiology every 6 to 12 months medication changes.  4. Former smoker  Comments:  quit about 2.5 years ago  5. Mixed hyperlipidemia  Comments:  Reviewed patient's lipid panel he is ambulating at this time to increase his Pravachol from 40 to 80 mg/day.   I told him it should be less than 70.  Orders:  -     Lipid Panel; Future  6. Vitamin D deficiency  Comments:  Vitamin D level therapeutic, continue vitamin D3 2000 units daily.  Orders:  -     Vitamin D 25 Hydroxy; Future  7. Morbid obesity (HCC)  -     TSH; Future  8. Abdominal aortic aneurysm (AAA) 3.0 cm to 5.5 cm in diameter in male (HCC)  Comments:  Patient being seen and followed by [facility], Dr. Don last visit ultrasound showed unchanged AAA size, 4.3 cm unchanged from previous.  US in   9. Family history of malignant neoplasm of colon in father      Return in about 3 months (around 2024) for to monitor medical conditions.         Subjective   SUBJECTIVE/OBJECTIVE:  HPI 63-year-old male here for Diabetes

## 2024-02-19 ENCOUNTER — PROCEDURE VISIT (OUTPATIENT)
Dept: PODIATRY | Age: 64
End: 2024-02-19
Payer: COMMERCIAL

## 2024-02-19 VITALS — BODY MASS INDEX: 39.17 KG/M2 | WEIGHT: 315 LBS | HEIGHT: 75 IN

## 2024-02-19 DIAGNOSIS — B35.1 TINEA UNGUIUM: ICD-10-CM

## 2024-02-19 DIAGNOSIS — Z79.01 ENCOUNTER FOR CURRENT LONG-TERM USE OF ANTICOAGULANTS: ICD-10-CM

## 2024-02-19 DIAGNOSIS — E11.9 TYPE 2 DIABETES MELLITUS WITHOUT COMPLICATION, UNSPECIFIED WHETHER LONG TERM INSULIN USE (HCC): Primary | ICD-10-CM

## 2024-02-19 DIAGNOSIS — G60.8 HEREDITARY SENSORY NEUROPATHY: ICD-10-CM

## 2024-02-19 PROCEDURE — 11721 DEBRIDE NAIL 6 OR MORE: CPT | Performed by: PODIATRIST

## 2024-02-19 NOTE — PROGRESS NOTES
Exam:      Neurovascular examination:    Dorsalis Pedis palpable bilateral.  Posterior tibialis weakly palpable bilateral.    Capillary Refill Time:  Immediate return  Hair growth:  Symmetrical and bilateral   Skin:  Not atrophic  Edema: Mild edema bilateral feet.   Mild edema bilateral ankles.  Neurologic:  Light touch diminished bilateral.  Warm to coolness bilateral distal toes  Decreased epicritic sensation     Musculoskeletal/ Orthopedic examination:    Equinis: present bilateral  Negative Homans.  5 out of 5 muscle strength dorsiflexion, plantarflexion, inversion and eversion bilateral    Dermatology examination:    Toenails 1 through 5 bilateral thickened, elongated, dystrophic, mycotic with subungual debris.  Web spaces 1 through 4 bilateral clean dry and intact.  No erythema.  No open wounds foot or ankle.      Assessment and Plan:  Nabeel was seen today for callouses, nail problem and diabetes.    Diagnoses and all orders for this visit:    Type 2 diabetes mellitus without complication, unspecified whether long term insulin use (HCC)  -     Diabetic Foot Exam    Tinea unguium    Hereditary sensory neuropathy    Encounter for current long-term use of anticoagulants        Follow-up diabetic foot and ankle exam  History anticoagulant therapy with Eliquis  Formal debridement toenails 1 through 5 right and left with manual debridement for thickness and overall length.  Follow-up in office 2 months    Return in about 2 months (around 4/19/2024).     Seen By:  Jose Chavira DPM      Document was created using voice recognition software.  Note was reviewed however may contain grammatical errors.

## 2024-04-06 ENCOUNTER — HOSPITAL ENCOUNTER (OUTPATIENT)
Age: 64
Discharge: HOME | End: 2024-04-06
Payer: COMMERCIAL

## 2024-04-06 DIAGNOSIS — E08.65 DIABETES MELLITUS DUE TO UNDERLYING CONDITION WITH HYPERGLYCEMIA, WITHOUT LONG-TERM CURRENT USE OF INSULIN (HCC): ICD-10-CM

## 2024-04-06 DIAGNOSIS — E55.9 VITAMIN D DEFICIENCY: ICD-10-CM

## 2024-04-06 DIAGNOSIS — E66.01 MORBID OBESITY (HCC): ICD-10-CM

## 2024-04-06 DIAGNOSIS — I10 ESSENTIAL HYPERTENSION: ICD-10-CM

## 2024-04-06 DIAGNOSIS — E78.2 MIXED HYPERLIPIDEMIA: ICD-10-CM

## 2024-04-06 LAB
25(OH)D3 SERPL-MCNC: 37.2 NG/ML (ref 30–100)
ALBUMIN SERPL-MCNC: 4.3 G/DL (ref 3.5–5.2)
ALP SERPL-CCNC: 47 U/L (ref 40–129)
ALT SERPL-CCNC: 12 U/L (ref 0–40)
ANION GAP SERPL CALCULATED.3IONS-SCNC: 8 MMOL/L (ref 7–16)
AST SERPL-CCNC: 13 U/L (ref 0–39)
BASOPHILS # BLD: 0.07 K/UL (ref 0–0.2)
BASOPHILS NFR BLD: 1 % (ref 0–2)
BILIRUB SERPL-MCNC: 0.6 MG/DL (ref 0–1.2)
BUN SERPL-MCNC: 28 MG/DL (ref 6–23)
CALCIUM SERPL-MCNC: 10.2 MG/DL (ref 8.6–10.2)
CHLORIDE SERPL-SCNC: 104 MMOL/L (ref 98–107)
CHOLEST SERPL-MCNC: 149 MG/DL
CO2 SERPL-SCNC: 30 MMOL/L (ref 22–29)
CREAT SERPL-MCNC: 1.2 MG/DL (ref 0.7–1.2)
EOSINOPHIL # BLD: 0.19 K/UL (ref 0.05–0.5)
EOSINOPHILS RELATIVE PERCENT: 3 % (ref 0–6)
ERYTHROCYTE [DISTWIDTH] IN BLOOD BY AUTOMATED COUNT: 13.4 % (ref 11.5–15)
GFR SERPL CREATININE-BSD FRML MDRD: 67 ML/MIN/1.73M2
GLUCOSE SERPL-MCNC: 118 MG/DL (ref 74–99)
HBA1C MFR BLD: 6.4 % (ref 4–5.6)
HCT VFR BLD AUTO: 45.7 % (ref 37–54)
HDLC SERPL-MCNC: 44 MG/DL
HGB BLD-MCNC: 14.9 G/DL (ref 12.5–16.5)
IMM GRANULOCYTES # BLD AUTO: <0.03 K/UL (ref 0–0.58)
IMM GRANULOCYTES NFR BLD: 0 % (ref 0–5)
LDLC SERPL CALC-MCNC: 85 MG/DL
LYMPHOCYTES NFR BLD: 1.61 K/UL (ref 1.5–4)
LYMPHOCYTES RELATIVE PERCENT: 22 % (ref 20–42)
MCH RBC QN AUTO: 31 PG (ref 26–35)
MCHC RBC AUTO-ENTMCNC: 32.6 G/DL (ref 32–34.5)
MCV RBC AUTO: 95.2 FL (ref 80–99.9)
MONOCYTES NFR BLD: 0.64 K/UL (ref 0.1–0.95)
MONOCYTES NFR BLD: 9 % (ref 2–12)
NEUTROPHILS NFR BLD: 65 % (ref 43–80)
NEUTS SEG NFR BLD: 4.7 K/UL (ref 1.8–7.3)
PLATELET # BLD AUTO: 180 K/UL (ref 130–450)
PMV BLD AUTO: 10.5 FL (ref 7–12)
POTASSIUM SERPL-SCNC: 4.9 MMOL/L (ref 3.5–5)
PROT SERPL-MCNC: 7.3 G/DL (ref 6.4–8.3)
RBC # BLD AUTO: 4.8 M/UL (ref 3.8–5.8)
SODIUM SERPL-SCNC: 142 MMOL/L (ref 132–146)
TRIGL SERPL-MCNC: 98 MG/DL
TSH SERPL DL<=0.05 MIU/L-ACNC: 0.96 UIU/ML (ref 0.27–4.2)
VLDLC SERPL CALC-MCNC: 20 MG/DL
WBC OTHER # BLD: 7.2 K/UL (ref 4.5–11.5)

## 2024-04-06 PROCEDURE — 80053 COMPREHEN METABOLIC PANEL: CPT

## 2024-04-06 PROCEDURE — 83036 HEMOGLOBIN GLYCOSYLATED A1C: CPT

## 2024-04-06 PROCEDURE — 85025 COMPLETE CBC W/AUTO DIFF WBC: CPT

## 2024-04-06 PROCEDURE — 36415 COLL VENOUS BLD VENIPUNCTURE: CPT

## 2024-04-06 PROCEDURE — 80061 LIPID PANEL: CPT

## 2024-04-06 PROCEDURE — 82306 VITAMIN D 25 HYDROXY: CPT

## 2024-04-06 PROCEDURE — 84443 ASSAY THYROID STIM HORMONE: CPT

## 2024-04-11 DIAGNOSIS — I71.43 INFRARENAL ABDOMINAL AORTIC ANEURYSM (AAA) WITHOUT RUPTURE (HCC): Primary | ICD-10-CM

## 2024-04-12 ENCOUNTER — TELEPHONE (OUTPATIENT)
Dept: FAMILY MEDICINE CLINIC | Age: 64
End: 2024-04-12

## 2024-04-12 NOTE — TELEPHONE ENCOUNTER
----- Message from Jonh Allen DO sent at 4/7/2024  9:12 PM EDT -----  Let patient know that his hemoglobin A1c improved to 6.4, previously was 6.5.    Complete metabolic panel unremarkable except for glucose of 118 BUN/creatinine 28 and 1.2, with a GFR of 67.  Liver functions normal.    Vitamin D level normal at 37.2.    TSH normal at 0.96.    Lipid panel showed LDL 85, triglycerides 98, and HDL 44.  Previously his LDL was 104 triglycerides 76, and HDL 46.     CBC with differential unremarkable.

## 2024-04-17 ENCOUNTER — OFFICE VISIT (OUTPATIENT)
Dept: FAMILY MEDICINE CLINIC | Age: 64
End: 2024-04-17
Payer: COMMERCIAL

## 2024-04-17 VITALS
DIASTOLIC BLOOD PRESSURE: 69 MMHG | BODY MASS INDEX: 39.17 KG/M2 | HEIGHT: 75 IN | RESPIRATION RATE: 18 BRPM | WEIGHT: 315 LBS | SYSTOLIC BLOOD PRESSURE: 108 MMHG | OXYGEN SATURATION: 95 % | HEART RATE: 55 BPM | TEMPERATURE: 97.8 F

## 2024-04-17 DIAGNOSIS — I71.40 ABDOMINAL AORTIC ANEURYSM (AAA) 3.0 CM TO 5.5 CM IN DIAMETER IN MALE (HCC): ICD-10-CM

## 2024-04-17 DIAGNOSIS — I48.21 PERMANENT ATRIAL FIBRILLATION (HCC): ICD-10-CM

## 2024-04-17 DIAGNOSIS — I10 ESSENTIAL HYPERTENSION: Primary | ICD-10-CM

## 2024-04-17 DIAGNOSIS — Z12.5 SCREENING FOR MALIGNANT NEOPLASM OF PROSTATE: ICD-10-CM

## 2024-04-17 DIAGNOSIS — E55.9 VITAMIN D DEFICIENCY: ICD-10-CM

## 2024-04-17 DIAGNOSIS — E78.2 MIXED HYPERLIPIDEMIA: ICD-10-CM

## 2024-04-17 DIAGNOSIS — E08.65 DIABETES MELLITUS DUE TO UNDERLYING CONDITION WITH HYPERGLYCEMIA, WITHOUT LONG-TERM CURRENT USE OF INSULIN (HCC): ICD-10-CM

## 2024-04-17 DIAGNOSIS — E66.01 MORBID OBESITY (HCC): ICD-10-CM

## 2024-04-17 LAB
CREATININE URINE POCT: ABNORMAL
MICROALBUMIN/CREAT 24H UR: ABNORMAL MG/G{CREAT}
MICROALBUMIN/CREAT UR-RTO: ABNORMAL

## 2024-04-17 PROCEDURE — G8427 DOCREV CUR MEDS BY ELIG CLIN: HCPCS | Performed by: NEUROMUSCULOSKELETAL MEDICINE & OMM

## 2024-04-17 PROCEDURE — 99214 OFFICE O/P EST MOD 30 MIN: CPT | Performed by: NEUROMUSCULOSKELETAL MEDICINE & OMM

## 2024-04-17 PROCEDURE — 82044 UR ALBUMIN SEMIQUANTITATIVE: CPT | Performed by: NEUROMUSCULOSKELETAL MEDICINE & OMM

## 2024-04-17 PROCEDURE — 3078F DIAST BP <80 MM HG: CPT | Performed by: NEUROMUSCULOSKELETAL MEDICINE & OMM

## 2024-04-17 PROCEDURE — 3074F SYST BP LT 130 MM HG: CPT | Performed by: NEUROMUSCULOSKELETAL MEDICINE & OMM

## 2024-04-17 PROCEDURE — 1036F TOBACCO NON-USER: CPT | Performed by: NEUROMUSCULOSKELETAL MEDICINE & OMM

## 2024-04-17 PROCEDURE — G8417 CALC BMI ABV UP PARAM F/U: HCPCS | Performed by: NEUROMUSCULOSKELETAL MEDICINE & OMM

## 2024-04-17 PROCEDURE — 3017F COLORECTAL CA SCREEN DOC REV: CPT | Performed by: NEUROMUSCULOSKELETAL MEDICINE & OMM

## 2024-04-17 RX ORDER — PRAVASTATIN SODIUM 40 MG
40 TABLET ORAL 2 TIMES DAILY
Qty: 180 TABLET | Refills: 3 | Status: SHIPPED | OUTPATIENT
Start: 2024-04-17

## 2024-04-17 SDOH — ECONOMIC STABILITY: INCOME INSECURITY: HOW HARD IS IT FOR YOU TO PAY FOR THE VERY BASICS LIKE FOOD, HOUSING, MEDICAL CARE, AND HEATING?: NOT HARD AT ALL

## 2024-04-17 SDOH — ECONOMIC STABILITY: FOOD INSECURITY: WITHIN THE PAST 12 MONTHS, YOU WORRIED THAT YOUR FOOD WOULD RUN OUT BEFORE YOU GOT MONEY TO BUY MORE.: NEVER TRUE

## 2024-04-17 SDOH — ECONOMIC STABILITY: FOOD INSECURITY: WITHIN THE PAST 12 MONTHS, THE FOOD YOU BOUGHT JUST DIDN'T LAST AND YOU DIDN'T HAVE MONEY TO GET MORE.: NEVER TRUE

## 2024-04-17 ASSESSMENT — ENCOUNTER SYMPTOMS
CHOKING: 0
BACK PAIN: 0
CONSTIPATION: 0
ABDOMINAL PAIN: 0
NAUSEA: 0
WHEEZING: 0
SHORTNESS OF BREATH: 0
DIARRHEA: 0
VOMITING: 0
ABDOMINAL DISTENTION: 0
COUGH: 0
CHEST TIGHTNESS: 0

## 2024-04-17 ASSESSMENT — PATIENT HEALTH QUESTIONNAIRE - PHQ9
SUM OF ALL RESPONSES TO PHQ QUESTIONS 1-9: 0
SUM OF ALL RESPONSES TO PHQ QUESTIONS 1-9: 0
2. FEELING DOWN, DEPRESSED OR HOPELESS: NOT AT ALL
SUM OF ALL RESPONSES TO PHQ QUESTIONS 1-9: 0
1. LITTLE INTEREST OR PLEASURE IN DOING THINGS: NOT AT ALL
SUM OF ALL RESPONSES TO PHQ QUESTIONS 1-9: 0
SUM OF ALL RESPONSES TO PHQ9 QUESTIONS 1 & 2: 0

## 2024-04-17 NOTE — PROGRESS NOTES
sildenafil (VIAGRA) 100 MG tablet Take 1 tablet by mouth as needed for Erectile Dysfunction Take one half daily, as needed 30 tablet 3    Cholecalciferol (VITAMIN D) 50 MCG (2000 UT) TABS tablet Take 1 tablet by mouth daily 90 tablet 0     No current facility-administered medications for this visit.       Physical Exam  Vitals and nursing note reviewed.   Constitutional:       General: He is not in acute distress.     Appearance: Normal appearance. He is obese. He is not ill-appearing, toxic-appearing or diaphoretic.   HENT:      Head: Normocephalic and atraumatic.   Eyes:      General: No scleral icterus.        Right eye: No discharge.         Left eye: No discharge.      Conjunctiva/sclera: Conjunctivae normal.   Neck:      Vascular: No carotid bruit.   Cardiovascular:      Rate and Rhythm: Normal rate and regular rhythm.      Pulses: Normal pulses.      Heart sounds: Normal heart sounds. No murmur heard.     No friction rub. No gallop.   Pulmonary:      Effort: Pulmonary effort is normal. No respiratory distress.      Breath sounds: Normal breath sounds. No stridor. No wheezing, rhonchi or rales.   Abdominal:      General: Bowel sounds are normal. There is no distension.      Palpations: Abdomen is soft. There is no mass.   Musculoskeletal:      Right lower leg: No edema.      Left lower leg: No edema.   Lymphadenopathy:      Cervical: No cervical adenopathy.   Skin:     General: Skin is warm and dry.      Findings: No lesion or rash.   Neurological:      General: No focal deficit present.      Mental Status: He is alert and oriented to person, place, and time. Mental status is at baseline.   Psychiatric:         Mood and Affect: Mood normal.         Behavior: Behavior normal.         Thought Content: Thought content normal.         Judgment: Judgment normal.         Health Maintenance Due   Topic Date Due    COVID-19 Vaccine (1) Never done    Pneumococcal 0-64 years Vaccine (1 of 2 - PCV) Never done

## 2024-04-22 ENCOUNTER — PROCEDURE VISIT (OUTPATIENT)
Dept: PODIATRY | Age: 64
End: 2024-04-22
Payer: COMMERCIAL

## 2024-04-22 VITALS — BODY MASS INDEX: 38.36 KG/M2 | HEIGHT: 76 IN | WEIGHT: 315 LBS

## 2024-04-22 DIAGNOSIS — E11.9 TYPE 2 DIABETES MELLITUS WITHOUT COMPLICATION, UNSPECIFIED WHETHER LONG TERM INSULIN USE (HCC): Primary | ICD-10-CM

## 2024-04-22 DIAGNOSIS — G60.8 HEREDITARY SENSORY NEUROPATHY: ICD-10-CM

## 2024-04-22 DIAGNOSIS — B35.1 TINEA UNGUIUM: ICD-10-CM

## 2024-04-22 PROCEDURE — 11721 DEBRIDE NAIL 6 OR MORE: CPT | Performed by: PODIATRIST

## 2024-04-22 NOTE — PROGRESS NOTES
and ankle history:     Focused Lower Extremity Physical Exam:      Neurovascular examination:    Dorsalis Pedis palpable bilateral.  Posterior tibialis weakly palpable bilateral.    Capillary Refill Time:  Immediate return  Hair growth:  Symmetrical and bilateral   Skin:  Not atrophic  Edema: Mild edema bilateral feet.   Mild edema bilateral ankles.  Neurologic:  Light touch diminished bilateral.  Warm to coolness bilateral distal toes  Decreased epicritic sensation     Musculoskeletal/ Orthopedic examination:    Equinis: present bilateral  Negative Homans.  5 out of 5 muscle strength dorsiflexion, plantarflexion, inversion and eversion bilateral  No pain foot or ankle bilateral    Dermatology examination:    Toenails 1 through 5 bilateral thickened, elongated, dystrophic, mycotic with subungual debris.  Web spaces 1 through 4 bilateral clean dry and intact.  No open wounds    Assessment and Plan:  Nabeel was seen today for callouses, nail problem and diabetes.    Diagnoses and all orders for this visit:    Type 2 diabetes mellitus without complication, unspecified whether long term insulin use (HCC)    Tinea unguium    Hereditary sensory neuropathy    Follow-up diabetic foot ankle exam  History anticoagulant therapy with Eliquis  Formal debridement toenails 1 through 5 right and left with manual debridement for thickness and overall length.  Follow-up in office 2 months    Return in about 2 months (around 6/22/2024).     Seen By:  Jose Chavira DPM      Document was created using voice recognition software.  Note was reviewed however may contain grammatical errors.

## 2024-04-24 ENCOUNTER — HOSPITAL ENCOUNTER (OUTPATIENT)
Age: 64
Discharge: HOME | End: 2024-04-24
Payer: COMMERCIAL

## 2024-04-24 DIAGNOSIS — Z12.5 SCREENING FOR MALIGNANT NEOPLASM OF PROSTATE: ICD-10-CM

## 2024-04-24 LAB — PSA SERPL-MCNC: 1.55 NG/ML (ref 0–4)

## 2024-04-24 PROCEDURE — 36415 COLL VENOUS BLD VENIPUNCTURE: CPT

## 2024-04-24 PROCEDURE — G0103 PSA SCREENING: HCPCS

## 2024-04-26 ENCOUNTER — TELEPHONE (OUTPATIENT)
Dept: FAMILY MEDICINE CLINIC | Age: 64
End: 2024-04-26

## 2024-04-26 NOTE — TELEPHONE ENCOUNTER
Please send Elaquist to to local pharmacy to [facility] to cover him while he awaits mail order   
Med Reconciliation

## 2024-04-29 ENCOUNTER — APPOINTMENT (OUTPATIENT)
Dept: GENERAL RADIOLOGY | Age: 64
DRG: 308 | End: 2024-04-29
Payer: COMMERCIAL

## 2024-04-29 ENCOUNTER — APPOINTMENT (OUTPATIENT)
Dept: CT IMAGING | Age: 64
DRG: 308 | End: 2024-04-29
Payer: COMMERCIAL

## 2024-04-29 ENCOUNTER — HOSPITAL ENCOUNTER (INPATIENT)
Age: 64
LOS: 2 days | Discharge: ANOTHER HOSPITAL | DRG: 308 | End: 2024-05-01
Attending: STUDENT IN AN ORGANIZED HEALTH CARE EDUCATION/TRAINING PROGRAM | Admitting: STUDENT IN AN ORGANIZED HEALTH CARE EDUCATION/TRAINING PROGRAM
Payer: COMMERCIAL

## 2024-04-29 DIAGNOSIS — I10 HYPERTENSION, UNSPECIFIED TYPE: ICD-10-CM

## 2024-04-29 DIAGNOSIS — I49.9 VENTRICULAR DYSRHYTHMIA: ICD-10-CM

## 2024-04-29 DIAGNOSIS — E87.20 LACTIC ACIDOSIS: ICD-10-CM

## 2024-04-29 DIAGNOSIS — J93.9 PNEUMOTHORAX, UNSPECIFIED TYPE: ICD-10-CM

## 2024-04-29 DIAGNOSIS — E87.29 RESPIRATORY ACIDOSIS: ICD-10-CM

## 2024-04-29 DIAGNOSIS — Z78.9 POOR INTRAVENOUS ACCESS: ICD-10-CM

## 2024-04-29 DIAGNOSIS — R74.01 TRANSAMINITIS: ICD-10-CM

## 2024-04-29 DIAGNOSIS — R29.3 DECORTICATE POSTURING: ICD-10-CM

## 2024-04-29 DIAGNOSIS — I21.4 NSTEMI (NON-ST ELEVATED MYOCARDIAL INFARCTION) (HCC): ICD-10-CM

## 2024-04-29 DIAGNOSIS — Z78.9 CARDIOPULMONARY RESUSCITATION (CPR)-ONLY RESUSCITATION STATUS: ICD-10-CM

## 2024-04-29 DIAGNOSIS — S22.41XA CLOSED FRACTURE OF MULTIPLE RIBS OF RIGHT SIDE, INITIAL ENCOUNTER: ICD-10-CM

## 2024-04-29 DIAGNOSIS — I46.9 CARDIOPULMONARY ARREST (HCC): Primary | ICD-10-CM

## 2024-04-29 DIAGNOSIS — I46.9 CARDIAC ARREST (HCC): ICD-10-CM

## 2024-04-29 LAB
ABO + RH BLD: NORMAL
ALBUMIN SERPL-MCNC: 3.8 G/DL (ref 3.5–5.2)
ALP SERPL-CCNC: 54 U/L (ref 40–129)
ALT SERPL-CCNC: 299 U/L (ref 0–40)
AMMONIA PLAS-SCNC: 18 UMOL/L (ref 16–60)
AMPHET UR QL SCN: NEGATIVE
ANION GAP SERPL CALCULATED.3IONS-SCNC: 13 MMOL/L (ref 7–16)
ANION GAP SERPL CALCULATED.3IONS-SCNC: 16 MMOL/L (ref 7–16)
APAP SERPL-MCNC: <5 UG/ML (ref 10–30)
ARM BAND NUMBER: NORMAL
AST SERPL-CCNC: 253 U/L (ref 0–39)
B PARAP IS1001 DNA NPH QL NAA+NON-PROBE: NOT DETECTED
B PERT DNA SPEC QL NAA+PROBE: NOT DETECTED
B.E.: -1.4 MMOL/L (ref -3–3)
B.E.: -1.4 MMOL/L (ref -3–3)
BARBITURATES UR QL SCN: NEGATIVE
BASOPHILS # BLD: 0.08 K/UL (ref 0–0.2)
BASOPHILS NFR BLD: 1 % (ref 0–2)
BENZODIAZ UR QL: NEGATIVE
BILIRUB DIRECT SERPL-MCNC: <0.2 MG/DL (ref 0–0.3)
BILIRUB INDIRECT SERPL-MCNC: ABNORMAL MG/DL (ref 0–1)
BILIRUB SERPL-MCNC: 0.5 MG/DL (ref 0–1.2)
BILIRUB UR QL STRIP: NEGATIVE
BLOOD BANK SAMPLE EXPIRATION: NORMAL
BLOOD GROUP ANTIBODIES SERPL: NEGATIVE
BUN SERPL-MCNC: 25 MG/DL (ref 6–23)
BUN SERPL-MCNC: 31 MG/DL (ref 6–23)
BUPRENORPHINE UR QL: NEGATIVE
C PNEUM DNA NPH QL NAA+NON-PROBE: NOT DETECTED
CALCIUM SERPL-MCNC: 10.7 MG/DL (ref 8.6–10.2)
CALCIUM SERPL-MCNC: 9.2 MG/DL (ref 8.6–10.2)
CANNABINOIDS UR QL SCN: NEGATIVE
CHLORIDE SERPL-SCNC: 101 MMOL/L (ref 98–107)
CHLORIDE SERPL-SCNC: 102 MMOL/L (ref 98–107)
CK SERPL-CCNC: 178 U/L (ref 20–200)
CLARITY UR: CLEAR
CO2 SERPL-SCNC: 22 MMOL/L (ref 22–29)
CO2 SERPL-SCNC: 25 MMOL/L (ref 22–29)
COCAINE UR QL SCN: NEGATIVE
COHB: 1 % (ref 0–1.5)
COHB: 1.1 % (ref 0–1.5)
COLOR UR: YELLOW
CREAT SERPL-MCNC: 1.5 MG/DL (ref 0.7–1.2)
CREAT SERPL-MCNC: 1.6 MG/DL (ref 0.7–1.2)
CRITICAL: ABNORMAL
CRITICAL: ABNORMAL
DATE ANALYZED: ABNORMAL
DATE ANALYZED: ABNORMAL
DATE OF COLLECTION: ABNORMAL
DATE OF COLLECTION: ABNORMAL
EKG ATRIAL RATE: 93 BPM
EKG P AXIS: 78 DEGREES
EKG P-R INTERVAL: 202 MS
EKG Q-T INTERVAL: 344 MS
EKG QRS DURATION: 138 MS
EKG QTC CALCULATION (BAZETT): 446 MS
EKG R AXIS: 118 DEGREES
EKG T AXIS: -51 DEGREES
EKG VENTRICULAR RATE: 101 BPM
EOSINOPHIL # BLD: 0.12 K/UL (ref 0.05–0.5)
EOSINOPHILS RELATIVE PERCENT: 1 % (ref 0–6)
ERYTHROCYTE [DISTWIDTH] IN BLOOD BY AUTOMATED COUNT: 13.4 % (ref 11.5–15)
ETHANOLAMINE SERPL-MCNC: <10 MG/DL
FENTANYL UR QL: NEGATIVE
FIO2: 100 %
FIO2: 100 %
FLUAV RNA NPH QL NAA+NON-PROBE: NOT DETECTED
FLUBV RNA NPH QL NAA+NON-PROBE: NOT DETECTED
GFR SERPL CREATININE-BSD FRML MDRD: 48 ML/MIN/1.73M2
GFR SERPL CREATININE-BSD FRML MDRD: 53 ML/MIN/1.73M2
GLUCOSE SERPL-MCNC: 201 MG/DL (ref 74–99)
GLUCOSE SERPL-MCNC: 249 MG/DL (ref 74–99)
GLUCOSE UR STRIP-MCNC: 100 MG/DL
HADV DNA NPH QL NAA+NON-PROBE: NOT DETECTED
HCO3: 24.4 MMOL/L (ref 22–26)
HCO3: 25.3 MMOL/L (ref 22–26)
HCOV 229E RNA NPH QL NAA+NON-PROBE: NOT DETECTED
HCOV HKU1 RNA NPH QL NAA+NON-PROBE: NOT DETECTED
HCOV NL63 RNA NPH QL NAA+NON-PROBE: NOT DETECTED
HCOV OC43 RNA NPH QL NAA+NON-PROBE: NOT DETECTED
HCT VFR BLD AUTO: 43.9 % (ref 37–54)
HGB BLD-MCNC: 13.9 G/DL (ref 12.5–16.5)
HGB UR QL STRIP.AUTO: ABNORMAL
HHB: 1.6 % (ref 0–5)
HHB: 1.9 % (ref 0–5)
HMPV RNA NPH QL NAA+NON-PROBE: NOT DETECTED
HPIV1 RNA NPH QL NAA+NON-PROBE: NOT DETECTED
HPIV2 RNA NPH QL NAA+NON-PROBE: NOT DETECTED
HPIV3 RNA NPH QL NAA+NON-PROBE: NOT DETECTED
HPIV4 RNA NPH QL NAA+NON-PROBE: NOT DETECTED
IMM GRANULOCYTES # BLD AUTO: 0.4 K/UL (ref 0–0.58)
IMM GRANULOCYTES NFR BLD: 4 % (ref 0–5)
INR PPP: 1.3
KETONES UR STRIP-MCNC: NEGATIVE MG/DL
LAB: ABNORMAL
LAB: ABNORMAL
LACTATE BLDV-SCNC: 1.6 MMOL/L (ref 0.5–1.9)
LACTATE BLDV-SCNC: 6.1 MMOL/L (ref 0.5–1.9)
LEUKOCYTE ESTERASE UR QL STRIP: NEGATIVE
LIPASE SERPL-CCNC: 57 U/L (ref 13–60)
LYMPHOCYTES NFR BLD: 4.09 K/UL (ref 1.5–4)
LYMPHOCYTES RELATIVE PERCENT: 42 % (ref 20–42)
Lab: 1615
Lab: 2005
M PNEUMO DNA NPH QL NAA+NON-PROBE: NOT DETECTED
MAGNESIUM SERPL-MCNC: 2.1 MG/DL (ref 1.6–2.6)
MCH RBC QN AUTO: 31.2 PG (ref 26–35)
MCHC RBC AUTO-ENTMCNC: 31.7 G/DL (ref 32–34.5)
MCV RBC AUTO: 98.4 FL (ref 80–99.9)
METHADONE UR QL: NEGATIVE
METHB: 0.3 % (ref 0–1.5)
METHB: 0.3 % (ref 0–1.5)
MODE: AC
MODE: AC
MONOCYTES NFR BLD: 0.28 K/UL (ref 0.1–0.95)
MONOCYTES NFR BLD: 3 % (ref 2–12)
NEUTROPHILS NFR BLD: 50 % (ref 43–80)
NEUTS SEG NFR BLD: 4.87 K/UL (ref 1.8–7.3)
NITRITE UR QL STRIP: NEGATIVE
O2 CONTENT: 20.3 ML/DL
O2 CONTENT: 20.4 ML/DL
O2 SATURATION: 98.1 % (ref 92–98.5)
O2 SATURATION: 98.4 % (ref 92–98.5)
O2HB: 96.7 % (ref 94–97)
O2HB: 97.1 % (ref 94–97)
OPERATOR ID: 1741
OPERATOR ID: ABNORMAL
OPIATES UR QL SCN: NEGATIVE
OXYCODONE UR QL SCN: NEGATIVE
PARTIAL THROMBOPLASTIN TIME: 28.8 SEC (ref 24.5–35.1)
PATIENT TEMP: 37 C
PATIENT TEMP: 37 C
PCO2: 44.8 MMHG (ref 35–45)
PCO2: 50.4 MMHG (ref 35–45)
PCP UR QL SCN: NEGATIVE
PEEP/CPAP: 8 CMH2O
PEEP/CPAP: 8 CMH2O
PFO2: 1.23 MMHG/%
PFO2: 1.31 MMHG/%
PH BLOOD GAS: 7.32 (ref 7.35–7.45)
PH BLOOD GAS: 7.35 (ref 7.35–7.45)
PH UR STRIP: 6 [PH] (ref 5–9)
PHOSPHATE SERPL-MCNC: 5.7 MG/DL (ref 2.5–4.5)
PLATELET, FLUORESCENCE: 134 K/UL (ref 130–450)
PMV BLD AUTO: 11.1 FL (ref 7–12)
PO2: 122.5 MMHG (ref 75–100)
PO2: 131.2 MMHG (ref 75–100)
POTASSIUM SERPL-SCNC: 3.6 MMOL/L (ref 3.5–5)
POTASSIUM SERPL-SCNC: 4.3 MMOL/L (ref 3.5–5)
PROT SERPL-MCNC: 6.1 G/DL (ref 6.4–8.3)
PROT UR STRIP-MCNC: >=300 MG/DL
PROTHROMBIN TIME: 13.9 SEC (ref 9.3–12.4)
RBC # BLD AUTO: 4.46 M/UL (ref 3.8–5.8)
RBC #/AREA URNS HPF: ABNORMAL /HPF
RI(T): 4.05
RI(T): 4.45
RR MECHANICAL: 20 B/MIN
RR MECHANICAL: 20 B/MIN
RSV RNA NPH QL NAA+NON-PROBE: NOT DETECTED
RV+EV RNA NPH QL NAA+NON-PROBE: NOT DETECTED
SALICYLATES SERPL-MCNC: <0.3 MG/DL (ref 0–30)
SARS-COV-2 RNA NPH QL NAA+NON-PROBE: NOT DETECTED
SODIUM SERPL-SCNC: 139 MMOL/L (ref 132–146)
SODIUM SERPL-SCNC: 140 MMOL/L (ref 132–146)
SOURCE, BLOOD GAS: ABNORMAL
SOURCE, BLOOD GAS: ABNORMAL
SP GR UR STRIP: 1.02 (ref 1–1.03)
SPECIMEN DESCRIPTION: NORMAL
TEST INFORMATION: NORMAL
THB: 14.8 G/DL (ref 11.5–16.5)
THB: 14.8 G/DL (ref 11.5–16.5)
TIME ANALYZED: 1627
TIME ANALYZED: 2015
TOXIC TRICYCLIC SC,BLOOD: NEGATIVE
TROPONIN I SERPL HS-MCNC: 104 NG/L (ref 0–11)
TROPONIN I SERPL HS-MCNC: 518 NG/L (ref 0–11)
UROBILINOGEN UR STRIP-ACNC: 1 EU/DL (ref 0–1)
VT MECHANICAL: 520 ML
VT MECHANICAL: 520 ML
WBC #/AREA URNS HPF: ABNORMAL /HPF
WBC OTHER # BLD: 9.8 K/UL (ref 4.5–11.5)

## 2024-04-29 PROCEDURE — 6370000000 HC RX 637 (ALT 250 FOR IP)

## 2024-04-29 PROCEDURE — 81001 URINALYSIS AUTO W/SCOPE: CPT

## 2024-04-29 PROCEDURE — 31500 INSERT EMERGENCY AIRWAY: CPT

## 2024-04-29 PROCEDURE — 96376 TX/PRO/DX INJ SAME DRUG ADON: CPT

## 2024-04-29 PROCEDURE — 86900 BLOOD TYPING SEROLOGIC ABO: CPT

## 2024-04-29 PROCEDURE — 0BH17EZ INSERTION OF ENDOTRACHEAL AIRWAY INTO TRACHEA, VIA NATURAL OR ARTIFICIAL OPENING: ICD-10-PCS | Performed by: STUDENT IN AN ORGANIZED HEALTH CARE EDUCATION/TRAINING PROGRAM

## 2024-04-29 PROCEDURE — 96375 TX/PRO/DX INJ NEW DRUG ADDON: CPT

## 2024-04-29 PROCEDURE — 71045 X-RAY EXAM CHEST 1 VIEW: CPT

## 2024-04-29 PROCEDURE — 6360000004 HC RX CONTRAST MEDICATION: Performed by: RADIOLOGY

## 2024-04-29 PROCEDURE — 83605 ASSAY OF LACTIC ACID: CPT

## 2024-04-29 PROCEDURE — 82248 BILIRUBIN DIRECT: CPT

## 2024-04-29 PROCEDURE — 80143 DRUG ASSAY ACETAMINOPHEN: CPT

## 2024-04-29 PROCEDURE — 02HV33Z INSERTION OF INFUSION DEVICE INTO SUPERIOR VENA CAVA, PERCUTANEOUS APPROACH: ICD-10-PCS | Performed by: STUDENT IN AN ORGANIZED HEALTH CARE EDUCATION/TRAINING PROGRAM

## 2024-04-29 PROCEDURE — 74177 CT ABD & PELVIS W/CONTRAST: CPT

## 2024-04-29 PROCEDURE — 84484 ASSAY OF TROPONIN QUANT: CPT

## 2024-04-29 PROCEDURE — 6360000002 HC RX W HCPCS: Performed by: STUDENT IN AN ORGANIZED HEALTH CARE EDUCATION/TRAINING PROGRAM

## 2024-04-29 PROCEDURE — 85610 PROTHROMBIN TIME: CPT

## 2024-04-29 PROCEDURE — 0W9930Z DRAINAGE OF RIGHT PLEURAL CAVITY WITH DRAINAGE DEVICE, PERCUTANEOUS APPROACH: ICD-10-PCS | Performed by: STUDENT IN AN ORGANIZED HEALTH CARE EDUCATION/TRAINING PROGRAM

## 2024-04-29 PROCEDURE — 6360000002 HC RX W HCPCS: Performed by: EMERGENCY MEDICINE

## 2024-04-29 PROCEDURE — 0202U NFCT DS 22 TRGT SARS-COV-2: CPT

## 2024-04-29 PROCEDURE — 70450 CT HEAD/BRAIN W/O DYE: CPT

## 2024-04-29 PROCEDURE — 36556 INSERT NON-TUNNEL CV CATH: CPT

## 2024-04-29 PROCEDURE — 2000000000 HC ICU R&B

## 2024-04-29 PROCEDURE — 96361 HYDRATE IV INFUSION ADD-ON: CPT

## 2024-04-29 PROCEDURE — 80053 COMPREHEN METABOLIC PANEL: CPT

## 2024-04-29 PROCEDURE — 80307 DRUG TEST PRSMV CHEM ANLYZR: CPT

## 2024-04-29 PROCEDURE — 82550 ASSAY OF CK (CPK): CPT

## 2024-04-29 PROCEDURE — 87040 BLOOD CULTURE FOR BACTERIA: CPT

## 2024-04-29 PROCEDURE — 6360000002 HC RX W HCPCS

## 2024-04-29 PROCEDURE — 5A1935Z RESPIRATORY VENTILATION, LESS THAN 24 CONSECUTIVE HOURS: ICD-10-PCS | Performed by: STUDENT IN AN ORGANIZED HEALTH CARE EDUCATION/TRAINING PROGRAM

## 2024-04-29 PROCEDURE — 36620 INSERTION CATHETER ARTERY: CPT

## 2024-04-29 PROCEDURE — 83735 ASSAY OF MAGNESIUM: CPT

## 2024-04-29 PROCEDURE — 72125 CT NECK SPINE W/O DYE: CPT

## 2024-04-29 PROCEDURE — 2580000003 HC RX 258

## 2024-04-29 PROCEDURE — 84100 ASSAY OF PHOSPHORUS: CPT

## 2024-04-29 PROCEDURE — 5A12012 PERFORMANCE OF CARDIAC OUTPUT, SINGLE, MANUAL: ICD-10-PCS | Performed by: STUDENT IN AN ORGANIZED HEALTH CARE EDUCATION/TRAINING PROGRAM

## 2024-04-29 PROCEDURE — 93005 ELECTROCARDIOGRAM TRACING: CPT

## 2024-04-29 PROCEDURE — 82805 BLOOD GASES W/O2 SATURATION: CPT

## 2024-04-29 PROCEDURE — 85025 COMPLETE CBC W/AUTO DIFF WBC: CPT

## 2024-04-29 PROCEDURE — 82140 ASSAY OF AMMONIA: CPT

## 2024-04-29 PROCEDURE — 94002 VENT MGMT INPAT INIT DAY: CPT

## 2024-04-29 PROCEDURE — 71275 CT ANGIOGRAPHY CHEST: CPT

## 2024-04-29 PROCEDURE — 99285 EMERGENCY DEPT VISIT HI MDM: CPT

## 2024-04-29 PROCEDURE — 74018 RADEX ABDOMEN 1 VIEW: CPT

## 2024-04-29 PROCEDURE — 86850 RBC ANTIBODY SCREEN: CPT

## 2024-04-29 PROCEDURE — 80179 DRUG ASSAY SALICYLATE: CPT

## 2024-04-29 PROCEDURE — 85730 THROMBOPLASTIN TIME PARTIAL: CPT

## 2024-04-29 PROCEDURE — 93010 ELECTROCARDIOGRAM REPORT: CPT | Performed by: INTERNAL MEDICINE

## 2024-04-29 PROCEDURE — 2500000003 HC RX 250 WO HCPCS: Performed by: EMERGENCY MEDICINE

## 2024-04-29 PROCEDURE — 96374 THER/PROPH/DIAG INJ IV PUSH: CPT

## 2024-04-29 PROCEDURE — 86901 BLOOD TYPING SEROLOGIC RH(D): CPT

## 2024-04-29 PROCEDURE — 80048 BASIC METABOLIC PNL TOTAL CA: CPT

## 2024-04-29 PROCEDURE — 83690 ASSAY OF LIPASE: CPT

## 2024-04-29 PROCEDURE — 2580000003 HC RX 258: Performed by: STUDENT IN AN ORGANIZED HEALTH CARE EDUCATION/TRAINING PROGRAM

## 2024-04-29 PROCEDURE — 2500000003 HC RX 250 WO HCPCS

## 2024-04-29 PROCEDURE — 51702 INSERT TEMP BLADDER CATH: CPT

## 2024-04-29 PROCEDURE — G0480 DRUG TEST DEF 1-7 CLASSES: HCPCS

## 2024-04-29 RX ORDER — POTASSIUM CHLORIDE 7.45 MG/ML
10 INJECTION INTRAVENOUS
Status: DISCONTINUED | OUTPATIENT
Start: 2024-04-29 | End: 2024-04-29

## 2024-04-29 RX ORDER — ACETAMINOPHEN 650 MG/1
650 SUPPOSITORY RECTAL ONCE
Status: COMPLETED | OUTPATIENT
Start: 2024-04-29 | End: 2024-04-29

## 2024-04-29 RX ORDER — LIDOCAINE HYDROCHLORIDE ANHYDROUS AND DEXTROSE MONOHYDRATE 5; 400 G/100ML; MG/100ML
1 INJECTION, SOLUTION INTRAVENOUS CONTINUOUS
Status: DISCONTINUED | OUTPATIENT
Start: 2024-04-29 | End: 2024-05-01 | Stop reason: HOSPADM

## 2024-04-29 RX ORDER — 0.9 % SODIUM CHLORIDE 0.9 %
1000 INTRAVENOUS SOLUTION INTRAVENOUS ONCE
Status: COMPLETED | OUTPATIENT
Start: 2024-04-29 | End: 2024-04-29

## 2024-04-29 RX ORDER — CALCIUM GLUCONATE 94 MG/ML
INJECTION, SOLUTION INTRAVENOUS DAILY PRN
Status: DISCONTINUED | OUTPATIENT
Start: 2024-04-29 | End: 2024-04-29

## 2024-04-29 RX ORDER — LIDOCAINE HYDROCHLORIDE ANHYDROUS AND DEXTROSE MONOHYDRATE 5; 400 G/100ML; MG/100ML
INJECTION, SOLUTION INTRAVENOUS CONTINUOUS PRN
Status: DISCONTINUED | OUTPATIENT
Start: 2024-04-29 | End: 2024-04-29

## 2024-04-29 RX ORDER — CALCIUM GLUCONATE 94 MG/ML
1000 INJECTION, SOLUTION INTRAVENOUS ONCE
Status: DISCONTINUED | OUTPATIENT
Start: 2024-04-29 | End: 2024-04-30

## 2024-04-29 RX ORDER — FENTANYL CITRATE-0.9 % NACL/PF 10 MCG/ML
25-200 PLASTIC BAG, INJECTION (ML) INTRAVENOUS CONTINUOUS
Status: DISCONTINUED | OUTPATIENT
Start: 2024-04-29 | End: 2024-05-01 | Stop reason: HOSPADM

## 2024-04-29 RX ORDER — LEVETIRACETAM 500 MG/5ML
2000 INJECTION, SOLUTION, CONCENTRATE INTRAVENOUS ONCE
Status: COMPLETED | OUTPATIENT
Start: 2024-04-29 | End: 2024-04-29

## 2024-04-29 RX ORDER — HYDRALAZINE HYDROCHLORIDE 20 MG/ML
5 INJECTION INTRAMUSCULAR; INTRAVENOUS ONCE
Status: COMPLETED | OUTPATIENT
Start: 2024-04-29 | End: 2024-04-29

## 2024-04-29 RX ORDER — ASPIRIN 300 MG/1
300 SUPPOSITORY RECTAL ONCE
Status: COMPLETED | OUTPATIENT
Start: 2024-04-29 | End: 2024-04-29

## 2024-04-29 RX ADMIN — VANCOMYCIN HYDROCHLORIDE 3000 MG: 10 INJECTION, POWDER, LYOPHILIZED, FOR SOLUTION INTRAVENOUS at 20:20

## 2024-04-29 RX ADMIN — Medication 50 MCG/HR: at 16:04

## 2024-04-29 RX ADMIN — SODIUM CHLORIDE 1000 ML: 9 INJECTION, SOLUTION INTRAVENOUS at 16:06

## 2024-04-29 RX ADMIN — Medication 50 MEQ: at 14:33

## 2024-04-29 RX ADMIN — PIPERACILLIN AND TAZOBACTAM 4500 MG: 4; .5 INJECTION, POWDER, LYOPHILIZED, FOR SOLUTION INTRAVENOUS at 19:25

## 2024-04-29 RX ADMIN — Medication 50 MCG/HR: at 22:02

## 2024-04-29 RX ADMIN — LEVETIRACETAM 2000 MG: 100 INJECTION, SOLUTION INTRAVENOUS at 16:16

## 2024-04-29 RX ADMIN — CALCIUM GLUCONATE 1000 MG: 94 INJECTION, SOLUTION INTRAVENOUS at 14:32

## 2024-04-29 RX ADMIN — SODIUM CHLORIDE 1000 ML: 9 INJECTION, SOLUTION INTRAVENOUS at 14:49

## 2024-04-29 RX ADMIN — ACETAMINOPHEN 650 MG: 650 SUPPOSITORY RECTAL at 20:12

## 2024-04-29 RX ADMIN — SODIUM BICARBONATE 50 MEQ: 84 INJECTION, SOLUTION INTRAVENOUS at 15:47

## 2024-04-29 RX ADMIN — LIDOCAINE HYDROCHLORIDE 1 MG/MIN: 4 INJECTION, SOLUTION INTRAVENOUS at 14:49

## 2024-04-29 RX ADMIN — LIDOCAINE HYDROCHLORIDE ANHYDROUS AND DEXTROSE MONOHYDRATE 2 MG/MIN: .4; 5 INJECTION, SOLUTION INTRAVENOUS at 14:41

## 2024-04-29 RX ADMIN — HYDRALAZINE HYDROCHLORIDE 5 MG: 20 INJECTION, SOLUTION INTRAMUSCULAR; INTRAVENOUS at 21:14

## 2024-04-29 RX ADMIN — ASPIRIN 300 MG: 300 SUPPOSITORY RECTAL at 20:37

## 2024-04-29 RX ADMIN — SODIUM BICARBONATE 50 MEQ: 84 INJECTION, SOLUTION INTRAVENOUS at 15:38

## 2024-04-29 RX ADMIN — IOPAMIDOL 75 ML: 755 INJECTION, SOLUTION INTRAVENOUS at 14:57

## 2024-04-29 ASSESSMENT — PULMONARY FUNCTION TESTS: PIF_VALUE: 23

## 2024-04-29 NOTE — CARE COORDINATION
Social Work/Transition of Care:    Pt presents to the ED due to Cardiac Arrest. Pt presented from work.    SW met with gentlemen in family waiting room who reported he was pts josafat Elizabeth [phone] and performed CPR on patient prior to pt arrival.  Pts Boss reported he only knows of pts girlfriend Priya and does not believe pt has any family.      Electronically signed by TANNER ferrell on 4/29/2024 at 5:16 PM

## 2024-04-29 NOTE — CODE DOCUMENTATION
ROSC at 1426 prior to entry to ED department  Witnessed fall and arrest at work, AED shock administered once prior to EMS arrival, per EMS v-fib was observed on monitor, EMS shocked three times, epi given three times, 300 mg of amiodarone all given PTA.

## 2024-04-29 NOTE — PROGRESS NOTES
Patient shoes, socks and glasses are  in belongings bag with a sticker on it. Patients wallet,mints,medications,pocket watch, chapstick and a can of snuff in a red biohazard bag with sticker on it in the room. WINTER Tinsley Notified

## 2024-04-29 NOTE — ED PROVIDER NOTES
abdominal aorta with eccentric mural thrombus.  Could consider CTA of the   abdomen/pelvis without and with IV contrast for better evaluation of the   abdominal aorta when patient condition permits.      3.  See above other comments and recommendations.      .         CTA PULMONARY W CONTRAST   Final Result   1. CT BRAIN:      1.  No indication for acute intracranial event.      2. Area of focal contusion of the scalp in the left occipital area.      2. CT CERVICAL SPINE:      1. Degenerative changes in the cervical spine.      2. No acute fractures or dislocations.      3. CTA CHEST:      1.  Endotracheal tube at the level of the arch of the aorta in good position.      2.  NG tube just crossing the esophagogastric junction, it can e advanced 5   cm more.      3.  Multiple displaced fractures of the right and left anterior rib cages   above described.  Patient had recent CPR performed.      4.  Presence of a small pneumothorax located anteriorly in the mid lower   aspect of the right chest.  The pneumothorax is 15-20%.      5.  Motion artifacts obscures detail in the level of the ascending aorta.  No   conspicuous dissection is seen where aneurysm formation the thoracic aorta.      6. No conspicuous visualization of acute pulmonary embolus in the areas not   significant affected by motion artifacts.      4. CT ABDOMEN/PELVIS:      1.  No indication for acute trauma injury to the intraperitoneal   retroperitoneal structures of the abdomen or of the pelvis.      2.  Presence of a 4.3 x 4 cm aneurysm of the infrarenal segment of the   abdominal aorta with eccentric mural thrombus.  Could consider CTA of the   abdomen/pelvis without and with IV contrast for better evaluation of the   abdominal aorta when patient condition permits.      3.  See above other comments and recommendations.      .             MDM and ED course  History is obtained from EMS for patient's acute onset of severe cardiac arrest    Differential  was eventually started for sedation.    Clinical Impression  1. Cardiopulmonary arrest (HCC)    2. Cardiopulmonary resuscitation (CPR)-only resuscitation status    3. Pneumothorax, unspecified type    4. Closed fracture of multiple ribs of right side, initial encounter    5. Lactic acidosis    6. Ventricular dysrhythmia    7. Decorticate posturing    8. Respiratory acidosis    9. Poor intravenous access    10. Transaminitis    11. NSTEMI (non-ST elevated myocardial infarction) (HCC)    12. Hypertension, unspecified type         Disposition  Patient's disposition: Transfer to [facility]    Odalis Briscoe MD  Resident PGY-2

## 2024-04-29 NOTE — PROGRESS NOTES
@9852 access center notified of transfer to [facility]/ MICU  Post cardiac arrest/ V Tach  Dr Edge spoke with Dr Burciaga/ cardio requesting transfer  @1824 Dr Edge spoke with Dr Saenz/ accepting hospitalist

## 2024-04-29 NOTE — PLAN OF CARE
Initially referred to our service for ICU admission to [facility] for cardiac arrest. Per ED provider discussion with Cardiology plan is now to transfer patient to [facility], has been accepted by hospitalist service and ICU team at [facility]. Spoke with Intensivist, do not plan to admit this patient to [facility] rather plan is for direct admission to [facility]. Discussed with ED team, at this time we will sign off as patient will not be admitted to [facility], please feel free to contact our service if we can be of  any further assistance with this patient.    Edel Levy MD   4/29/24  6:36 PM EDT

## 2024-04-30 ENCOUNTER — APPOINTMENT (OUTPATIENT)
Dept: GENERAL RADIOLOGY | Age: 64
DRG: 308 | End: 2024-04-30
Payer: COMMERCIAL

## 2024-04-30 ENCOUNTER — APPOINTMENT (OUTPATIENT)
Age: 64
DRG: 308 | End: 2024-04-30
Payer: COMMERCIAL

## 2024-04-30 PROBLEM — I49.9 VENTRICULAR DYSRHYTHMIA: Status: ACTIVE | Noted: 2024-04-30

## 2024-04-30 PROBLEM — E87.20 LACTIC ACIDOSIS: Status: ACTIVE | Noted: 2024-04-30

## 2024-04-30 PROBLEM — I21.4 NSTEMI (NON-ST ELEVATED MYOCARDIAL INFARCTION) (HCC): Status: ACTIVE | Noted: 2024-04-30

## 2024-04-30 PROBLEM — R74.01 TRANSAMINITIS: Status: ACTIVE | Noted: 2024-04-30

## 2024-04-30 LAB
ANION GAP SERPL CALCULATED.3IONS-SCNC: 10 MMOL/L (ref 7–16)
ANION GAP SERPL CALCULATED.3IONS-SCNC: 11 MMOL/L (ref 7–16)
ANION GAP SERPL CALCULATED.3IONS-SCNC: 9 MMOL/L (ref 7–16)
B.E.: -0.8 MMOL/L (ref -3–3)
B.E.: -3.7 MMOL/L (ref -3–3)
B.E.: 0.6 MMOL/L (ref -3–3)
BASOPHILS # BLD: 0.01 K/UL (ref 0–0.2)
BASOPHILS NFR BLD: 0 % (ref 0–2)
BUN SERPL-MCNC: 31 MG/DL (ref 6–23)
BUN SERPL-MCNC: 35 MG/DL (ref 6–23)
BUN SERPL-MCNC: 36 MG/DL (ref 6–23)
CA-I BLD-SCNC: 1.17 MMOL/L (ref 1.15–1.33)
CA-I BLD-SCNC: 1.19 MMOL/L (ref 1.15–1.33)
CA-I BLD-SCNC: 1.2 MMOL/L (ref 1.15–1.33)
CALCIUM SERPL-MCNC: 8.8 MG/DL (ref 8.6–10.2)
CALCIUM SERPL-MCNC: 8.9 MG/DL (ref 8.6–10.2)
CALCIUM SERPL-MCNC: 9.1 MG/DL (ref 8.6–10.2)
CHLORIDE SERPL-SCNC: 104 MMOL/L (ref 98–107)
CHLORIDE SERPL-SCNC: 106 MMOL/L (ref 98–107)
CHLORIDE SERPL-SCNC: 107 MMOL/L (ref 98–107)
CK SERPL-CCNC: 1172 U/L (ref 20–200)
CO2 SERPL-SCNC: 25 MMOL/L (ref 22–29)
CO2 SERPL-SCNC: 25 MMOL/L (ref 22–29)
CO2 SERPL-SCNC: 27 MMOL/L (ref 22–29)
COHB: 0.9 % (ref 0–1.5)
COHB: 1 % (ref 0–1.5)
COHB: 1.3 % (ref 0–1.5)
COMMENT: ABNORMAL
COMMENT: ABNORMAL
COMMENT: NORMAL
CREAT SERPL-MCNC: 1.2 MG/DL (ref 0.7–1.2)
CREAT SERPL-MCNC: 1.6 MG/DL (ref 0.7–1.2)
CREAT SERPL-MCNC: 1.7 MG/DL (ref 0.7–1.2)
CRITICAL: ABNORMAL
CRITICAL: ABNORMAL
CRITICAL: NORMAL
DATE ANALYZED: ABNORMAL
DATE ANALYZED: ABNORMAL
DATE ANALYZED: NORMAL
DATE OF COLLECTION: ABNORMAL
DATE OF COLLECTION: ABNORMAL
DATE OF COLLECTION: NORMAL
ECHO AO ASC DIAM: 4.2 CM
ECHO AV AREA PEAK VELOCITY: 2.7 CM2
ECHO AV AREA VTI: 2.7 CM2
ECHO AV CUSP MM: 2.8 CM
ECHO AV MEAN GRADIENT: 6 MMHG
ECHO AV MEAN VELOCITY: 1.2 M/S
ECHO AV PEAK GRADIENT: 13 MMHG
ECHO AV PEAK VELOCITY: 1.8 M/S
ECHO AV VELOCITY RATIO: 0.67
ECHO AV VTI: 32.4 CM
ECHO LA DIAMETER: 5.1 CM
ECHO LA VOL A-L A2C: 165 ML (ref 18–58)
ECHO LA VOL A-L A4C: 186 ML (ref 18–58)
ECHO LA VOL MOD A2C: 160 ML (ref 18–58)
ECHO LA VOL MOD A4C: 171 ML (ref 18–58)
ECHO LA VOLUME AREA LENGTH: 179 ML
ECHO LV FRACTIONAL SHORTENING: 28 % (ref 28–44)
ECHO LV INTERNAL DIMENSION DIASTOLIC: 5.3 CM (ref 4.2–5.9)
ECHO LV INTERNAL DIMENSION SYSTOLIC: 3.8 CM
ECHO LV IVSD: 1.2 CM (ref 0.6–1)
ECHO LV IVSS: 1.7 CM
ECHO LV MASS 2D: 286.9 G (ref 88–224)
ECHO LV POSTERIOR WALL DIASTOLIC: 1.4 CM (ref 0.6–1)
ECHO LV POSTERIOR WALL SYSTOLIC: 1.7 CM
ECHO LV RELATIVE WALL THICKNESS RATIO: 0.53
ECHO LVOT AREA: 4.2 CM2
ECHO LVOT AV VTI INDEX: 0.66
ECHO LVOT DIAM: 2.3 CM
ECHO LVOT MEAN GRADIENT: 3 MMHG
ECHO LVOT PEAK GRADIENT: 5 MMHG
ECHO LVOT PEAK VELOCITY: 1.2 M/S
ECHO LVOT SV: 88.5 ML
ECHO LVOT VTI: 21.3 CM
ECHO MV "A" WAVE DURATION: 124.6 MSEC
ECHO MV A VELOCITY: 0.53 M/S
ECHO MV AREA PHT: 3.9 CM2
ECHO MV AREA VTI: 3.4 CM2
ECHO MV E DECELERATION TIME (DT): 178.6 MS
ECHO MV E VELOCITY: 1 M/S
ECHO MV E/A RATIO: 1.89
ECHO MV LVOT VTI INDEX: 1.23
ECHO MV MAX VELOCITY: 1.2 M/S
ECHO MV MEAN GRADIENT: 2 MMHG
ECHO MV MEAN VELOCITY: 0.7 M/S
ECHO MV PEAK GRADIENT: 6 MMHG
ECHO MV PRESSURE HALF TIME (PHT): 56.3 MS
ECHO MV VTI: 26.2 CM
ECHO PV MAX VELOCITY: 0.9 M/S
ECHO PV MEAN GRADIENT: 2 MMHG
ECHO PV MEAN VELOCITY: 0.6 M/S
ECHO PV PEAK GRADIENT: 3 MMHG
ECHO PV VTI: 14.6 CM
EKG ATRIAL RATE: 70 BPM
EKG P AXIS: 81 DEGREES
EKG P-R INTERVAL: 196 MS
EKG Q-T INTERVAL: 420 MS
EKG QRS DURATION: 94 MS
EKG QTC CALCULATION (BAZETT): 453 MS
EKG R AXIS: 43 DEGREES
EKG T AXIS: 34 DEGREES
EKG VENTRICULAR RATE: 70 BPM
EOSINOPHIL # BLD: 0 K/UL (ref 0.05–0.5)
EOSINOPHILS RELATIVE PERCENT: 0 % (ref 0–6)
ERYTHROCYTE [DISTWIDTH] IN BLOOD BY AUTOMATED COUNT: 13.5 % (ref 11.5–15)
FIO2: 65 %
FIO2: 70 %
FIO2: 80 %
GFR SERPL CREATININE-BSD FRML MDRD: 43 ML/MIN/1.73M2
GFR SERPL CREATININE-BSD FRML MDRD: 50 ML/MIN/1.73M2
GFR SERPL CREATININE-BSD FRML MDRD: 66 ML/MIN/1.73M2
GLUCOSE BLD-MCNC: 113 MG/DL (ref 74–99)
GLUCOSE BLD-MCNC: 127 MG/DL (ref 74–99)
GLUCOSE BLD-MCNC: 158 MG/DL (ref 74–99)
GLUCOSE SERPL-MCNC: 145 MG/DL (ref 74–99)
GLUCOSE SERPL-MCNC: 199 MG/DL (ref 74–99)
GLUCOSE SERPL-MCNC: 246 MG/DL (ref 74–99)
HCO3: 22.8 MMOL/L (ref 22–26)
HCO3: 23.6 MMOL/L (ref 22–26)
HCO3: 25.3 MMOL/L (ref 22–26)
HCT VFR BLD AUTO: 43 % (ref 37–54)
HGB BLD-MCNC: 13.8 G/DL (ref 12.5–16.5)
HHB: 1.7 % (ref 0–5)
HHB: 3.2 % (ref 0–5)
HHB: 4 % (ref 0–5)
IMM GRANULOCYTES # BLD AUTO: 0.05 K/UL (ref 0–0.58)
IMM GRANULOCYTES NFR BLD: 0 % (ref 0–5)
LAB: ABNORMAL
LAB: ABNORMAL
LAB: NORMAL
LACTATE BLDV-SCNC: 1 MMOL/L (ref 0.5–2.2)
LYMPHOCYTES NFR BLD: 0.44 K/UL (ref 1.5–4)
LYMPHOCYTES RELATIVE PERCENT: 3 % (ref 20–42)
Lab: 1250
Lab: 1730
Lab: 2345
MAGNESIUM SERPL-MCNC: 1.8 MG/DL (ref 1.6–2.6)
MAGNESIUM SERPL-MCNC: 1.8 MG/DL (ref 1.6–2.6)
MAGNESIUM SERPL-MCNC: 1.9 MG/DL (ref 1.6–2.6)
MCH RBC QN AUTO: 30.3 PG (ref 26–35)
MCHC RBC AUTO-ENTMCNC: 32.1 G/DL (ref 32–34.5)
MCV RBC AUTO: 94.3 FL (ref 80–99.9)
METHB: 0.3 % (ref 0–1.5)
MODE: AC
MONOCYTES NFR BLD: 0.95 K/UL (ref 0.1–0.95)
MONOCYTES NFR BLD: 7 % (ref 2–12)
NEUTROPHILS NFR BLD: 89 % (ref 43–80)
NEUTS SEG NFR BLD: 12.39 K/UL (ref 1.8–7.3)
O2 CONTENT: 18.6 ML/DL
O2 CONTENT: 19.7 ML/DL
O2 CONTENT: 20.2 ML/DL
O2 SATURATION: 95.9 % (ref 92–98.5)
O2 SATURATION: 96.8 % (ref 92–98.5)
O2 SATURATION: 98.3 % (ref 92–98.5)
O2HB: 94.4 % (ref 94–97)
O2HB: 95.6 % (ref 94–97)
O2HB: 97 % (ref 94–97)
OPERATOR ID: 166
OPERATOR ID: 1687
OPERATOR ID: 2856
PARTIAL THROMBOPLASTIN TIME: 29.7 SEC (ref 24.5–35.1)
PARTIAL THROMBOPLASTIN TIME: 45.9 SEC (ref 24.5–35.1)
PATIENT TEMP: 35.5 C
PATIENT TEMP: 36.1 C
PATIENT TEMP: 36.9 C
PCO2: 38.3 MMHG (ref 35–45)
PCO2: 40.6 MMHG (ref 35–45)
PCO2: 46.5 MMHG (ref 35–45)
PEEP/CPAP: 5 CMH2O
PFO2: 1.16 MMHG/%
PFO2: 1.22 MMHG/%
PFO2: 1.72 MMHG/%
PH BLOOD GAS: 7.31 (ref 7.35–7.45)
PH BLOOD GAS: 7.41 (ref 7.35–7.45)
PH BLOOD GAS: 7.41 (ref 7.35–7.45)
PHOSPHATE SERPL-MCNC: 2.9 MG/DL (ref 2.5–4.5)
PHOSPHATE SERPL-MCNC: 3.2 MG/DL (ref 2.5–4.5)
PHOSPHATE SERPL-MCNC: 3.2 MG/DL (ref 2.5–4.5)
PLATELET # BLD AUTO: 129 K/UL (ref 130–450)
PMV BLD AUTO: 10.4 FL (ref 7–12)
PO2: 120.6 MMHG (ref 75–100)
PO2: 79.2 MMHG (ref 75–100)
PO2: 93.1 MMHG (ref 75–100)
POTASSIUM SERPL-SCNC: 3.5 MMOL/L (ref 3.5–5)
POTASSIUM SERPL-SCNC: 4.1 MMOL/L (ref 3.5–5)
POTASSIUM SERPL-SCNC: 4.2 MMOL/L (ref 3.5–5)
RBC # BLD AUTO: 4.56 M/UL (ref 3.8–5.8)
RBC # BLD: ABNORMAL 10*6/UL
RI(T): 3.01
RI(T): 4.64
RI(T): 4.92
RR MECHANICAL: 18 B/MIN
SODIUM SERPL-SCNC: 140 MMOL/L (ref 132–146)
SODIUM SERPL-SCNC: 141 MMOL/L (ref 132–146)
SODIUM SERPL-SCNC: 143 MMOL/L (ref 132–146)
SOURCE, BLOOD GAS: ABNORMAL
SOURCE, BLOOD GAS: ABNORMAL
SOURCE, BLOOD GAS: NORMAL
THB: 14 G/DL (ref 11.5–16.5)
THB: 14.6 G/DL (ref 11.5–16.5)
THB: 14.7 G/DL (ref 11.5–16.5)
TIME ANALYZED: 1254
TIME ANALYZED: 1737
TIME ANALYZED: 2352
TROPONIN I SERPL HS-MCNC: 563 NG/L (ref 0–11)
TROPONIN I SERPL HS-MCNC: 579 NG/L (ref 0–11)
TROPONIN I SERPL HS-MCNC: 652 NG/L (ref 0–11)
VT MECHANICAL: 510 ML
WBC OTHER # BLD: 13.8 K/UL (ref 4.5–11.5)

## 2024-04-30 PROCEDURE — 82330 ASSAY OF CALCIUM: CPT

## 2024-04-30 PROCEDURE — 80048 BASIC METABOLIC PNL TOTAL CA: CPT

## 2024-04-30 PROCEDURE — 83605 ASSAY OF LACTIC ACID: CPT

## 2024-04-30 PROCEDURE — 94003 VENT MGMT INPAT SUBQ DAY: CPT

## 2024-04-30 PROCEDURE — 2500000003 HC RX 250 WO HCPCS: Performed by: INTERNAL MEDICINE

## 2024-04-30 PROCEDURE — 6360000002 HC RX W HCPCS: Performed by: INTERNAL MEDICINE

## 2024-04-30 PROCEDURE — 82962 GLUCOSE BLOOD TEST: CPT

## 2024-04-30 PROCEDURE — 85025 COMPLETE CBC W/AUTO DIFF WBC: CPT

## 2024-04-30 PROCEDURE — 87081 CULTURE SCREEN ONLY: CPT

## 2024-04-30 PROCEDURE — 82550 ASSAY OF CK (CPK): CPT

## 2024-04-30 PROCEDURE — 6370000000 HC RX 637 (ALT 250 FOR IP)

## 2024-04-30 PROCEDURE — APPSS180 APP SPLIT SHARED TIME > 60 MINUTES: Performed by: NURSE PRACTITIONER

## 2024-04-30 PROCEDURE — 93005 ELECTROCARDIOGRAM TRACING: CPT | Performed by: NURSE PRACTITIONER

## 2024-04-30 PROCEDURE — C9113 INJ PANTOPRAZOLE SODIUM, VIA: HCPCS | Performed by: INTERNAL MEDICINE

## 2024-04-30 PROCEDURE — 2000000000 HC ICU R&B

## 2024-04-30 PROCEDURE — 93306 TTE W/DOPPLER COMPLETE: CPT | Performed by: INTERNAL MEDICINE

## 2024-04-30 PROCEDURE — 84484 ASSAY OF TROPONIN QUANT: CPT

## 2024-04-30 PROCEDURE — C1751 CATH, INF, PER/CENT/MIDLINE: HCPCS

## 2024-04-30 PROCEDURE — 2580000003 HC RX 258: Performed by: STUDENT IN AN ORGANIZED HEALTH CARE EDUCATION/TRAINING PROGRAM

## 2024-04-30 PROCEDURE — 84100 ASSAY OF PHOSPHORUS: CPT

## 2024-04-30 PROCEDURE — 71045 X-RAY EXAM CHEST 1 VIEW: CPT

## 2024-04-30 PROCEDURE — C8929 TTE W OR WO FOL WCON,DOPPLER: HCPCS

## 2024-04-30 PROCEDURE — 93010 ELECTROCARDIOGRAM REPORT: CPT | Performed by: INTERNAL MEDICINE

## 2024-04-30 PROCEDURE — 99223 1ST HOSP IP/OBS HIGH 75: CPT | Performed by: STUDENT IN AN ORGANIZED HEALTH CARE EDUCATION/TRAINING PROGRAM

## 2024-04-30 PROCEDURE — 6370000000 HC RX 637 (ALT 250 FOR IP): Performed by: INTERNAL MEDICINE

## 2024-04-30 PROCEDURE — 83735 ASSAY OF MAGNESIUM: CPT

## 2024-04-30 PROCEDURE — 94664 DEMO&/EVAL PT USE INHALER: CPT

## 2024-04-30 PROCEDURE — 85730 THROMBOPLASTIN TIME PARTIAL: CPT

## 2024-04-30 PROCEDURE — 6360000002 HC RX W HCPCS: Performed by: STUDENT IN AN ORGANIZED HEALTH CARE EDUCATION/TRAINING PROGRAM

## 2024-04-30 PROCEDURE — 82805 BLOOD GASES W/O2 SATURATION: CPT

## 2024-04-30 PROCEDURE — 6360000002 HC RX W HCPCS

## 2024-04-30 PROCEDURE — 03HY32Z INSERTION OF MONITORING DEVICE INTO UPPER ARTERY, PERCUTANEOUS APPROACH: ICD-10-PCS | Performed by: INTERNAL MEDICINE

## 2024-04-30 PROCEDURE — 99291 CRITICAL CARE FIRST HOUR: CPT | Performed by: INTERNAL MEDICINE

## 2024-04-30 PROCEDURE — 36620 INSERTION CATHETER ARTERY: CPT

## 2024-04-30 PROCEDURE — 2500000003 HC RX 250 WO HCPCS

## 2024-04-30 PROCEDURE — 2580000003 HC RX 258: Performed by: INTERNAL MEDICINE

## 2024-04-30 PROCEDURE — A4216 STERILE WATER/SALINE, 10 ML: HCPCS | Performed by: INTERNAL MEDICINE

## 2024-04-30 RX ORDER — ONDANSETRON 4 MG/1
4 TABLET, ORALLY DISINTEGRATING ORAL EVERY 8 HOURS PRN
Status: DISCONTINUED | OUTPATIENT
Start: 2024-04-30 | End: 2024-05-01 | Stop reason: HOSPADM

## 2024-04-30 RX ORDER — INSULIN LISPRO 100 [IU]/ML
0-8 INJECTION, SOLUTION INTRAVENOUS; SUBCUTANEOUS EVERY 6 HOURS
Status: DISCONTINUED | OUTPATIENT
Start: 2024-04-30 | End: 2024-05-01 | Stop reason: HOSPADM

## 2024-04-30 RX ORDER — MIDAZOLAM HYDROCHLORIDE 1 MG/ML
1-10 INJECTION, SOLUTION INTRAVENOUS CONTINUOUS
Status: DISCONTINUED | OUTPATIENT
Start: 2024-04-30 | End: 2024-05-01 | Stop reason: HOSPADM

## 2024-04-30 RX ORDER — ONDANSETRON 2 MG/ML
4 INJECTION INTRAMUSCULAR; INTRAVENOUS EVERY 6 HOURS PRN
Status: DISCONTINUED | OUTPATIENT
Start: 2024-04-30 | End: 2024-05-01 | Stop reason: HOSPADM

## 2024-04-30 RX ORDER — MIDAZOLAM HYDROCHLORIDE 2 MG/2ML
1 INJECTION, SOLUTION INTRAMUSCULAR; INTRAVENOUS EVERY 30 MIN PRN
Status: DISCONTINUED | OUTPATIENT
Start: 2024-04-30 | End: 2024-05-01 | Stop reason: HOSPADM

## 2024-04-30 RX ORDER — HEPARIN SODIUM 10000 [USP'U]/100ML
5-30 INJECTION, SOLUTION INTRAVENOUS CONTINUOUS
Status: DISCONTINUED | OUTPATIENT
Start: 2024-04-30 | End: 2024-05-01 | Stop reason: HOSPADM

## 2024-04-30 RX ORDER — ASPIRIN 81 MG/1
81 TABLET, CHEWABLE ORAL DAILY
Status: DISCONTINUED | OUTPATIENT
Start: 2024-04-30 | End: 2024-05-01 | Stop reason: HOSPADM

## 2024-04-30 RX ORDER — GLUCAGON 1 MG/ML
1 KIT INJECTION PRN
Status: DISCONTINUED | OUTPATIENT
Start: 2024-04-30 | End: 2024-05-01 | Stop reason: HOSPADM

## 2024-04-30 RX ORDER — NITROGLYCERIN 20 MG/100ML
5-200 INJECTION INTRAVENOUS CONTINUOUS
Status: DISCONTINUED | OUTPATIENT
Start: 2024-04-30 | End: 2024-05-01 | Stop reason: HOSPADM

## 2024-04-30 RX ORDER — HEPARIN SODIUM 1000 [USP'U]/ML
4000 INJECTION, SOLUTION INTRAVENOUS; SUBCUTANEOUS PRN
Status: DISCONTINUED | OUTPATIENT
Start: 2024-04-30 | End: 2024-05-01 | Stop reason: HOSPADM

## 2024-04-30 RX ORDER — DEXTROSE MONOHYDRATE 100 MG/ML
INJECTION, SOLUTION INTRAVENOUS CONTINUOUS PRN
Status: DISCONTINUED | OUTPATIENT
Start: 2024-04-30 | End: 2024-05-01 | Stop reason: HOSPADM

## 2024-04-30 RX ORDER — HEPARIN SODIUM 1000 [USP'U]/ML
2000 INJECTION, SOLUTION INTRAVENOUS; SUBCUTANEOUS PRN
Status: DISCONTINUED | OUTPATIENT
Start: 2024-04-30 | End: 2024-05-01 | Stop reason: HOSPADM

## 2024-04-30 RX ORDER — CHLORHEXIDINE GLUCONATE ORAL RINSE 1.2 MG/ML
15 SOLUTION DENTAL 2 TIMES DAILY
Status: DISCONTINUED | OUTPATIENT
Start: 2024-04-30 | End: 2024-05-01 | Stop reason: HOSPADM

## 2024-04-30 RX ORDER — HEPARIN SODIUM 1000 [USP'U]/ML
4000 INJECTION, SOLUTION INTRAVENOUS; SUBCUTANEOUS ONCE
Status: COMPLETED | OUTPATIENT
Start: 2024-04-30 | End: 2024-04-30

## 2024-04-30 RX ORDER — VECURONIUM BROMIDE 1 MG/ML
10 INJECTION, POWDER, LYOPHILIZED, FOR SOLUTION INTRAVENOUS ONCE
Status: COMPLETED | OUTPATIENT
Start: 2024-04-30 | End: 2024-04-30

## 2024-04-30 RX ORDER — IPRATROPIUM BROMIDE AND ALBUTEROL SULFATE 2.5; .5 MG/3ML; MG/3ML
1 SOLUTION RESPIRATORY (INHALATION) ONCE
Status: COMPLETED | OUTPATIENT
Start: 2024-04-30 | End: 2024-04-30

## 2024-04-30 RX ADMIN — PIPERACILLIN AND TAZOBACTAM 4500 MG: 4; .5 INJECTION, POWDER, LYOPHILIZED, FOR SOLUTION INTRAVENOUS at 13:59

## 2024-04-30 RX ADMIN — Medication 200 MCG/HR: at 17:09

## 2024-04-30 RX ADMIN — VECURONIUM BROMIDE 10 MG: 1 INJECTION, POWDER, LYOPHILIZED, FOR SOLUTION INTRAVENOUS at 15:53

## 2024-04-30 RX ADMIN — HEPARIN SODIUM 2000 UNITS: 1000 INJECTION INTRAVENOUS; SUBCUTANEOUS at 18:19

## 2024-04-30 RX ADMIN — LIDOCAINE HYDROCHLORIDE 1 MG/MIN: 4 INJECTION, SOLUTION INTRAVENOUS at 11:08

## 2024-04-30 RX ADMIN — HEPARIN SODIUM 5 UNITS/KG/HR: 10000 INJECTION, SOLUTION INTRAVENOUS at 12:42

## 2024-04-30 RX ADMIN — NITROGLYCERIN 5 MCG/MIN: 20 INJECTION INTRAVENOUS at 18:13

## 2024-04-30 RX ADMIN — PANTOPRAZOLE SODIUM 40 MG: 40 INJECTION, POWDER, FOR SOLUTION INTRAVENOUS at 12:43

## 2024-04-30 RX ADMIN — VECURONIUM BROMIDE 1 MCG/KG/MIN: 20 INJECTION, POWDER, LYOPHILIZED, FOR SOLUTION INTRAVENOUS at 16:56

## 2024-04-30 RX ADMIN — Medication 200 MCG/HR: at 23:00

## 2024-04-30 RX ADMIN — PIPERACILLIN AND TAZOBACTAM 4500 MG: 4; .5 INJECTION, POWDER, LYOPHILIZED, FOR SOLUTION INTRAVENOUS at 02:16

## 2024-04-30 RX ADMIN — Medication 2 MG/HR: at 11:05

## 2024-04-30 RX ADMIN — PIPERACILLIN AND TAZOBACTAM 4500 MG: 4; .5 INJECTION, POWDER, LYOPHILIZED, FOR SOLUTION INTRAVENOUS at 07:53

## 2024-04-30 RX ADMIN — CHLORHEXIDINE GLUCONATE 15 ML: 1.2 RINSE ORAL at 21:31

## 2024-04-30 RX ADMIN — HEPARIN SODIUM 4000 UNITS: 1000 INJECTION INTRAVENOUS; SUBCUTANEOUS at 12:37

## 2024-04-30 RX ADMIN — PIPERACILLIN AND TAZOBACTAM 4500 MG: 4; .5 INJECTION, POWDER, LYOPHILIZED, FOR SOLUTION INTRAVENOUS at 21:30

## 2024-04-30 RX ADMIN — IPRATROPIUM BROMIDE AND ALBUTEROL SULFATE 1 DOSE: 2.5; .5 SOLUTION RESPIRATORY (INHALATION) at 07:15

## 2024-04-30 RX ADMIN — VECURONIUM BROMIDE 1.5 MCG/KG/MIN: 20 INJECTION, POWDER, LYOPHILIZED, FOR SOLUTION INTRAVENOUS at 23:10

## 2024-04-30 RX ADMIN — Medication 10 MG/HR: at 23:00

## 2024-04-30 RX ADMIN — Medication 100 MCG/HR: at 10:31

## 2024-04-30 RX ADMIN — CHLORHEXIDINE GLUCONATE 15 ML: 1.2 RINSE ORAL at 11:25

## 2024-04-30 ASSESSMENT — PULMONARY FUNCTION TESTS
PIF_VALUE: 28
PIF_VALUE: 29
PIF_VALUE: 24
PIF_VALUE: 29
PIF_VALUE: 25
PIF_VALUE: 26
PIF_VALUE: 27
PIF_VALUE: 28
PIF_VALUE: 25
PIF_VALUE: 27
PIF_VALUE: 28
PIF_VALUE: 28
PIF_VALUE: 30
PIF_VALUE: 25
PIF_VALUE: 28
PIF_VALUE: 19
PIF_VALUE: 18
PIF_VALUE: 28
PIF_VALUE: 26
PIF_VALUE: 29
PIF_VALUE: 25
PIF_VALUE: 26
PIF_VALUE: 25
PIF_VALUE: 24
PIF_VALUE: 28
PIF_VALUE: 24
PIF_VALUE: 27
PIF_VALUE: 28
PIF_VALUE: 24
PIF_VALUE: 25
PIF_VALUE: 29
PIF_VALUE: 32

## 2024-04-30 ASSESSMENT — PAIN SCALES - GENERAL
PAINLEVEL_OUTOF10: 0
PAINLEVEL_OUTOF10: 0

## 2024-04-30 NOTE — PROCEDURES
Arterial line placement    Procedure: Left Radial arterial line placement.     Indications: Continuous monitoring of blood pressure in a patient with hypotension +/- shock    Anesthesia: Local infiltration of 1% lidocaine.     Consent:  Unable to be obtained due to the emergent nature of this procedure.    Technique: Time Out: Immediately prior to the procedure a \"timeout\" was called to verify the correct patient and procedure. Procedure was done using strict aseptic technique. Leland's test was performed and was normal. Left Radial site was cleaned with chloraprep and draped. Left Radial was identified, then Lidocaine 1% was infiltrated locally.  Arterial line was inserted, a good blood flow was obtained, after which guidewire was inserted all the way with no resistance. Then the canula was inserted and needle with guidewire was withdrawn. Pulsatile bright red blood flow was observed. The canula was connected to BP monitoring apparatus and a good quality waveform was noted. Then the canula was secured with 2 stay sutures of 2-0 silk after Lidocaine infiltration, following which dressing was applied.     Number of sticks: 1.     Number of Kits used: 1.     Complications: No immediate complication.      Estimated blood loss: About 1 ml.     Comment: Patient tolerated the procedure well.     Ruddy Bazzi II, DO PGY-1  4/30/2024 12:45 PM      Jamaal Hannon DO

## 2024-04-30 NOTE — PROGRESS NOTES
Patient admitted from ED-20 to 216, with the following belongings; SHOES, OVERALLS, KEYS, WALLET, CHEWING TOBACCO, LIGHTER, HAND , NECKLACE, placed on monitor, patient oriented to room and unit visiting hours.  Patient guide at bedside, reviewed patient rights and responsibilities. MRSA nasal swab obtained.  Bed alarm on.  Call light within reach.

## 2024-04-30 NOTE — PROGRESS NOTES
Intensive Care Daily Quality Rounding Checklist      ICU Team Members: Dr. Hannon, residents, charge nurse    ICU Day #: NUMBER: 1    Intubation Date: April 29    Ventilator Day #: NUMBER: 2    Central Line Insertion Date: April 29        Day #: NUMBER: 2        Indication: CVCIndication: Hemodynamically unstable requiring volume resuscitation     Arterial Line Insertion Date: April 29      Day #: NUMBER: 2    Temporary Hemodialysis Catheter Insertion Date:  NA      Day #     DVT Prophylaxis: Heparin drip    GI Prophylaxis: Protonix    Mcgill Catheter Insertion Date: April 9       Day #: 2      Indications: Need for fluid management of the critically ill patient in a critical care setting      Continued need (if yes, reason documented and discussed with physician): yes,     Skin Issues/ Wounds and ordered treatment discussed on rounds: Y    Goals/ Plans for the Day: Monitor labs and vitals. Pt on hypothermia protocol. Cardiology following. ECHO ordered.on Heparin drip    Reviewed plan and goals for day with patient and/or representative: Yes

## 2024-04-30 NOTE — PROGRESS NOTES
04/30/24 1024   [facility] Vent Information   Ventilator ID [id number]   Equipment Changed Suction catheter   Vent Type 980   Vent Mode AC/VC   Vt (Set, mL) 520 mL   Vt (Measured) 509 mL   Resp Rate (Set) 20 bpm   Rate Measured 20 br/min   Minute Volume (L/min) 10.2 Liters   Peak Flow 60 L/min   FiO2  100 %   Peak Inspiratory Pressure (cmH2O) 28 cmH2O   I:E Ratio 1:2.00   Sensitivity 3   PEEP/CPAP (cmH2O) 8   Mean Airway Pressure (cmH2O) 9.6 cmH20   Additional Respiratory Assessments   Humidification Source Heated wire   Humidification Temp 37   Circuit Condensation Not drained   Vent Alarm Settings   High Pressure (cmH2O) 40 cmH2O   Low Minute Volume (lpm) 6 L/min   High Minute Volume (lpm) 16 L/min   Low Exhaled Vt (ml) 320 mL   High Exhaled Vt (ml) 820 mL   RR High (bpm) 35 br/min   Apnea (secs) 20 secs   Patient Observation   Observations red outlet and alarm plugged in. bvm in room   ETT    Placement Date/Time: 04/29/24 0137   Present on Admission/Arrival: Yes  Placed By: (c) Other (comment)  Placement Verified By: Auscultation  Airway Tube Size: 7.5 mm  Location: Oral  Secured At: 24 cm  Measured From: Lips   Secured At 25 cm   Measured From Lips   ETT Placement Center   Secured By Commercial tube borden

## 2024-04-30 NOTE — PROGRESS NOTES
04/30/24 1343   NICU Vent Information   Vent Type 980   Vent Mode AC/VC   FiO2  (S)  75 %   Plateau Pressure 19 cmH20   Static Compliance 36 mL/cmH2O   Additional Respiratory Assessments   Humidification Source Heated wire   Humidification Temp 37   Circuit Condensation Drained   Vent Alarm Settings   Low Minute Volume (lpm) 6 L/min   High Minute Volume (lpm) 16 L/min   Low Exhaled Vt (ml) 320 mL   High Exhaled Vt (ml) 820 mL   RR High (bpm) 35 br/min   Apnea (secs) 20 secs   ETT    Placement Date/Time: 04/29/24 1437   Present on Admission/Arrival: Yes  Placed By: (c) Other (comment)  Placement Verified By: Auscultation  Airway Tube Size: 7.5 mm  Location: Oral  Secured At: 24 cm  Measured From: Lips   Secured At 25 cm   Measured From Lips   ETT Placement Center   Secured By Commercial tube borden

## 2024-04-30 NOTE — CONSULTS
Comprehensive Nutrition Assessment    Type and Reason for Visit:  Initial, Consult (TF Recommendation - Provider to Manage)    Nutrition Recommendations/Plan:   If pt remains intubated and EN needed for nutrition support (after cooling protocol) recommend:    TF RECOMMENDATION: Diabetic TF (Glucerna 1.5) to goal rate 55 ml/hr and 30 ml flushes Q 4 hr  This will provide: 1320 ml/d, 1980 renuka, 109 g pro, 1182 ml total free water  This regimen will meet 100% energy and protein needs    Continue inpatient monitoring     Malnutrition Assessment:  Malnutrition Status:  At risk for malnutrition (Comment) (04/30/24 1231)    Context:  Acute Illness     Findings of the 6 clinical characteristics of malnutrition:  Energy Intake:  Mild decrease in energy intake (Comment) (NPO since admit)  Weight Loss:  No significant weight loss     Body Fat Loss:  Unable to assess     Muscle Mass Loss:  Unable to assess    Fluid Accumulation:  No significant fluid accumulation     Strength:  Not Performed    Nutrition Assessment:    Pt admit s/p cardiac arrest with ROSC as pulling into ED. Currently intubated/sedated, on cooling protocol and plan for transfer to [facility] for cath, when bed available. Pertinent hx=DM, AAA, acute on CKD. Will provide TF recommendation per consult and continue to monitor pt status.    Nutrition Related Findings:    vent/sedated, cooling protocol, soft obese abd +BS, CT, no edema, BUN/creat 36/1.7, gluc 246 Wound Type: None       Current Nutrition Intake & Therapies:    Average Meal Intake: NPO  Average Supplements Intake: NPO  Diet NPO    Anthropometric Measures:  Height: 191.8 cm (6' 3.5\")  Ideal Body Weight (IBW): 199 lbs (90 kg)    Admission Body Weight: 169.1 kg (372 lb 12.8 oz) (4/29 bedscale)  Current Body Weight: 169.1 kg (372 lb 12.8 oz), 187.3 % IBW. Weight Source: Bed Scale (4/29)  Current BMI (kg/m2): 46  Usual Body Weight: 153.3 kg (337 lb 15.4 oz) (11/9/2023 OV at Cardiologist)  % Weight Change

## 2024-04-30 NOTE — ED NOTES
Spoke with Tisha, emergency contact, with verified info in the chart. Provided update on patient condition. Tisha will be to hospital if she can get a ride.

## 2024-04-30 NOTE — PROGRESS NOTES
4 Eyes Skin Assessment     NAME:  Nabeel Appiah  YOB: 1960  MEDICAL RECORD NUMBER:  [id number]    The patient is being assessed for  Admission    I agree that at least one RN has performed a thorough Head to Toe Skin Assessment on the patient. ALL assessment sites listed below have been assessed.      Areas assessed by both nurses:    Head, Face, Ears, Shoulders, Back, Chest, Arms, Elbows, Hands, Sacrum. Buttock, Coccyx, Ischium, Legs. Feet and Heels, and Under Medical Devices         Does the Patient have a Wound? No noted wound(s)       Misha Prevention initiated by RN: Yes  Wound Care Orders initiated by RN: No    Pressure Injury (Stage 3,4, Unstageable, DTI, NWPT, and Complex wounds) if present, place Wound referral order by RN under : No    New Ostomies, if present place, Ostomy referral order under : No     Nurse 1 eSignature: Electronically signed by Jamaal Navarrete RN on 4/30/24 at 1:04 PM EDT    **SHARE this note so that the co-signing nurse can place an eSignature**    Nurse 2 eSignature: Electronically signed by Ria Panda RN on 4/30/24 at 1:23 PM EDT

## 2024-04-30 NOTE — CONSULTS
1.  No indication for acute intracranial event.      2. Area of focal contusion of the scalp in the left occipital area.      2. CT CERVICAL SPINE:      1. Degenerative changes in the cervical spine.      2. No acute fractures or dislocations.      3. CTA CHEST:      1.  Endotracheal tube at the level of the arch of the aorta in good position.      2.  NG tube just crossing the esophagogastric junction, it can e advanced 5   cm more.      3.  Multiple displaced fractures of the right and left anterior rib cages   above described.  Patient had recent CPR performed.      4.  Presence of a small pneumothorax located anteriorly in the mid lower   aspect of the right chest.  The pneumothorax is 15-20%.      5.  Motion artifacts obscures detail in the level of the ascending aorta.  No   conspicuous dissection is seen where aneurysm formation the thoracic aorta.      6. No conspicuous visualization of acute pulmonary embolus in the areas not   significant affected by motion artifacts.      4. CT ABDOMEN/PELVIS:      1.  No indication for acute trauma injury to the intraperitoneal   retroperitoneal structures of the abdomen or of the pelvis.      2.  Presence of a 4.3 x 4 cm aneurysm of the infrarenal segment of the   abdominal aorta with eccentric mural thrombus.  Could consider CTA of the   abdomen/pelvis without and with IV contrast for better evaluation of the   abdominal aorta when patient condition permits.      3.  See above other comments and recommendations.      .         CT HEAD WO CONTRAST   Final Result   1. CT BRAIN:      1.  No indication for acute intracranial event.      2. Area of focal contusion of the scalp in the left occipital area.      2. CT CERVICAL SPINE:      1. Degenerative changes in the cervical spine.      2. No acute fractures or dislocations.      3. CTA CHEST:      1.  Endotracheal tube at the level of the arch of the aorta in good position.      2.  NG tube just crossing the    2.  Presence of a 4.3 x 4 cm aneurysm of the infrarenal segment of the   abdominal aorta with eccentric mural thrombus.  Could consider CTA of the   abdomen/pelvis without and with IV contrast for better evaluation of the   abdominal aorta when patient condition permits.      3.  See above other comments and recommendations.      .         XR CHEST PORTABLE    (Results Pending)       SYSTEMS ASSESSMENT    Neuro  # Sedation  On Versed, fentanyl drip  Does have some purposeful hand   Intermittently follows commands  CT head obtained in ER shows no evidence of acute intracranial event    Respiratory  # Acute respiratory failure  Intubated by EMS prior to ER arrival  Currently tolerating ventilator settings    # Right-sided pneumothorax  Likely from CPR  Chest tube placed in ER  Will obtain interval chest x-rays    Cardiovascular  # Cardiogenic shock  Postarrest, now in normal sinus rhythm  On lidocaine drip  Currently being cooled with Arctic sun    # ACS  Uptrending troponins  Loaded with aspirin  On heparin drip  Echo ordered for today  Will transfer to ICU [facility] for cardiac cath, likely tomorrow    # AAA  4.3 x 4 cm infrarenal aortic aneurysm seen on CT abdomen pelvis      GI  No acute issues    Renal  Strict I's and O's  Monitor electrolytes, replete as needed    Infectious disease  Patient was given Vanco and Zosyn in ER    Heme/Onc  Trend H&H, transfuse for hemoglobin less than 7.  History of chronic Eliquis use for A-fib    Endocrine  # DM2  Monitor BGL  SSI    Social/Spiritual/DNR/Other  Code status: Full  Diet Diet NPO  Stress ulcer prophylaxis: PPI  DVT prophylaxis: pharmacologic prophylaxis (with the following: heparin)  Consultations needed: No  Transfer out of ICU today? No    Lines/catheters  Right femoral TLC 4/29  Left radial arterial line 4/30  ETT 4/29  HARSH's      Ruddy Bazzi II, DO  1:41 PM  04/30/24      I personally saw, examined and provided care for the patient. I performed the

## 2024-04-30 NOTE — PROGRESS NOTES
04/30/24 1049   NICU Vent Information   Vent Mode AC/VC   Vt (Set, mL) (S)  510 mL  (per new order)   Vt (Measured) 503 mL   Resp Rate (Set) (S)  18 bpm  (per new order)   Rate Measured 18 br/min   Minute Volume (L/min) 10 Liters   Peak Flow 60 L/min   FiO2  100 %   Peak Inspiratory Pressure (cmH2O) 25 cmH2O   I:E Ratio 1:2.60   Sensitivity 3   PEEP/CPAP (cmH2O) (S)  5  (per new order)   Mean Airway Pressure (cmH2O) 10 cmH20   Additional Respiratory Assessments   Pulse 71   Respirations 19   SpO2 95 %   Vent Alarm Settings   High Pressure (cmH2O) 40 cmH2O     Changes are made per order

## 2024-04-30 NOTE — H&P
[facility] Hospitalist History and Physical      CHIEF COMPLAINT:  cardiac arrest    History of Present Illness: 63 y.o. male with history of HTN, Afib, HLD, obesity, DM2, AAA, presents with cardiac arrest    No family at bedside for collateral, patient intubated and sedated and therefore unable to provide history. Most history obtained from chart review and ED signout.    Reportedly with witnessed fall at work and received CPR from a coworker, on EMS arrival reportedly in VT/VF arrest and received amio and shock x3. Given fall CTH performed w/o intracranial finding, only left scalp contusion noted. Was supposed to go to [facility] for cardiac catheterization however apparently no beds are available thus patient is being admitted to [facility] to await transfer.      Decision was made to admit and IM was contacted for hospitalization.    Informant(s) for H&P: chart review    REVIEW OF SYSTEMS: DEMAR    PMH:  Past Medical History:   Diagnosis Date    AAA (abdominal aortic aneurysm) (HCC)     Decreased dorsalis pedis pulse 5/4/2023    Hypertension     Infrarenal abdominal aortic aneurysm (AAA) without rupture (HCC) 5/4/2023    After accounting for the tortuosity, approximately 4 cm x 4.5 cm abdominal aortic aneurysm noted on the ultrasound, April 2023    Irregular heart beat        Surgical History:  Past Surgical History:   Procedure Laterality Date    APPENDECTOMY      COLONOSCOPY      SHOULDER ARTHROPLASTY      TONSILLECTOMY         Medications Prior to Admission:    Prior to Admission medications    Medication Sig Start Date End Date Taking? Authorizing Provider   apixaban (ELIQUIS) 5 MG TABS tablet TAKE 1 TABLET TWICE A DAY 4/26/24   Jonh Allen DO   pravastatin (PRAVACHOL) 40 MG tablet Take 1 tablet by mouth in the morning and at bedtime 4/17/24   Jonh Allen DO   metoprolol succinate (TOPROL XL) 25 MG extended release tablet Take 1 tablet by mouth daily 1/12/24   Jonh Allen DO  above  ACS- rising Trop ~100-->500+, ECHO as above, Cards c/s, nitro gtt?, plans for cath as noted above  GENE- b/l Cr ~1.2, up to 1.7 on admit, stable, cont to trend  AAA- noted on CT a/p    Code Status: Full  DVT prophylaxis: hep gtt      NOTE: Portions of this report may have been transcribed using voice recognition software. Every effort was made to ensure accuracy; however, inadvertent computerized transcription errors may be present.  Electronically signed by Edel Levy MD on 4/30/2024 at 4:40 PM

## 2024-04-30 NOTE — CONSULTS
INPATIENT CARDIOLOGY CONSULT     Reason for Consult:  Cardiac Arrest     Cardiologist: Dr. Cleveland     Requesting Physician:  Dr. Laron Edge     Date of Consultation: 4/30/2024    HISTORY OF PRESENT ILLNESS:   Mr. Appiah is a 63 year old male who is known to Dr. Haines; most recently seen in outpatient on 11/9/2023 for history of persistent AF on chronic OAC.     [facility] on 4/29/2024 with a cardiac arrest. Patient was at work (works as a ) and had a witnessed fall where he was became unresponsive, a bystander initiated CPR. EMS and fire department were summoned.  The fire department was the first on arrival and started CPR.  His initial rhythm was VFib, shocked x 1.  Patient received 2 more shocks for additional episodes of V-fib and 1 episode that appeared to be torsades (strips are unavailable for review).  He was given 3 doses of epinephrine, 300 mg of amiodarone and was intubated.  His initial EKG showed ST, PVCs,right axis deviation, nonspecific ST-T wave changes, new RBBB, rate 101 bpm. On arrival to the ED, patient was started on lidocaine drip for recurrent ventricular arrhythmias noted and route to emergency department.  On imaging a small pneumothorax was seen and a chest tube was placed.  Right femoral central line was placed due to patient due to difficulty obtaining blood work.  ABG showed acute respiratory acidosis likely secondary to respiratory insufficiency and cardiac arrest.  Patient did have hematuria.  The patient was found to have decorticate posturing per ER documentation.  Patient was given 2 A of sodium and bicarbonate along with 2 g of Keppra IV.  Patient was given rectal aspirin for a high-sensitivity troponin from 10 4-5 18.  Heparin was not administered due to possible hemorrhage and recent chest tube placement.  Patient's temperature on arrival was 37.2 °C in the emergency department patient became hypertensive and was given hydralazine 5 mg IV.  Patient was started  displaced fractures of the right and left anterior rib cages secondary to CPR.  Infrarenal abdominal aortic aneurysm 4.3 x 4 cm on CT abdomen/pelvis 4/29/2024  Type 2 diabetes mellitus  HLD, on statin therapy   Erectile deficiency   Tobacco use     RECOMMENDATIONS:  STAT ECHO ordered this am reviewed as above.   Continue lidocaine IV drip  Recommend starting IV amiodarone if recurrent arrhythmias occur.  Recommend starting IV Heparin if no contraindications   Start IV nitroglycerin drip  Start Lopressor 25 mg BID PO  Start ASA 81 mg QD: monitor platelets   Unable to initiate statin therapy in the setting of LFTS.   Recommend patient to be [facility] for eventual need of cardiac catheterization (no bed availability at this time). Low threshold for transfer to tertiary care center.   Further recommendations to follow pending the above clinical course and ECHO.   Will follow       Above as per Dr. Cleveland -- A+P discussed and made in collaboration with him.  Further recommendations to follow      NOTE: This report was transcribed using voice recognition software. Every effort was made to ensure accuracy; however, inadvertent computerized transcription errors may be present.      Luba Butler, APRN-CNP  [facility] Cardiology

## 2024-04-30 NOTE — ED NOTES
Dr Henao in to see patient regarding chest tube now draining blood. States to observe drainage, and the patient will get a cxr and morning labs.

## 2024-04-30 NOTE — PLAN OF CARE
Problem: Safety - Adult  Goal: Free from fall injury  Outcome: Progressing     Problem: Discharge Planning  Goal: Discharge to home or other facility with appropriate resources  Outcome: Progressing     Problem: Nutrition Deficit:  Goal: Optimize nutritional status  4/30/2024 1342 by Jamaal Navarrete RN  Outcome: Progressing  4/30/2024 1231 by Catherine Kilpatrick RD, CNSC, LD  Flowsheets (Taken 4/30/2024 1231)  Nutrient intake appropriate for improving, restoring, or maintaining nutritional needs: Assess nutritional status and recommend course of action     Problem: Safety - Medical Restraint  Goal: Remains free of injury from restraints (Restraint for Interference with Medical Device)  Description: INTERVENTIONS:  1. Determine that other, less restrictive measures have been tried or would not be effective before applying the restraint  2. Evaluate the patient's condition at the time of restraint application  3. Inform patient/family regarding the reason for restraint  4. Q2H: Monitor safety, psychosocial status, comfort, nutrition and hydration  Outcome: Progressing  Flowsheets (Taken 4/30/2024 1252)  Remains free of injury from restraints (restraint for interference with medical device): Every 2 hours: Monitor safety, psychosocial status, comfort, nutrition and hydration     Problem: Skin/Tissue Integrity  Goal: Absence of new skin breakdown  Description: 1.  Monitor for areas of redness and/or skin breakdown  2.  Assess vascular access sites hourly  3.  Every 4-6 hours minimum:  Change oxygen saturation probe site  4.  Every 4-6 hours:  If on nasal continuous positive airway pressure, respiratory therapy assess nares and determine need for appliance change or resting period.  Outcome: Progressing     Problem: ABCDS Injury Assessment  Goal: Absence of physical injury  Outcome: Progressing

## 2024-04-30 NOTE — ED NOTES
Updated patients girlfriend Tisha at this time. She asked to speak to patient. Updated her that patient is intubated and sedated, and was sleeping due to sedation.

## 2024-05-01 ENCOUNTER — HOSPITAL ENCOUNTER (INPATIENT)
Age: 64
LOS: 24 days | DRG: 283 | End: 2024-05-25
Attending: STUDENT IN AN ORGANIZED HEALTH CARE EDUCATION/TRAINING PROGRAM | Admitting: STUDENT IN AN ORGANIZED HEALTH CARE EDUCATION/TRAINING PROGRAM
Payer: COMMERCIAL

## 2024-05-01 ENCOUNTER — APPOINTMENT (OUTPATIENT)
Dept: GENERAL RADIOLOGY | Age: 64
DRG: 283 | End: 2024-05-01
Attending: STUDENT IN AN ORGANIZED HEALTH CARE EDUCATION/TRAINING PROGRAM
Payer: COMMERCIAL

## 2024-05-01 VITALS
RESPIRATION RATE: 18 BRPM | HEIGHT: 76 IN | HEART RATE: 61 BPM | DIASTOLIC BLOOD PRESSURE: 52 MMHG | WEIGHT: 315 LBS | BODY MASS INDEX: 38.36 KG/M2 | TEMPERATURE: 96.3 F | OXYGEN SATURATION: 96 % | SYSTOLIC BLOOD PRESSURE: 103 MMHG

## 2024-05-01 PROBLEM — Z51.5 PALLIATIVE CARE BY SPECIALIST: Status: ACTIVE | Noted: 2024-05-01

## 2024-05-01 PROBLEM — I46.9 CARDIAC ARREST (HCC): Status: ACTIVE | Noted: 2024-05-01

## 2024-05-01 PROBLEM — Z71.89 GOALS OF CARE, COUNSELING/DISCUSSION: Status: ACTIVE | Noted: 2024-05-01

## 2024-05-01 LAB
AADO2: 338.7 MMHG
ALBUMIN SERPL-MCNC: 3.4 G/DL (ref 3.5–5.2)
ALBUMIN SERPL-MCNC: 3.5 G/DL (ref 3.5–5.2)
ALP SERPL-CCNC: 35 U/L (ref 40–129)
ALP SERPL-CCNC: 38 U/L (ref 40–129)
ALT SERPL-CCNC: 166 U/L (ref 0–40)
ALT SERPL-CCNC: 169 U/L (ref 0–40)
ANION GAP SERPL CALCULATED.3IONS-SCNC: 10 MMOL/L (ref 7–16)
ANION GAP SERPL CALCULATED.3IONS-SCNC: 11 MMOL/L (ref 7–16)
ANION GAP SERPL CALCULATED.3IONS-SCNC: 14 MMOL/L (ref 7–16)
ANION GAP SERPL CALCULATED.3IONS-SCNC: 9 MMOL/L (ref 7–16)
AST SERPL-CCNC: 85 U/L (ref 0–39)
AST SERPL-CCNC: 90 U/L (ref 0–39)
B.E.: -0.3 MMOL/L (ref -3–3)
BASOPHILS # BLD: 0.03 K/UL (ref 0–0.2)
BASOPHILS # BLD: 0.03 K/UL (ref 0–0.2)
BASOPHILS NFR BLD: 0 % (ref 0–2)
BASOPHILS NFR BLD: 0 % (ref 0–2)
BILIRUB SERPL-MCNC: 1.1 MG/DL (ref 0–1.2)
BILIRUB SERPL-MCNC: 1.1 MG/DL (ref 0–1.2)
BNP SERPL-MCNC: 4507 PG/ML (ref 0–125)
BUN SERPL-MCNC: 24 MG/DL (ref 6–23)
BUN SERPL-MCNC: 27 MG/DL (ref 6–23)
BUN SERPL-MCNC: 27 MG/DL (ref 6–23)
BUN SERPL-MCNC: 30 MG/DL (ref 6–23)
CA-I BLD-SCNC: 1.17 MMOL/L (ref 1.15–1.33)
CA-I BLD-SCNC: 1.18 MMOL/L (ref 1.15–1.33)
CALCIUM SERPL-MCNC: 8.7 MG/DL (ref 8.6–10.2)
CALCIUM SERPL-MCNC: 8.8 MG/DL (ref 8.6–10.2)
CALCIUM SERPL-MCNC: 8.9 MG/DL (ref 8.6–10.2)
CALCIUM SERPL-MCNC: 8.9 MG/DL (ref 8.6–10.2)
CHLORIDE SERPL-SCNC: 103 MMOL/L (ref 98–107)
CHLORIDE SERPL-SCNC: 104 MMOL/L (ref 98–107)
CHLORIDE SERPL-SCNC: 105 MMOL/L (ref 98–107)
CHLORIDE SERPL-SCNC: 107 MMOL/L (ref 98–107)
CK SERPL-CCNC: 529 U/L (ref 20–200)
CK SERPL-CCNC: 572 U/L (ref 20–200)
CO2 SERPL-SCNC: 22 MMOL/L (ref 22–29)
CO2 SERPL-SCNC: 25 MMOL/L (ref 22–29)
CO2 SERPL-SCNC: 26 MMOL/L (ref 22–29)
CO2 SERPL-SCNC: 26 MMOL/L (ref 22–29)
COHB: 0.7 % (ref 0–1.5)
CREAT SERPL-MCNC: 1.1 MG/DL (ref 0.7–1.2)
CREAT SERPL-MCNC: 1.2 MG/DL (ref 0.7–1.2)
CRITICAL: NORMAL
DATE ANALYZED: NORMAL
DATE OF COLLECTION: NORMAL
EOSINOPHIL # BLD: 0.03 K/UL (ref 0.05–0.5)
EOSINOPHIL # BLD: 0.03 K/UL (ref 0.05–0.5)
EOSINOPHILS RELATIVE PERCENT: 0 % (ref 0–6)
EOSINOPHILS RELATIVE PERCENT: 0 % (ref 0–6)
ERYTHROCYTE [DISTWIDTH] IN BLOOD BY AUTOMATED COUNT: 13.6 % (ref 11.5–15)
ERYTHROCYTE [DISTWIDTH] IN BLOOD BY AUTOMATED COUNT: 13.6 % (ref 11.5–15)
FIO2: 65 %
GFR, ESTIMATED: 71 ML/MIN/1.73M2
GFR, ESTIMATED: 76 ML/MIN/1.73M2
GFR, ESTIMATED: 77 ML/MIN/1.73M2
GFR, ESTIMATED: 79 ML/MIN/1.73M2
GLUCOSE BLD-MCNC: 109 MG/DL (ref 74–99)
GLUCOSE BLD-MCNC: 119 MG/DL (ref 74–99)
GLUCOSE BLD-MCNC: 129 MG/DL (ref 74–99)
GLUCOSE BLD-MCNC: 136 MG/DL (ref 74–99)
GLUCOSE BLD-MCNC: 148 MG/DL (ref 74–99)
GLUCOSE BLD-MCNC: 97 MG/DL (ref 74–99)
GLUCOSE BLD-MCNC: 97 MG/DL (ref 74–99)
GLUCOSE SERPL-MCNC: 124 MG/DL (ref 74–99)
GLUCOSE SERPL-MCNC: 155 MG/DL (ref 74–99)
GLUCOSE SERPL-MCNC: 159 MG/DL (ref 74–99)
GLUCOSE SERPL-MCNC: 162 MG/DL (ref 74–99)
HCO3: 24.2 MMOL/L (ref 22–26)
HCT VFR BLD AUTO: 40.4 % (ref 37–54)
HCT VFR BLD AUTO: 40.8 % (ref 37–54)
HCT VFR BLD AUTO: 41 % (ref 37–54)
HGB BLD-MCNC: 13.1 G/DL (ref 12.5–16.5)
HGB BLD-MCNC: 13.3 G/DL (ref 12.5–16.5)
HGB BLD-MCNC: 13.5 G/DL (ref 12.5–16.5)
HHB: 4.2 % (ref 0–5)
IMM GRANULOCYTES # BLD AUTO: 0.04 K/UL (ref 0–0.58)
IMM GRANULOCYTES # BLD AUTO: 0.05 K/UL (ref 0–0.58)
IMM GRANULOCYTES NFR BLD: 0 % (ref 0–5)
IMM GRANULOCYTES NFR BLD: 1 % (ref 0–5)
LAB: NORMAL
LACTATE BLDV-SCNC: 1 MMOL/L (ref 0.5–2.2)
LYMPHOCYTES NFR BLD: 0.65 K/UL (ref 1.5–4)
LYMPHOCYTES NFR BLD: 0.72 K/UL (ref 1.5–4)
LYMPHOCYTES RELATIVE PERCENT: 6 % (ref 20–42)
LYMPHOCYTES RELATIVE PERCENT: 7 % (ref 20–42)
Lab: 420
MAGNESIUM SERPL-MCNC: 1.8 MG/DL (ref 1.6–2.6)
MAGNESIUM SERPL-MCNC: 2 MG/DL (ref 1.6–2.6)
MCH RBC QN AUTO: 30.6 PG (ref 26–35)
MCH RBC QN AUTO: 31.3 PG (ref 26–35)
MCHC RBC AUTO-ENTMCNC: 32.6 G/DL (ref 32–34.5)
MCHC RBC AUTO-ENTMCNC: 33.4 G/DL (ref 32–34.5)
MCV RBC AUTO: 93.7 FL (ref 80–99.9)
MCV RBC AUTO: 94 FL (ref 80–99.9)
METHB: 0.2 % (ref 0–1.5)
MICROORGANISM SPEC CULT: NORMAL
MODE: AC
MONOCYTES NFR BLD: 0.66 K/UL (ref 0.1–0.95)
MONOCYTES NFR BLD: 0.75 K/UL (ref 0.1–0.95)
MONOCYTES NFR BLD: 6 % (ref 2–12)
MONOCYTES NFR BLD: 7 % (ref 2–12)
NEUTROPHILS NFR BLD: 85 % (ref 43–80)
NEUTROPHILS NFR BLD: 86 % (ref 43–80)
NEUTS SEG NFR BLD: 8.84 K/UL (ref 1.8–7.3)
NEUTS SEG NFR BLD: 9.1 K/UL (ref 1.8–7.3)
O2 CONTENT: 18.5 ML/DL
O2 SATURATION: 95.8 % (ref 92–98.5)
O2HB: 94.9 % (ref 94–97)
OPERATOR ID: 1893
PARTIAL THROMBOPLASTIN TIME: 46 SEC (ref 24.5–35.1)
PARTIAL THROMBOPLASTIN TIME: 46.4 SEC (ref 24.5–35.1)
PARTIAL THROMBOPLASTIN TIME: 54.8 SEC (ref 24.5–35.1)
PARTIAL THROMBOPLASTIN TIME: 66 SEC (ref 24.5–35.1)
PATIENT TEMP: 37 C
PCO2: 39.2 MMHG (ref 35–45)
PEEP/CPAP: 5 CMH2O
PFO2: 1.26 MMHG/%
PH BLOOD GAS: 7.41 (ref 7.35–7.45)
PHOSPHATE SERPL-MCNC: 2.3 MG/DL (ref 2.5–4.5)
PHOSPHATE SERPL-MCNC: 2.3 MG/DL (ref 2.5–4.5)
PHOSPHATE SERPL-MCNC: 2.5 MG/DL (ref 2.5–4.5)
PHOSPHATE SERPL-MCNC: 2.7 MG/DL (ref 2.5–4.5)
PLATELET # BLD AUTO: 105 K/UL (ref 130–450)
PLATELET, FLUORESCENCE: 104 K/UL (ref 130–450)
PMV BLD AUTO: 10.6 FL (ref 7–12)
PMV BLD AUTO: 11.2 FL (ref 7–12)
PO2: 82.1 MMHG (ref 75–100)
POTASSIUM SERPL-SCNC: 3.1 MMOL/L (ref 3.5–5)
POTASSIUM SERPL-SCNC: 3.4 MMOL/L (ref 3.5–5)
POTASSIUM SERPL-SCNC: 3.4 MMOL/L (ref 3.5–5)
POTASSIUM SERPL-SCNC: 4 MMOL/L (ref 3.5–5)
PROCALCITONIN SERPL-MCNC: 0.74 NG/ML (ref 0–0.08)
PROT SERPL-MCNC: 5.6 G/DL (ref 6.4–8.3)
PROT SERPL-MCNC: 5.9 G/DL (ref 6.4–8.3)
RBC # BLD AUTO: 4.31 M/UL (ref 3.8–5.8)
RBC # BLD AUTO: 4.34 M/UL (ref 3.8–5.8)
RI(T): 4.13
RR MECHANICAL: 18 B/MIN
SODIUM SERPL-SCNC: 139 MMOL/L (ref 132–146)
SODIUM SERPL-SCNC: 140 MMOL/L (ref 132–146)
SODIUM SERPL-SCNC: 141 MMOL/L (ref 132–146)
SODIUM SERPL-SCNC: 142 MMOL/L (ref 132–146)
SOURCE, BLOOD GAS: NORMAL
SPECIMEN DESCRIPTION: NORMAL
THB: 13.8 G/DL (ref 11.5–16.5)
TIME ANALYZED: 422
TROPONIN I SERPL HS-MCNC: 510 NG/L (ref 0–11)
TROPONIN I SERPL HS-MCNC: 554 NG/L (ref 0–11)
VT MECHANICAL: 510 ML
WBC OTHER # BLD: 10.3 K/UL (ref 4.5–11.5)
WBC OTHER # BLD: 10.6 K/UL (ref 4.5–11.5)

## 2024-05-01 PROCEDURE — 82962 GLUCOSE BLOOD TEST: CPT

## 2024-05-01 PROCEDURE — A4216 STERILE WATER/SALINE, 10 ML: HCPCS

## 2024-05-01 PROCEDURE — 6370000000 HC RX 637 (ALT 250 FOR IP)

## 2024-05-01 PROCEDURE — 94002 VENT MGMT INPAT INIT DAY: CPT

## 2024-05-01 PROCEDURE — 80053 COMPREHEN METABOLIC PANEL: CPT

## 2024-05-01 PROCEDURE — 83735 ASSAY OF MAGNESIUM: CPT

## 2024-05-01 PROCEDURE — 87086 URINE CULTURE/COLONY COUNT: CPT

## 2024-05-01 PROCEDURE — 87070 CULTURE OTHR SPECIMN AEROBIC: CPT

## 2024-05-01 PROCEDURE — 84100 ASSAY OF PHOSPHORUS: CPT

## 2024-05-01 PROCEDURE — 37799 UNLISTED PX VASCULAR SURGERY: CPT

## 2024-05-01 PROCEDURE — 71045 X-RAY EXAM CHEST 1 VIEW: CPT

## 2024-05-01 PROCEDURE — 6360000002 HC RX W HCPCS: Performed by: NURSE PRACTITIONER

## 2024-05-01 PROCEDURE — 36415 COLL VENOUS BLD VENIPUNCTURE: CPT

## 2024-05-01 PROCEDURE — 87040 BLOOD CULTURE FOR BACTERIA: CPT

## 2024-05-01 PROCEDURE — 99222 1ST HOSP IP/OBS MODERATE 55: CPT

## 2024-05-01 PROCEDURE — 2580000003 HC RX 258: Performed by: STUDENT IN AN ORGANIZED HEALTH CARE EDUCATION/TRAINING PROGRAM

## 2024-05-01 PROCEDURE — 82805 BLOOD GASES W/O2 SATURATION: CPT

## 2024-05-01 PROCEDURE — 85025 COMPLETE CBC W/AUTO DIFF WBC: CPT

## 2024-05-01 PROCEDURE — APPSS180 APP SPLIT SHARED TIME > 60 MINUTES: Performed by: NURSE PRACTITIONER

## 2024-05-01 PROCEDURE — 85730 THROMBOPLASTIN TIME PARTIAL: CPT

## 2024-05-01 PROCEDURE — 85014 HEMATOCRIT: CPT

## 2024-05-01 PROCEDURE — 80176 ASSAY OF LIDOCAINE: CPT

## 2024-05-01 PROCEDURE — 2000000000 HC ICU R&B

## 2024-05-01 PROCEDURE — 93005 ELECTROCARDIOGRAM TRACING: CPT

## 2024-05-01 PROCEDURE — 84145 PROCALCITONIN (PCT): CPT

## 2024-05-01 PROCEDURE — 6360000002 HC RX W HCPCS

## 2024-05-01 PROCEDURE — C9113 INJ PANTOPRAZOLE SODIUM, VIA: HCPCS

## 2024-05-01 PROCEDURE — 74018 RADEX ABDOMEN 1 VIEW: CPT

## 2024-05-01 PROCEDURE — 82550 ASSAY OF CK (CPK): CPT

## 2024-05-01 PROCEDURE — 5A1955Z RESPIRATORY VENTILATION, GREATER THAN 96 CONSECUTIVE HOURS: ICD-10-PCS | Performed by: INTERNAL MEDICINE

## 2024-05-01 PROCEDURE — 6360000002 HC RX W HCPCS: Performed by: STUDENT IN AN ORGANIZED HEALTH CARE EDUCATION/TRAINING PROGRAM

## 2024-05-01 PROCEDURE — 83880 ASSAY OF NATRIURETIC PEPTIDE: CPT

## 2024-05-01 PROCEDURE — 0BH17EZ INSERTION OF ENDOTRACHEAL AIRWAY INTO TRACHEA, VIA NATURAL OR ARTIFICIAL OPENING: ICD-10-PCS | Performed by: INTERNAL MEDICINE

## 2024-05-01 PROCEDURE — 85018 HEMOGLOBIN: CPT

## 2024-05-01 PROCEDURE — 83605 ASSAY OF LACTIC ACID: CPT

## 2024-05-01 PROCEDURE — 87205 SMEAR GRAM STAIN: CPT

## 2024-05-01 PROCEDURE — 99233 SBSQ HOSP IP/OBS HIGH 50: CPT | Performed by: INTERNAL MEDICINE

## 2024-05-01 PROCEDURE — 86403 PARTICLE AGGLUT ANTBDY SCRN: CPT

## 2024-05-01 PROCEDURE — 82330 ASSAY OF CALCIUM: CPT

## 2024-05-01 PROCEDURE — 2500000003 HC RX 250 WO HCPCS

## 2024-05-01 PROCEDURE — 2580000003 HC RX 258

## 2024-05-01 PROCEDURE — 80048 BASIC METABOLIC PNL TOTAL CA: CPT

## 2024-05-01 PROCEDURE — 84484 ASSAY OF TROPONIN QUANT: CPT

## 2024-05-01 RX ORDER — IPRATROPIUM BROMIDE AND ALBUTEROL SULFATE 2.5; .5 MG/3ML; MG/3ML
1 SOLUTION RESPIRATORY (INHALATION) EVERY 4 HOURS PRN
Status: DISCONTINUED | OUTPATIENT
Start: 2024-05-01 | End: 2024-05-04

## 2024-05-01 RX ORDER — GLUCAGON 1 MG/ML
1 KIT INJECTION PRN
Status: DISCONTINUED | OUTPATIENT
Start: 2024-05-01 | End: 2024-05-18

## 2024-05-01 RX ORDER — POTASSIUM CHLORIDE 29.8 MG/ML
20 INJECTION INTRAVENOUS
Status: DISCONTINUED | OUTPATIENT
Start: 2024-05-01 | End: 2024-05-01

## 2024-05-01 RX ORDER — HEPARIN SODIUM 1000 [USP'U]/ML
4000 INJECTION, SOLUTION INTRAVENOUS; SUBCUTANEOUS PRN
Status: DISCONTINUED | OUTPATIENT
Start: 2024-05-01 | End: 2024-05-03

## 2024-05-01 RX ORDER — HEPARIN SODIUM 1000 [USP'U]/ML
2000 INJECTION, SOLUTION INTRAVENOUS; SUBCUTANEOUS PRN
Status: DISCONTINUED | OUTPATIENT
Start: 2024-05-01 | End: 2024-05-03

## 2024-05-01 RX ORDER — HEPARIN SODIUM 10000 [USP'U]/100ML
5-30 INJECTION, SOLUTION INTRAVENOUS CONTINUOUS
Status: DISCONTINUED | OUTPATIENT
Start: 2024-05-01 | End: 2024-05-03

## 2024-05-01 RX ORDER — SODIUM CHLORIDE 9 MG/ML
INJECTION, SOLUTION INTRAVENOUS PRN
Status: DISCONTINUED | OUTPATIENT
Start: 2024-05-01 | End: 2024-05-18

## 2024-05-01 RX ORDER — DEXTROSE MONOHYDRATE 100 MG/ML
INJECTION, SOLUTION INTRAVENOUS CONTINUOUS PRN
Status: DISCONTINUED | OUTPATIENT
Start: 2024-05-01 | End: 2024-05-18

## 2024-05-01 RX ORDER — MAGNESIUM SULFATE IN WATER 40 MG/ML
2000 INJECTION, SOLUTION INTRAVENOUS ONCE
Status: COMPLETED | OUTPATIENT
Start: 2024-05-01 | End: 2024-05-01

## 2024-05-01 RX ORDER — ACETAMINOPHEN 325 MG/1
650 TABLET ORAL EVERY 6 HOURS PRN
Status: DISCONTINUED | OUTPATIENT
Start: 2024-05-01 | End: 2024-05-18

## 2024-05-01 RX ORDER — POTASSIUM CHLORIDE 29.8 MG/ML
20 INJECTION INTRAVENOUS ONCE
Status: COMPLETED | OUTPATIENT
Start: 2024-05-01 | End: 2024-05-01

## 2024-05-01 RX ORDER — POLYETHYLENE GLYCOL 3350 17 G/17G
17 POWDER, FOR SOLUTION ORAL DAILY PRN
Status: DISCONTINUED | OUTPATIENT
Start: 2024-05-01 | End: 2024-05-18

## 2024-05-01 RX ORDER — ACETAMINOPHEN 650 MG/1
650 SUPPOSITORY RECTAL EVERY 6 HOURS PRN
Status: DISCONTINUED | OUTPATIENT
Start: 2024-05-01 | End: 2024-05-18

## 2024-05-01 RX ORDER — FENTANYL CITRATE-0.9 % NACL/PF 10 MCG/ML
25-200 PLASTIC BAG, INJECTION (ML) INTRAVENOUS CONTINUOUS
Status: DISCONTINUED | OUTPATIENT
Start: 2024-05-01 | End: 2024-05-02

## 2024-05-01 RX ORDER — ONDANSETRON 4 MG/1
4 TABLET, ORALLY DISINTEGRATING ORAL EVERY 8 HOURS PRN
Status: DISCONTINUED | OUTPATIENT
Start: 2024-05-01 | End: 2024-05-18

## 2024-05-01 RX ORDER — POTASSIUM CHLORIDE 29.8 MG/ML
20 INJECTION INTRAVENOUS
Status: DISCONTINUED | OUTPATIENT
Start: 2024-05-01 | End: 2024-05-01 | Stop reason: HOSPADM

## 2024-05-01 RX ORDER — INSULIN LISPRO 100 [IU]/ML
0-4 INJECTION, SOLUTION INTRAVENOUS; SUBCUTANEOUS
Status: DISCONTINUED | OUTPATIENT
Start: 2024-05-01 | End: 2024-05-02

## 2024-05-01 RX ORDER — SODIUM CHLORIDE 0.9 % (FLUSH) 0.9 %
5-40 SYRINGE (ML) INJECTION PRN
Status: DISCONTINUED | OUTPATIENT
Start: 2024-05-01 | End: 2024-05-18

## 2024-05-01 RX ORDER — LIDOCAINE HYDROCHLORIDE ANHYDROUS AND DEXTROSE MONOHYDRATE 5; 400 G/100ML; MG/100ML
1 INJECTION, SOLUTION INTRAVENOUS CONTINUOUS
Status: DISCONTINUED | OUTPATIENT
Start: 2024-05-01 | End: 2024-05-02

## 2024-05-01 RX ORDER — SODIUM CHLORIDE, SODIUM LACTATE, POTASSIUM CHLORIDE, CALCIUM CHLORIDE 600; 310; 30; 20 MG/100ML; MG/100ML; MG/100ML; MG/100ML
INJECTION, SOLUTION INTRAVENOUS CONTINUOUS
Status: ACTIVE | OUTPATIENT
Start: 2024-05-01 | End: 2024-05-02

## 2024-05-01 RX ORDER — ATORVASTATIN CALCIUM 40 MG/1
40 TABLET, FILM COATED ORAL NIGHTLY
Status: DISCONTINUED | OUTPATIENT
Start: 2024-05-02 | End: 2024-05-18

## 2024-05-01 RX ORDER — POTASSIUM CHLORIDE 7.45 MG/ML
10 INJECTION INTRAVENOUS ONCE
Status: COMPLETED | OUTPATIENT
Start: 2024-05-01 | End: 2024-05-01

## 2024-05-01 RX ORDER — MINERAL OIL AND WHITE PETROLATUM 150; 830 MG/G; MG/G
OINTMENT OPHTHALMIC EVERY 8 HOURS PRN
Status: DISCONTINUED | OUTPATIENT
Start: 2024-05-01 | End: 2024-05-18

## 2024-05-01 RX ORDER — ONDANSETRON 2 MG/ML
4 INJECTION INTRAMUSCULAR; INTRAVENOUS EVERY 6 HOURS PRN
Status: DISCONTINUED | OUTPATIENT
Start: 2024-05-01 | End: 2024-05-18

## 2024-05-01 RX ORDER — CHLORHEXIDINE GLUCONATE ORAL RINSE 1.2 MG/ML
15 SOLUTION DENTAL 2 TIMES DAILY
Status: DISCONTINUED | OUTPATIENT
Start: 2024-05-01 | End: 2024-05-18

## 2024-05-01 RX ORDER — SODIUM CHLORIDE 0.9 % (FLUSH) 0.9 %
5-40 SYRINGE (ML) INJECTION EVERY 12 HOURS SCHEDULED
Status: DISCONTINUED | OUTPATIENT
Start: 2024-05-01 | End: 2024-05-18

## 2024-05-01 RX ORDER — MIDAZOLAM HYDROCHLORIDE 1 MG/ML
1-10 INJECTION, SOLUTION INTRAVENOUS CONTINUOUS
Status: DISCONTINUED | OUTPATIENT
Start: 2024-05-01 | End: 2024-05-02

## 2024-05-01 RX ORDER — INSULIN LISPRO 100 [IU]/ML
0-4 INJECTION, SOLUTION INTRAVENOUS; SUBCUTANEOUS NIGHTLY
Status: DISCONTINUED | OUTPATIENT
Start: 2024-05-01 | End: 2024-05-02

## 2024-05-01 RX ADMIN — HEPARIN SODIUM 9 UNITS/KG/HR: 10000 INJECTION, SOLUTION INTRAVENOUS at 09:47

## 2024-05-01 RX ADMIN — MAGNESIUM SULFATE HEPTAHYDRATE 2000 MG: 40 INJECTION, SOLUTION INTRAVENOUS at 06:13

## 2024-05-01 RX ADMIN — ACETAMINOPHEN 650 MG: 325 TABLET ORAL at 22:48

## 2024-05-01 RX ADMIN — SODIUM CHLORIDE, POTASSIUM CHLORIDE, SODIUM LACTATE AND CALCIUM CHLORIDE: 600; 310; 30; 20 INJECTION, SOLUTION INTRAVENOUS at 10:06

## 2024-05-01 RX ADMIN — SODIUM CHLORIDE, PRESERVATIVE FREE 10 ML: 5 INJECTION INTRAVENOUS at 21:19

## 2024-05-01 RX ADMIN — HEPARIN SODIUM 2000 UNITS: 1000 INJECTION INTRAVENOUS; SUBCUTANEOUS at 16:24

## 2024-05-01 RX ADMIN — POTASSIUM CHLORIDE 20 MEQ: 29.8 INJECTION, SOLUTION INTRAVENOUS at 06:04

## 2024-05-01 RX ADMIN — Medication 200 MCG/HR: at 12:15

## 2024-05-01 RX ADMIN — MINERAL OIL, WHITE PETROLATUM: .03; .94 OINTMENT OPHTHALMIC at 21:24

## 2024-05-01 RX ADMIN — POTASSIUM CHLORIDE 20 MEQ: 29.8 INJECTION, SOLUTION INTRAVENOUS at 06:57

## 2024-05-01 RX ADMIN — Medication 200 MCG/HR: at 18:22

## 2024-05-01 RX ADMIN — SODIUM CHLORIDE, POTASSIUM CHLORIDE, SODIUM LACTATE AND CALCIUM CHLORIDE: 600; 310; 30; 20 INJECTION, SOLUTION INTRAVENOUS at 22:23

## 2024-05-01 RX ADMIN — HEPARIN SODIUM 2000 UNITS: 1000 INJECTION INTRAVENOUS; SUBCUTANEOUS at 07:08

## 2024-05-01 RX ADMIN — Medication 15 ML: at 08:13

## 2024-05-01 RX ADMIN — Medication 10 MG/HR: at 06:20

## 2024-05-01 RX ADMIN — Medication 200 MCG/HR: at 03:36

## 2024-05-01 RX ADMIN — Medication 200 MCG/HR: at 05:59

## 2024-05-01 RX ADMIN — SODIUM CHLORIDE, PRESERVATIVE FREE 10 ML: 5 INJECTION INTRAVENOUS at 09:59

## 2024-05-01 RX ADMIN — LIDOCAINE HYDROCHLORIDE 1 MG/MIN: 4 INJECTION, SOLUTION INTRAVENOUS at 07:04

## 2024-05-01 RX ADMIN — Medication 10 MG/HR: at 03:38

## 2024-05-01 RX ADMIN — Medication 10 MG/HR: at 17:01

## 2024-05-01 RX ADMIN — POTASSIUM CHLORIDE 20 MEQ: 29.8 INJECTION, SOLUTION INTRAVENOUS at 00:41

## 2024-05-01 RX ADMIN — Medication 15 ML: at 21:22

## 2024-05-01 RX ADMIN — SODIUM CHLORIDE, PRESERVATIVE FREE 40 MG: 5 INJECTION INTRAVENOUS at 08:10

## 2024-05-01 RX ADMIN — POTASSIUM CHLORIDE 10 MEQ: 7.46 INJECTION, SOLUTION INTRAVENOUS at 03:46

## 2024-05-01 RX ADMIN — HEPARIN SODIUM 7 UNITS/KG/HR: 10000 INJECTION, SOLUTION INTRAVENOUS at 03:15

## 2024-05-01 RX ADMIN — POTASSIUM PHOSPHATE, MONOBASIC AND POTASSIUM PHOSPHATE, DIBASIC 15 MMOL: 224; 236 INJECTION, SOLUTION, CONCENTRATE INTRAVENOUS at 06:41

## 2024-05-01 ASSESSMENT — PULMONARY FUNCTION TESTS
PIF_VALUE: 25
PIF_VALUE: 26
PIF_VALUE: 22
PIF_VALUE: 23
PIF_VALUE: 28
PIF_VALUE: 29
PIF_VALUE: 27
PIF_VALUE: 20

## 2024-05-01 ASSESSMENT — PAIN SCALES - GENERAL
PAINLEVEL_OUTOF10: 0

## 2024-05-01 NOTE — CONSULTS
[facility]  Internal Medicine Residency Program  CONSULT NOTE  MICU    Patient:  Nabeel Appiah 63 y.o. male MRN: [id number]     Date of Service: 5/1/2024    Hospital Day: 1      Chief complaint: Cardiac arrest  History of Present Illness   The patient is a 63 y.o. male with past medical history of hypertension, hyperlipidemia, persistent A-fib on Eliquis, type II DM, AAA was brought to the [place] ED after cardiac arrest.  History obtained from chart review as patient is intubated and sedated.  Patient had a witnessed fall at work on 4/29/2024, and was unresponsive.  Fire department were called, CPR was started.  Initial rhythm was ventricular fibrillation with 1 episode of what appeared to be torsades.  EMS arrived.  Patient was shocked 3 times, received 3 epinephrines, 300 mg of amiodarone, was intubated on field by EMS.  ROSC was achieved just before arriving to ED at around 2:26 PM.  Patient was brought to the [place] ED, later admitted to [place] ICU.     On arrival to [place] ED, patient's blood glucose was 230, EKG was done.  Patient was given calcium gluconate and 1 ampoule of sodium bicarb for widened QRS.  Patient was also started on lidocaine infusion due to recurrent ventricular arrhythmias on the way to ER.  On lidocaine drip the patient maintained NSR.  Imaging revealed a small pneumothorax, right-sided chest tube was placed.  Right femoral CVC placed.  ABG revealed respiratory acidosis.  Patient also had hematuria.  Creatinine was slightly elevated from baseline at 1.5.  ALT and AST were elevated, CT abdomen did not reveal any biliary pathology.  Lactic acid was 6.1 and patient was given 2 L of fluid.  Patient was found to have decorticating posturing.  Patient was given 2 additional ampules of sodium bicarbonate for concern of cerebral edema.  Patient was also given 2 g of Keppra IV.  Troponin was initially 104, repeat troponin was 518.  Rectal aspirin was administered.   4/26/24   Jonh Allen DO   pravastatin (PRAVACHOL) 40 MG tablet Take 1 tablet by mouth in the morning and at bedtime 4/17/24   Jonh Allen DO   metoprolol succinate (TOPROL XL) 25 MG extended release tablet Take 1 tablet by mouth daily 1/12/24   Jonh Allen DO   lisinopril-hydroCHLOROthiazide (PRINZIDE;ZESTORETIC) 20-12.5 MG per tablet TAKE 2 TABLETS DAILY 5/10/23   Jonh Allen DO   metFORMIN (GLUCOPHAGE-XR) 500 MG extended release tablet Take 1 tablet by mouth 2 times daily (with meals) 5/10/23   Jonh Allen DO   sildenafil (VIAGRA) 100 MG tablet Take 1 tablet by mouth as needed for Erectile Dysfunction Take one half daily, as needed 11/11/20   Sonido Haines MD   Cholecalciferol (VITAMIN D) 50 MCG (2000 UT) TABS tablet Take 1 tablet by mouth daily 3/6/20   Mariel Murdock MD       Allergies:  Patient has no known allergies.    Social History:   TOBACCO:   reports that he quit smoking about 3 years ago. His smoking use included cigarettes. He started smoking about 44 years ago. He has a 21.0 pack-year smoking history. He has never used smokeless tobacco.  ETOH:   reports current alcohol use.  OCCUPATION:     Family History:       Problem Relation Age of Onset    No Known Problems Mother        REVIEW OF SYSTEMS:    Unable to obtain as patient is intubated and sedated    Physical Exam   Vitals: /64   Pulse 50   Temp (!) 95.2 °F (35.1 °C) (Bladder)   Resp 16   Ht 1.918 m (6' 3.51\")   Wt (!) 168.5 kg (371 lb 6.4 oz)   SpO2 96%   BMI 45.79 kg/m²   ABP (Arterial line BP): 131/61  ABP Mean (Arterial Line Mean): 83 mmHg    General Appearance: Intubated, sedated, unresponsive to pain and voice  Head: normocephalic and atraumatic  Eyes:  pupils constricted, nonreactive to light  Pulmonary/Chest: clear to auscultation bilaterally- no wheezes, rales or rhonchi, normal air movement, no respiratory distress  Cardiovascular: Normal rate and

## 2024-05-01 NOTE — DISCHARGE SUMMARY
[facility] Hospitalist Physician Discharge Summary       No follow-up provider specified.    Activity level: as tolerated    Dispo: other acute care facility      Condition on discharge: stable    Patient ID:  Nabeel Appiah  [id number]  63 y.o.  1960    Admit date: 4/29/2024    Discharge date and time:  5/1/2024  5:10 PM    Admission Diagnoses: Principal Problem:    Cardiopulmonary arrest (HCC)  Active Problems:    NSTEMI (non-ST elevated myocardial infarction) (HCC)    Ventricular dysrhythmia    Lactic acidosis    Transaminitis  Resolved Problems:    * No resolved hospital problems. *      Discharge Diagnoses: Principal Problem:    Cardiopulmonary arrest (HCC)  Active Problems:    NSTEMI (non-ST elevated myocardial infarction) (HCC)    Ventricular dysrhythmia    Lactic acidosis    Transaminitis  Resolved Problems:    * No resolved hospital problems. *      Consults:  IP CONSULT TO CRITICAL CARE  IP CONSULT TO CARDIOLOGY  IP CONSULT TO DIETITIAN    Procedures:  Chest tube, right, 4/29     Hospital Course:   Patient Nabeel Appiah is a 63 y.o. presented with Cardiac arrest (HCC) [I46.9]  Cardiopulmonary arrest (HCC) [I46.9]  Lactic acidosis [E87.20]  Respiratory acidosis [E87.29]  Ventricular dysrhythmia [I49.9]  Transaminitis [R74.01]  NSTEMI (non-ST elevated myocardial infarction) (HCC) [I21.4]  Poor intravenous access [Z78.9]  Cardiopulmonary resuscitation (CPR)-only resuscitation status [Z78.9]  Closed fracture of multiple ribs of right side, initial encounter [S22.41XA]  Decorticate posturing [R29.3]  Pneumothorax, unspecified type [J93.9]  Hypertension, unspecified type [I10]    Brief summary:  Patient presented with cardiac arrest, VT/VF and ROSC with shocks x3 and amio. Admitted to the ICU pending transfer to [facility] for eventual cardiac catheterization. Hypothermia protocol initiated. He is now being transferred to [facility] facility for further management. Patient otherwise remained in critical  SYSTEM PROVIDED HISTORY: S/P intubation. TECHNOLOGIST PROVIDED HISTORY: Reason for exam:->S/P intubation. FINDINGS: A right-sided chest tube is again seen in place.  No pneumothorax is seen. Right-sided subcutaneous emphysema is again appreciated.  There has been no significant interval change in the bilateral lung opacities and right pleural effusion.  Cardiomegaly is again noted.  The mediastinal structures are without significant interval change.  An endotracheal tube is again identified, with the tip 4.8 cm above the milla.  An NG tube is again identified, with the tip in the stomach.     1. No significant interval change when compared to the previous study performed earlier in the day.     XR ABDOMEN FOR NG/OG/NE TUBE PLACEMENT    Result Date: 5/1/2024  EXAMINATION: ONE SUPINE XRAY VIEW(S) OF THE ABDOMEN 5/1/2024 7:57 am COMPARISON: None. HISTORY: ORDERING SYSTEM PROVIDED HISTORY: Confirmation of course of NG/OG/NE tube and location of tip of tube TECHNOLOGIST PROVIDED HISTORY: Reason for exam:->Confirmation of course of NG/OG/NE tube and location of tip of tube Portable?->Yes FINDINGS: Orogastric tube tip as several cm past GE junction and should be advanced the approximately 7 cm for more optimal placement.     Orogastric tube tip several cm past GE junction and should be advanced the approximately 7 cm for more optimal placement.     XR CHEST PORTABLE    Result Date: 5/1/2024  EXAMINATION: ONE XRAY VIEW OF THE CHEST 5/1/2024 7:58 am COMPARISON: 05/01/2024 HISTORY: ORDERING SYSTEM PROVIDED HISTORY: post ETT advancement TECHNOLOGIST PROVIDED HISTORY: Reason for exam:->post ETT advancement FINDINGS: Endotracheal tube terminates approximately 3 cm from the milla.  NG tube is stable.  Right chest tube is stable.  Mild cardiomegaly is unchanged. Pulmonary vasculature is within normal limits. Mild atelectatic changes noted at the right lung base.  No pneumothorax is identified. Bony structures are unremarkable.

## 2024-05-01 NOTE — CARE COORDINATION
Case Management Assessment  Initial Evaluation    Date/Time of Evaluation: 5/1/2024 1:12 PM  Assessment Completed by: Valery Beard RN    If patient is discharged prior to next notation, then this note serves as note for discharge by case management.    Patient Name: Nabeel Appiah                   YOB: 1960  Diagnosis: Cardiac arrest (HCC) [I46.9]                   Date / Time: 5/1/2024  2:21 AM    Patient Admission Status: Inpatient   Readmission Risk (Low < 19, Mod (19-27), High > 27): Readmission Risk Score: 14.2    Current PCP: Jonh Allen, DO  PCP verified by CM? No    Chart Reviewed: Yes      History Provided by: Friend  Patient Orientation: Unable to Assess    Patient Cognition: Other (see comment) (intubated)    Hospitalization in the last 30 days (Readmission):  Yes    If yes, Readmission Assessment in CM Navigator will be completed.    Advance Directives:      Code Status: Full Code   Patient's Primary Decision Maker is: Legal Next of Kin (THERE IS NO PDM-- only has girlfriend)      Discharge Planning:    Patient lives with:   Type of Home:    Primary Care Giver: Self  Patient Support Systems include: Friends/Neighbors   Current Financial resources:    Current community resources:    Current services prior to admission:              Current DME:              Type of Home Care services:       ADLS  Prior functional level: Independent in ADLs/IADLs  Current functional level: Other (see comment) (icu cardiac arrest)    PT AM-PAC:   /24  OT AM-PAC:   /24    Family can provide assistance at DC: Other (comment) (needs unknown)  Would you like Case Management to discuss the discharge plan with any other family members/significant others, and if so, who? No (no family, only girlfriend)  Plans to Return to Present Housing: Unknown at present  Other Identified Issues/Barriers to RETURNING to current housing: unknown  Potential Assistance needed at discharge:              Potential DME:     Patient expects to discharge to:    Plan for transportation at discharge:      Financial    Payor: [employer] / Plan: [employer] [address] / Product Type: *No Product type* /     Does insurance require precert for SNF: Yes    Potential assistance Purchasing Medications:    Meds-to-Beds request:        [id number] - [place] - [address] - P [phone] - F [phone]  [address] [place] [address]  Phone: [phone] Fax: [phone]    [facility] - [place], [place] - [address] - P [phone] - F [phone]  [address] [address]  Phone: [phone] Fax: [phone]      Notes:    Factors facilitating achievement of predicted outcomes: Friend support    Barriers to discharge: Medical complications    Additional Case Management Notes:     The Plan for Transition of Care is related to the following treatment goals of Cardiac arrest (HCC) [I46.9]    IF APPLICABLE: The Patient and/or patient representative Nabeel and his family were provided with a choice of provider and agrees with the discharge plan. Freedom of choice list with basic dialogue that supports the patient's individualized plan of care/goals and shares the quality data associated with the providers was provided to:     Patient Representative Name:       The Patient and/or Patient Representative Agree with the Discharge Plan?      Valery Beard RN  Case Management Department  Ph: [phone] Fax: [phone]

## 2024-05-01 NOTE — CARE COORDINATION
Care Coordination:  Transfer from [facility] today. S/P Cardiac arrest outside of hospital. Intubated, sedated and transfer to [facility] for cardiac cath. Cardiology following and will need to be assessed for signs of mental status recovery, cardiac cath at some point.  Vent, lidocaine, heparin gtt, versed, fentanyl, arctic sun.  Call to contact on chart Tisha.  States she is his Girl friend; they are not  but have been together for 27 years.  They live separately, he has his own house, 1 story. 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   Follows with pcp , she is uncertain of which one. He has no living relatives, sister  age 54, uncle  59, father  in 50's, mother  last year at 92.   There is no POA in place.  She is on her way to hospital.  Needs unclear at this time.    Electronically signed by Valery Beard RN on 2024 at 1:07 PM

## 2024-05-01 NOTE — PROGRESS NOTES
Inpatient Cardiology Progress note     PATIENT IS BEING FOLLOWED FOR: S/p Cardiac Arrest, NSTEMI on Heparin gtt, possible need for cardiac catheterization     Nabeel Appiah is a 63-year-old  male who follows with Dr. Haines.  He was most recently seen in consultation with Dr. Cleveland on 04/30/2024 for cardiac arrest.    [facility]-ED on 4/29/2024 with a cardiac arrest. Patient was at work (works as a ) and had a witnessed fall where he was became unresponsive, a bystander initiated CPR. EMS and fire department were summoned.  The fire department was the first on arrival and started CPR.  His initial rhythm was VFib, shocked x 1.  Patient received 2 more shocks for additional episodes of V-fib and 1 episode that appeared to be torsades (strips are unavailable for review).  He was given 3 doses of epinephrine, 300 mg of amiodarone and was intubated.  His initial EKG showed ST, PVCs,right axis deviation, nonspecific ST-T wave changes, new RBBB, rate 101 bpm. On arrival to the ED, patient was started on lidocaine drip for recurrent ventricular arrhythmias noted and route to emergency department.  On imaging a small pneumothorax was seen and a chest tube was placed.  Right femoral central line was placed due to patient due to difficulty obtaining blood work.  ABG showed acute respiratory acidosis likely secondary to respiratory insufficiency and cardiac arrest.  Patient did have hematuria.  The patient was found to have decorticate posturing per ER documentation.  Patient was given 2 A of sodium and bicarbonate along with 2 g of Keppra IV.  Patient was given rectal aspirin for a high-sensitivity troponin from 10 4-5 18.  Heparin was not administered due to possible hemorrhage and recent chest tube placement.  Patient's temperature on arrival was 37.2 °C in the emergency department patient became hypertensive and was given hydralazine 5 mg IV.  Patient was started on IV fentanyl.     On-call

## 2024-05-01 NOTE — PROGRESS NOTES
I visited the patient in his room - he was resting comfortably, but unable to talk. I reached out to the contact list and made connection with a significant other, a long time girl friend. She related to me that no other family was living. She was grieving over the medical challenges, and seemed surprised, as she thought he was in good health. So this chapter is unexpected. I assured her of our prayers and support.    She related to me that all of his family had already passed. He has no immediate family.    If you need additional support, you may reach out to us at [facility], x [phone].     Nahum Braun; FABI Johansen

## 2024-05-01 NOTE — PROGRESS NOTES
Patient admitted to MICU with the following belongings:  pants, shirt, undergarments, socks, gloves, shoes, keys, watch, necklace, mints, lighter, wallet chew tobacco. The following belongings admitted with the patient: pants, shirt, undergarments, socks, gloves, shoes, keys, watch, necklace, mints, lighter, wallet chew tobacco. None were sent home with the patient's family.

## 2024-05-01 NOTE — PROGRESS NOTES
Report called to [facility]. No questions at this time.    Tisha, patients emergency contact, notified of patients transfer.

## 2024-05-01 NOTE — CONSULTS
Please see full consult note dated 04/30/2024.  Electronically signed by KENNEDI Bronson CNP on 5/1/24 at 7:26 AM EDT

## 2024-05-01 NOTE — PLAN OF CARE
Problem: Safety - Adult  Goal: Free from fall injury  Outcome: Progressing  Flowsheets (Taken 5/1/2024 0841)  Free From Fall Injury: Instruct family/caregiver on patient safety     Problem: Pain  Goal: Verbalizes/displays adequate comfort level or baseline comfort level  Outcome: Progressing  Flowsheets (Taken 5/1/2024 0841)  Verbalizes/displays adequate comfort level or baseline comfort level:   Assess pain using appropriate pain scale   Administer analgesics based on type and severity of pain and evaluate response   Implement non-pharmacological measures as appropriate and evaluate response   Consider cultural and social influences on pain and pain management     Problem: Respiratory - Adult  Goal: Achieves optimal ventilation and oxygenation  Outcome: Progressing  Flowsheets (Taken 5/1/2024 0841)  Achieves optimal ventilation and oxygenation:   Assess for changes in respiratory status   Assess for changes in mentation and behavior   Position to facilitate oxygenation and minimize respiratory effort   Oxygen supplementation based on oxygen saturation or arterial blood gases   Assess the need for suctioning and aspirate as needed     Problem: Genitourinary - Adult  Goal: Urinary catheter remains patent  Outcome: Progressing  Flowsheets (Taken 5/1/2024 0841)  Urinary catheter remains patent:   Assess patency of urinary catheter   Irrigate catheter per Licensed Independent Practitioner order if indicated and notify Licensed Independent Practitioner if unable to irrigate   Assess need for a larger catheter size or a 3-way catheter for continuous bladder irrigation     Problem: Neurosensory - Adult  Goal: Achieves stable or improved neurological status  Outcome: Not Progressing  Flowsheets (Taken 5/1/2024 0841)  Achieves stable or improved neurological status:   Assess for and report changes in neurological status   Monitor temperature, glucose, and sodium. Initiate appropriate interventions as ordered

## 2024-05-01 NOTE — H&P
[facility] Hospitalist Group History and Physical      CHIEF COMPLAINT: Cardiac arrest    History of Present Illness:    The patient is a 63-year-old male with past medical history of A-fib, hypertension, obesity, type II DM, AAA who presented to [facility] for cardiac arrest.  Reportedly had a fall at work and received CPR from a coworker.  On EMS arrival reported to be in V. tach/V-fib arrest and received amiodarone and shocked x 3.  Patient was noted to have ROSC prior to ED arrival.  Patient was then intubated and sedated.  Due to ventricular rhythms patient was started on a lidocaine drip In the emergency department notable lab work showed creatinine of 1.5, lactic acid of 6.1, UDS was negative, CBC also within normal limits. noted to to be elevated [phone], patient also started on heparin drip.    CT of the head was done in the emergency department without any intracranial findings.  Further imaging revealed a small pneumothorax for which a right-sided chest tube was placed: CT of the chest negative for PE.  CT of the abdomen and pelvis showed presence of 4.3 x 4 cm aneurysm. Plan was for cardiac catheterization at [facility].  In the meantime due to bed availability patient was brought to [place] ICU.  Patient was then subsequently transferred to [facility] on 5/1    Informant(s) for H&P: Chart    REVIEW OF SYSTEMS:  Unable to obtain    PMH:  Past Medical History:   Diagnosis Date    AAA (abdominal aortic aneurysm) (HCC)     Decreased dorsalis pedis pulse 5/4/2023    Hypertension     Infrarenal abdominal aortic aneurysm (AAA) without rupture (HCC) 5/4/2023    After accounting for the tortuosity, approximately 4 cm x 4.5 cm abdominal aortic aneurysm noted on the ultrasound, April 2023    Irregular heart beat        Surgical History:  Past Surgical History:   Procedure Laterality Date    APPENDECTOMY      COLONOSCOPY      SHOULDER ARTHROPLASTY      TONSILLECTOMY   auscultation bilaterally- no wheezes, rales or rhonchi, normal air movement, no respiratory distress  Cardiovascular: normal rate, normal S1 and S2 and no carotid bruits  Abdomen: soft, non-tender, non-distended, normal bowel sounds, no masses or organomegaly  Extremities: no cyanosis, no clubbing and no edema  Neurologic: no cranial nerve deficit and speech normal        LABS:  Recent Labs     04/30/24  2345 05/01/24  0312 05/01/24  0405    139 141   K 3.1* 3.4* 3.4*    103 105   CO2 26 25 22   BUN 30* 27* 27*   CREATININE 1.1 1.1 1.1   GLUCOSE 162* 159* 155*   CALCIUM 8.7 8.9 8.9       Recent Labs     04/30/24  0504 05/01/24  0312 05/01/24  0405   WBC 13.8* 10.6 10.3   RBC 4.56 4.31 4.34   HGB 13.8 13.5 13.3   HCT 43.0 40.4 40.8   MCV 94.3 93.7 94.0   MCH 30.3 31.3 30.6   MCHC 32.1 33.4 32.6   RDW 13.5 13.6 13.6   *  --  105*   MPV 10.4 11.2 10.6       Recent Labs     04/30/24  1726 04/30/24  2350 05/01/24  0413 05/01/24  0645   POCGLU 113* 127* 148* 136*       CBC:   Lab Results   Component Value Date/Time    WBC 10.3 05/01/2024 04:05 AM    RBC 4.34 05/01/2024 04:05 AM    HGB 13.3 05/01/2024 04:05 AM    HCT 40.8 05/01/2024 04:05 AM    MCV 94.0 05/01/2024 04:05 AM    MCH 30.6 05/01/2024 04:05 AM    MCHC 32.6 05/01/2024 04:05 AM    RDW 13.6 05/01/2024 04:05 AM     05/01/2024 04:05 AM    MPV 10.6 05/01/2024 04:05 AM     BMP:    Lab Results   Component Value Date/Time     05/01/2024 04:05 AM    K 3.4 05/01/2024 04:05 AM    K 4.6 09/10/2020 10:19 PM     05/01/2024 04:05 AM    CO2 22 05/01/2024 04:05 AM    BUN 27 05/01/2024 04:05 AM    CREATININE 1.1 05/01/2024 04:05 AM    CALCIUM 8.9 05/01/2024 04:05 AM    GFRAA >60 01/11/2022 12:58 PM    LABGLOM >60 01/11/2022 12:58 PM    GLUCOSE 155 05/01/2024 04:05 AM       Radiology:   XR CHEST PORTABLE   Final Result   1. ET tube in satisfactory position.   2. No pneumothorax.         XR CHEST PORTABLE   Final Result   Endotracheal tube high

## 2024-05-01 NOTE — PROGRESS NOTES
Patient being transferred to [facility], room 4424.    Report phone number [phone].    [facility]  between 1-2 hours.

## 2024-05-01 NOTE — PLAN OF CARE
Problem: Safety - Adult  Goal: Free from fall injury  5/1/2024 1850 by Yudith Rodriguez RN  Outcome: Progressing  Flowsheets (Taken 5/1/2024 0841 by Babs Tyson RN)  Free From Fall Injury: Instruct family/caregiver on patient safety  5/1/2024 0841 by Babs Tyson RN  Outcome: Progressing  Flowsheets (Taken 5/1/2024 0841)  Free From Fall Injury: Instruct family/caregiver on patient safety     Problem: Pain  Goal: Verbalizes/displays adequate comfort level or baseline comfort level  5/1/2024 1850 by Yudith Rodriguez RN  Outcome: Progressing  Flowsheets (Taken 5/1/2024 0841 by Babs Tyson RN)  Verbalizes/displays adequate comfort level or baseline comfort level:   Assess pain using appropriate pain scale   Administer analgesics based on type and severity of pain and evaluate response   Implement non-pharmacological measures as appropriate and evaluate response   Consider cultural and social influences on pain and pain management  5/1/2024 0841 by Babs Tyson RN  Outcome: Progressing  Flowsheets (Taken 5/1/2024 0841)  Verbalizes/displays adequate comfort level or baseline comfort level:   Assess pain using appropriate pain scale   Administer analgesics based on type and severity of pain and evaluate response   Implement non-pharmacological measures as appropriate and evaluate response   Consider cultural and social influences on pain and pain management     Problem: Neurosensory - Adult  Goal: Achieves stable or improved neurological status  5/1/2024 1850 by Yudith Rodriguez RN  Outcome: Progressing  Flowsheets (Taken 5/1/2024 0841 by Babs Tyson RN)  Achieves stable or improved neurological status:   Assess for and report changes in neurological status   Monitor temperature, glucose, and sodium. Initiate appropriate interventions as ordered  5/1/2024 0841 by Babs Tyson RN  Outcome: Not Progressing  Flowsheets (Taken 5/1/2024 0841)  Achieves stable or improved neurological status:   Assess

## 2024-05-01 NOTE — CONSULTS
Palliative Care Department  [phone]  Palliative Care Initial Consult  Provider Alea Zaragoza, APRN - CNP     Nabeel Appiah  [id number]  Hospital Day: 1  Date of Initial Consult: 5/1/24  Referring Provider: Ashley Rivera MD   Palliative Medicine was consulted for assistance with: goals of care    HPI:   Nabeel Appiah is a 63 y.o. with a medical history of HTN, HLD, atrial fibrillation on Eliquis, DM, AAA who was admitted on 5/1/2024 from home with a CHIEF COMPLAINT of cardiac arrest.  Patient originally presented to [facility] after a witnessed fall at work 4/29 and was unresponsive.  ROSC achieved in the field and patient was intubated prior to arriving to [facility] ED.  Patient was started on lidocaine drip for ventricular arrhythmias.  CXR showed small right pneumothorax, right-sided chest tube placed.  Lactic acid 6.1, creatinine 1.5, mild transaminitis, temperature increasing and Arctic sun applied.  Patient was progressively becoming more hypertensive requiring nitroglycerin drip.  CT head showed no acute intracranial abnormality, area of focal contusion of the scalp of the left occipital area.  CTA chest showed multiple displaced fractures of the right and left anterior rib cages, small right pneumothorax, no pulmonary emboli.  CT A/P showed no indication for acute trauma.  Cardiology consulted.  Heparin drip started and patient transferred to [place] for possible need for cardiac catheterization.  Echo done while at [facility] showed EF 50-55%, severely dilated left atrium, normal wall motion of left ventricle.  Vecuronium drip stopped early a.m. of 5/1.  Palliative medicine consulted for goals of care.    ASSESSMENT/PLAN:     Pertinent Hospital Diagnoses     Cardiac arrest  Acute hypoxic respiratory failure  Lactic acidosis  Multiple bilateral rib fractures  Small right pneumothorax  Hypertensive urgency    Palliative Care Encounter / Counseling Regarding Goals of Care  Please see detailed goals

## 2024-05-01 NOTE — PROGRESS NOTES
4 Eyes Skin Assessment     NAME:  Nabeel Appiah  YOB: 1960  MEDICAL RECORD NUMBER:  [id number]    The patient is being assessed for  Admission    I agree that at least one RN has performed a thorough Head to Toe Skin Assessment on the patient. ALL assessment sites listed below have been assessed.      Areas assessed by both nurses:    Head, Face, Ears, Shoulders, Back, Chest, Arms, Elbows, Hands, Sacrum. Buttock, Coccyx, Ischium, Legs. Feet and Heels, and Under Medical Devices         Does the Patient have a Wound? No noted wound(s)       Misha Prevention initiated by RN: Yes  Wound Care Orders initiated by RN: No    Pressure Injury (Stage 3,4, Unstageable, DTI, NWPT, and Complex wounds) if present, place Wound referral order by RN under : No    New Ostomies, if present place, Ostomy referral order under : No     Nurse 1 eSignature: Electronically signed by IVAN ZIMMERMAN RN on 5/1/24 at 6:21 AM EDT    **SHARE this note so that the co-signing nurse can place an eSignature**    Nurse 2 eSignature: Electronically signed by Susan Faustin RN on 5/1/24 at 6:26 AM EDT

## 2024-05-02 ENCOUNTER — ANESTHESIA (OUTPATIENT)
Dept: ICU | Age: 64
End: 2024-05-02
Payer: COMMERCIAL

## 2024-05-02 ENCOUNTER — APPOINTMENT (OUTPATIENT)
Dept: GENERAL RADIOLOGY | Age: 64
DRG: 283 | End: 2024-05-02
Attending: STUDENT IN AN ORGANIZED HEALTH CARE EDUCATION/TRAINING PROGRAM
Payer: COMMERCIAL

## 2024-05-02 ENCOUNTER — ANESTHESIA EVENT (OUTPATIENT)
Dept: ICU | Age: 64
End: 2024-05-02
Payer: COMMERCIAL

## 2024-05-02 LAB
AADO2: 256.6 MMHG
AADO2: 261.4 MMHG
AADO2: 281.3 MMHG
ALBUMIN SERPL-MCNC: 3.5 G/DL (ref 3.5–5.2)
ALP SERPL-CCNC: 39 U/L (ref 40–129)
ALT SERPL-CCNC: 130 U/L (ref 0–40)
ANION GAP SERPL CALCULATED.3IONS-SCNC: 12 MMOL/L (ref 7–16)
AST SERPL-CCNC: 60 U/L (ref 0–39)
B PARAP IS1001 DNA NPH QL NAA+NON-PROBE: NOT DETECTED
B PERT DNA SPEC QL NAA+PROBE: NOT DETECTED
B.E.: -0.8 MMOL/L (ref -3–3)
B.E.: -1.3 MMOL/L (ref -3–3)
B.E.: 0.5 MMOL/L (ref -3–3)
BASOPHILS # BLD: 0.05 K/UL (ref 0–0.2)
BASOPHILS NFR BLD: 1 % (ref 0–2)
BILIRUB SERPL-MCNC: 0.6 MG/DL (ref 0–1.2)
BUN SERPL-MCNC: 23 MG/DL (ref 6–23)
C PNEUM DNA NPH QL NAA+NON-PROBE: NOT DETECTED
CALCIUM SERPL-MCNC: 8.8 MG/DL (ref 8.6–10.2)
CHLORIDE SERPL-SCNC: 108 MMOL/L (ref 98–107)
CO2 SERPL-SCNC: 24 MMOL/L (ref 22–29)
COHB: 0.5 % (ref 0–1.5)
COHB: 0.6 % (ref 0–1.5)
COHB: 0.7 % (ref 0–1.5)
CREAT SERPL-MCNC: 1.1 MG/DL (ref 0.7–1.2)
CRITICAL: ABNORMAL
DATE ANALYZED: ABNORMAL
DATE OF COLLECTION: ABNORMAL
EKG ATRIAL RATE: 60 BPM
EKG P AXIS: 43 DEGREES
EKG P-R INTERVAL: 188 MS
EKG Q-T INTERVAL: 470 MS
EKG QRS DURATION: 90 MS
EKG QTC CALCULATION (BAZETT): 470 MS
EKG R AXIS: 48 DEGREES
EKG T AXIS: 25 DEGREES
EKG VENTRICULAR RATE: 60 BPM
EOSINOPHIL # BLD: 0.04 K/UL (ref 0.05–0.5)
EOSINOPHILS RELATIVE PERCENT: 0 % (ref 0–6)
ERYTHROCYTE [DISTWIDTH] IN BLOOD BY AUTOMATED COUNT: 13.8 % (ref 11.5–15)
FIO2: 55 %
FIO2: 55 %
FIO2: 60 %
FLUAV RNA NPH QL NAA+NON-PROBE: NOT DETECTED
FLUBV RNA NPH QL NAA+NON-PROBE: NOT DETECTED
GFR, ESTIMATED: 77 ML/MIN/1.73M2
GLUCOSE BLD-MCNC: 78 MG/DL (ref 74–99)
GLUCOSE BLD-MCNC: 92 MG/DL (ref 74–99)
GLUCOSE SERPL-MCNC: 99 MG/DL (ref 74–99)
HADV DNA NPH QL NAA+NON-PROBE: NOT DETECTED
HCO3: 24.7 MMOL/L (ref 22–26)
HCO3: 26.3 MMOL/L (ref 22–26)
HCO3: 27.9 MMOL/L (ref 22–26)
HCOV 229E RNA NPH QL NAA+NON-PROBE: NOT DETECTED
HCOV HKU1 RNA NPH QL NAA+NON-PROBE: NOT DETECTED
HCOV NL63 RNA NPH QL NAA+NON-PROBE: NOT DETECTED
HCOV OC43 RNA NPH QL NAA+NON-PROBE: NOT DETECTED
HCT VFR BLD AUTO: 41.2 % (ref 37–54)
HGB BLD-MCNC: 13 G/DL (ref 12.5–16.5)
HHB: 2.3 % (ref 0–5)
HHB: 7.3 % (ref 0–5)
HHB: 9.7 % (ref 0–5)
HMPV RNA NPH QL NAA+NON-PROBE: NOT DETECTED
HPIV1 RNA NPH QL NAA+NON-PROBE: NOT DETECTED
HPIV2 RNA NPH QL NAA+NON-PROBE: NOT DETECTED
HPIV3 RNA NPH QL NAA+NON-PROBE: NOT DETECTED
HPIV4 RNA NPH QL NAA+NON-PROBE: NOT DETECTED
IMM GRANULOCYTES # BLD AUTO: 0.04 K/UL (ref 0–0.58)
IMM GRANULOCYTES NFR BLD: 0 % (ref 0–5)
L PNEUMO1 AG UR QL IA.RAPID: NEGATIVE
LAB: ABNORMAL
LIDOCAIN SERPL-MCNC: 2.2 UG/ML (ref 1.2–5)
LYMPHOCYTES NFR BLD: 0.91 K/UL (ref 1.5–4)
LYMPHOCYTES RELATIVE PERCENT: 10 % (ref 20–42)
Lab: 1003
Lab: 2202
Lab: 455
M PNEUMO DNA NPH QL NAA+NON-PROBE: NOT DETECTED
MAGNESIUM SERPL-MCNC: 2 MG/DL (ref 1.6–2.6)
MCH RBC QN AUTO: 30.7 PG (ref 26–35)
MCHC RBC AUTO-ENTMCNC: 31.6 G/DL (ref 32–34.5)
MCV RBC AUTO: 97.2 FL (ref 80–99.9)
METHB: 0.3 % (ref 0–1.5)
MICROORGANISM SPEC CULT: NO GROWTH
MODE: AC
MONOCYTES NFR BLD: 0.91 K/UL (ref 0.1–0.95)
MONOCYTES NFR BLD: 10 % (ref 2–12)
NEUTROPHILS NFR BLD: 78 % (ref 43–80)
NEUTS SEG NFR BLD: 7.05 K/UL (ref 1.8–7.3)
O2 CONTENT: 17.8 ML/DL
O2 CONTENT: 18 ML/DL
O2 CONTENT: 18.8 ML/DL
O2 SATURATION: 90.2 % (ref 92–98.5)
O2 SATURATION: 92.6 % (ref 92–98.5)
O2 SATURATION: 97.7 % (ref 92–98.5)
O2HB: 89.3 % (ref 94–97)
O2HB: 91.9 % (ref 94–97)
O2HB: 96.8 % (ref 94–97)
OPERATOR ID: 2962
OPERATOR ID: 5100
OPERATOR ID: 882
PARTIAL THROMBOPLASTIN TIME: 72.1 SEC (ref 24.5–35.1)
PATIENT TEMP: 37 C
PCO2: 45.8 MMHG (ref 35–45)
PCO2: 53.9 MMHG (ref 35–45)
PCO2: 56.8 MMHG (ref 35–45)
PEEP/CPAP: 5 CMH2O
PFO2: 1.09 MMHG/%
PFO2: 1.23 MMHG/%
PFO2: 1.87 MMHG/%
PH BLOOD GAS: 7.31 (ref 7.35–7.45)
PH BLOOD GAS: 7.31 (ref 7.35–7.45)
PH BLOOD GAS: 7.35 (ref 7.35–7.45)
PHOSPHATE SERPL-MCNC: 2.9 MG/DL (ref 2.5–4.5)
PLATELET # BLD AUTO: 113 K/UL (ref 130–450)
PMV BLD AUTO: 12 FL (ref 7–12)
PO2: 111.9 MMHG (ref 75–100)
PO2: 59.9 MMHG (ref 75–100)
PO2: 67.6 MMHG (ref 75–100)
POTASSIUM SERPL-SCNC: 4.2 MMOL/L (ref 3.5–5)
POTASSIUM SERPL-SCNC: 4.3 MMOL/L (ref 3.5–5)
PROCALCITONIN SERPL-MCNC: 0.44 NG/ML (ref 0–0.08)
PROT SERPL-MCNC: 5.9 G/DL (ref 6.4–8.3)
RBC # BLD AUTO: 4.24 M/UL (ref 3.8–5.8)
RI(T): 2.29
RI(T): 3.87
RI(T): 4.7
RR MECHANICAL: 18 B/MIN
RR MECHANICAL: 22 B/MIN
RR MECHANICAL: 22 B/MIN
RSV RNA NPH QL NAA+NON-PROBE: NOT DETECTED
RV+EV RNA NPH QL NAA+NON-PROBE: NOT DETECTED
S PNEUM AG SPEC QL: NEGATIVE
SARS-COV-2 RNA NPH QL NAA+NON-PROBE: NOT DETECTED
SODIUM SERPL-SCNC: 144 MMOL/L (ref 132–146)
SOURCE, BLOOD GAS: ABNORMAL
SPECIMEN DESCRIPTION: NORMAL
SPECIMEN DESCRIPTION: NORMAL
SPECIMEN SOURCE: NORMAL
THB: 13.7 G/DL (ref 11.5–16.5)
THB: 13.9 G/DL (ref 11.5–16.5)
THB: 14.2 G/DL (ref 11.5–16.5)
TIME ANALYZED: 1008
TIME ANALYZED: 2208
TIME ANALYZED: 510
VT MECHANICAL: 510 ML
VT MECHANICAL: 510 ML
VT MECHANICAL: 550 ML
WBC OTHER # BLD: 9 K/UL (ref 4.5–11.5)

## 2024-05-02 PROCEDURE — 85025 COMPLETE CBC W/AUTO DIFF WBC: CPT

## 2024-05-02 PROCEDURE — 83735 ASSAY OF MAGNESIUM: CPT

## 2024-05-02 PROCEDURE — 82805 BLOOD GASES W/O2 SATURATION: CPT

## 2024-05-02 PROCEDURE — 2580000003 HC RX 258

## 2024-05-02 PROCEDURE — 93010 ELECTROCARDIOGRAM REPORT: CPT | Performed by: INTERNAL MEDICINE

## 2024-05-02 PROCEDURE — 31500 INSERT EMERGENCY AIRWAY: CPT

## 2024-05-02 PROCEDURE — 87899 AGENT NOS ASSAY W/OPTIC: CPT

## 2024-05-02 PROCEDURE — 6370000000 HC RX 637 (ALT 250 FOR IP)

## 2024-05-02 PROCEDURE — 87449 NOS EACH ORGANISM AG IA: CPT

## 2024-05-02 PROCEDURE — 71045 X-RAY EXAM CHEST 1 VIEW: CPT

## 2024-05-02 PROCEDURE — 6360000002 HC RX W HCPCS: Performed by: STUDENT IN AN ORGANIZED HEALTH CARE EDUCATION/TRAINING PROGRAM

## 2024-05-02 PROCEDURE — 84100 ASSAY OF PHOSPHORUS: CPT

## 2024-05-02 PROCEDURE — 85730 THROMBOPLASTIN TIME PARTIAL: CPT

## 2024-05-02 PROCEDURE — 0202U NFCT DS 22 TRGT SARS-COV-2: CPT

## 2024-05-02 PROCEDURE — 2000000000 HC ICU R&B

## 2024-05-02 PROCEDURE — 94003 VENT MGMT INPAT SUBQ DAY: CPT

## 2024-05-02 PROCEDURE — 84145 PROCALCITONIN (PCT): CPT

## 2024-05-02 PROCEDURE — 82962 GLUCOSE BLOOD TEST: CPT

## 2024-05-02 PROCEDURE — 2500000003 HC RX 250 WO HCPCS

## 2024-05-02 PROCEDURE — 99232 SBSQ HOSP IP/OBS MODERATE 35: CPT | Performed by: INTERNAL MEDICINE

## 2024-05-02 PROCEDURE — C9113 INJ PANTOPRAZOLE SODIUM, VIA: HCPCS

## 2024-05-02 PROCEDURE — 2580000003 HC RX 258: Performed by: STUDENT IN AN ORGANIZED HEALTH CARE EDUCATION/TRAINING PROGRAM

## 2024-05-02 PROCEDURE — 6370000000 HC RX 637 (ALT 250 FOR IP): Performed by: STUDENT IN AN ORGANIZED HEALTH CARE EDUCATION/TRAINING PROGRAM

## 2024-05-02 PROCEDURE — 80053 COMPREHEN METABOLIC PANEL: CPT

## 2024-05-02 PROCEDURE — A4216 STERILE WATER/SALINE, 10 ML: HCPCS

## 2024-05-02 PROCEDURE — 84132 ASSAY OF SERUM POTASSIUM: CPT

## 2024-05-02 PROCEDURE — 6360000002 HC RX W HCPCS

## 2024-05-02 RX ORDER — SODIUM CHLORIDE, SODIUM LACTATE, POTASSIUM CHLORIDE, CALCIUM CHLORIDE 600; 310; 30; 20 MG/100ML; MG/100ML; MG/100ML; MG/100ML
INJECTION, SOLUTION INTRAVENOUS CONTINUOUS
Status: DISCONTINUED | OUTPATIENT
Start: 2024-05-02 | End: 2024-05-02

## 2024-05-02 RX ORDER — PROPOFOL 10 MG/ML
INJECTION, EMULSION INTRAVENOUS
Status: DISCONTINUED
Start: 2024-05-02 | End: 2024-05-02

## 2024-05-02 RX ORDER — INSULIN LISPRO 100 [IU]/ML
0-4 INJECTION, SOLUTION INTRAVENOUS; SUBCUTANEOUS EVERY 4 HOURS
Status: DISCONTINUED | OUTPATIENT
Start: 2024-05-02 | End: 2024-05-18

## 2024-05-02 RX ADMIN — Medication 15 ML: at 08:59

## 2024-05-02 RX ADMIN — HEPARIN SODIUM 11 UNITS/KG/HR: 10000 INJECTION, SOLUTION INTRAVENOUS at 15:29

## 2024-05-02 RX ADMIN — Medication 10 MG/HR: at 05:45

## 2024-05-02 RX ADMIN — Medication 200 MCG/HR: at 06:00

## 2024-05-02 RX ADMIN — ATORVASTATIN CALCIUM 40 MG: 40 TABLET, FILM COATED ORAL at 20:45

## 2024-05-02 RX ADMIN — Medication 15 ML: at 19:51

## 2024-05-02 RX ADMIN — AMPICILLIN SODIUM AND SULBACTAM SODIUM 3000 MG: 2; 1 INJECTION, POWDER, FOR SOLUTION INTRAMUSCULAR; INTRAVENOUS at 20:49

## 2024-05-02 RX ADMIN — Medication 200 MCG/HR: at 11:09

## 2024-05-02 RX ADMIN — POTASSIUM PHOSPHATE, MONOBASIC AND POTASSIUM PHOSPHATE, DIBASIC 10 MMOL: 224; 236 INJECTION, SOLUTION, CONCENTRATE INTRAVENOUS at 13:20

## 2024-05-02 RX ADMIN — AMPICILLIN SODIUM AND SULBACTAM SODIUM 3000 MG: 2; 1 INJECTION, POWDER, FOR SOLUTION INTRAMUSCULAR; INTRAVENOUS at 14:20

## 2024-05-02 RX ADMIN — DEXTROSE MONOHYDRATE 125 ML: 100 INJECTION, SOLUTION INTRAVENOUS at 07:55

## 2024-05-02 RX ADMIN — SODIUM CHLORIDE, PRESERVATIVE FREE 40 MG: 5 INJECTION INTRAVENOUS at 09:00

## 2024-05-02 RX ADMIN — HEPARIN SODIUM 11 UNITS/KG/HR: 10000 INJECTION, SOLUTION INTRAVENOUS at 01:56

## 2024-05-02 RX ADMIN — Medication 200 MCG/HR: at 01:39

## 2024-05-02 RX ADMIN — SODIUM CHLORIDE, PRESERVATIVE FREE 10 ML: 5 INJECTION INTRAVENOUS at 19:51

## 2024-05-02 ASSESSMENT — PAIN SCALES - GENERAL
PAINLEVEL_OUTOF10: 0

## 2024-05-02 ASSESSMENT — PULMONARY FUNCTION TESTS
PIF_VALUE: 37
PIF_VALUE: 34
PIF_VALUE: 29
PIF_VALUE: 19
PIF_VALUE: 34
PIF_VALUE: 31
PIF_VALUE: 32
PIF_VALUE: 28
PIF_VALUE: 27
PIF_VALUE: 26
PIF_VALUE: 34
PIF_VALUE: 35
PIF_VALUE: 26
PIF_VALUE: 25
PIF_VALUE: 26
PIF_VALUE: 33
PIF_VALUE: 33
PIF_VALUE: 27
PIF_VALUE: 31
PIF_VALUE: 35
PIF_VALUE: 24
PIF_VALUE: 20
PIF_VALUE: 30
PIF_VALUE: 28
PIF_VALUE: 27
PIF_VALUE: 27
PIF_VALUE: 25

## 2024-05-02 NOTE — PLAN OF CARE
Problem: Respiratory - Adult  Goal: Achieves optimal ventilation and oxygenation  5/2/2024 0820 by Ryann Sarabia RCP  Outcome: Progressing  5/2/2024 0321 by Babs Tyson, RN  Outcome: Progressing  Flowsheets (Taken 5/1/2024 0841)  Achieves optimal ventilation and oxygenation:   Assess for changes in respiratory status   Assess for changes in mentation and behavior   Position to facilitate oxygenation and minimize respiratory effort   Oxygen supplementation based on oxygen saturation or arterial blood gases   Assess the need for suctioning and aspirate as needed  5/1/2024 1850 by Yudith Rodriguez, RN  Outcome: Progressing  Flowsheets (Taken 5/1/2024 0841 by Babs Tyson, RN)  Achieves optimal ventilation and oxygenation:   Assess for changes in respiratory status   Assess for changes in mentation and behavior   Position to facilitate oxygenation and minimize respiratory effort   Oxygen supplementation based on oxygen saturation or arterial blood gases   Assess the need for suctioning and aspirate as needed

## 2024-05-02 NOTE — PROGRESS NOTES
Chart reviewed.  No acute overnight events.  Care discussed with  and ICU attending.  Patient has a long term girlfriend of 27 years, Tisha.  There is no power of  for healthcare paperwork in place.  He does not have any living relatives.  Guardianship would need to be obtained for long-term medical decision-making capabilities.  Until then, 2 doctors will need to sign off on all medical decisions/consents.  As there is no next of kin/POA/guardian.  Palliative medicine will sign off.    Alea Zaragoza, KENNEDI - CNP  Palliative Medicine

## 2024-05-02 NOTE — PROGRESS NOTES
[facility]   Department of Internal Medicine   Internal Medicine Residency  MICU Progress Note    Patient:  Nabeel Appiah 63 y.o. male   MRN: [id number]       Date of Service: 5/2/2024    Allergy: Patient has no known allergies.    Subjective     Overnight   Patient was seen and examined this morning .  Remains intubated.  Rewarmed per protocol.      Objective     I & O - 24hr:    Intake/Output Summary (Last 24 hours) at 5/2/2024 0710  Last data filed at 5/2/2024 0648  Gross per 24 hour   Intake 3602.92 ml   Output 1040 ml   Net 2562.92 ml       Physical Exam  Vitals: BP (!) 109/49   Pulse 70   Temp 99 °F (37.2 °C) (Bladder)   Resp 18   Ht 1.918 m (6' 3.51\")   Wt (!) 171 kg (376 lb 14.4 oz)   SpO2 98%   BMI 46.47 kg/m²     General Appearance: intubated and sedated, not responsive to pain or voice,  HEENT: NC/AT, pupil constricted and nonreactive to light   Lung: clear to auscustation bilaterally, no wheezes/rales or rhonchi   Heart: RRR, S1 and S2 normal, no murmurs appreciated  Abdomen: soft, nontender, non distended, bowel sounds present  Extremities: peripheral edema  Neurologic: pupils constricted and nonreactive to light, absent cough and gag reflex.    Medications     Continuous Infusions:   sodium chloride      dextrose      fentaNYL 200 mcg/hr (05/02/24 0648)    midazolam 10 mg/hr (05/02/24 0648)    heparin (PORCINE) Infusion 11 Units/kg/hr (05/02/24 0648)    lidocaine 1 mg/min (05/02/24 0648)    lactated ringers IV soln 75 mL/hr at 05/01/24 2251     Scheduled Meds:   potassium phosphate IVPB (PERIPHERAL LINE)  10 mmol IntraVENous Once    sodium chloride flush  5-40 mL IntraVENous 2 times per day    pantoprazole (PROTONIX) 40 mg in sodium chloride (PF) 0.9 % 10 mL injection  40 mg IntraVENous Daily    chlorhexidine  15 mL Mouth/Throat BID    insulin lispro  0-4 Units SubCUTAneous TID WC    insulin lispro  0-4 Units SubCUTAneous Nightly    atorvastatin  40 mg Oral Nightly     PRN  increased markings seen within the right lower lung field   and left lung base.         XR CHEST PORTABLE    (Results Pending)   XR CHEST PORTABLE    (Results Pending)        Resident's Assessment and Plan     Assessment:  Acute encephalopathy 2/2 cardiac arrest  V-fib cardiac arrest  Hypertensive emergency- resolved   Elevated troponin likely 2/2 NSTEMI vs CPR  Abdominal aortic aneurysm  Persistent A-fib   Acute respiratory failure 2/2 cardiac arrest s/p intubation  Right-sided pneumothorax   Multiple rib fractures likely 2/2 CPR  Transaminitis  GNEE - resolving   Hx of Hypertension  Hx of Hyperlipidemia  Type 2 diabetes mellitus    Plan:  Patient remains intubated  ETT changed today  Follow post intubation ABG   On heparin  SAT today, off sedation   Discontinue lidocaine  Follow cardiology recs  Start tube feed  Follow infectious workup   Started on Unasyn       Antimicrobials: Unasyn   I/Os: net +2317  PT/OT evaluation:   DVT prophylaxis: heparin   GI prophylaxis: protonix  Diet:   Diet NPO   Bowel regimen: glycolax  Pain management: as needed  Code status: Full Code   Disposition: Continue current care.   Family: updated as available    Xena Giron MD, PGY-1   Attending physician: Dr. Salas        I personally saw, examined and provided care for the patient. Radiographs, labs and medication list were reviewed by me independently. I spoke with bedside nursing, therapists and consultants. Critical care services and times documented are independent of procedures and multidisciplinary rounds with Residents. Additionally comprehensive, multidisciplinary rounds were conducted with the MICU team. The case was discussed in detail and plans for care were established. Review of Residents documentation was conducted and revisions were made as appropriate. I agree with the above documented exam, problem list and plan of care.I performed the substantive portion of the visit.    Patient is status post cardiac arrest.

## 2024-05-02 NOTE — PROGRESS NOTES
[facility] Cardiology progress note  Sonido Haines MD     Patient is seen in follow-up for cardiac arrest    Subjective:     Mr. Appiah is lying in bed intubated, sedated    Review of systems:  Unable to obtain since disintegrated    Scheduled Meds: Reviewed.  Continuous Infusions: Reviewed.  PRN Meds: Reviewed.    I/O last 3 completed shifts:  In: 3602.9 [I.V.:3157.2; IV Piggyback:445.7]  Out: 1960 [Urine:1955; Chest Tube:5]  I/O this shift:  In: -   Out: 100 [Urine:100]      Objective:      Physical Exam:   BP (!) 141/73   Pulse 86   Temp 98.8 °F (37.1 °C) (Bladder)   Resp 22   Ht 1.918 m (6' 3.51\")   Wt (!) 171 kg (376 lb 14.4 oz)   SpO2 98%   BMI 46.47 kg/m²   CONSTITUTIONAL:  no apparent distress, and appears stated age  HEAD:  normocepalic, without obvious abnormality, atraumatic  NECK:  Supple, symmetrical, trachea midline, no adenopathy, thyroid symmetric, not enlarged and no tenderness, skin normal  LUNGS:  No increased work of breathing, good air exchange, clear to auscultation bilaterally, no crackles or wheezing  CARDIOVASCULAR:  Normal apical impulse, regular rate and rhythm, normal S1 and S2, no S3 or S4, and no murmur noted, no edema, no JVD, no carotid bruit.  ABDOMEN:  Soft, nontender, no masses, no hepatomegaly, no splenomegaly, BS+  MUSCULOSKELETAL:  No clubbing no cyanosis.there is no redness, warmth, or swelling of the joints  NEUROLOGIC: Intubated, sedated    Cardiographics  I personally reviewed the telemetry monitor strips with the following interpretation: Sinus rhythm     Echocardiogram: 4/30/2024,    Left Ventricle: Normal left ventricular systolic function with a visually estimated EF of 50 - 55%. Left ventricle size is normal. Normal wall thickness. Normal wall motion.    Right Ventricle: Right ventricle size is normal. Normal systolic function.    Tricuspid Valve: Unable to assess RVSP.    Left Atrium: Left atrium is severely dilated.    Right Atrium: Right atrium size is normal.     Image quality is adequate. Contrast used: Definity. Technically difficult study.    Imaging  Reviewed.    Lab Review   Lab Results   Component Value Date/Time     05/02/2024 04:37 AM    K 4.30 05/02/2024 10:03 AM    K 4.6 09/10/2020 10:19 PM     05/02/2024 04:37 AM    CO2 24 05/02/2024 04:37 AM    BUN 23 05/02/2024 04:37 AM    CREATININE 1.1 05/02/2024 04:37 AM    GLUCOSE 99 05/02/2024 04:37 AM    CALCIUM 8.8 05/02/2024 04:37 AM     Lab Results   Component Value Date/Time    WBC 9.0 05/02/2024 04:37 AM    HGB 13.0 05/02/2024 04:37 AM    HCT 41.2 05/02/2024 04:37 AM    MCV 97.2 05/02/2024 04:37 AM     05/02/2024 04:37 AM     Reviewed, as above.  I have personally reviewed the laboratory, cardiac diagnostic and radiographic testing as outlined above:    Assessment:     Cardiac arrest in the setting of Vfib: Now in sinus rhythm, ischemic evaluation prior to discharge if patient shows meaningful neurological recovery  Non-ST elevation MI: As above   Persistent atrial fibrillation: Normal sinus rhythm.  Trace MR, trace TR on TTE 4/30/2024.  Acute hypoxic respiratory failure: Intubated  History of HTN:  HLD, not on statin due to elevated LFTs  Type II diabetes mellitus  Tobacco use  Infrarenal abdominal aortic aneurysm 4.3 x 4 cm on CT abdomen/pelvis 4/29/2024  Elevated LFTs: Trending down  Thrombocytopenia:     Recommendations:     1.  Ventilatory support as per ICU team  2.  Continue current treatment  3.  Aspirin and statin when able  4.  Ischemic evaluation preferably cardiac catheterization prior to discharge and if patient has meaningful neurological recovery  5.  Further cardiac recommendations will be forthcoming pending his clinical course and diagnostic test findings    No family at bedside  Electronically signed by Sonido Haines MD on 5/2/2024 at 3:20 PM    NOTE: This report was transcribed using voice recognition software. Every effort was made to ensure accuracy; however, inadvertent

## 2024-05-02 NOTE — CARE COORDINATION
Care Coordination: LOS 1 day.  Call to Debby Sullivan at [facility] regarding possible need for guardianship. They are not able to process emergency guardianship for medical decisions. If long term guardianship will be needed, then can go through [facility]. Debby states  the girl friend can go through an  and file for Emergency guardianship for decision making, otherwise  two doctors can sign off on decisions for this patient    Electronically signed by Valery Beard RN on 5/2/2024 at 10:16 AM

## 2024-05-02 NOTE — ANESTHESIA PROCEDURE NOTES
Airway  Date/Time: 5/2/2024 12:30 PM  Urgency: urgent    Airway not difficult    General Information and Staff    Patient location during procedure: ICU  Anesthesiologist: Yaya Parsons MD  Resident/CRNA: Chava Andersen APRN - CRNA  Performed: anesthesiologist and resident/CRNA/CAA   Performed by: Chava Andersen APRN - CRNA  Authorized by: Chava Andersen APRN - CRNA      Consent for Airway (if performed for an anesthetic, see related documentation for consents)  Patient identity confirmed: per hospital policy      Code status verified:yes  Indications and Patient Condition  Indications for airway management: airway protection  Spontaneous Ventilation: absent  Sedation level: deep  Preoxygenated: yes  MILS not maintained throughout      Final Airway Details  Final airway type: endotracheal airway      Successful airway: ETT  Cuffed: yes   Successful intubation technique: exchange catheter  Endotracheal tube insertion site: oral  ETT size (mm): 8.0  Placement verified by: chest auscultation and capnometry   Measured from: lips  ETT to lips (cm): 24  Number of attempts at approach: 1  Ventilation between attempts: bag mask  Number of other approaches attempted: 0    Additional Comments  Anesthesia called to room with patient already intubated and sedated, patient Unable to achieve adequate tidal volumes.  Vital signs stable.      Exchange catheter used to place new tube.  No issues post exchange. Vital signs remained stable after exchange.  no

## 2024-05-02 NOTE — PROGRESS NOTES
[facility] Hospitalist Progress Note    Admitting Date and Time: 5/1/2024  2:21 AM  Admit Dx: Cardiac arrest (HCC) [I46.9]  Synopsis: The patient is a 63-year-old male with past medical history of A-fib, hypertension, obesity, type II DM, AAA who presented to [facility] for cardiac arrest. On EMS arrival reported to be in V. tach/V-fib arrest and received amiodarone and shocked x 3. Patient was noted to have ROSC prior to ED arrival. CT of the head was done in the emergency department without any intracranial findings. Further imaging revealed a small pneumothorax for which a right-sided chest tube was placed: CT of the chest negative for PE. CT of the abdomen and pelvis showed presence of 4.3 x 4 cm aneurysm. Plan was for cardiac catheterization at [facility] [place] hence patient was transferred from [place].  Patient remains on ventilatory support and critical care unit    Subjective:  Patient seen and examined at bedside  On ventilatory support and sedation    ROS: Unable to obtain     potassium phosphate IVPB (PERIPHERAL LINE)  10 mmol IntraVENous Once    ampicillin-sulbactam  3,000 mg IntraVENous Q6H    insulin lispro  0-4 Units SubCUTAneous Q4H    propofol        sodium chloride flush  5-40 mL IntraVENous 2 times per day    pantoprazole (PROTONIX) 40 mg in sodium chloride (PF) 0.9 % 10 mL injection  40 mg IntraVENous Daily    chlorhexidine  15 mL Mouth/Throat BID    atorvastatin  40 mg Oral Nightly     propofol, ,   sodium chloride flush, 5-40 mL, PRN  sodium chloride, , PRN  ondansetron, 4 mg, Q8H PRN   Or  ondansetron, 4 mg, Q6H PRN  polyethylene glycol, 17 g, Daily PRN  acetaminophen, 650 mg, Q6H PRN   Or  acetaminophen, 650 mg, Q6H PRN  glucose, 4 tablet, PRN  dextrose bolus, 125 mL, PRN   Or  dextrose bolus, 250 mL, PRN  glucagon (rDNA), 1 mg, PRN  dextrose, , Continuous PRN  artificial tears, , Q8H PRN  heparin (porcine), 4,000 Units, PRN  heparin (porcine), 2,000 Units,

## 2024-05-02 NOTE — PROGRESS NOTES
ETT exchanged by anesthesia.  Patient with an 8ETT that is now 26 at the lips.  Patient tolerated well.  ETT resecured with commercial device at this time.  Patient tolerated well.

## 2024-05-02 NOTE — PROGRESS NOTES
05/02/24 0818   Patient Observation   Pulse 79   Respirations 22   Vent Information   Ventilator ID [id number]   Vent Mode AC/VC   Ventilator Settings   Vt (Set, mL) 510 mL   Resp Rate (Set) 22 bpm   PEEP/CPAP (cmH2O) 5   FiO2  55 %   Peak Inspiratory Flow (Set) 70 L/sec   Vent Patient Data (Readings)   Vt (Measured) 402 mL   Peak Inspiratory Pressure (cmH2O) 24 cmH2O   Rate Measured 27 br/min   Minute Volume (L/min) 12.9 Liters   Peak Inspiratory Flow (lpm) 70 L/sec   Mean Airway Pressure (cmH2O) 14 cmH20   Plateau Pressure (cm H2O) 20 cm H2O   Driving Pressure 15   I:E Ratio 1:2.40   Flow Sensitivity 3 L/min   PEEP Intrinsic (cm H2O) 6.4 cm H2O   Static Compliance (L/cm H2O) 82   Backup Apnea On   Backup Rate 22 Breaths Per Minute   Backup Vt 510   Vent Alarm Settings   High Pressure (cmH2O) 50 cmH2O   Low Minute Volume (lpm) 9 L/min   High Minute Volume (lpm) 15 L/min   Low Exhaled Vt (ml) 400 mL   High Exhaled Vt (ml) 800 mL   RR High (bpm) 35 br/min   Apnea (secs) 20 secs   Additional Respiratoray Assessments   Humidification Source Heated wire   Humidification Temp 37   Circuit Condensation Drained   Ambu Bag With Mask At Bedside Yes   ETT    Placement Date/Time: 04/29/24 1437   Present on Admission/Arrival: Yes  Placed By: (c) Other (comment)  Placement Verified By: Auscultation  Airway Tube Size: 7.5 mm  Location: Oral  Secured At: 24 cm  Measured From: Lips   Secured At 26 cm   Measured From Lips   ETT Placement Center   Secured By Commercial tube borden   Site Assessment Dry

## 2024-05-02 NOTE — PROGRESS NOTES
05/02/24 1127   Patient Observation   Pulse 73   Respirations 21   SpO2 98 %   Vent Information   Ventilator ID [id number]   Vent Mode AC/VC   Ventilator Settings   Vt (Set, mL) 510 mL   Resp Rate (Set) 22 bpm   PEEP/CPAP (cmH2O) 5   FiO2  55 %   Peak Inspiratory Flow (Set) 70 L/sec   Vent Patient Data (Readings)   Vt (Measured) 430 mL   Peak Inspiratory Pressure (cmH2O) 26 cmH2O   Rate Measured 22 br/min   Minute Volume (L/min) 10.5 Liters   Peak Inspiratory Flow (lpm) 70 L/sec   Mean Airway Pressure (cmH2O) 12 cmH20   Plateau Pressure (cm H2O) 20 cm H2O   Driving Pressure 15   I:E Ratio 1:2.40   Flow Sensitivity 3 L/min   Static Compliance (L/cm H2O) 31   Backup Apnea On   Backup Rate 22 Breaths Per Minute   Backup Vt 510   Vent Alarm Settings   High Pressure (cmH2O) 50 cmH2O   Low Minute Volume (lpm) 9 L/min   High Minute Volume (lpm) 15 L/min   Low Exhaled Vt (ml) 400 mL   High Exhaled Vt (ml) 800 mL   RR High (bpm) 35 br/min   Apnea (secs) 20 secs   Additional Respiratoray Assessments   Humidification Source Heated wire   Humidification Temp 37   Ambu Bag With Mask At Bedside Yes   ETT    Placement Date/Time: 04/29/24 1437   Present on Admission/Arrival: Yes  Placed By: (c) Other (comment)  Placement Verified By: Auscultation  Airway Tube Size: 7.5 mm  Location: Oral  Secured At: 24 cm  Measured From: Lips   Secured At 26 cm   Measured From Lips   ETT Placement Center   Secured By Commercial tube borden   Site Assessment Dry

## 2024-05-02 NOTE — PLAN OF CARE
Problem: Safety - Adult  Goal: Free from fall injury  5/2/2024 0321 by Bbas Tyson RN  Outcome: Progressing  Flowsheets (Taken 5/1/2024 0841)  Free From Fall Injury: Instruct family/caregiver on patient safety  5/1/2024 1850 by Yudith Rodriguez RN  Outcome: Progressing  Flowsheets (Taken 5/1/2024 0841 by Babs Tyson RN)  Free From Fall Injury: Instruct family/caregiver on patient safety     Problem: Pain  Goal: Verbalizes/displays adequate comfort level or baseline comfort level  5/2/2024 0321 by Babs Tyson RN  Outcome: Progressing  Flowsheets (Taken 5/1/2024 0841)  Verbalizes/displays adequate comfort level or baseline comfort level:   Assess pain using appropriate pain scale   Administer analgesics based on type and severity of pain and evaluate response   Implement non-pharmacological measures as appropriate and evaluate response   Consider cultural and social influences on pain and pain management  5/1/2024 1850 by Yudith Rodriguez RN  Outcome: Progressing  Flowsheets (Taken 5/1/2024 0841 by Babs Tyson RN)  Verbalizes/displays adequate comfort level or baseline comfort level:   Assess pain using appropriate pain scale   Administer analgesics based on type and severity of pain and evaluate response   Implement non-pharmacological measures as appropriate and evaluate response   Consider cultural and social influences on pain and pain management     Problem: Respiratory - Adult  Goal: Achieves optimal ventilation and oxygenation  5/2/2024 0321 by Babs Tyson RN  Outcome: Progressing  Flowsheets (Taken 5/1/2024 0841)  Achieves optimal ventilation and oxygenation:   Assess for changes in respiratory status   Assess for changes in mentation and behavior   Position to facilitate oxygenation and minimize respiratory effort   Oxygen supplementation based on oxygen saturation or arterial blood gases   Assess the need for suctioning and aspirate as needed  5/1/2024 1850 by Yudith Rodriguez  RN  Outcome: Progressing  Flowsheets (Taken 5/1/2024 0841 by Babs Tyson RN)  Achieves optimal ventilation and oxygenation:   Assess for changes in respiratory status   Assess for changes in mentation and behavior   Position to facilitate oxygenation and minimize respiratory effort   Oxygen supplementation based on oxygen saturation or arterial blood gases   Assess the need for suctioning and aspirate as needed     Problem: Genitourinary - Adult  Goal: Urinary catheter remains patent  5/2/2024 0321 by Babs Tyson RN  Outcome: Progressing  Flowsheets (Taken 5/1/2024 0841)  Urinary catheter remains patent:   Assess patency of urinary catheter   Irrigate catheter per Licensed Independent Practitioner order if indicated and notify Licensed Independent Practitioner if unable to irrigate   Assess need for a larger catheter size or a 3-way catheter for continuous bladder irrigation  5/1/2024 1850 by Yudith Rodriguez RN  Outcome: Progressing  Flowsheets (Taken 5/1/2024 0841 by Babs Tsyon RN)  Urinary catheter remains patent:   Assess patency of urinary catheter   Irrigate catheter per Licensed Independent Practitioner order if indicated and notify Licensed Independent Practitioner if unable to irrigate   Assess need for a larger catheter size or a 3-way catheter for continuous bladder irrigation     Problem: Skin/Tissue Integrity  Goal: Absence of new skin breakdown  Description: 1.  Monitor for areas of redness and/or skin breakdown  2.  Assess vascular access sites hourly  3.  Every 4-6 hours minimum:  Change oxygen saturation probe site  4.  Every 4-6 hours:  If on nasal continuous positive airway pressure, respiratory therapy assess nares and determine need for appliance change or resting period.  Outcome: Progressing     Problem: ABCDS Injury Assessment  Goal: Absence of physical injury  Outcome: Progressing     Problem: Chronic Conditions and Co-morbidities  Goal: Patient's chronic conditions and  co-morbidity symptoms are monitored and maintained or improved  5/1/2024 1850 by Yudith Rodriguez RN  Outcome: Not Progressing     Problem: Discharge Planning  Goal: Discharge to home or other facility with appropriate resources  5/1/2024 1850 by Yudith Rodriguez RN  Outcome: Not Progressing     Problem: Neurosensory - Adult  Goal: Achieves stable or improved neurological status  5/2/2024 0321 by Babs Tyson RN  Outcome: Not Progressing  Flowsheets (Taken 5/1/2024 0841)  Achieves stable or improved neurological status:   Assess for and report changes in neurological status   Monitor temperature, glucose, and sodium. Initiate appropriate interventions as ordered  5/1/2024 1850 by Yudith Rodriguez RN  Outcome: Progressing  Flowsheets (Taken 5/1/2024 0841 by Babs Tyson, RN)  Achieves stable or improved neurological status:   Assess for and report changes in neurological status   Monitor temperature, glucose, and sodium. Initiate appropriate interventions as ordered

## 2024-05-02 NOTE — PLAN OF CARE
Problem: Safety - Adult  Goal: Free from fall injury  Outcome: Progressing     Problem: Respiratory - Adult  Goal: Achieves optimal ventilation and oxygenation  5/2/2024 1806 by Kaylynn Shelton RN  Outcome: Progressing  5/2/2024 0820 by Ryann Sarabia RCP  Outcome: Progressing     Problem: Genitourinary - Adult  Goal: Urinary catheter remains patent  Outcome: Progressing     Problem: Skin/Tissue Integrity  Goal: Absence of new skin breakdown  Description: 1.  Monitor for areas of redness and/or skin breakdown  2.  Assess vascular access sites hourly  3.  Every 4-6 hours minimum:  Change oxygen saturation probe site  4.  Every 4-6 hours:  If on nasal continuous positive airway pressure, respiratory therapy assess nares and determine need for appliance change or resting period.  Outcome: Progressing     Problem: ABCDS Injury Assessment  Goal: Absence of physical injury  Outcome: Progressing

## 2024-05-03 ENCOUNTER — APPOINTMENT (OUTPATIENT)
Dept: GENERAL RADIOLOGY | Age: 64
DRG: 283 | End: 2024-05-03
Attending: STUDENT IN AN ORGANIZED HEALTH CARE EDUCATION/TRAINING PROGRAM
Payer: COMMERCIAL

## 2024-05-03 ENCOUNTER — APPOINTMENT (OUTPATIENT)
Dept: NEUROLOGY | Age: 64
DRG: 283 | End: 2024-05-03
Attending: STUDENT IN AN ORGANIZED HEALTH CARE EDUCATION/TRAINING PROGRAM
Payer: COMMERCIAL

## 2024-05-03 PROBLEM — G93.1 ANOXIC BRAIN INJURY (HCC): Status: ACTIVE | Noted: 2024-05-03

## 2024-05-03 PROBLEM — G25.3 MYOCLONUS: Status: ACTIVE | Noted: 2024-05-03

## 2024-05-03 LAB
AADO2: 345.3 MMHG
ALBUMIN SERPL-MCNC: 3.2 G/DL (ref 3.5–5.2)
ALP SERPL-CCNC: 40 U/L (ref 40–129)
ALT SERPL-CCNC: 90 U/L (ref 0–40)
ANION GAP SERPL CALCULATED.3IONS-SCNC: 17 MMOL/L (ref 7–16)
AST SERPL-CCNC: 61 U/L (ref 0–39)
B.E.: -1.6 MMOL/L (ref -3–3)
BASOPHILS # BLD: 0 K/UL (ref 0–0.2)
BASOPHILS NFR BLD: 0 % (ref 0–2)
BILIRUB SERPL-MCNC: 0.7 MG/DL (ref 0–1.2)
BUN SERPL-MCNC: 24 MG/DL (ref 6–23)
CALCIUM SERPL-MCNC: 8.9 MG/DL (ref 8.6–10.2)
CHLORIDE SERPL-SCNC: 103 MMOL/L (ref 98–107)
CO2 SERPL-SCNC: 22 MMOL/L (ref 22–29)
COHB: 1.1 % (ref 0–1.5)
CREAT SERPL-MCNC: 1 MG/DL (ref 0.7–1.2)
CRITICAL: ABNORMAL
DATE ANALYZED: ABNORMAL
DATE OF COLLECTION: ABNORMAL
EOSINOPHIL # BLD: 0 K/UL (ref 0.05–0.5)
EOSINOPHILS RELATIVE PERCENT: 0 % (ref 0–6)
ERYTHROCYTE [DISTWIDTH] IN BLOOD BY AUTOMATED COUNT: 13.5 % (ref 11.5–15)
FIO2: 65 %
GFR, ESTIMATED: 81 ML/MIN/1.73M2
GLUCOSE BLD-MCNC: 132 MG/DL (ref 74–99)
GLUCOSE BLD-MCNC: 138 MG/DL (ref 74–99)
GLUCOSE BLD-MCNC: 139 MG/DL (ref 74–99)
GLUCOSE BLD-MCNC: 70 MG/DL (ref 74–99)
GLUCOSE BLD-MCNC: 70 MG/DL (ref 74–99)
GLUCOSE BLD-MCNC: 99 MG/DL (ref 74–99)
GLUCOSE SERPL-MCNC: 104 MG/DL (ref 74–99)
HCO3: 23.2 MMOL/L (ref 22–26)
HCT VFR BLD AUTO: 39.2 % (ref 37–54)
HCT VFR BLD AUTO: 41 % (ref 37–54)
HGB BLD-MCNC: 12.8 G/DL (ref 12.5–16.5)
HGB BLD-MCNC: 13 G/DL (ref 12.5–16.5)
HHB: 4.9 % (ref 0–5)
LAB: ABNORMAL
LYMPHOCYTES NFR BLD: 0.44 K/UL (ref 1.5–4)
LYMPHOCYTES RELATIVE PERCENT: 5 % (ref 20–42)
Lab: 433
MAGNESIUM SERPL-MCNC: 1.9 MG/DL (ref 1.6–2.6)
MAGNESIUM SERPL-MCNC: 2.4 MG/DL (ref 1.6–2.6)
MCH RBC QN AUTO: 30.7 PG (ref 26–35)
MCHC RBC AUTO-ENTMCNC: 31.7 G/DL (ref 32–34.5)
MCV RBC AUTO: 96.9 FL (ref 80–99.9)
METHB: 0.2 % (ref 0–1.5)
MICROORGANISM SPEC CULT: ABNORMAL
MICROORGANISM SPEC CULT: ABNORMAL
MICROORGANISM/AGENT SPEC: ABNORMAL
MODE: AC
MONOCYTES NFR BLD: 0.51 K/UL (ref 0.1–0.95)
MONOCYTES NFR BLD: 6 % (ref 2–12)
NEUTROPHILS NFR BLD: 89 % (ref 43–80)
NEUTS SEG NFR BLD: 7.45 K/UL (ref 1.8–7.3)
O2 CONTENT: 18 ML/DL
O2 SATURATION: 95 % (ref 92–98.5)
O2HB: 93.8 % (ref 94–97)
OPERATOR ID: 2962
PARTIAL THROMBOPLASTIN TIME: 68.4 SEC (ref 24.5–35.1)
PATIENT TEMP: 37 C
PCO2: 39.5 MMHG (ref 35–45)
PEEP/CPAP: 5 CMH2O
PFO2: 1.16 MMHG/%
PH BLOOD GAS: 7.39 (ref 7.35–7.45)
PHOSPHATE SERPL-MCNC: 2.8 MG/DL (ref 2.5–4.5)
PHOSPHATE SERPL-MCNC: 3.3 MG/DL (ref 2.5–4.5)
PLATELET, FLUORESCENCE: 119 K/UL (ref 130–450)
PMV BLD AUTO: 11.5 FL (ref 7–12)
PO2: 75.2 MMHG (ref 75–100)
POTASSIUM SERPL-SCNC: 3.8 MMOL/L (ref 3.5–5)
PROT SERPL-MCNC: 5.7 G/DL (ref 6.4–8.3)
RBC # BLD AUTO: 4.23 M/UL (ref 3.8–5.8)
RBC # BLD: ABNORMAL 10*6/UL
RBC # BLD: ABNORMAL 10*6/UL
RI(T): 4.59
RR MECHANICAL: 22 B/MIN
SODIUM SERPL-SCNC: 142 MMOL/L (ref 132–146)
SOURCE, BLOOD GAS: ABNORMAL
SPECIMEN DESCRIPTION: ABNORMAL
THB: 13.6 G/DL (ref 11.5–16.5)
TIME ANALYZED: 444
VT MECHANICAL: 550 ML
WBC OTHER # BLD: 8.4 K/UL (ref 4.5–11.5)

## 2024-05-03 PROCEDURE — 74018 RADEX ABDOMEN 1 VIEW: CPT

## 2024-05-03 PROCEDURE — 2500000003 HC RX 250 WO HCPCS

## 2024-05-03 PROCEDURE — 84100 ASSAY OF PHOSPHORUS: CPT

## 2024-05-03 PROCEDURE — 94003 VENT MGMT INPAT SUBQ DAY: CPT

## 2024-05-03 PROCEDURE — 2580000003 HC RX 258

## 2024-05-03 PROCEDURE — 2580000003 HC RX 258: Performed by: STUDENT IN AN ORGANIZED HEALTH CARE EDUCATION/TRAINING PROGRAM

## 2024-05-03 PROCEDURE — 95822 EEG COMA OR SLEEP ONLY: CPT | Performed by: PSYCHIATRY & NEUROLOGY

## 2024-05-03 PROCEDURE — 71045 X-RAY EXAM CHEST 1 VIEW: CPT

## 2024-05-03 PROCEDURE — 85025 COMPLETE CBC W/AUTO DIFF WBC: CPT

## 2024-05-03 PROCEDURE — C9113 INJ PANTOPRAZOLE SODIUM, VIA: HCPCS

## 2024-05-03 PROCEDURE — 6360000002 HC RX W HCPCS: Performed by: STUDENT IN AN ORGANIZED HEALTH CARE EDUCATION/TRAINING PROGRAM

## 2024-05-03 PROCEDURE — 83735 ASSAY OF MAGNESIUM: CPT

## 2024-05-03 PROCEDURE — 85018 HEMOGLOBIN: CPT

## 2024-05-03 PROCEDURE — 6370000000 HC RX 637 (ALT 250 FOR IP): Performed by: INTERNAL MEDICINE

## 2024-05-03 PROCEDURE — 95822 EEG COMA OR SLEEP ONLY: CPT

## 2024-05-03 PROCEDURE — 6370000000 HC RX 637 (ALT 250 FOR IP)

## 2024-05-03 PROCEDURE — 2580000003 HC RX 258: Performed by: PSYCHIATRY & NEUROLOGY

## 2024-05-03 PROCEDURE — 99232 SBSQ HOSP IP/OBS MODERATE 35: CPT | Performed by: INTERNAL MEDICINE

## 2024-05-03 PROCEDURE — 6370000000 HC RX 637 (ALT 250 FOR IP): Performed by: STUDENT IN AN ORGANIZED HEALTH CARE EDUCATION/TRAINING PROGRAM

## 2024-05-03 PROCEDURE — 6360000002 HC RX W HCPCS

## 2024-05-03 PROCEDURE — 99223 1ST HOSP IP/OBS HIGH 75: CPT | Performed by: PSYCHIATRY & NEUROLOGY

## 2024-05-03 PROCEDURE — 6360000002 HC RX W HCPCS: Performed by: PSYCHIATRY & NEUROLOGY

## 2024-05-03 PROCEDURE — 80053 COMPREHEN METABOLIC PANEL: CPT

## 2024-05-03 PROCEDURE — 82962 GLUCOSE BLOOD TEST: CPT

## 2024-05-03 PROCEDURE — 85014 HEMATOCRIT: CPT

## 2024-05-03 PROCEDURE — 2000000000 HC ICU R&B

## 2024-05-03 PROCEDURE — 85730 THROMBOPLASTIN TIME PARTIAL: CPT

## 2024-05-03 PROCEDURE — 82805 BLOOD GASES W/O2 SATURATION: CPT

## 2024-05-03 RX ORDER — ASPIRIN 81 MG/1
81 TABLET ORAL DAILY
Status: DISCONTINUED | OUTPATIENT
Start: 2024-05-04 | End: 2024-05-06

## 2024-05-03 RX ORDER — MIDAZOLAM HYDROCHLORIDE 2 MG/2ML
2 INJECTION, SOLUTION INTRAMUSCULAR; INTRAVENOUS ONCE
Status: COMPLETED | OUTPATIENT
Start: 2024-05-03 | End: 2024-05-03

## 2024-05-03 RX ORDER — POTASSIUM CHLORIDE 29.8 MG/ML
20 INJECTION INTRAVENOUS ONCE
Status: COMPLETED | OUTPATIENT
Start: 2024-05-03 | End: 2024-05-03

## 2024-05-03 RX ORDER — ENOXAPARIN SODIUM 100 MG/ML
40 INJECTION SUBCUTANEOUS DAILY
Status: DISCONTINUED | OUTPATIENT
Start: 2024-05-04 | End: 2024-05-04

## 2024-05-03 RX ORDER — MAGNESIUM SULFATE IN WATER 40 MG/ML
2000 INJECTION, SOLUTION INTRAVENOUS ONCE
Status: COMPLETED | OUTPATIENT
Start: 2024-05-03 | End: 2024-05-03

## 2024-05-03 RX ORDER — THIAMINE HYDROCHLORIDE 100 MG/ML
100 INJECTION, SOLUTION INTRAMUSCULAR; INTRAVENOUS DAILY
Status: DISCONTINUED | OUTPATIENT
Start: 2024-05-06 | End: 2024-05-18

## 2024-05-03 RX ORDER — LEVETIRACETAM 500 MG/5ML
1500 INJECTION, SOLUTION, CONCENTRATE INTRAVENOUS EVERY 12 HOURS
Status: DISCONTINUED | OUTPATIENT
Start: 2024-05-03 | End: 2024-05-25 | Stop reason: HOSPADM

## 2024-05-03 RX ADMIN — POTASSIUM CHLORIDE 20 MEQ: 29.8 INJECTION, SOLUTION INTRAVENOUS at 06:41

## 2024-05-03 RX ADMIN — LEVETIRACETAM 4500 MG: 100 INJECTION, SOLUTION INTRAVENOUS at 15:07

## 2024-05-03 RX ADMIN — AMPICILLIN SODIUM AND SULBACTAM SODIUM 3000 MG: 2; 1 INJECTION, POWDER, FOR SOLUTION INTRAMUSCULAR; INTRAVENOUS at 04:40

## 2024-05-03 RX ADMIN — SODIUM CHLORIDE, PRESERVATIVE FREE 10 ML: 5 INJECTION INTRAVENOUS at 07:47

## 2024-05-03 RX ADMIN — ATORVASTATIN CALCIUM 40 MG: 40 TABLET, FILM COATED ORAL at 20:32

## 2024-05-03 RX ADMIN — THIAMINE HYDROCHLORIDE 500 MG: 100 INJECTION, SOLUTION INTRAMUSCULAR; INTRAVENOUS at 20:31

## 2024-05-03 RX ADMIN — HEPARIN SODIUM 11 UNITS/KG/HR: 10000 INJECTION, SOLUTION INTRAVENOUS at 05:01

## 2024-05-03 RX ADMIN — SODIUM CHLORIDE, PRESERVATIVE FREE 10 ML: 5 INJECTION INTRAVENOUS at 20:32

## 2024-05-03 RX ADMIN — MIDAZOLAM HYDROCHLORIDE 2 MG: 1 INJECTION, SOLUTION INTRAMUSCULAR; INTRAVENOUS at 10:59

## 2024-05-03 RX ADMIN — METOPROLOL TARTRATE 25 MG: 25 TABLET, FILM COATED ORAL at 20:32

## 2024-05-03 RX ADMIN — LEVETIRACETAM 1500 MG: 500 INJECTION, SOLUTION, CONCENTRATE INTRAVENOUS at 20:32

## 2024-05-03 RX ADMIN — POTASSIUM PHOSPHATE, MONOBASIC AND POTASSIUM PHOSPHATE, DIBASIC 10 MMOL: 224; 236 INJECTION, SOLUTION, CONCENTRATE INTRAVENOUS at 07:47

## 2024-05-03 RX ADMIN — SODIUM CHLORIDE, PRESERVATIVE FREE 40 MG: 5 INJECTION INTRAVENOUS at 10:39

## 2024-05-03 RX ADMIN — AMPICILLIN SODIUM AND SULBACTAM SODIUM 3000 MG: 2; 1 INJECTION, POWDER, FOR SOLUTION INTRAMUSCULAR; INTRAVENOUS at 15:11

## 2024-05-03 RX ADMIN — MAGNESIUM SULFATE HEPTAHYDRATE 2000 MG: 40 INJECTION, SOLUTION INTRAVENOUS at 06:42

## 2024-05-03 RX ADMIN — AMPICILLIN SODIUM AND SULBACTAM SODIUM 3000 MG: 2; 1 INJECTION, POWDER, FOR SOLUTION INTRAMUSCULAR; INTRAVENOUS at 20:37

## 2024-05-03 RX ADMIN — Medication 15 ML: at 07:47

## 2024-05-03 RX ADMIN — THIAMINE HYDROCHLORIDE 500 MG: 100 INJECTION, SOLUTION INTRAMUSCULAR; INTRAVENOUS at 12:40

## 2024-05-03 RX ADMIN — AMPICILLIN SODIUM AND SULBACTAM SODIUM 3000 MG: 2; 1 INJECTION, POWDER, FOR SOLUTION INTRAMUSCULAR; INTRAVENOUS at 10:38

## 2024-05-03 RX ADMIN — Medication 15 ML: at 20:31

## 2024-05-03 ASSESSMENT — PAIN SCALES - GENERAL
PAINLEVEL_OUTOF10: 0

## 2024-05-03 ASSESSMENT — PULMONARY FUNCTION TESTS
PIF_VALUE: 29
PIF_VALUE: 27
PIF_VALUE: 32
PIF_VALUE: 31
PIF_VALUE: 27
PIF_VALUE: 27
PIF_VALUE: 30
PIF_VALUE: 29
PIF_VALUE: 28
PIF_VALUE: 27
PIF_VALUE: 32
PIF_VALUE: 34
PIF_VALUE: 26
PIF_VALUE: 25
PIF_VALUE: 27
PIF_VALUE: 27
PIF_VALUE: 25
PIF_VALUE: 26
PIF_VALUE: 26
PIF_VALUE: 33
PIF_VALUE: 28
PIF_VALUE: 27
PIF_VALUE: 33

## 2024-05-03 NOTE — PROGRESS NOTES
Comprehensive Nutrition Assessment    Type and Reason for Visit:  Initial (New TF)    Nutrition Recommendations/Plan:     Continue NPO, Start Tube Feeding     *Current order/ goal rate of 80 ml/hr greatly overfeeds.     Rec Diabetic (Glucerna 1.5) @ 60 ml/hr + 1 protein mod daily.   Provides: 1440 ml tv, 2160 kcals, 118 gm pro (2260 kcals & 144 gm pro w/ mod), 1092 ml free water           Malnutrition Assessment:  Malnutrition Status:  At risk for malnutrition (05/03/24 1144)    Context:  Acute Illness     Findings of the 6 clinical characteristics of malnutrition:  Energy Intake:  50% or less of estimated energy requirements for 5 or more days  Weight Loss:  Unable to assess (limited wt hx within past year)     Body Fat Loss:  No significant body fat loss     Muscle Mass Loss:  No significant muscle mass loss    Fluid Accumulation:  No significant fluid accumulation     Strength:  Not measured     Nutrition Assessment:    Pt admit from [facility] for heart cath s/p cardiac arrest/ fall at work PTA +Rib fx & PTX 2/2 CPR s/p CT placement. Noted Aspiration PNA. Noted GENE & Transaminitis - improving. PMHx DM, CAD, AAA. Pt remains intubated. EN support ordered. Will provide updated recs & monitor.    Nutrition Related Findings:    Pt intubated s/p cardiac arrest, +I/O's, +2 edema, active BS, CT x 1, OGT clamped Wound Type: None       Current Nutrition Intake & Therapies:    Average Meal Intake: NPO    Current Tube Feeding (TF) Orders:  Feeding Route: Orogastric (clamped)  Formula: Standard with Fiber  Schedule: Continuous  Feeding Regimen: 80 ml/hr, not started  Water Flushes: 300 ml q 4 hr = 1800 ml water  Goal TF & Flush Orders Provides: 1920 ml tv, 2880 kcals, 122 gm pro, 1459 ml free water, 3259 ml total water w/ flushes    Anthropometric Measures:  Height: 190.5 cm (6' 3\")  Ideal Body Weight (IBW): 196 lbs (89 kg)    Admission Body Weight: 168.7 kg (372 lb) (4/29 first measured @ [facility])  Current Body Weight: 168.7 kg

## 2024-05-03 NOTE — PLAN OF CARE
Problem: Safety - Adult  Goal: Free from fall injury  5/3/2024 0444 by Pat Noriega RN  Outcome: Progressing  5/2/2024 1806 by Kaylynn Shelton RN  Outcome: Progressing     Problem: Chronic Conditions and Co-morbidities  Goal: Patient's chronic conditions and co-morbidity symptoms are monitored and maintained or improved  Outcome: Not Progressing     Problem: Discharge Planning  Goal: Discharge to home or other facility with appropriate resources  Outcome: Not Progressing     Problem: Pain  Goal: Verbalizes/displays adequate comfort level or baseline comfort level  Outcome: Progressing  Flowsheets (Taken 5/2/2024 2000)  Verbalizes/displays adequate comfort level or baseline comfort level: Assess pain using appropriate pain scale     Problem: Neurosensory - Adult  Goal: Achieves stable or improved neurological status  Outcome: Not Progressing     Problem: Respiratory - Adult  Goal: Achieves optimal ventilation and oxygenation  5/3/2024 0444 by Pat Noriega RN  Outcome: Progressing  5/2/2024 1806 by Kaylynn Shelton RN  Outcome: Progressing     Problem: Genitourinary - Adult  Goal: Urinary catheter remains patent  5/3/2024 0444 by Pat Noriega RN  Outcome: Progressing  5/2/2024 1806 by Kaylynn Shelton RN  Outcome: Progressing     Problem: Skin/Tissue Integrity  Goal: Absence of new skin breakdown  Description: 1.  Monitor for areas of redness and/or skin breakdown  2.  Assess vascular access sites hourly  3.  Every 4-6 hours minimum:  Change oxygen saturation probe site  4.  Every 4-6 hours:  If on nasal continuous positive airway pressure, respiratory therapy assess nares and determine need for appliance change or resting period.  5/3/2024 0444 by Pat Noriega RN  Outcome: Progressing  5/2/2024 1806 by Kaylynn Shelton RN  Outcome: Progressing     Problem: ABCDS Injury Assessment  Goal: Absence of physical injury  5/3/2024 0444 by Pat Noriega RN  Outcome: Progressing  5/2/2024 1806 by Kaylynn Shelton  GRACIELA RN  Outcome: Progressing     Problem: Chronic Conditions and Co-morbidities  Goal: Patient's chronic conditions and co-morbidity symptoms are monitored and maintained or improved  Outcome: Not Progressing     Problem: Discharge Planning  Goal: Discharge to home or other facility with appropriate resources  Outcome: Not Progressing     Problem: Neurosensory - Adult  Goal: Achieves stable or improved neurological status  Outcome: Not Progressing

## 2024-05-03 NOTE — PROGRESS NOTES
[facility] Hospitalist Progress Note    Admitting Date and Time: 5/1/2024  2:21 AM  Admit Dx: Cardiac arrest (HCC) [I46.9]  Synopsis: The patient is a 63-year-old male with past medical history of A-fib, hypertension, obesity, type II DM, AAA who presented to [facility] for cardiac arrest. On EMS arrival reported to be in V. tach/V-fib arrest and received amiodarone and shocked x 3. Patient was noted to have ROSC prior to ED arrival. CT of the head was done in the emergency department without any intracranial findings. Further imaging revealed a small pneumothorax for which a right-sided chest tube was placed: CT of the chest negative for PE. CT of the abdomen and pelvis showed presence of 4.3 x 4 cm aneurysm. Plan was for cardiac catheterization at [facility] [place] hence patient was transferred from [place].  Patient remains on ventilatory support and critical care unit    Subjective:  Patient seen and examined at bedside  On ventilatory support and sedation    ROS: Unable to obtain     thiamine  500 mg IntraVENous q8h    [START ON 5/6/2024] thiamine  100 mg IntraVENous Daily    ampicillin-sulbactam  3,000 mg IntraVENous Q6H    insulin lispro  0-4 Units SubCUTAneous Q4H    sodium chloride flush  5-40 mL IntraVENous 2 times per day    pantoprazole (PROTONIX) 40 mg in sodium chloride (PF) 0.9 % 10 mL injection  40 mg IntraVENous Daily    chlorhexidine  15 mL Mouth/Throat BID    atorvastatin  40 mg Oral Nightly     sodium chloride flush, 5-40 mL, PRN  sodium chloride, , PRN  ondansetron, 4 mg, Q8H PRN   Or  ondansetron, 4 mg, Q6H PRN  polyethylene glycol, 17 g, Daily PRN  acetaminophen, 650 mg, Q6H PRN   Or  acetaminophen, 650 mg, Q6H PRN  glucose, 4 tablet, PRN  dextrose bolus, 125 mL, PRN   Or  dextrose bolus, 250 mL, PRN  glucagon (rDNA), 1 mg, PRN  dextrose, , Continuous PRN  artificial tears, , Q8H PRN  heparin (porcine), 4,000 Units, PRN  heparin (porcine), 2,000 Units,

## 2024-05-03 NOTE — CONSULTS
[facility]  Neurology Consult    Date:  5/3/2024  Patient Name:  Nabeel Appiah  YOB: 1960  MRN: [id number]     PCP:  Jonh Allen DO   Referring:  Destin Gruber DO      Chief Complaint: myoclonus following cardiac arrest    History obtained from: chart, staff    Assessment  Nabeel Appiah is a 63 y.o. male admitted with myoclonus following cardiac arrest. EEG pending to determine if epileptic vs subcortical in etiology, but will start treatment at present. Concern for underlying anoxic brain injury in this clinical context.      Plan  MRI brain w/o contrast  EEG  4500 mg IV Keppra x 1 followed by 1500 mg BID  Benzodiazepines as needed for myoclonic jerking        History of Present Illness:  Nabeel Appiah is a 63 y.o. 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  Patient admitted with cardiac arrest, noted to be in V. tach/V-fib and received amiodarone and shocks x 3.  ROSC was achieved prior to ED arrival.  He was then admitted to the MICU.    While in ICU he was noted to develop myoclonic jerking of the face.  Neurology consulted for myoclonic activity.      Medical History:   Past Medical History:   Diagnosis Date    AAA (abdominal aortic aneurysm) (HCC)     Decreased dorsalis pedis pulse 5/4/2023    Hypertension     Infrarenal abdominal aortic aneurysm (AAA) without rupture (HCC) 5/4/2023    After accounting for the tortuosity, approximately 4 cm x 4.5 cm abdominal aortic aneurysm noted on the ultrasound, April 2023    Irregular heart beat         Surgical History:   Past Surgical History:   Procedure Laterality Date    APPENDECTOMY      COLONOSCOPY      SHOULDER ARTHROPLASTY      TONSILLECTOMY          Family History:   Family History   Problem Relation Age of Onset    No Known Problems Mother        Social History:  Social History     Tobacco Use    Smoking status: Former     Current packs/day: 0.00     Average packs/day: 0.5 packs/day for 41.9

## 2024-05-03 NOTE — PROCEDURES
[facility] Neurodiagnostic Report    MRN: [id number]  PATIENT NAME: Nabeel Appiah  DATE OF REPORT: 5/3/2024    DATE OF SERVICE: 5/3/2024  PHYSICIAN NAME: Gordo Deleon DO  STUDY ORDERED BY: Dr Mansfield      Patient's : 1960  Patient's Age: 63 y.o.  Gender: male    PROCEDURE: Routine EEG with video      Clinical Interpretation:   This abnormal study showed evidence of:    A potential for generalized onset seizures  A severe nonspecific encephalopathy  Occasional myoclonic jerks without associated definite epileptiform activity    Structural abnormalities should be considered for the findings above and appropriate imaging obtained if clinically indicated.      ____________________________  Electronically signed by: Gordo Deleon DO, 5/3/2024 2:57 PM      Patient Clinical Information   Reason for Study: The patient is undergoing evaluation for seizure(s)  Patient State: Comatose  Primary neurological diagnosis: Spell of uncertain etiology  Primary indication for monitoring: Characterization of spell    Pertinent Medications and Treatments    levetiracetam     Sedatives administered: No  Intubated: Yes  Pharmacological paralytic: No    Reporting Period  Start of Study: 1312, 5/3/2024   End of Study:  133, 5/3/2024       EEG Description  Digital video and scalp EEG monitoring was performed using the standard protocol for this laboratory. Scalp electrodes were applied in the international 10/20 system. Multiple digital montage arrangements were utilized for evaluation. EKG and video were recorded.     Background:      Occipital rhythm (posterior dominant rhythm or PDR): Absent   Voltage: Low  Organization: poor  Reactivity to eye opening/closure: Not tested    Drowsiness: Absent  Sleep: Absent    Comments: The background is composed primarily of generalized very low amplitude irregular delta activity.    Technical and Activation Procedures:  Hyperventilation: Not done  Photic  stimulation: Not done  Reactivity to stimulation: Present    Abnormalities:    I. Seizures?  Patient noted to have frequent myoclonic jerks primarily involving the face/head. No definite underlying electrographic changes noted other than muscle artifact.    II. Rhythmic or Periodic Patterns?  Occasional generalized periodic discharges noted with an average frequency of 0.5-1.0 cycles per second. No significant evolution noted to suggest ictal activity.    III. Other Abnormalities?  As above

## 2024-05-03 NOTE — PROGRESS NOTES
[facility]  Department of Internal Medicine   Internal Medicine Residency   MICU Progress Note    Patient:  Nabeel Appiah 63 y.o. male  MRN: [id number]     Date of Service: 5/3/2024    Allergy: Patient has no known allergies.    Overnight Events: none     Subjective     Patient seen and examined at bedside, he is requiring ICU level care.  This morning during exam patient was found to have myoclonic jerks.  Neurology was consulted and he was started on keppra for myoclonic jerks. EEG was ordered. Family was present at bedside and was updated.       Objective     VS: /79   Pulse 97   Temp 99.3 °F (37.4 °C) (Bladder)   Resp 26   Ht 1.905 m (6' 3\")   Wt (!) 170.6 kg (376 lb)   SpO2 97%   BMI 47.00 kg/m²   ABP (Arterial line BP): 100/50  ABP Mean (Arterial Line Mean): (!) 64 mmHg    I & O - 24hr:   Intake/Output Summary (Last 24 hours) at 5/3/2024 1546  Last data filed at 5/3/2024 1400  Gross per 24 hour   Intake --   Output 450 ml   Net -450 ml       Oxygen: AC/   22  8  65    ABG:     Lab Results   Component Value Date/Time    PH 7.387 05/03/2024 04:33 AM    PCO2 39.5 05/03/2024 04:33 AM    PO2 75.2 05/03/2024 04:33 AM    HCO3 23.2 05/03/2024 04:33 AM    BE -1.6 05/03/2024 04:33 AM    THB 13.6 05/03/2024 04:33 AM    O2SAT 95.0 05/03/2024 04:33 AM       Physical Exam:  General Appearance: No acute distress. Intubated  Neuro: Round symmetric pupil that react to light bilaterally. Upward gaze. +Gag, +Cough, reacts to painful stimuli   Cardiovascular: regular rate and rhythm, S1 and S2 heard, no murmurs appreciated   Respiratory: Clear to auscultation bilaterally. No wheezing or crackles appreciated.  Abdomen: Soft, non-tender; bowel sounds normal; no masses, no organomegaly, rebound or guarding  Extremities: Extremities normal, no cyanosis, distal pulses intact, 1+  edema.       Lines: CT right pleural (4/29), CVC right femoral (4/29)      Urinary catheter: scott (5/1)     Labs      Basic Labs    Complete Blood Count:   Recent Labs     05/01/24  0405 05/01/24 1737 05/02/24  0437 05/03/24  0408   WBC 10.3  --  9.0 8.4   HGB 13.3 13.1 13.0 13.0   HCT 40.8 41.0 41.2 41.0   *  --  113*  --         Last 3 Blood Glucose:   Recent Labs     05/01/24 1737 05/02/24  0437 05/03/24  0408   GLUCOSE 124* 99 104*        PT/INR:    Lab Results   Component Value Date/Time    PROTIME 13.9 04/29/2024 02:51 PM    INR 1.3 04/29/2024 02:51 PM     PTT:    Lab Results   Component Value Date/Time    APTT 68.4 05/03/2024 04:08 AM       Comprehensive Metabolic Profile:   Recent Labs     05/01/24 1737 05/02/24 0437 05/02/24  1003 05/03/24  0408    144  --  142   K 4.0 4.2 4.30 3.8    108*  --  103   CO2 26 24  --  22   BUN 24* 23  --  24*   CREATININE 1.2 1.1  --  1.0   GLUCOSE 124* 99  --  104*   CALCIUM 8.8 8.8  --  8.9   BILITOT  --  0.6  --  0.7   ALKPHOS  --  39*  --  40   AST  --  60*  --  61*   ALT  --  130*  --  90*      Magnesium:   Lab Results   Component Value Date/Time    MG 1.9 05/03/2024 04:08 AM     Phosphorus:   Lab Results   Component Value Date/Time    PHOS 2.8 05/03/2024 04:08 AM     Ionized Calcium:   Lab Results   Component Value Date/Time    CAION 1.18 05/01/2024 03:12 AM      Troponin: No results for input(s): \"TROPONINI\" in the last 72 hours.    Imaging Studies:  Have been reviewed appropriately        Resident's Assessment and Plan        Assessment:   Acute encephalopathy? likely 2/2 cardiac arrest, on cooling protocol  V-fib cardiac arrest,  on lidocaine drip  Elevated troponin likely 2/2 CPR versus NSTEMI on heparin drip  Myoclonic Jerks   Abdominal aortic aneurysm  Persistent A-fib on Eliquis 5 mg twice daily at home. On heparin drip  Acute respiratory failure 2/2 cardiac arrest s/p intubation  Right-sided pneumothorax s/p chest tube on right side  Multiple rib fracture   Transaminitis  GENE-resolved  Hypertension on lisinopril-hydrochlorothiazide 20-12.5 mg 2 tablets  extensive neurological exam on patient.  Patient has been off any sedation for over 24 hours.  During my examination I noticed patient has subtle myoclonic jerks on his face.  Once proceeded to place an NG tube and patient he did have facial grimace and coughing.  Following that his myoclonic jerks increased.  He has been started on Keppra for them.  Neurology has been consulted.  EEG was ordered earlier this morning.  Results shows the potential for generalized onset seizures, severe nonspecific encephalopathic and occasional myoclonic jerks without associated definite epileptiform activity.  Patient's prognosis remains guarded.        During multidisciplinary team rounds the patient was seen, examined and discussed. This is confirmation that I have personally seen and examined the patient and that the key elements of the encounter were performed by me (> 85 % time).  The medications & laboratory data and imagery was discussed and adjusted where necessary. Key issues of the case were discussed among consultants.     This patient has a high probability of sudden clinically significant deterioration. I managed/supervised life or organ supporting interventions that required frequent physician assessment. I devoted my full attention to the direct care of this patient for the period of time indicated below. In addition to above, time was devoted to teaching and to any procedure.      NOTE: This report, in part or full, may have been transcribed using voice recognition software. Every effort was made to ensure accuracy; however, inadvertent computerized transcription errors may be present. Please excuse any transcriptional grammatical or spelling errors that may have escaped my editorial review.     Total critical care time caring for this patient with life threatening, unstable organ failure, including direct patient contact, management of life support systems, review of data including imaging and labs, discussions with

## 2024-05-03 NOTE — PROGRESS NOTES
[facility] Cardiology progress note  Sonido Haines MD     Patient is seen in follow-up for cardiac arrest    Subjective:     Mr. Appiah is lying in bed intubated, sedated    Review of systems:  Unable to obtain since disintegrated    Scheduled Meds: Reviewed.  Continuous Infusions: Reviewed.  PRN Meds: Reviewed.    I/O last 3 completed shifts:  In: 1672.5 [I.V.:1672.5]  Out: 900 [Urine:860; Chest Tube:40]  I/O this shift:  In: 1041.4 [I.V.:660.5; IV Piggyback:380.9]  Out: 190 [Urine:190]      Objective:      Physical Exam:   /72   Pulse 63   Temp 99.3 °F (37.4 °C) (Bladder)   Resp 22   Ht 1.905 m (6' 3\")   Wt (!) 170.6 kg (376 lb)   SpO2 96%   BMI 47.00 kg/m²   CONSTITUTIONAL:  no apparent distress, and appears stated age  HEAD:  normocepalic, without obvious abnormality, atraumatic  NECK:  Supple, symmetrical, trachea midline, no adenopathy, thyroid symmetric, not enlarged and no tenderness, skin normal  LUNGS:  No increased work of breathing, good air exchange, clear to auscultation bilaterally, no crackles or wheezing  CARDIOVASCULAR:  Normal apical impulse, regular rate and rhythm, normal S1 and S2, no S3 or S4, and no murmur noted, no edema, no JVD, no carotid bruit.  ABDOMEN:  Soft, nontender, no masses, no hepatomegaly, no splenomegaly, BS+  MUSCULOSKELETAL:  No clubbing no cyanosis.there is no redness, warmth, or swelling of the joints  NEUROLOGIC: Intubated, sedated    Cardiographics  I personally reviewed the telemetry monitor strips with the following interpretation: Sinus rhythm     Echocardiogram: 4/30/2024,    Left Ventricle: Normal left ventricular systolic function with a visually estimated EF of 50 - 55%. Left ventricle size is normal. Normal wall thickness. Normal wall motion.    Right Ventricle: Right ventricle size is normal. Normal systolic function.    Tricuspid Valve: Unable to assess RVSP.    Left Atrium: Left atrium is severely dilated.    Right Atrium: Right atrium size is normal.

## 2024-05-04 ENCOUNTER — APPOINTMENT (OUTPATIENT)
Dept: GENERAL RADIOLOGY | Age: 64
DRG: 283 | End: 2024-05-04
Attending: STUDENT IN AN ORGANIZED HEALTH CARE EDUCATION/TRAINING PROGRAM
Payer: COMMERCIAL

## 2024-05-04 LAB
AADO2: 343.7 MMHG
AADO2: 603.8 MMHG
AADO2: 613.1 MMHG
ALBUMIN SERPL-MCNC: 3.1 G/DL (ref 3.5–5.2)
ALP SERPL-CCNC: 43 U/L (ref 40–129)
ALT SERPL-CCNC: 64 U/L (ref 0–40)
ANION GAP SERPL CALCULATED.3IONS-SCNC: 11 MMOL/L (ref 7–16)
ANION GAP SERPL CALCULATED.3IONS-SCNC: 13 MMOL/L (ref 7–16)
AST SERPL-CCNC: 48 U/L (ref 0–39)
B.E.: 1 MMOL/L (ref -3–3)
B.E.: 1.4 MMOL/L (ref -3–3)
B.E.: 2.1 MMOL/L (ref -3–3)
BASOPHILS # BLD: 0.02 K/UL (ref 0–0.2)
BASOPHILS NFR BLD: 0 % (ref 0–2)
BILIRUB SERPL-MCNC: 0.7 MG/DL (ref 0–1.2)
BUN SERPL-MCNC: 38 MG/DL (ref 6–23)
BUN SERPL-MCNC: 38 MG/DL (ref 6–23)
CALCIUM SERPL-MCNC: 9 MG/DL (ref 8.6–10.2)
CALCIUM SERPL-MCNC: 9.1 MG/DL (ref 8.6–10.2)
CHLORIDE SERPL-SCNC: 108 MMOL/L (ref 98–107)
CHLORIDE SERPL-SCNC: 108 MMOL/L (ref 98–107)
CO2 SERPL-SCNC: 22 MMOL/L (ref 22–29)
CO2 SERPL-SCNC: 26 MMOL/L (ref 22–29)
COHB: 0.2 % (ref 0–1.5)
COHB: 0.3 % (ref 0–1.5)
COHB: 0.5 % (ref 0–1.5)
CREAT SERPL-MCNC: 1.4 MG/DL (ref 0.7–1.2)
CREAT SERPL-MCNC: 1.5 MG/DL (ref 0.7–1.2)
CRITICAL: ABNORMAL
CRITICAL: ABNORMAL
CRITICAL: NORMAL
DATE ANALYZED: ABNORMAL
DATE ANALYZED: ABNORMAL
DATE ANALYZED: NORMAL
DATE OF COLLECTION: ABNORMAL
DATE OF COLLECTION: ABNORMAL
DATE OF COLLECTION: NORMAL
EOSINOPHIL # BLD: 0.03 K/UL (ref 0.05–0.5)
EOSINOPHILS RELATIVE PERCENT: 0 % (ref 0–6)
ERYTHROCYTE [DISTWIDTH] IN BLOOD BY AUTOMATED COUNT: 13.6 % (ref 11.5–15)
FIO2: 100 %
FIO2: 100 %
FIO2: 65 %
GFR, ESTIMATED: 53 ML/MIN/1.73M2
GFR, ESTIMATED: 58 ML/MIN/1.73M2
GLUCOSE BLD-MCNC: 142 MG/DL (ref 74–99)
GLUCOSE BLD-MCNC: 150 MG/DL (ref 74–99)
GLUCOSE BLD-MCNC: 151 MG/DL (ref 74–99)
GLUCOSE BLD-MCNC: 164 MG/DL (ref 74–99)
GLUCOSE BLD-MCNC: 192 MG/DL (ref 74–99)
GLUCOSE BLD-MCNC: 194 MG/DL (ref 74–99)
GLUCOSE SERPL-MCNC: 161 MG/DL (ref 74–99)
GLUCOSE SERPL-MCNC: 183 MG/DL (ref 74–99)
HCO3: 25.1 MMOL/L (ref 22–26)
HCO3: 25.2 MMOL/L (ref 22–26)
HCO3: 25.5 MMOL/L (ref 22–26)
HCT VFR BLD AUTO: 36.4 % (ref 37–54)
HGB BLD-MCNC: 12 G/DL (ref 12.5–16.5)
HHB: 5 % (ref 0–5)
HHB: 5.8 % (ref 0–5)
HHB: 7.3 % (ref 0–5)
IMM GRANULOCYTES # BLD AUTO: 0.04 K/UL (ref 0–0.58)
IMM GRANULOCYTES NFR BLD: 1 % (ref 0–5)
LAB: ABNORMAL
LAB: ABNORMAL
LAB: NORMAL
LACTATE BLDV-SCNC: 1.9 MMOL/L (ref 0.5–2.2)
LYMPHOCYTES NFR BLD: 0.77 K/UL (ref 1.5–4)
LYMPHOCYTES RELATIVE PERCENT: 10 % (ref 20–42)
Lab: 1340
Lab: 2134
Lab: 430
MAGNESIUM SERPL-MCNC: 2.4 MG/DL (ref 1.6–2.6)
MAGNESIUM SERPL-MCNC: 2.5 MG/DL (ref 1.6–2.6)
MCH RBC QN AUTO: 31 PG (ref 26–35)
MCHC RBC AUTO-ENTMCNC: 33 G/DL (ref 32–34.5)
MCV RBC AUTO: 94.1 FL (ref 80–99.9)
METHB: 0.1 % (ref 0–1.5)
METHB: 0.2 % (ref 0–1.5)
METHB: 0.3 % (ref 0–1.5)
MICROORGANISM SPEC CULT: NORMAL
MICROORGANISM SPEC CULT: NORMAL
MODE: AC
MONOCYTES NFR BLD: 0.83 K/UL (ref 0.1–0.95)
MONOCYTES NFR BLD: 10 % (ref 2–12)
NEUTROPHILS NFR BLD: 79 % (ref 43–80)
NEUTS SEG NFR BLD: 6.29 K/UL (ref 1.8–7.3)
O2 CONTENT: 15.8 ML/DL
O2 CONTENT: 16.9 ML/DL
O2 CONTENT: 17 ML/DL
O2 SATURATION: 92.7 % (ref 92–98.5)
O2 SATURATION: 94.2 % (ref 92–98.5)
O2 SATURATION: 95 % (ref 92–98.5)
O2HB: 92.1 % (ref 94–97)
O2HB: 93.8 % (ref 94–97)
O2HB: 94.4 % (ref 94–97)
OPERATOR ID: 2577
OPERATOR ID: 359
OPERATOR ID: 366
PATIENT TEMP: 37 C
PCO2: 35.6 MMHG (ref 35–45)
PCO2: 36.9 MMHG (ref 35–45)
PCO2: 38.3 MMHG (ref 35–45)
PEEP/CPAP: 10 CMH2O
PEEP/CPAP: 10 CMH2O
PEEP/CPAP: 8 CMH2O
PFO2: 0.64 MMHG/%
PFO2: 0.72 MMHG/%
PFO2: 1.2 MMHG/%
PH BLOOD GAS: 7.43 (ref 7.35–7.45)
PH BLOOD GAS: 7.45 (ref 7.35–7.45)
PH BLOOD GAS: 7.47 (ref 7.35–7.45)
PHOSPHATE SERPL-MCNC: 2.9 MG/DL (ref 2.5–4.5)
PHOSPHATE SERPL-MCNC: 3.1 MG/DL (ref 2.5–4.5)
PLATELET, FLUORESCENCE: 133 K/UL (ref 130–450)
PMV BLD AUTO: 11 FL (ref 7–12)
PO2: 64.3 MMHG (ref 75–100)
PO2: 72.3 MMHG (ref 75–100)
PO2: 78.1 MMHG (ref 75–100)
POTASSIUM SERPL-SCNC: 3.8 MMOL/L (ref 3.5–5)
POTASSIUM SERPL-SCNC: 4 MMOL/L (ref 3.5–5)
PROT SERPL-MCNC: 5.6 G/DL (ref 6.4–8.3)
RBC # BLD AUTO: 3.87 M/UL (ref 3.8–5.8)
RI(T): 4.4
RI(T): 8.35
RI(T): 9.54
RR MECHANICAL: 22 B/MIN
SERVICE CMNT-IMP: NORMAL
SERVICE CMNT-IMP: NORMAL
SODIUM SERPL-SCNC: 143 MMOL/L (ref 132–146)
SODIUM SERPL-SCNC: 145 MMOL/L (ref 132–146)
SOURCE, BLOOD GAS: ABNORMAL
SOURCE, BLOOD GAS: ABNORMAL
SOURCE, BLOOD GAS: NORMAL
SPECIMEN DESCRIPTION: NORMAL
SPECIMEN DESCRIPTION: NORMAL
THB: 12.2 G/DL (ref 11.5–16.5)
THB: 12.8 G/DL (ref 11.5–16.5)
THB: 12.8 G/DL (ref 11.5–16.5)
TIME ANALYZED: 1345
TIME ANALYZED: 2138
TIME ANALYZED: 434
TROPONIN I SERPL HS-MCNC: 624 NG/L (ref 0–11)
VT MECHANICAL: 550 ML
WBC OTHER # BLD: 8 K/UL (ref 4.5–11.5)

## 2024-05-04 PROCEDURE — 80165 DIPROPYLACETIC ACID FREE: CPT

## 2024-05-04 PROCEDURE — 71045 X-RAY EXAM CHEST 1 VIEW: CPT

## 2024-05-04 PROCEDURE — 2580000003 HC RX 258: Performed by: STUDENT IN AN ORGANIZED HEALTH CARE EDUCATION/TRAINING PROGRAM

## 2024-05-04 PROCEDURE — C9113 INJ PANTOPRAZOLE SODIUM, VIA: HCPCS

## 2024-05-04 PROCEDURE — 6360000002 HC RX W HCPCS: Performed by: PSYCHIATRY & NEUROLOGY

## 2024-05-04 PROCEDURE — 2000000000 HC ICU R&B

## 2024-05-04 PROCEDURE — 2580000003 HC RX 258

## 2024-05-04 PROCEDURE — 6360000002 HC RX W HCPCS

## 2024-05-04 PROCEDURE — 6360000002 HC RX W HCPCS: Performed by: STUDENT IN AN ORGANIZED HEALTH CARE EDUCATION/TRAINING PROGRAM

## 2024-05-04 PROCEDURE — 82805 BLOOD GASES W/O2 SATURATION: CPT

## 2024-05-04 PROCEDURE — 83735 ASSAY OF MAGNESIUM: CPT

## 2024-05-04 PROCEDURE — 6370000000 HC RX 637 (ALT 250 FOR IP)

## 2024-05-04 PROCEDURE — 82962 GLUCOSE BLOOD TEST: CPT

## 2024-05-04 PROCEDURE — 2500000003 HC RX 250 WO HCPCS: Performed by: NURSE PRACTITIONER

## 2024-05-04 PROCEDURE — 94669 MECHANICAL CHEST WALL OSCILL: CPT

## 2024-05-04 PROCEDURE — 94003 VENT MGMT INPAT SUBQ DAY: CPT

## 2024-05-04 PROCEDURE — 6360000002 HC RX W HCPCS: Performed by: INTERNAL MEDICINE

## 2024-05-04 PROCEDURE — 80053 COMPREHEN METABOLIC PANEL: CPT

## 2024-05-04 PROCEDURE — 94667 MNPJ CHEST WALL 1ST: CPT

## 2024-05-04 PROCEDURE — 94640 AIRWAY INHALATION TREATMENT: CPT

## 2024-05-04 PROCEDURE — 83605 ASSAY OF LACTIC ACID: CPT

## 2024-05-04 PROCEDURE — 99233 SBSQ HOSP IP/OBS HIGH 50: CPT | Performed by: NURSE PRACTITIONER

## 2024-05-04 PROCEDURE — 84484 ASSAY OF TROPONIN QUANT: CPT

## 2024-05-04 PROCEDURE — 85025 COMPLETE CBC W/AUTO DIFF WBC: CPT

## 2024-05-04 PROCEDURE — 6370000000 HC RX 637 (ALT 250 FOR IP): Performed by: STUDENT IN AN ORGANIZED HEALTH CARE EDUCATION/TRAINING PROGRAM

## 2024-05-04 PROCEDURE — 80048 BASIC METABOLIC PNL TOTAL CA: CPT

## 2024-05-04 PROCEDURE — 2580000003 HC RX 258: Performed by: NURSE PRACTITIONER

## 2024-05-04 PROCEDURE — 84100 ASSAY OF PHOSPHORUS: CPT

## 2024-05-04 PROCEDURE — 6370000000 HC RX 637 (ALT 250 FOR IP): Performed by: INTERNAL MEDICINE

## 2024-05-04 RX ORDER — SODIUM CHLORIDE, SODIUM LACTATE, POTASSIUM CHLORIDE, AND CALCIUM CHLORIDE .6; .31; .03; .02 G/100ML; G/100ML; G/100ML; G/100ML
1000 INJECTION, SOLUTION INTRAVENOUS ONCE
Status: COMPLETED | OUTPATIENT
Start: 2024-05-04 | End: 2024-05-04

## 2024-05-04 RX ORDER — MIDAZOLAM HYDROCHLORIDE 2 MG/2ML
2 INJECTION, SOLUTION INTRAMUSCULAR; INTRAVENOUS ONCE
Status: COMPLETED | OUTPATIENT
Start: 2024-05-04 | End: 2024-05-04

## 2024-05-04 RX ORDER — ALBUTEROL SULFATE 2.5 MG/3ML
2.5 SOLUTION RESPIRATORY (INHALATION) EVERY 6 HOURS
Status: DISCONTINUED | OUTPATIENT
Start: 2024-05-04 | End: 2024-05-04

## 2024-05-04 RX ORDER — MIDAZOLAM HYDROCHLORIDE 1 MG/ML
1-10 INJECTION, SOLUTION INTRAVENOUS CONTINUOUS
Status: DISCONTINUED | OUTPATIENT
Start: 2024-05-04 | End: 2024-05-05

## 2024-05-04 RX ORDER — ACETYLCYSTEINE 100 MG/ML
4 SOLUTION ORAL; RESPIRATORY (INHALATION)
Status: DISCONTINUED | OUTPATIENT
Start: 2024-05-04 | End: 2024-05-13

## 2024-05-04 RX ORDER — LORAZEPAM 2 MG/ML
INJECTION INTRAMUSCULAR
Status: COMPLETED
Start: 2024-05-04 | End: 2024-05-04

## 2024-05-04 RX ORDER — MIDAZOLAM HYDROCHLORIDE 1 MG/ML
INJECTION INTRAMUSCULAR; INTRAVENOUS
Status: COMPLETED
Start: 2024-05-04 | End: 2024-05-04

## 2024-05-04 RX ORDER — ACETYLCYSTEINE 100 MG/ML
4 SOLUTION ORAL; RESPIRATORY (INHALATION) EVERY 4 HOURS
Status: DISCONTINUED | OUTPATIENT
Start: 2024-05-04 | End: 2024-05-04

## 2024-05-04 RX ORDER — ENOXAPARIN SODIUM 100 MG/ML
40 INJECTION SUBCUTANEOUS 2 TIMES DAILY
Status: DISCONTINUED | OUTPATIENT
Start: 2024-05-04 | End: 2024-05-04

## 2024-05-04 RX ORDER — LORAZEPAM 2 MG/ML
2 INJECTION INTRAMUSCULAR ONCE
Status: COMPLETED | OUTPATIENT
Start: 2024-05-04 | End: 2024-05-04

## 2024-05-04 RX ORDER — CLONAZEPAM 0.5 MG/1
0.5 TABLET ORAL EVERY 8 HOURS
Status: DISCONTINUED | OUTPATIENT
Start: 2024-05-04 | End: 2024-05-05

## 2024-05-04 RX ORDER — ENOXAPARIN SODIUM 100 MG/ML
30 INJECTION SUBCUTANEOUS 2 TIMES DAILY
Status: DISCONTINUED | OUTPATIENT
Start: 2024-05-04 | End: 2024-05-05

## 2024-05-04 RX ORDER — ALBUTEROL SULFATE 2.5 MG/3ML
2.5 SOLUTION RESPIRATORY (INHALATION)
Status: DISCONTINUED | OUTPATIENT
Start: 2024-05-04 | End: 2024-05-07

## 2024-05-04 RX ADMIN — ALBUTEROL SULFATE 2.5 MG: 2.5 SOLUTION RESPIRATORY (INHALATION) at 13:13

## 2024-05-04 RX ADMIN — Medication 15 ML: at 09:08

## 2024-05-04 RX ADMIN — SODIUM CHLORIDE, PRESERVATIVE FREE 10 ML: 5 INJECTION INTRAVENOUS at 09:08

## 2024-05-04 RX ADMIN — SODIUM CHLORIDE, PRESERVATIVE FREE 10 ML: 5 INJECTION INTRAVENOUS at 20:47

## 2024-05-04 RX ADMIN — AMPICILLIN SODIUM AND SULBACTAM SODIUM 3000 MG: 2; 1 INJECTION, POWDER, FOR SOLUTION INTRAMUSCULAR; INTRAVENOUS at 15:43

## 2024-05-04 RX ADMIN — LEVETIRACETAM 1500 MG: 500 INJECTION, SOLUTION, CONCENTRATE INTRAVENOUS at 20:42

## 2024-05-04 RX ADMIN — THIAMINE HYDROCHLORIDE 500 MG: 100 INJECTION, SOLUTION INTRAMUSCULAR; INTRAVENOUS at 12:21

## 2024-05-04 RX ADMIN — LORAZEPAM 2 MG: 2 INJECTION INTRAMUSCULAR at 15:30

## 2024-05-04 RX ADMIN — MIDAZOLAM 2 MG: 1 INJECTION INTRAMUSCULAR; INTRAVENOUS at 08:56

## 2024-05-04 RX ADMIN — AMPICILLIN SODIUM AND SULBACTAM SODIUM 3000 MG: 2; 1 INJECTION, POWDER, FOR SOLUTION INTRAMUSCULAR; INTRAVENOUS at 20:50

## 2024-05-04 RX ADMIN — CLONAZEPAM 0.5 MG: 0.5 TABLET ORAL at 20:45

## 2024-05-04 RX ADMIN — LORAZEPAM 2 MG: 2 INJECTION INTRAMUSCULAR; INTRAVENOUS at 14:04

## 2024-05-04 RX ADMIN — THIAMINE HYDROCHLORIDE 500 MG: 100 INJECTION, SOLUTION INTRAMUSCULAR; INTRAVENOUS at 20:02

## 2024-05-04 RX ADMIN — ALBUTEROL SULFATE 2.5 MG: 2.5 SOLUTION RESPIRATORY (INHALATION) at 16:07

## 2024-05-04 RX ADMIN — ENOXAPARIN SODIUM 30 MG: 100 INJECTION SUBCUTANEOUS at 20:45

## 2024-05-04 RX ADMIN — AMPICILLIN SODIUM AND SULBACTAM SODIUM 3000 MG: 2; 1 INJECTION, POWDER, FOR SOLUTION INTRAMUSCULAR; INTRAVENOUS at 09:21

## 2024-05-04 RX ADMIN — SODIUM CHLORIDE, POTASSIUM CHLORIDE, SODIUM LACTATE AND CALCIUM CHLORIDE 1000 ML: 600; 310; 30; 20 INJECTION, SOLUTION INTRAVENOUS at 12:13

## 2024-05-04 RX ADMIN — LORAZEPAM 2 MG: 2 INJECTION INTRAMUSCULAR at 14:04

## 2024-05-04 RX ADMIN — ENOXAPARIN SODIUM 40 MG: 100 INJECTION SUBCUTANEOUS at 09:08

## 2024-05-04 RX ADMIN — CLONAZEPAM 0.5 MG: 0.5 TABLET ORAL at 12:30

## 2024-05-04 RX ADMIN — Medication 2 MG/HR: at 18:40

## 2024-05-04 RX ADMIN — ACETYLCYSTEINE 400 MG: 100 SOLUTION ORAL; RESPIRATORY (INHALATION) at 19:45

## 2024-05-04 RX ADMIN — ASPIRIN 81 MG: 81 TABLET, COATED ORAL at 09:09

## 2024-05-04 RX ADMIN — ACETYLCYSTEINE 400 MG: 100 SOLUTION ORAL; RESPIRATORY (INHALATION) at 16:07

## 2024-05-04 RX ADMIN — VALPROATE SODIUM 3000 MG: 100 INJECTION, SOLUTION INTRAVENOUS at 15:36

## 2024-05-04 RX ADMIN — MINERAL OIL, WHITE PETROLATUM: .03; .94 OINTMENT OPHTHALMIC at 20:47

## 2024-05-04 RX ADMIN — POTASSIUM BICARBONATE 20 MEQ: 782 TABLET, EFFERVESCENT ORAL at 06:08

## 2024-05-04 RX ADMIN — LORAZEPAM 2 MG: 2 INJECTION INTRAMUSCULAR; INTRAVENOUS at 15:30

## 2024-05-04 RX ADMIN — SODIUM CHLORIDE, PRESERVATIVE FREE 40 MG: 5 INJECTION INTRAVENOUS at 09:08

## 2024-05-04 RX ADMIN — AMPICILLIN SODIUM AND SULBACTAM SODIUM 3000 MG: 2; 1 INJECTION, POWDER, FOR SOLUTION INTRAMUSCULAR; INTRAVENOUS at 03:27

## 2024-05-04 RX ADMIN — Medication 15 ML: at 20:45

## 2024-05-04 RX ADMIN — ALBUTEROL SULFATE 2.5 MG: 2.5 SOLUTION RESPIRATORY (INHALATION) at 19:45

## 2024-05-04 RX ADMIN — MIDAZOLAM HYDROCHLORIDE 2 MG: 2 INJECTION, SOLUTION INTRAMUSCULAR; INTRAVENOUS at 08:56

## 2024-05-04 RX ADMIN — LEVETIRACETAM 1500 MG: 500 INJECTION, SOLUTION, CONCENTRATE INTRAVENOUS at 09:08

## 2024-05-04 RX ADMIN — ATORVASTATIN CALCIUM 40 MG: 40 TABLET, FILM COATED ORAL at 20:45

## 2024-05-04 RX ADMIN — THIAMINE HYDROCHLORIDE 500 MG: 100 INJECTION, SOLUTION INTRAMUSCULAR; INTRAVENOUS at 04:05

## 2024-05-04 RX ADMIN — ACETYLCYSTEINE 400 MG: 100 SOLUTION ORAL; RESPIRATORY (INHALATION) at 13:14

## 2024-05-04 ASSESSMENT — PULMONARY FUNCTION TESTS
PIF_VALUE: 42
PIF_VALUE: 27
PIF_VALUE: 26
PIF_VALUE: 28
PIF_VALUE: 27
PIF_VALUE: 31
PIF_VALUE: 31
PIF_VALUE: 28
PIF_VALUE: 35
PIF_VALUE: 27
PIF_VALUE: 27
PIF_VALUE: 30
PIF_VALUE: 26
PIF_VALUE: 27
PIF_VALUE: 39
PIF_VALUE: 28

## 2024-05-04 ASSESSMENT — PAIN SCALES - GENERAL
PAINLEVEL_OUTOF10: 0

## 2024-05-04 NOTE — PROGRESS NOTES
Pharmacist Review and Automatic Dose Adjustment of Prophylactic Enoxaparin    Reviewed reason(s) for admission/hospital problem list    The reviewing pharmacist has made an adjustment to the ordered enoxaparin dose or converted to UFH per the approved [facility] protocol and table as identified below.        Nabeel Appiah is a 63 y.o. male.     Recent Labs     05/02/24  0437 05/03/24  0408 05/04/24  0423   CREATININE 1.1 1.0 1.4*       Estimated Creatinine Clearance: 90 mL/min (A) (based on SCr of 1.4 mg/dL (H)).    Recent Labs     05/02/24  0437 05/03/24  0408 05/03/24  1802 05/04/24  0423   HGB 13.0   < > 12.8 12.0*   HCT 41.2   < > 39.2 36.4*   *  --   --   --     < > = values in this interval not displayed.     No results for input(s): \"INR\" in the last 72 hours.    Height:   Ht Readings from Last 1 Encounters:   05/03/24 1.905 m (6' 3\")     Weight:  Wt Readings from Last 1 Encounters:   05/04/24 (!) 171 kg (377 lb 0.6 oz)               Plan: Based upon the patient's weight and renal function    Ordered: Enoxaparin 40mg SUBQ Daily    Changed/converted to    New Order: Enoxaparin 40mg SUBQ BID      Thank you,  Reta Rhoades, [facility]  5/4/2024, 6:29 AM

## 2024-05-04 NOTE — PLAN OF CARE
Problem: Chronic Conditions and Co-morbidities  Goal: Patient's chronic conditions and co-morbidity symptoms are monitored and maintained or improved  Care Plan - Patient's Chronic Conditions and Co-Morbidity Symptoms are Monitored and Maintained or Improved:   Monitor and assess patient's chronic conditions and comorbid symptoms for stability, deterioration, or improvement   Collaborate with multidisciplinary team to address chronic and comorbid conditions and prevent exacerbation or deterioration   Update acute care plan with appropriate goals if chronic or comorbid symptoms are exacerbated and prevent overall improvement and discharge     Problem: Respiratory - Adult  Goal: Achieves optimal ventilation and oxygenation  Recent Flowsheet Documentation  Taken 5/3/2024 2000 by Jose Landry, RN  Achieves optimal ventilation and oxygenation:   Assess for changes in respiratory status   Assess for changes in mentation and behavior   Position to facilitate oxygenation and minimize respiratory effort   Oxygen supplementation based on oxygen saturation or arterial blood gases   Initiate smoking cessation protocol as indicated

## 2024-05-04 NOTE — PROGRESS NOTES
[facility]  Department of Internal Medicine   Internal Medicine Residency   MICU Progress Note    Patient:  Nabeel Appiah 63 y.o. male  MRN: [id number]     Date of Service: 5/4/2024    Allergy: Patient has no known allergies.    Overnight Events: none     Subjective     Patient seen and examined at bedside, he is requiring ICU level care.  Patient continues to have myoclonic jerks despite being on keppra. Reached out to Neurology and Noam Richards recommended clonazepam 0.5mg q8 .     Patient had 2 physician consent to change the code status to DNR-CCA.      Objective     VS: /60   Pulse 58   Temp 99.2 °F (37.3 °C) (Oral)   Resp 22   Ht 1.905 m (6' 3\")   Wt (!) 171 kg (377 lb 0.6 oz)   SpO2 93%   BMI 47.13 kg/m²   ABP (Arterial line BP): 100/50  ABP Mean (Arterial Line Mean): (!) 64 mmHg    I & O - 24hr:   Intake/Output Summary (Last 24 hours) at 5/4/2024 1220  Last data filed at 5/4/2024 0400  Gross per 24 hour   Intake 1815.4 ml   Output 840 ml   Net 975.4 ml       Oxygen: AC/   22  8  65    ABG:     Lab Results   Component Value Date/Time    PH 7.435 05/04/2024 04:30 AM    PCO2 38.3 05/04/2024 04:30 AM    PO2 78.1 05/04/2024 04:30 AM    HCO3 25.1 05/04/2024 04:30 AM    BE 1.0 05/04/2024 04:30 AM    THB 12.8 05/04/2024 04:30 AM    O2SAT 95.0 05/04/2024 04:30 AM       Physical Exam:  General Appearance: No acute distress. Intubated  Neuro: Round symmetric pupil that react to light bilaterally. Upward gaze. +Gag, +Cough, reacts to painful stimuli. Myocolonic jerks of the face.  Cardiovascular: regular rate and rhythm, S1 and S2 heard, no murmurs appreciated   Respiratory: Clear to auscultation bilaterally. No wheezing or crackles appreciated.  Abdomen: Soft, non-tender; bowel sounds normal; no masses, no organomegaly, rebound or guarding  Extremities: Extremities normal, no cyanosis, distal pulses intact, 1+  edema.       Lines: CT right pleural (4/29), CVC right femoral

## 2024-05-04 NOTE — PROGRESS NOTES
[facility] Hospitalist Progress Note    Admitting Date and Time: 5/1/2024  2:21 AM  Admit Dx: Cardiac arrest (HCC) [I46.9]  Synopsis: The patient is a 63-year-old male with past medical history of A-fib, hypertension, obesity, type II DM, AAA who presented to [facility] for cardiac arrest. On EMS arrival reported to be in V. tach/V-fib arrest and received amiodarone and shocked x 3. Patient was noted to have ROSC prior to ED arrival. CT of the head was done in the emergency department without any intracranial findings. Further imaging revealed a small pneumothorax for which a right-sided chest tube was placed: CT of the chest negative for PE. CT of the abdomen and pelvis showed presence of 4.3 x 4 cm aneurysm. Plan was for cardiac catheterization at [facility] hence patient was transferred from [place].  Patient remains on ventilatory support and critical care unit.     Subjective:  Patient seen and examined at bedside  On ventilatory support and sedation  EEG showing a potential for generalized onset seizures     ROS: Unable to obtain     enoxaparin  30 mg SubCUTAneous BID    lactated ringers  1,000 mL IntraVENous Once    acetylcysteine  4 mL Inhalation Q4H    albuterol  2.5 mg Nebulization Q6H    thiamine  500 mg IntraVENous q8h    [START ON 5/6/2024] thiamine  100 mg IntraVENous Daily    levETIRAcetam  1,500 mg IntraVENous Q12H    aspirin  81 mg Oral Daily    metoprolol tartrate  25 mg Oral BID    ampicillin-sulbactam  3,000 mg IntraVENous Q6H    insulin lispro  0-4 Units SubCUTAneous Q4H    sodium chloride flush  5-40 mL IntraVENous 2 times per day    pantoprazole (PROTONIX) 40 mg in sodium chloride (PF) 0.9 % 10 mL injection  40 mg IntraVENous Daily    chlorhexidine  15 mL Mouth/Throat BID    atorvastatin  40 mg Oral Nightly     sodium chloride flush, 5-40 mL, PRN  sodium chloride, , PRN  ondansetron, 4 mg, Q8H PRN   Or  ondansetron, 4 mg, Q6H PRN  polyethylene glycol, 17 g,  Daily PRN  acetaminophen, 650 mg, Q6H PRN   Or  acetaminophen, 650 mg, Q6H PRN  glucose, 4 tablet, PRN  dextrose bolus, 125 mL, PRN   Or  dextrose bolus, 250 mL, PRN  glucagon (rDNA), 1 mg, PRN  dextrose, , Continuous PRN  artificial tears, , Q8H PRN         Objective:    /60   Pulse 58   Temp 99.2 °F (37.3 °C) (Oral)   Resp 22   Ht 1.905 m (6' 3\")   Wt (!) 171 kg (377 lb 0.6 oz)   SpO2 93%   BMI 47.13 kg/m²   General Appearance: Obese, intubated and sedated skin: warm and dry  Head: normocephalic and atraumatic  Eyes: pupils equal, round, and reactive to light, extraocular eye movements intact, conjunctivae normal  Neck: neck supple and non tender without mass   Pulmonary/Chest: clear to auscultation bilaterally- no wheezes, rales or rhonchi, normal air movement, no respiratory distress  Cardiovascular: normal rate, normal S1 and S2 and no carotid bruits  Abdomen: soft, non-tender, non-distended, normal bowel sounds, no masses or organomegaly  Extremities: no cyanosis, no clubbing and no edema  Neurologic: Currently sedated        Recent Labs     05/02/24  0437 05/02/24  1003 05/03/24  0408 05/04/24  0423     --  142 143   K 4.2 4.30 3.8 3.8   *  --  103 108*   CO2 24  --  22 22   BUN 23  --  24* 38*   CREATININE 1.1  --  1.0 1.4*   GLUCOSE 99  --  104* 183*   CALCIUM 8.8  --  8.9 9.0         Recent Labs     05/02/24  0437 05/03/24  0408 05/03/24  1802 05/04/24  0423   WBC 9.0 8.4  --  8.0   RBC 4.24 4.23  --  3.87   HGB 13.0 13.0 12.8 12.0*   HCT 41.2 41.0 39.2 36.4*   MCV 97.2 96.9  --  94.1   MCH 30.7 30.7  --  31.0   MCHC 31.6* 31.7*  --  33.0   RDW 13.8 13.5  --  13.6   *  --   --   --    MPV 12.0 11.5  --  11.0         Radiology:   XR CHEST ABDOMEN NG PLACEMENT   Final Result   Distal enteric tube in good position within the stomach.         XR CHEST PORTABLE   Final Result   1. Endotracheal tube in satisfactory position 4 cm above milla.   2. Orogastric tube in position.   3.

## 2024-05-04 NOTE — PLAN OF CARE
Problem: Discharge Planning  Goal: Discharge to home or other facility with appropriate resources  Outcome: Not Progressing  Flowsheets (Taken 5/3/2024 2000 by Jose Landry, RN)  Discharge to home or other facility with appropriate resources:   Identify barriers to discharge with patient and caregiver   Arrange for needed discharge resources and transportation as appropriate   Arrange for interpreters to assist at discharge as needed     Problem: Neurosensory - Adult  Goal: Achieves stable or improved neurological status  Outcome: Not Progressing  Flowsheets (Taken 5/3/2024 2000 by Jose Landry, RN)  Achieves stable or improved neurological status:   Assess for and report changes in neurological status   Initiate measures to prevent increased intracranial pressure   Maintain blood pressure and fluid volume within ordered parameters to optimize cerebral perfusion and minimize risk of hemorrhage   Monitor temperature, glucose, and sodium. Initiate appropriate interventions as ordered     Problem: Respiratory - Adult  Goal: Achieves optimal ventilation and oxygenation  Outcome: Not Progressing  Flowsheets (Taken 5/3/2024 2000 by Jose Landry, RN)  Achieves optimal ventilation and oxygenation:   Assess for changes in respiratory status   Assess for changes in mentation and behavior   Position to facilitate oxygenation and minimize respiratory effort   Oxygen supplementation based on oxygen saturation or arterial blood gases   Initiate smoking cessation protocol as indicated     Problem: Safety - Adult  Goal: Free from fall injury  Outcome: Progressing     Problem: Chronic Conditions and Co-morbidities  Goal: Patient's chronic conditions and co-morbidity symptoms are monitored and maintained or improved  5/4/2024 0810 by Delano Krause, RN  Outcome: Progressing  5/4/2024 0012 by Jose Landry, RN  Outcome: Progressing  Flowsheets (Taken 5/3/2024 2000)  Care Plan - Patient's Chronic Conditions and  Co-Morbidity Symptoms are Monitored and Maintained or Improved:   Monitor and assess patient's chronic conditions and comorbid symptoms for stability, deterioration, or improvement   Collaborate with multidisciplinary team to address chronic and comorbid conditions and prevent exacerbation or deterioration   Update acute care plan with appropriate goals if chronic or comorbid symptoms are exacerbated and prevent overall improvement and discharge     Problem: Pain  Goal: Verbalizes/displays adequate comfort level or baseline comfort level  Outcome: Progressing  Flowsheets (Taken 5/3/2024 2000 by Jose Landry RN)  Verbalizes/displays adequate comfort level or baseline comfort level:   Encourage patient to monitor pain and request assistance   Assess pain using appropriate pain scale   Administer analgesics based on type and severity of pain and evaluate response     Problem: Skin/Tissue Integrity  Goal: Absence of new skin breakdown  Description: 1.  Monitor for areas of redness and/or skin breakdown  2.  Assess vascular access sites hourly  3.  Every 4-6 hours minimum:  Change oxygen saturation probe site  4.  Every 4-6 hours:  If on nasal continuous positive airway pressure, respiratory therapy assess nares and determine need for appliance change or resting period.  Outcome: Progressing     Problem: ABCDS Injury Assessment  Goal: Absence of physical injury  Outcome: Progressing  Flowsheets (Taken 5/4/2024 0808)  Absence of Physical Injury: Implement safety measures based on patient assessment     Problem: Nutrition Deficit:  Goal: Optimize nutritional status  Outcome: Progressing

## 2024-05-04 NOTE — PLAN OF CARE
Problem: Chronic Conditions and Co-morbidities  Goal: Patient's chronic conditions and co-morbidity symptoms are monitored and maintained or improved  Outcome: Progressing  Care Plan - Patient's Chronic Conditions and Co-Morbidity Symptoms are Monitored and Maintained or Improved:   Monitor and assess patient's chronic conditions and comorbid symptoms for stability, deterioration, or improvement   Collaborate with multidisciplinary team to address chronic and comorbid conditions and prevent exacerbation or deterioration   Update acute care plan with appropriate goals if chronic or comorbid symptoms are exacerbated and prevent overall improvement and discharge

## 2024-05-04 NOTE — PROGRESS NOTES
DAILY VENTILATOR WEANING ASSESSMENT PERFORMED    P/FIO2 Ratio =  120        (<100= do not Wean)                  Cs =41                          (<32= Instability)  Plat. Pressure = 22  MV =11.6  RSBI =    Instabilities:       Cardiovascular =       CNS =       Respiratory =1       Metabolic =    Parameters    no    Wean per protocol  no    Ask Physician for a weaning plan yes    Additional Comments: pt still unstable  not responding myclonic jerks now     Performed by Sushma Patterson, MARQUEZ RRT      Reference Table:    Cardiovascular     CNS      1. Mean BP less than or equal to 75   1. Neuromuscular blockade  2. Heart Rate greater than 130   2. RASS of -3, -4, -5  3. Myocardial Ischemia    3. RASS of +3, +4  4. Mechanical Assist Device    4. ICP greater than 15 or             Intracranial Hypertension         Respiratory      Metabolic  1. PEEP equal to or greater than 10cm/H20  1.Temp. (8hrs) less than 95 or > 103  2. Respiratory Rate greater than 35   2. WBC < 5000 or > 08036  3. Minute Volume greater than 15L  4. pH less than 7.30  5. Deteriorating chest X-ray         05/04/24 0908   Patient Observation   Pulse 60   SpO2 93 %   Vent Information   Ventilator Day(s) 3   Ventilator -31   Vent Mode AC/PRVC   $Ventilation $Subsequent Day   Ventilator Settings   Vt (Set, mL) 550 mL   Resp Rate (Set) 22 bpm   PEEP/CPAP (cmH2O) 8   FiO2  65 %   Insp Time (sec) 0.75 sec   Vent Patient Data (Readings)   Vt (Measured) 529 mL   Peak Inspiratory Pressure (cmH2O) 27 cmH2O   Rate Measured 22 br/min   Minute Volume (L/min) 11.6 Liters   Mean Airway Pressure (cmH2O) 14 cmH20   Plateau Pressure (cm H2O) 22 cm H2O   Driving Pressure 14   I:E Ratio 1:2.60   Pressure Sensitivity 2 cm H2O   PEEP Intrinsic (cm H2O) 0.4 cm H2O   Static Compliance (L/cm H2O) 41   I Time/ I Time % 0 s   Insp Rise Time (%) 50 %   Backup Apnea On   Backup Rate 22 Breaths Per Minute   Backup Vt 550   Vent Alarm Settings   High Pressure (cmH2O) 50 cmH2O

## 2024-05-04 NOTE — PLAN OF CARE
Problem: Respiratory - Adult  Goal: Achieves optimal ventilation and oxygenation  5/4/2024 1459 by Sushma Villalobos, RCP  Outcome: Not Progressing    Mri pending had to increase peep to 10 and oxygen to 100%

## 2024-05-04 NOTE — PROGRESS NOTES
Messaged by staff regarding persistent myoclonic jerking.  Recommended routine clonazepam dosing 0.5 mg every 8 hours.  Discussed with Dr. Deleon who also recommended bolusing with Depakote 20 mg/kg and and starting 500 mg twice daily.  Will recheck Depakote level this evening and follow daily levels for a few days.    Noam Richards, APRN - CNP  1:56 PM     06-Feb-2020 12:51

## 2024-05-04 NOTE — PROGRESS NOTES
[facility]  Neurology Follow Up    Date:  5/4/2024  Patient Name:  Nabeel Appiah  YOB: 1960  MRN: [id number]     PCP:  Jonh Allen DO   Referring:  Destin Gruber DO      Chief Complaint: myoclonus following cardiac arrest    Sig PMH: AAA, HTN,     History of Present Illness:  Nabeel Appiah is a 63 y.o. 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  Patient admitted 4/29 with cardiac arrest, noted to be in V. tach/V-fib and received amiodarone and shocks x 3.  ROSC was achieved prior to ED arrival.  He was then admitted to the MICU.    While in ICU he was noted to develop myoclonic jerking of the face.  Neurology consulted for myoclonic activity--started on Keppra    EEG showed a potential for generalized seizures but no associated epileptiform activity was found and myoclonic jerks were captured    MRI of the brain is pending    Subjective:  He is intubated and off all sedation but is unresponsive and follows no commands.  RN is at the bedside and just gave him a dose of Versed for myoclonic jerking of the left side of his face.    There is no family present at the bedside.  Unable to complete review of systems as he is unresponsive    Current Medications:      Current Facility-Administered Medications   Medication Dose Route Frequency Provider Last Rate Last Admin    enoxaparin (LOVENOX) injection 40 mg  40 mg SubCUTAneous BID Sonido Haines MD        thiamine (B-1) 500 mg in sodium chloride 0.9 % 100 mL IVPB  500 mg IntraVENous q8h Fely Garcia MD   Stopped at 05/04/24 0556    [START ON 5/6/2024] thiamine (B-1) injection 100 mg  100 mg IntraVENous Daily Fely Garcia MD        levETIRAcetam (KEPPRA) injection 1,500 mg  1,500 mg IntraVENous Q12H Gordo Deleon DO   1,500 mg at 05/03/24 2032    aspirin EC tablet 81 mg  81 mg Oral Daily Sonido Haines MD        metoprolol tartrate (LOPRESSOR) tablet 25 mg  25 mg Oral BID Sonido Haines MD   25 mg    RDW 13.8   < > 13.6  --    *  --   --   --    MPV 12.0   < > 11.0  --    PH  --    < >  --  7.435   PO2  --    < >  --  78.1   PCO2  --    < >  --  38.3   HCO3  --    < >  --  25.1   BE  --    < >  --  1.0   O2SAT  --    < >  --  95.0    < > = values in this interval not displayed.       Hemoglobin A1C   Date Value Ref Range Status   04/06/2024 6.4 (H) 4.0 - 5.6 % Final      Latest Reference Range & Units Most Recent   Cholesterol, Total <200 mg/dL 149  4/6/24 09:18   HDL Cholesterol >40 mg/dL 44  4/6/24 09:18   LDL Cholesterol <100 mg/dL 85  4/6/24 09:18   Triglycerides <150 mg/dL 98  4/6/24 09:18   VLDL mg/dL 20  4/6/24 09:18       Imaging  XR CHEST ABDOMEN NG PLACEMENT   Final Result   Distal enteric tube in good position within the stomach.         XR CHEST PORTABLE   Final Result   1. Endotracheal tube in satisfactory position 4 cm above milla.   2. Orogastric tube in position.   3. Prominent pulmonary vascularity with bibasilar infiltrates.         XR ABDOMEN FOR NG/OG/NE TUBE PLACEMENT   Final Result   Distal enteric tube within the proximal stomach.         XR CHEST PORTABLE   Final Result   1. Cardiomediastinal and interstitial prominence concerning for congestive   heart failure, stable   2. No signs of pneumothorax   3. Stable position of the right chest tube and endotracheal tubes.   4. Small bilateral pleural effusions, stable         XR CHEST PORTABLE   Final Result   Endotracheal tube appears in satisfactory position.         XR CHEST PORTABLE   Final Result   1. Interval development of diffuse interstitial opacities throughout both   lungs, suspicious for pulmonary edema/CHF and when compared to the prior   study performed 1 day earlier.   2. Small right pleural effusion again seen.   3. No evidence of pneumothorax.   4. Tubes and lines as described above.         XR CHEST PORTABLE   Final Result   1. No significant interval change when compared to the previous study   performed earlier in

## 2024-05-05 ENCOUNTER — APPOINTMENT (OUTPATIENT)
Dept: GENERAL RADIOLOGY | Age: 64
DRG: 283 | End: 2024-05-05
Attending: STUDENT IN AN ORGANIZED HEALTH CARE EDUCATION/TRAINING PROGRAM
Payer: COMMERCIAL

## 2024-05-05 LAB
AADO2: 603.3 MMHG
ALBUMIN SERPL-MCNC: 3.1 G/DL (ref 3.5–5.2)
ALP SERPL-CCNC: 48 U/L (ref 40–129)
ALT SERPL-CCNC: 57 U/L (ref 0–40)
ANION GAP SERPL CALCULATED.3IONS-SCNC: 13 MMOL/L (ref 7–16)
AST SERPL-CCNC: 49 U/L (ref 0–39)
B.E.: 0.5 MMOL/L (ref -3–3)
BASOPHILS # BLD: 0.05 K/UL (ref 0–0.2)
BASOPHILS NFR BLD: 1 % (ref 0–2)
BILIRUB SERPL-MCNC: 0.4 MG/DL (ref 0–1.2)
BUN SERPL-MCNC: 39 MG/DL (ref 6–23)
CALCIUM SERPL-MCNC: 8.7 MG/DL (ref 8.6–10.2)
CHLORIDE SERPL-SCNC: 110 MMOL/L (ref 98–107)
CO2 SERPL-SCNC: 21 MMOL/L (ref 22–29)
COHB: 0.5 % (ref 0–1.5)
CREAT SERPL-MCNC: 1.4 MG/DL (ref 0.7–1.2)
CRITICAL: ABNORMAL
DATE ANALYZED: ABNORMAL
DATE LAST DOSE: NORMAL
DATE OF COLLECTION: ABNORMAL
EOSINOPHIL # BLD: 0.13 K/UL (ref 0.05–0.5)
EOSINOPHILS RELATIVE PERCENT: 2 % (ref 0–6)
ERYTHROCYTE [DISTWIDTH] IN BLOOD BY AUTOMATED COUNT: 14 % (ref 11.5–15)
FIO2: 100 %
GFR, ESTIMATED: 58 ML/MIN/1.73M2
GLUCOSE BLD-MCNC: 152 MG/DL (ref 74–99)
GLUCOSE BLD-MCNC: 156 MG/DL (ref 74–99)
GLUCOSE BLD-MCNC: 161 MG/DL (ref 74–99)
GLUCOSE BLD-MCNC: 162 MG/DL (ref 74–99)
GLUCOSE BLD-MCNC: 171 MG/DL (ref 74–99)
GLUCOSE BLD-MCNC: 185 MG/DL (ref 74–99)
GLUCOSE SERPL-MCNC: 181 MG/DL (ref 74–99)
HCO3: 23.9 MMOL/L (ref 22–26)
HCT VFR BLD AUTO: 36.4 % (ref 37–54)
HGB BLD-MCNC: 11.6 G/DL (ref 12.5–16.5)
HHB: 4.8 % (ref 0–5)
IMM GRANULOCYTES # BLD AUTO: 0.07 K/UL (ref 0–0.58)
IMM GRANULOCYTES NFR BLD: 1 % (ref 0–5)
LAB: ABNORMAL
LYMPHOCYTES NFR BLD: 0.96 K/UL (ref 1.5–4)
LYMPHOCYTES RELATIVE PERCENT: 12 % (ref 20–42)
Lab: 415
MAGNESIUM SERPL-MCNC: 2.4 MG/DL (ref 1.6–2.6)
MCH RBC QN AUTO: 30.1 PG (ref 26–35)
MCHC RBC AUTO-ENTMCNC: 31.9 G/DL (ref 32–34.5)
MCV RBC AUTO: 94.3 FL (ref 80–99.9)
METHB: 0.2 % (ref 0–1.5)
MODE: AC
MONOCYTES NFR BLD: 0.93 K/UL (ref 0.1–0.95)
MONOCYTES NFR BLD: 12 % (ref 2–12)
NEUTROPHILS NFR BLD: 73 % (ref 43–80)
NEUTS SEG NFR BLD: 5.76 K/UL (ref 1.8–7.3)
O2 CONTENT: 16.5 ML/DL
O2 SATURATION: 95.2 % (ref 92–98.5)
O2HB: 94.5 % (ref 94–97)
OPERATOR ID: ABNORMAL
PATIENT TEMP: 37 C
PCO2: 34.5 MMHG (ref 35–45)
PEEP/CPAP: 10 CMH2O
PFO2: 0.75 MMHG/%
PH BLOOD GAS: 7.46 (ref 7.35–7.45)
PHOSPHATE SERPL-MCNC: 3.5 MG/DL (ref 2.5–4.5)
PLATELET # BLD AUTO: 140 K/UL (ref 130–450)
PMV BLD AUTO: 11.1 FL (ref 7–12)
PO2: 75.2 MMHG (ref 75–100)
POTASSIUM SERPL-SCNC: 3.8 MMOL/L (ref 3.5–5)
PROT SERPL-MCNC: 5.6 G/DL (ref 6.4–8.3)
RBC # BLD AUTO: 3.86 M/UL (ref 3.8–5.8)
RI(T): 8.02
RR MECHANICAL: 22 B/MIN
SODIUM SERPL-SCNC: 144 MMOL/L (ref 132–146)
SOURCE, BLOOD GAS: ABNORMAL
THB: 12.4 G/DL (ref 11.5–16.5)
TIME ANALYZED: 428
TME LAST DOSE: NORMAL H
TROPONIN I SERPL HS-MCNC: 561 NG/L (ref 0–11)
TROPONIN I SERPL HS-MCNC: 611 NG/L (ref 0–11)
TROPONIN I SERPL HS-MCNC: 629 NG/L (ref 0–11)
TROPONIN I SERPL HS-MCNC: 644 NG/L (ref 0–11)
VALPROATE SERPL-MCNC: 56 UG/ML (ref 50–100)
VANCOMYCIN DOSE: NORMAL MG
VT MECHANICAL: 550 ML
WBC OTHER # BLD: 7.9 K/UL (ref 4.5–11.5)

## 2024-05-05 PROCEDURE — 6370000000 HC RX 637 (ALT 250 FOR IP): Performed by: INTERNAL MEDICINE

## 2024-05-05 PROCEDURE — 2000000000 HC ICU R&B

## 2024-05-05 PROCEDURE — 2580000003 HC RX 258: Performed by: NURSE PRACTITIONER

## 2024-05-05 PROCEDURE — 6370000000 HC RX 637 (ALT 250 FOR IP): Performed by: STUDENT IN AN ORGANIZED HEALTH CARE EDUCATION/TRAINING PROGRAM

## 2024-05-05 PROCEDURE — 71045 X-RAY EXAM CHEST 1 VIEW: CPT

## 2024-05-05 PROCEDURE — 2580000003 HC RX 258: Performed by: STUDENT IN AN ORGANIZED HEALTH CARE EDUCATION/TRAINING PROGRAM

## 2024-05-05 PROCEDURE — C9113 INJ PANTOPRAZOLE SODIUM, VIA: HCPCS

## 2024-05-05 PROCEDURE — 82962 GLUCOSE BLOOD TEST: CPT

## 2024-05-05 PROCEDURE — A4216 STERILE WATER/SALINE, 10 ML: HCPCS

## 2024-05-05 PROCEDURE — 6360000002 HC RX W HCPCS: Performed by: PSYCHIATRY & NEUROLOGY

## 2024-05-05 PROCEDURE — 84484 ASSAY OF TROPONIN QUANT: CPT

## 2024-05-05 PROCEDURE — 84100 ASSAY OF PHOSPHORUS: CPT

## 2024-05-05 PROCEDURE — 6360000002 HC RX W HCPCS: Performed by: STUDENT IN AN ORGANIZED HEALTH CARE EDUCATION/TRAINING PROGRAM

## 2024-05-05 PROCEDURE — 94669 MECHANICAL CHEST WALL OSCILL: CPT

## 2024-05-05 PROCEDURE — 99232 SBSQ HOSP IP/OBS MODERATE 35: CPT | Performed by: INTERNAL MEDICINE

## 2024-05-05 PROCEDURE — 2580000003 HC RX 258

## 2024-05-05 PROCEDURE — 94003 VENT MGMT INPAT SUBQ DAY: CPT

## 2024-05-05 PROCEDURE — 6360000002 HC RX W HCPCS

## 2024-05-05 PROCEDURE — 6370000000 HC RX 637 (ALT 250 FOR IP)

## 2024-05-05 PROCEDURE — 2500000003 HC RX 250 WO HCPCS: Performed by: NURSE PRACTITIONER

## 2024-05-05 PROCEDURE — 94640 AIRWAY INHALATION TREATMENT: CPT

## 2024-05-05 PROCEDURE — 82805 BLOOD GASES W/O2 SATURATION: CPT

## 2024-05-05 PROCEDURE — 99232 SBSQ HOSP IP/OBS MODERATE 35: CPT | Performed by: NURSE PRACTITIONER

## 2024-05-05 PROCEDURE — 6360000002 HC RX W HCPCS: Performed by: INTERNAL MEDICINE

## 2024-05-05 PROCEDURE — 80164 ASSAY DIPROPYLACETIC ACD TOT: CPT

## 2024-05-05 PROCEDURE — 83735 ASSAY OF MAGNESIUM: CPT

## 2024-05-05 PROCEDURE — 80053 COMPREHEN METABOLIC PANEL: CPT

## 2024-05-05 PROCEDURE — 85025 COMPLETE CBC W/AUTO DIFF WBC: CPT

## 2024-05-05 RX ORDER — HYDROCHLOROTHIAZIDE 25 MG/1
25 TABLET ORAL DAILY
Status: DISCONTINUED | OUTPATIENT
Start: 2024-05-05 | End: 2024-05-18

## 2024-05-05 RX ORDER — FUROSEMIDE 10 MG/ML
40 INJECTION INTRAMUSCULAR; INTRAVENOUS ONCE
Status: COMPLETED | OUTPATIENT
Start: 2024-05-05 | End: 2024-05-05

## 2024-05-05 RX ORDER — ENOXAPARIN SODIUM 100 MG/ML
40 INJECTION SUBCUTANEOUS 2 TIMES DAILY
Status: DISCONTINUED | OUTPATIENT
Start: 2024-05-05 | End: 2024-05-06

## 2024-05-05 RX ORDER — LISINOPRIL 20 MG/1
40 TABLET ORAL DAILY
Status: DISCONTINUED | OUTPATIENT
Start: 2024-05-05 | End: 2024-05-18

## 2024-05-05 RX ORDER — MIDAZOLAM HYDROCHLORIDE 2 MG/2ML
2 INJECTION, SOLUTION INTRAMUSCULAR; INTRAVENOUS
Status: DISCONTINUED | OUTPATIENT
Start: 2024-05-05 | End: 2024-05-24

## 2024-05-05 RX ORDER — HYDRALAZINE HYDROCHLORIDE 20 MG/ML
10 INJECTION INTRAMUSCULAR; INTRAVENOUS EVERY 4 HOURS PRN
Status: DISCONTINUED | OUTPATIENT
Start: 2024-05-05 | End: 2024-05-18

## 2024-05-05 RX ORDER — LISINOPRIL AND HYDROCHLOROTHIAZIDE 20; 12.5 MG/1; MG/1
2 TABLET ORAL DAILY
Status: DISCONTINUED | OUTPATIENT
Start: 2024-05-05 | End: 2024-05-05

## 2024-05-05 RX ORDER — LABETALOL HYDROCHLORIDE 5 MG/ML
10 INJECTION, SOLUTION INTRAVENOUS EVERY 4 HOURS PRN
Status: DISCONTINUED | OUTPATIENT
Start: 2024-05-05 | End: 2024-05-18

## 2024-05-05 RX ORDER — CLONAZEPAM 0.5 MG/1
1 TABLET ORAL EVERY 6 HOURS
Status: DISCONTINUED | OUTPATIENT
Start: 2024-05-05 | End: 2024-05-06

## 2024-05-05 RX ORDER — CLONAZEPAM 0.5 MG/1
1 TABLET ORAL EVERY 8 HOURS
Status: DISCONTINUED | OUTPATIENT
Start: 2024-05-05 | End: 2024-05-05

## 2024-05-05 RX ADMIN — ENOXAPARIN SODIUM 40 MG: 100 INJECTION SUBCUTANEOUS at 19:51

## 2024-05-05 RX ADMIN — MIDAZOLAM 2 MG: 1 INJECTION INTRAMUSCULAR; INTRAVENOUS at 19:50

## 2024-05-05 RX ADMIN — Medication 15 ML: at 08:18

## 2024-05-05 RX ADMIN — ALBUTEROL SULFATE 2.5 MG: 2.5 SOLUTION RESPIRATORY (INHALATION) at 16:48

## 2024-05-05 RX ADMIN — ATORVASTATIN CALCIUM 40 MG: 40 TABLET, FILM COATED ORAL at 19:49

## 2024-05-05 RX ADMIN — VALPROATE SODIUM 750 MG: 100 INJECTION, SOLUTION INTRAVENOUS at 12:22

## 2024-05-05 RX ADMIN — VALPROATE SODIUM 500 MG: 100 INJECTION, SOLUTION INTRAVENOUS at 02:05

## 2024-05-05 RX ADMIN — CLONAZEPAM 1 MG: 0.5 TABLET ORAL at 11:05

## 2024-05-05 RX ADMIN — FUROSEMIDE 40 MG: 10 INJECTION, SOLUTION INTRAMUSCULAR; INTRAVENOUS at 11:04

## 2024-05-05 RX ADMIN — ENOXAPARIN SODIUM 30 MG: 100 INJECTION SUBCUTANEOUS at 08:18

## 2024-05-05 RX ADMIN — Medication 15 ML: at 19:49

## 2024-05-05 RX ADMIN — ACETYLCYSTEINE 400 MG: 100 SOLUTION ORAL; RESPIRATORY (INHALATION) at 09:25

## 2024-05-05 RX ADMIN — LEVETIRACETAM 1500 MG: 500 INJECTION, SOLUTION, CONCENTRATE INTRAVENOUS at 08:10

## 2024-05-05 RX ADMIN — ACETYLCYSTEINE 400 MG: 100 SOLUTION ORAL; RESPIRATORY (INHALATION) at 13:27

## 2024-05-05 RX ADMIN — ALBUTEROL SULFATE 2.5 MG: 2.5 SOLUTION RESPIRATORY (INHALATION) at 04:31

## 2024-05-05 RX ADMIN — AMPICILLIN SODIUM AND SULBACTAM SODIUM 3000 MG: 2; 1 INJECTION, POWDER, FOR SOLUTION INTRAMUSCULAR; INTRAVENOUS at 19:52

## 2024-05-05 RX ADMIN — HYDRALAZINE HYDROCHLORIDE 10 MG: 20 INJECTION INTRAMUSCULAR; INTRAVENOUS at 22:14

## 2024-05-05 RX ADMIN — SODIUM CHLORIDE, PRESERVATIVE FREE 10 ML: 5 INJECTION INTRAVENOUS at 19:50

## 2024-05-05 RX ADMIN — AMPICILLIN SODIUM AND SULBACTAM SODIUM 3000 MG: 2; 1 INJECTION, POWDER, FOR SOLUTION INTRAMUSCULAR; INTRAVENOUS at 09:16

## 2024-05-05 RX ADMIN — CLONAZEPAM 1 MG: 0.5 TABLET ORAL at 22:29

## 2024-05-05 RX ADMIN — ALBUTEROL SULFATE 2.5 MG: 2.5 SOLUTION RESPIRATORY (INHALATION) at 13:27

## 2024-05-05 RX ADMIN — ALBUTEROL SULFATE 2.5 MG: 2.5 SOLUTION RESPIRATORY (INHALATION) at 09:27

## 2024-05-05 RX ADMIN — SODIUM CHLORIDE, PRESERVATIVE FREE 40 MG: 5 INJECTION INTRAVENOUS at 08:18

## 2024-05-05 RX ADMIN — THIAMINE HYDROCHLORIDE 500 MG: 100 INJECTION, SOLUTION INTRAMUSCULAR; INTRAVENOUS at 04:50

## 2024-05-05 RX ADMIN — ACETYLCYSTEINE 400 MG: 100 SOLUTION ORAL; RESPIRATORY (INHALATION) at 20:55

## 2024-05-05 RX ADMIN — AMPICILLIN SODIUM AND SULBACTAM SODIUM 3000 MG: 2; 1 INJECTION, POWDER, FOR SOLUTION INTRAMUSCULAR; INTRAVENOUS at 15:39

## 2024-05-05 RX ADMIN — AMPICILLIN SODIUM AND SULBACTAM SODIUM 3000 MG: 2; 1 INJECTION, POWDER, FOR SOLUTION INTRAMUSCULAR; INTRAVENOUS at 03:32

## 2024-05-05 RX ADMIN — METOPROLOL TARTRATE 25 MG: 25 TABLET, FILM COATED ORAL at 19:49

## 2024-05-05 RX ADMIN — ALBUTEROL SULFATE 2.5 MG: 2.5 SOLUTION RESPIRATORY (INHALATION) at 00:09

## 2024-05-05 RX ADMIN — HYDROCHLOROTHIAZIDE 25 MG: 25 TABLET ORAL at 09:22

## 2024-05-05 RX ADMIN — ASPIRIN 81 MG: 81 TABLET, COATED ORAL at 08:18

## 2024-05-05 RX ADMIN — ALBUTEROL SULFATE 2.5 MG: 2.5 SOLUTION RESPIRATORY (INHALATION) at 20:54

## 2024-05-05 RX ADMIN — CLONAZEPAM 0.5 MG: 0.5 TABLET ORAL at 04:52

## 2024-05-05 RX ADMIN — POTASSIUM BICARBONATE 20 MEQ: 782 TABLET, EFFERVESCENT ORAL at 08:18

## 2024-05-05 RX ADMIN — LISINOPRIL 40 MG: 20 TABLET ORAL at 09:11

## 2024-05-05 RX ADMIN — CLONAZEPAM 1 MG: 0.5 TABLET ORAL at 17:00

## 2024-05-05 RX ADMIN — SODIUM CHLORIDE, PRESERVATIVE FREE 10 ML: 5 INJECTION INTRAVENOUS at 08:19

## 2024-05-05 RX ADMIN — METOPROLOL TARTRATE 25 MG: 25 TABLET, FILM COATED ORAL at 08:18

## 2024-05-05 RX ADMIN — LEVETIRACETAM 1500 MG: 500 INJECTION, SOLUTION, CONCENTRATE INTRAVENOUS at 19:50

## 2024-05-05 ASSESSMENT — PULMONARY FUNCTION TESTS
PIF_VALUE: 27
PIF_VALUE: 34
PIF_VALUE: 26
PIF_VALUE: 29
PIF_VALUE: 29
PIF_VALUE: 32
PIF_VALUE: 26
PIF_VALUE: 30
PIF_VALUE: 30
PIF_VALUE: 24
PIF_VALUE: 33
PIF_VALUE: 28
PIF_VALUE: 29
PIF_VALUE: 25
PIF_VALUE: 33

## 2024-05-05 ASSESSMENT — PAIN SCALES - GENERAL
PAINLEVEL_OUTOF10: 0

## 2024-05-05 NOTE — PROGRESS NOTES
05/05/24 0920   ETT    Placement Date/Time: 05/02/24 (c) 0550   Placement Verified By: Auscultation;Capnometry  Preoxygenation: Yes  Airway Tube Size: 8 mm  Location: Oral  Insertion attempts: 1  Measured From: Lips  Comments: Anesthesia called to room with patient already ...   Secured At 28 cm  (ett advanced 2 cm to 28 at lip)   Measured From Lips

## 2024-05-05 NOTE — PROGRESS NOTES
[facility]  Neurology Follow Up    Date:  5/5/2024  Patient Name:  Nabeel Appiah  YOB: 1960  MRN: [id number]     PCP:  Jonh Allen DO   Referring:  Destin Gruber DO      Chief Complaint: myoclonus following cardiac arrest    Sig PMH: AAA, HTN,     History of Present Illness:  Nabeel Appiah is a 63 y.o. 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  Patient admitted 4/29 with cardiac arrest, noted to be in V. tach/V-fib and received amiodarone and shocks x 3.  ROSC was achieved prior to ED arrival.  He was then admitted to the MICU.    While in ICU he was noted to develop myoclonic jerking of the face.  Neurology consulted for myoclonic activity--started on Keppra    EEG showed a potential for generalized seizures but no associated epileptiform activity was found and myoclonic jerks were captured    MRI of the brain is pending    5/4: persistent myoclonic jerking with any stimulation.  Started on routine clonazepam and loaded with valproic acid.  5/5 myoclonic jerks persist.    Subjective:  He remains intubated and has been off sedation for a couple of days but unfortunately is unresponsive.  Respiratory is working with him and with any stimulation he continues to have myoclonic jerking mainly in his face.    There is no family present at the bedside.  Unable to complete review of systems as he is unresponsive    Current Medications:      Current Facility-Administered Medications   Medication Dose Route Frequency Provider Last Rate Last Admin    potassium bicarb-citric acid (EFFER-K) effervescent tablet 20 mEq  20 mEq Per NG tube Once Thai Ayon MD        enoxaparin Sodium (LOVENOX) injection 30 mg  30 mg SubCUTAneous BID Madelyn Bess MD   30 mg at 05/04/24 2045    clonazePAM (KLONOPIN) tablet 0.5 mg  0.5 mg Oral Q8H Nicole Dhaliwal MD   0.5 mg at 05/05/24 0452    albuterol (PROVENTIL) (2.5 MG/3ML) 0.083% nebulizer solution 2.5 mg  2.5  evidence of:  A potential for generalized onset seizures  A severe nonspecific encephalopathy  Occasional myoclonic jerks without associated definite epileptiform activity  Structural abnormalities should be considered for the findings above and appropriate imaging obtained if clinically indicated.     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  Brainstem responses are intact and MRI of the brain is pending to evaluate for degree of anoxic injury.  His prognosis for meaningful neurologic recovery is poor at this time    Plan  -Increase clonazepam to 1 mg every 8 hours  -Increase valproic acid to 750 mg BID and continue to follow levels  -Await MRI brain w/o contrast  -Continue Keppra 1500 mg BID  -Discussed with Dr Deleon and pts RN    Will follow closely    Electronically signed by KENNEDI Joshua CNP on 5/5/2024 at 8:16 AM

## 2024-05-05 NOTE — PLAN OF CARE
Problem: Safety - Adult  Goal: Free from fall injury  Outcome: Progressing     Problem: Chronic Conditions and Co-morbidities  Goal: Patient's chronic conditions and co-morbidity symptoms are monitored and maintained or improved  Outcome: Progressing  Flowsheets (Taken 5/4/2024 2000)  Care Plan - Patient's Chronic Conditions and Co-Morbidity Symptoms are Monitored and Maintained or Improved:   Monitor and assess patient's chronic conditions and comorbid symptoms for stability, deterioration, or improvement   Collaborate with multidisciplinary team to address chronic and comorbid conditions and prevent exacerbation or deterioration   Update acute care plan with appropriate goals if chronic or comorbid symptoms are exacerbated and prevent overall improvement and discharge     Problem: Pain  Goal: Verbalizes/displays adequate comfort level or baseline comfort level  Outcome: Progressing  Flowsheets (Taken 5/4/2024 2000)  Verbalizes/displays adequate comfort level or baseline comfort level:   Assess pain using appropriate pain scale   Administer analgesics based on type and severity of pain and evaluate response   Implement non-pharmacological measures as appropriate and evaluate response     Problem: Respiratory - Adult  Goal: Achieves optimal ventilation and oxygenation  5/4/2024 1459 by Sushma Villalobos, RCP  Outcome: Not Progressing

## 2024-05-05 NOTE — PROGRESS NOTES
[facility] Hospitalist Progress Note    Admitting Date and Time: 5/1/2024  2:21 AM  Admit Dx: Cardiac arrest (HCC) [I46.9]  Synopsis: The patient is a 63-year-old male with past medical history of A-fib, hypertension, obesity, type II DM, AAA who presented to [facility] for cardiac arrest. On EMS arrival reported to be in V. tach/V-fib arrest and received amiodarone and shocked x 3. Patient was noted to have ROSC prior to ED arrival. CT of the head was done in the emergency department without any intracranial findings. Further imaging revealed a small pneumothorax for which a right-sided chest tube was placed: CT of the chest negative for PE. CT of the abdomen and pelvis showed presence of 4.3 x 4 cm aneurysm. Plan was for cardiac catheterization at [facility] hence patient was transferred from [place].  Patient remains on ventilatory support and critical care unit.     Subjective:  Patient seen and examined at bedside  On ventilatory support and sedation  Currently no muscle twitching on exam     ROS: Unable to obtain     lisinopril  40 mg Oral Daily    And    hydroCHLOROthiazide  25 mg Oral Daily    enoxaparin  40 mg SubCUTAneous BID    clonazePAM  1 mg Oral Q6H    furosemide  40 mg IntraVENous Once    albuterol  2.5 mg Nebulization Q4H RT    valproate (DEPACON) 500 mg in sodium chloride 0.9 % 100 mL IVPB  500 mg IntraVENous Q12H    acetylcysteine  4 mL Inhalation TID RT    [START ON 5/6/2024] thiamine  100 mg IntraVENous Daily    levETIRAcetam  1,500 mg IntraVENous Q12H    aspirin  81 mg Oral Daily    metoprolol tartrate  25 mg Oral BID    ampicillin-sulbactam  3,000 mg IntraVENous Q6H    insulin lispro  0-4 Units SubCUTAneous Q4H    sodium chloride flush  5-40 mL IntraVENous 2 times per day    pantoprazole (PROTONIX) 40 mg in sodium chloride (PF) 0.9 % 10 mL injection  40 mg IntraVENous Daily    chlorhexidine  15 mL Mouth/Throat BID    atorvastatin  40 mg Oral Nightly  transcribed using voice recognition software. Every effort was made to ensure accuracy; however, inadvertent computerized transcription errors may be present.  Electronically signed by Keyona Mccloud MD on 5/5/2024 at 10:50 AM

## 2024-05-05 NOTE — PLAN OF CARE
Problem: Discharge Planning  Goal: Discharge to home or other facility with appropriate resources  Outcome: Not Progressing  Flowsheets (Taken 5/5/2024 0800)  Discharge to home or other facility with appropriate resources: Identify barriers to discharge with patient and caregiver     Problem: Safety - Adult  Goal: Free from fall injury  5/5/2024 1609 by Lizzie Watts  Outcome: Progressing  5/5/2024 0345 by Jose aLndry, RN  Outcome: Progressing     Problem: Chronic Conditions and Co-morbidities  Goal: Patient's chronic conditions and co-morbidity symptoms are monitored and maintained or improved  5/5/2024 1609 by Lizzie Watts  Outcome: Progressing  Flowsheets (Taken 5/5/2024 0800)  Care Plan - Patient's Chronic Conditions and Co-Morbidity Symptoms are Monitored and Maintained or Improved:   Monitor and assess patient's chronic conditions and comorbid symptoms for stability, deterioration, or improvement   Collaborate with multidisciplinary team to address chronic and comorbid conditions and prevent exacerbation or deterioration  5/5/2024 0345 by Jose Landry, RN  Outcome: Progressing  Flowsheets (Taken 5/4/2024 2000)  Care Plan - Patient's Chronic Conditions and Co-Morbidity Symptoms are Monitored and Maintained or Improved:   Monitor and assess patient's chronic conditions and comorbid symptoms for stability, deterioration, or improvement   Collaborate with multidisciplinary team to address chronic and comorbid conditions and prevent exacerbation or deterioration   Update acute care plan with appropriate goals if chronic or comorbid symptoms are exacerbated and prevent overall improvement and discharge     Problem: Pain  Goal: Verbalizes/displays adequate comfort level or baseline comfort level  5/5/2024 1609 by Lizzie Watts  Outcome: Progressing  Flowsheets (Taken 5/5/2024 0800)  Verbalizes/displays adequate comfort level or baseline comfort level:   Assess pain using appropriate pain  scale   Administer analgesics based on type and severity of pain and evaluate response   Implement non-pharmacological measures as appropriate and evaluate response  5/5/2024 0345 by Jose Landry, RN  Outcome: Progressing  Flowsheets (Taken 5/4/2024 2000)  Verbalizes/displays adequate comfort level or baseline comfort level:   Assess pain using appropriate pain scale   Administer analgesics based on type and severity of pain and evaluate response   Implement non-pharmacological measures as appropriate and evaluate response     Problem: Neurosensory - Adult  Goal: Achieves stable or improved neurological status  Outcome: Progressing  Flowsheets (Taken 5/5/2024 0800)  Achieves stable or improved neurological status:   Assess for and report changes in neurological status   Initiate measures to prevent increased intracranial pressure   Maintain blood pressure and fluid volume within ordered parameters to optimize cerebral perfusion and minimize risk of hemorrhage   Monitor temperature, glucose, and sodium. Initiate appropriate interventions as ordered     Problem: Respiratory - Adult  Goal: Achieves optimal ventilation and oxygenation  Outcome: Progressing  Flowsheets (Taken 5/5/2024 0800)  Achieves optimal ventilation and oxygenation:   Assess for changes in respiratory status   Assess for changes in mentation and behavior   Position to facilitate oxygenation and minimize respiratory effort   Oxygen supplementation based on oxygen saturation or arterial blood gases   Encourage broncho-pulmonary hygiene including cough, deep breathe, incentive spirometry   Respiratory therapy support as indicated     Problem: Skin/Tissue Integrity  Goal: Absence of new skin breakdown  Description: 1.  Monitor for areas of redness and/or skin breakdown  2.  Assess vascular access sites hourly  3.  Every 4-6 hours minimum:  Change oxygen saturation probe site  4.  Every 4-6 hours:  If on nasal continuous positive airway pressure,  respiratory therapy assess nares and determine need for appliance change or resting period.  Outcome: Progressing     Problem: ABCDS Injury Assessment  Goal: Absence of physical injury  Outcome: Progressing  Flowsheets (Taken 5/5/2024 0855)  Absence of Physical Injury: Implement safety measures based on patient assessment     Problem: Nutrition Deficit:  Goal: Optimize nutritional status  Outcome: Progressing     Problem: Discharge Planning  Goal: Discharge to home or other facility with appropriate resources  Outcome: Not Progressing  Flowsheets (Taken 5/5/2024 0800)  Discharge to home or other facility with appropriate resources: Identify barriers to discharge with patient and caregiver

## 2024-05-05 NOTE — PROGRESS NOTES
[facility]  Department of Internal Medicine   Internal Medicine Residency   MICU Progress Note    Patient:  Nabeel Appiah 63 y.o. male  MRN: [id number]     Date of Service: 5/5/2024    Allergy: Patient has no known allergies.    Overnight Events: none     Subjective     Patient remains unresponsive and critically ill. Still having myoclonic jerks - started on versed drip overnight.   Patient seen and evaluated at bedside in no acute distress.     Objective     VS: /61   Pulse 62   Temp 97.5 °F (36.4 °C) (Temporal)   Resp 22   Ht 1.905 m (6' 3\")   Wt (!) 171.1 kg (377 lb 3.3 oz)   SpO2 92%   BMI 47.15 kg/m²   ABP (Arterial line BP): 100/50  ABP Mean (Arterial Line Mean): (!) 64 mmHg    I & O - 24hr:   Intake/Output Summary (Last 24 hours) at 5/5/2024 1119  Last data filed at 5/5/2024 1100  Gross per 24 hour   Intake 3220.99 ml   Output 1260 ml   Net 1960.99 ml       Oxygen: AC/   22  8  65    ABG:     Lab Results   Component Value Date/Time    PH 7.459 05/05/2024 04:15 AM    PCO2 34.5 05/05/2024 04:15 AM    PO2 75.2 05/05/2024 04:15 AM    HCO3 23.9 05/05/2024 04:15 AM    BE 0.5 05/05/2024 04:15 AM    THB 12.4 05/05/2024 04:15 AM    O2SAT 95.2 05/05/2024 04:15 AM       Physical Exam:  General Appearance: No acute distress. Intubated and unresponsive   Neuro: Responsive to nail bed,  +Gag, +Cough, reacts to painful stimuli. Myocolonic jerks of the face. +corneal and pupillary light reflex.  Cardiovascular: regular rate and rhythm, S1 and S2 heard, no murmurs appreciated   Respiratory: Clear to auscultation bilaterally. No wheezing or crackles appreciated.  Abdomen: Soft, non-tender; bowel sounds normal; no masses, no organomegaly, rebound or guarding  Extremities: Extremities normal, no cyanosis, distal pulses intact, 1+  edema.       Lines: CT right pleural (4/29), CVC right femoral (4/29)      Urinary catheter: scott (5/1)     Labs     Basic Labs    Complete Blood Count:  which is left with patient for 25 years he did not wish to live long-term with mechanical support.  This is something that we are going to discussed tomorrow with case management social care and also legal team since patient does not have any legal POA.    During multidisciplinary team rounds the patient was seen, examined and discussed. This is confirmation that I have personally seen and examined the patient and that the key elements of the encounter were performed by me (> 85 % time).  The medications & laboratory data and imagery was discussed and adjusted where necessary. Key issues of the case were discussed among consultants.     This patient has a high probability of sudden clinically significant deterioration. I managed/supervised life or organ supporting interventions that required frequent physician assessment. I devoted my full attention to the direct care of this patient for the period of time indicated below. In addition to above, time was devoted to teaching and to any procedure.      NOTE: This report, in part or full, may have been transcribed using voice recognition software. Every effort was made to ensure accuracy; however, inadvertent computerized transcription errors may be present. Please excuse any transcriptional grammatical or spelling errors that may have escaped my editorial review.     Total critical care time caring for this patient with life threatening, unstable organ failure, including direct patient contact, management of life support systems, review of data including imaging and labs, discussions with other team members and physicians is at least 36 Min so far today, excluding procedures.      Austin Salas MD

## 2024-05-06 ENCOUNTER — APPOINTMENT (OUTPATIENT)
Dept: GENERAL RADIOLOGY | Age: 64
DRG: 283 | End: 2024-05-06
Attending: STUDENT IN AN ORGANIZED HEALTH CARE EDUCATION/TRAINING PROGRAM
Payer: COMMERCIAL

## 2024-05-06 LAB
AADO2: 326.3 MMHG
AADO2: 395.6 MMHG
ALBUMIN SERPL-MCNC: 3 G/DL (ref 3.5–5.2)
ALP SERPL-CCNC: 43 U/L (ref 40–129)
ALT SERPL-CCNC: 47 U/L (ref 0–40)
ANION GAP SERPL CALCULATED.3IONS-SCNC: 16 MMOL/L (ref 7–16)
AST SERPL-CCNC: 48 U/L (ref 0–39)
B.E.: 2.3 MMOL/L (ref -3–3)
B.E.: 4.1 MMOL/L (ref -3–3)
BASOPHILS # BLD: 0.04 K/UL (ref 0–0.2)
BASOPHILS NFR BLD: 0 % (ref 0–2)
BILIRUB SERPL-MCNC: 0.5 MG/DL (ref 0–1.2)
BUN SERPL-MCNC: 36 MG/DL (ref 6–23)
CALCIUM SERPL-MCNC: 8.7 MG/DL (ref 8.6–10.2)
CHLORIDE SERPL-SCNC: 107 MMOL/L (ref 98–107)
CO2 SERPL-SCNC: 21 MMOL/L (ref 22–29)
COHB: 0.1 % (ref 0–1.5)
COHB: 0.5 % (ref 0–1.5)
CREAT SERPL-MCNC: 1.2 MG/DL (ref 0.7–1.2)
CRITICAL: ABNORMAL
CRITICAL: ABNORMAL
DATE ANALYZED: ABNORMAL
DATE ANALYZED: ABNORMAL
DATE LAST DOSE: NORMAL
DATE OF COLLECTION: ABNORMAL
DATE OF COLLECTION: ABNORMAL
EOSINOPHIL # BLD: 0.2 K/UL (ref 0.05–0.5)
EOSINOPHILS RELATIVE PERCENT: 2 % (ref 0–6)
ERYTHROCYTE [DISTWIDTH] IN BLOOD BY AUTOMATED COUNT: 14 % (ref 11.5–15)
FIO2: 65 %
FIO2: 70 %
GFR, ESTIMATED: 65 ML/MIN/1.73M2
GLUCOSE BLD-MCNC: 140 MG/DL (ref 74–99)
GLUCOSE BLD-MCNC: 150 MG/DL (ref 74–99)
GLUCOSE BLD-MCNC: 165 MG/DL (ref 74–99)
GLUCOSE BLD-MCNC: 166 MG/DL (ref 74–99)
GLUCOSE BLD-MCNC: 180 MG/DL (ref 74–99)
GLUCOSE SERPL-MCNC: 166 MG/DL (ref 74–99)
HCO3: 25.3 MMOL/L (ref 22–26)
HCO3: 25.8 MMOL/L (ref 22–26)
HCT VFR BLD AUTO: 33.8 % (ref 37–54)
HGB BLD-MCNC: 11.1 G/DL (ref 12.5–16.5)
HHB: 3.1 % (ref 0–5)
HHB: 4.4 % (ref 0–5)
IMM GRANULOCYTES # BLD AUTO: 0.12 K/UL (ref 0–0.58)
IMM GRANULOCYTES NFR BLD: 1 % (ref 0–5)
LAB: ABNORMAL
LAB: ABNORMAL
LYMPHOCYTES NFR BLD: 1.29 K/UL (ref 1.5–4)
LYMPHOCYTES RELATIVE PERCENT: 14 % (ref 20–42)
Lab: 1328
Lab: 412
MAGNESIUM SERPL-MCNC: 2.2 MG/DL (ref 1.6–2.6)
MCH RBC QN AUTO: 30.8 PG (ref 26–35)
MCHC RBC AUTO-ENTMCNC: 32.8 G/DL (ref 32–34.5)
MCV RBC AUTO: 93.9 FL (ref 80–99.9)
METHB: 0.3 % (ref 0–1.5)
METHB: 0.3 % (ref 0–1.5)
MICROORGANISM SPEC CULT: NORMAL
MICROORGANISM SPEC CULT: NORMAL
MODE: AC
MODE: AC
MONOCYTES NFR BLD: 1.23 K/UL (ref 0.1–0.95)
MONOCYTES NFR BLD: 13 % (ref 2–12)
NEUTROPHILS NFR BLD: 69 % (ref 43–80)
NEUTS SEG NFR BLD: 6.5 K/UL (ref 1.8–7.3)
O2 CONTENT: 16 ML/DL
O2 CONTENT: 16.3 ML/DL
O2 SATURATION: 95.6 % (ref 92–98.5)
O2 SATURATION: 96.9 % (ref 92–98.5)
O2HB: 94.8 % (ref 94–97)
O2HB: 96.5 % (ref 94–97)
OPERATOR ID: 2593
OPERATOR ID: 359
PATIENT TEMP: 37 C
PATIENT TEMP: 37 C
PCO2: 27.8 MMHG (ref 35–45)
PCO2: 36.2 MMHG (ref 35–45)
PEEP/CPAP: 10 CMH2O
PEEP/CPAP: 10 CMH2O
PFO2: 1.05 MMHG/%
PFO2: 1.5 MMHG/%
PH BLOOD GAS: 7.47 (ref 7.35–7.45)
PH BLOOD GAS: 7.58 (ref 7.35–7.45)
PHOSPHATE SERPL-MCNC: 3.2 MG/DL (ref 2.5–4.5)
PLATELET # BLD AUTO: 130 K/UL (ref 130–450)
PMV BLD AUTO: 11.3 FL (ref 7–12)
PO2: 73.6 MMHG (ref 75–100)
PO2: 97.8 MMHG (ref 75–100)
POTASSIUM SERPL-SCNC: 3.9 MMOL/L (ref 3.5–5)
PROT SERPL-MCNC: 5.3 G/DL (ref 6.4–8.3)
RBC # BLD AUTO: 3.6 M/UL (ref 3.8–5.8)
RI(T): 3.34
RI(T): 5.38
RR MECHANICAL: 16 B/MIN
RR MECHANICAL: 22 B/MIN
SERVICE CMNT-IMP: NORMAL
SERVICE CMNT-IMP: NORMAL
SODIUM SERPL-SCNC: 144 MMOL/L (ref 132–146)
SOURCE, BLOOD GAS: ABNORMAL
SOURCE, BLOOD GAS: ABNORMAL
SPECIMEN DESCRIPTION: NORMAL
SPECIMEN DESCRIPTION: NORMAL
THB: 11.9 G/DL (ref 11.5–16.5)
THB: 12 G/DL (ref 11.5–16.5)
TIME ANALYZED: 1331
TIME ANALYZED: 416
TME LAST DOSE: NORMAL H
TROPONIN I SERPL HS-MCNC: 505 NG/L (ref 0–11)
TROPONIN I SERPL HS-MCNC: 519 NG/L (ref 0–11)
TROPONIN I SERPL HS-MCNC: 531 NG/L (ref 0–11)
VALPROATE SERPL-MCNC: 53 UG/ML (ref 50–100)
VANCOMYCIN DOSE: NORMAL MG
VT MECHANICAL: 500 ML
VT MECHANICAL: 550 ML
WBC OTHER # BLD: 9.4 K/UL (ref 4.5–11.5)

## 2024-05-06 PROCEDURE — 80053 COMPREHEN METABOLIC PANEL: CPT

## 2024-05-06 PROCEDURE — 6370000000 HC RX 637 (ALT 250 FOR IP): Performed by: STUDENT IN AN ORGANIZED HEALTH CARE EDUCATION/TRAINING PROGRAM

## 2024-05-06 PROCEDURE — 2580000003 HC RX 258: Performed by: STUDENT IN AN ORGANIZED HEALTH CARE EDUCATION/TRAINING PROGRAM

## 2024-05-06 PROCEDURE — 6360000002 HC RX W HCPCS: Performed by: STUDENT IN AN ORGANIZED HEALTH CARE EDUCATION/TRAINING PROGRAM

## 2024-05-06 PROCEDURE — C9113 INJ PANTOPRAZOLE SODIUM, VIA: HCPCS

## 2024-05-06 PROCEDURE — 80164 ASSAY DIPROPYLACETIC ACD TOT: CPT

## 2024-05-06 PROCEDURE — 6370000000 HC RX 637 (ALT 250 FOR IP)

## 2024-05-06 PROCEDURE — 94669 MECHANICAL CHEST WALL OSCILL: CPT

## 2024-05-06 PROCEDURE — 6360000002 HC RX W HCPCS

## 2024-05-06 PROCEDURE — 84484 ASSAY OF TROPONIN QUANT: CPT

## 2024-05-06 PROCEDURE — 99291 CRITICAL CARE FIRST HOUR: CPT | Performed by: INTERNAL MEDICINE

## 2024-05-06 PROCEDURE — 85025 COMPLETE CBC W/AUTO DIFF WBC: CPT

## 2024-05-06 PROCEDURE — 2580000003 HC RX 258

## 2024-05-06 PROCEDURE — 2000000000 HC ICU R&B

## 2024-05-06 PROCEDURE — 2500000003 HC RX 250 WO HCPCS: Performed by: NURSE PRACTITIONER

## 2024-05-06 PROCEDURE — 82962 GLUCOSE BLOOD TEST: CPT

## 2024-05-06 PROCEDURE — 99233 SBSQ HOSP IP/OBS HIGH 50: CPT | Performed by: NURSE PRACTITIONER

## 2024-05-06 PROCEDURE — 71045 X-RAY EXAM CHEST 1 VIEW: CPT

## 2024-05-06 PROCEDURE — 6360000002 HC RX W HCPCS: Performed by: INTERNAL MEDICINE

## 2024-05-06 PROCEDURE — 94640 AIRWAY INHALATION TREATMENT: CPT

## 2024-05-06 PROCEDURE — 6370000000 HC RX 637 (ALT 250 FOR IP): Performed by: INTERNAL MEDICINE

## 2024-05-06 PROCEDURE — 82805 BLOOD GASES W/O2 SATURATION: CPT

## 2024-05-06 PROCEDURE — 2580000003 HC RX 258: Performed by: NURSE PRACTITIONER

## 2024-05-06 PROCEDURE — A4216 STERILE WATER/SALINE, 10 ML: HCPCS

## 2024-05-06 PROCEDURE — 6360000002 HC RX W HCPCS: Performed by: PSYCHIATRY & NEUROLOGY

## 2024-05-06 PROCEDURE — 84100 ASSAY OF PHOSPHORUS: CPT

## 2024-05-06 PROCEDURE — 83735 ASSAY OF MAGNESIUM: CPT

## 2024-05-06 PROCEDURE — 99232 SBSQ HOSP IP/OBS MODERATE 35: CPT | Performed by: INTERNAL MEDICINE

## 2024-05-06 PROCEDURE — 94003 VENT MGMT INPAT SUBQ DAY: CPT

## 2024-05-06 RX ORDER — CLONAZEPAM 0.5 MG/1
1.6 TABLET ORAL EVERY 6 HOURS
Status: DISCONTINUED | OUTPATIENT
Start: 2024-05-06 | End: 2024-05-20 | Stop reason: CLARIF

## 2024-05-06 RX ORDER — ASPIRIN 81 MG/1
81 TABLET, CHEWABLE ORAL DAILY
Status: DISCONTINUED | OUTPATIENT
Start: 2024-05-07 | End: 2024-05-18

## 2024-05-06 RX ORDER — FUROSEMIDE 10 MG/ML
40 INJECTION INTRAMUSCULAR; INTRAVENOUS ONCE
Status: COMPLETED | OUTPATIENT
Start: 2024-05-06 | End: 2024-05-06

## 2024-05-06 RX ADMIN — METOPROLOL TARTRATE 25 MG: 25 TABLET, FILM COATED ORAL at 20:28

## 2024-05-06 RX ADMIN — ALBUTEROL SULFATE 2.5 MG: 2.5 SOLUTION RESPIRATORY (INHALATION) at 08:49

## 2024-05-06 RX ADMIN — APIXABAN 5 MG: 5 TABLET, FILM COATED ORAL at 12:11

## 2024-05-06 RX ADMIN — VALPROATE SODIUM 1000 MG: 100 INJECTION, SOLUTION INTRAVENOUS at 11:45

## 2024-05-06 RX ADMIN — APIXABAN 5 MG: 5 TABLET, FILM COATED ORAL at 20:28

## 2024-05-06 RX ADMIN — FUROSEMIDE 40 MG: 10 INJECTION, SOLUTION INTRAMUSCULAR; INTRAVENOUS at 16:32

## 2024-05-06 RX ADMIN — METOPROLOL TARTRATE 25 MG: 25 TABLET, FILM COATED ORAL at 09:04

## 2024-05-06 RX ADMIN — Medication 15 ML: at 20:29

## 2024-05-06 RX ADMIN — Medication 15 ML: at 09:05

## 2024-05-06 RX ADMIN — ALBUTEROL SULFATE 2.5 MG: 2.5 SOLUTION RESPIRATORY (INHALATION) at 20:32

## 2024-05-06 RX ADMIN — ALBUTEROL SULFATE 2.5 MG: 2.5 SOLUTION RESPIRATORY (INHALATION) at 16:56

## 2024-05-06 RX ADMIN — LEVETIRACETAM 1500 MG: 500 INJECTION, SOLUTION, CONCENTRATE INTRAVENOUS at 20:29

## 2024-05-06 RX ADMIN — NYSTATIN 500000 UNITS: 100000 SUSPENSION ORAL at 17:12

## 2024-05-06 RX ADMIN — HYDROCHLOROTHIAZIDE 25 MG: 25 TABLET ORAL at 09:16

## 2024-05-06 RX ADMIN — LISINOPRIL 40 MG: 20 TABLET ORAL at 09:03

## 2024-05-06 RX ADMIN — CLONAZEPAM 1.5 MG: 0.5 TABLET ORAL at 22:42

## 2024-05-06 RX ADMIN — ALBUTEROL SULFATE 2.5 MG: 2.5 SOLUTION RESPIRATORY (INHALATION) at 04:25

## 2024-05-06 RX ADMIN — VALPROATE SODIUM 750 MG: 100 INJECTION, SOLUTION INTRAVENOUS at 00:17

## 2024-05-06 RX ADMIN — CLONAZEPAM 1 MG: 0.5 TABLET ORAL at 03:43

## 2024-05-06 RX ADMIN — ALBUTEROL SULFATE 2.5 MG: 2.5 SOLUTION RESPIRATORY (INHALATION) at 12:33

## 2024-05-06 RX ADMIN — AMPICILLIN SODIUM AND SULBACTAM SODIUM 3000 MG: 2; 1 INJECTION, POWDER, FOR SOLUTION INTRAMUSCULAR; INTRAVENOUS at 09:29

## 2024-05-06 RX ADMIN — ENOXAPARIN SODIUM 40 MG: 100 INJECTION SUBCUTANEOUS at 09:10

## 2024-05-06 RX ADMIN — CLONAZEPAM 1.5 MG: 0.5 TABLET ORAL at 17:09

## 2024-05-06 RX ADMIN — SODIUM CHLORIDE, PRESERVATIVE FREE 40 MG: 5 INJECTION INTRAVENOUS at 09:03

## 2024-05-06 RX ADMIN — SODIUM CHLORIDE, PRESERVATIVE FREE 10 ML: 5 INJECTION INTRAVENOUS at 20:28

## 2024-05-06 RX ADMIN — MIDAZOLAM 2 MG: 1 INJECTION INTRAMUSCULAR; INTRAVENOUS at 21:02

## 2024-05-06 RX ADMIN — AMPICILLIN SODIUM AND SULBACTAM SODIUM 3000 MG: 2; 1 INJECTION, POWDER, FOR SOLUTION INTRAMUSCULAR; INTRAVENOUS at 15:14

## 2024-05-06 RX ADMIN — ATORVASTATIN CALCIUM 40 MG: 40 TABLET, FILM COATED ORAL at 20:28

## 2024-05-06 RX ADMIN — THIAMINE HYDROCHLORIDE 100 MG: 100 INJECTION, SOLUTION INTRAMUSCULAR; INTRAVENOUS at 09:04

## 2024-05-06 RX ADMIN — ALBUTEROL SULFATE 2.5 MG: 2.5 SOLUTION RESPIRATORY (INHALATION) at 00:27

## 2024-05-06 RX ADMIN — SODIUM CHLORIDE, PRESERVATIVE FREE 10 ML: 5 INJECTION INTRAVENOUS at 09:06

## 2024-05-06 RX ADMIN — ACETYLCYSTEINE 400 MG: 100 SOLUTION ORAL; RESPIRATORY (INHALATION) at 20:32

## 2024-05-06 RX ADMIN — MIDAZOLAM 2 MG: 1 INJECTION INTRAMUSCULAR; INTRAVENOUS at 12:09

## 2024-05-06 RX ADMIN — POTASSIUM BICARBONATE 20 MEQ: 782 TABLET, EFFERVESCENT ORAL at 06:24

## 2024-05-06 RX ADMIN — LEVETIRACETAM 1500 MG: 500 INJECTION, SOLUTION, CONCENTRATE INTRAVENOUS at 09:04

## 2024-05-06 RX ADMIN — ACETYLCYSTEINE 400 MG: 100 SOLUTION ORAL; RESPIRATORY (INHALATION) at 08:49

## 2024-05-06 RX ADMIN — NYSTATIN 500000 UNITS: 100000 SUSPENSION ORAL at 20:28

## 2024-05-06 RX ADMIN — AMPICILLIN SODIUM AND SULBACTAM SODIUM 3000 MG: 2; 1 INJECTION, POWDER, FOR SOLUTION INTRAMUSCULAR; INTRAVENOUS at 03:42

## 2024-05-06 RX ADMIN — NYSTATIN 500000 UNITS: 100000 SUSPENSION ORAL at 10:19

## 2024-05-06 RX ADMIN — ACETYLCYSTEINE 400 MG: 100 SOLUTION ORAL; RESPIRATORY (INHALATION) at 12:33

## 2024-05-06 RX ADMIN — CLONAZEPAM 1 MG: 0.5 TABLET ORAL at 10:58

## 2024-05-06 RX ADMIN — ASPIRIN 81 MG: 81 TABLET, COATED ORAL at 09:10

## 2024-05-06 ASSESSMENT — PULMONARY FUNCTION TESTS
PIF_VALUE: 23
PIF_VALUE: 27
PIF_VALUE: 42
PIF_VALUE: 24
PIF_VALUE: 27
PIF_VALUE: 26
PIF_VALUE: 29
PIF_VALUE: 26
PIF_VALUE: 24
PIF_VALUE: 26
PIF_VALUE: 27
PIF_VALUE: 26
PIF_VALUE: 21
PIF_VALUE: 39
PIF_VALUE: 26
PIF_VALUE: 27
PIF_VALUE: 25
PIF_VALUE: 28

## 2024-05-06 ASSESSMENT — PAIN SCALES - GENERAL
PAINLEVEL_OUTOF10: 0

## 2024-05-06 NOTE — PROGRESS NOTES
[facility]  Neurology Follow Up    Date:  5/6/2024  Patient Name:  Nabeel Appiah  YOB: 1960  MRN: [id number]     PCP:  Jonh Allen DO   Referring:  Destin Gruber DO      Chief Complaint: myoclonus following cardiac arrest    Sig PMH: AAA, HTN,     History of Present Illness:  Nabeel Appiah is a 63 y.o. 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  Patient admitted 4/29 with cardiac arrest, noted to be in V. tach/V-fib and received amiodarone and shocks x 3.  ROSC was achieved prior to ED arrival.  He was then admitted to the MICU.    While in ICU he was noted to develop myoclonic jerking of the face.  Neurology consulted for myoclonic activity--started on Keppra    EEG showed a potential for generalized seizures but no associated epileptiform activity was found and myoclonic jerks were captured    MRI of the brain is pending    5/4: persistent myoclonic jerking with any stimulation.  Started on routine clonazepam and loaded with valproic acid.  5/5 myoclonic jerks persist. VPA and clonazepam increased    Subjective:  He remains intubated and has been off sedation for a couple of days but unfortunately remains unresponsive with persistent myoclonic jerking of his face with any stimulation, and sometimes at rest.  ICU physician is at the bedside and is considering Versed drip.    There apparently some medical legal issues going on.  He has no power of  but apparently his longtime girlfriend has stated that the patient would not want trach/PEG.  He is currently a DNR CCA .    There is no family present at the bedside.      Unable to complete review of systems as he is unresponsive    Current Medications:      Current Facility-Administered Medications   Medication Dose Route Frequency Provider Last Rate Last Admin    lisinopril (PRINIVIL;ZESTRIL) tablet 40 mg  40 mg Oral Daily Feli May MD   40 mg at 05/05/24 0911    And    hydroCHLOROthiazide  % sodium chloride infusion   IntraVENous PRN Ashley Rivera MD        ondansetron (ZOFRAN-ODT) disintegrating tablet 4 mg  4 mg Oral Q8H PRN Ashley Rivrea MD        Or    ondansetron (ZOFRAN) injection 4 mg  4 mg IntraVENous Q6H PRN Ashley Rivera MD        polyethylene glycol (GLYCOLAX) packet 17 g  17 g Oral Daily PRN Ashley Rivera MD        acetaminophen (TYLENOL) tablet 650 mg  650 mg Oral Q6H PRN Ashley Rivera MD   650 mg at 05/01/24 2248    Or    acetaminophen (TYLENOL) suppository 650 mg  650 mg Rectal Q6H PRN Ashley Rivera MD        pantoprazole (PROTONIX) 40 mg in sodium chloride (PF) 0.9 % 10 mL injection  40 mg IntraVENous Daily Ashley Rivera MD   40 mg at 05/05/24 0818    glucose chewable tablet 16 g  4 tablet Oral PRN Ashley Rivera MD        dextrose bolus 10% 125 mL  125 mL IntraVENous PRN Ashley Rivera MD   Stopped at 05/02/24 0809    Or    dextrose bolus 10% 250 mL  250 mL IntraVENous PRN Ashley Rivera MD        glucagon injection 1 mg  1 mg SubCUTAneous PRAshley Garza MD        dextrose 10 % infusion   IntraVENous Continuous PRN Ashley Rivera MD        chlorhexidine (PERIDEX) 0.12 % solution 15 mL  15 mL Mouth/Throat BID Ashley Rivera MD   15 mL at 05/05/24 1949    lubrifresh P.M. (artificial tears) ophthalmic ointment   Both Eyes Q8H PRN Ashley Rivera MD   Given at 05/04/24 2047    atorvastatin (LIPITOR) tablet 40 mg  40 mg Oral Nightly Colleen Mansfield MD   40 mg at 05/05/24 1949      Allergies:      No Known Allergies     Physical Examination  Vitals   Vitals:    05/06/24 0400 05/06/24 0500 05/06/24 0549 05/06/24 0600   BP: (!) 156/74 (!) 140/67  (!) 150/67   Pulse: 63 69  66   Resp: 22 22  22   Temp: 98.1 °F (36.7 °C)      TempSrc: Temporal      SpO2: 94% 95%  95%   Weight:   (!) 171.2 kg (377 lb 7.2 oz)    Height:            General: intubated, off sedation, persistent myoclonic jerking of his face   HEENT: Normocephalic,

## 2024-05-06 NOTE — PROGRESS NOTES
[facility]  Department of Internal Medicine   Internal Medicine Residency   MICU Progress Note    Patient:  Nabeel Appiah 63 y.o. male  MRN: [id number]     Date of Service: 5/6/2024    Allergy: Patient has no known allergies.    Overnight Events: Elevated BP, prns added. TV reduced to 530 due to respiratory alkalosis    Subjective     Patient seen and examined at bedside this AM. Remains unresponsive, intubated. Had a possible seizure this am, given versed 2mg.    ROS: unable to be achieved due to current mental status    Objective     VS: /66   Pulse 77   Temp 97 °F (36.1 °C) (Temporal)   Resp 22   Ht 1.905 m (6' 3\")   Wt (!) 171.2 kg (377 lb 7.2 oz)   SpO2 97%   BMI 47.18 kg/m²   ABP (Arterial line BP): 100/50  ABP Mean (Arterial Line Mean): (!) 64 mmHg    I & O - 24hr:   Intake/Output Summary (Last 24 hours) at 5/6/2024 1916  Last data filed at 5/6/2024 1700  Gross per 24 hour   Intake 3882.19 ml   Output 2025 ml   Net 1857.19 ml       Sedatives: N/A. Versed Drip discontinued yesterday    Pressors: n/a    Oxygen: Intubated 500/16/70/10    ABG:     Lab Results   Component Value Date/Time    PH 7.471 05/06/2024 01:28 PM    PCO2 36.2 05/06/2024 01:28 PM    PO2 97.8 05/06/2024 01:28 PM    HCO3 25.8 05/06/2024 01:28 PM    BE 2.3 05/06/2024 01:28 PM    THB 11.9 05/06/2024 01:28 PM    O2SAT 96.9 05/06/2024 01:28 PM       Physical Exam:  General Appearance: AO x 0, not following commands    Neuro:Upward gaze, +pupillary reflex, absent corneal reflex, +gag reflex, absent cough reflex, withdrawing to noxious stimuli, myoclonic jerks still present  Cardiovascular: regular rate and rhythm, S1 and S2 heard, no murmurs appreciated   Respiratory: Mechanically ventilated lung sounds. No wheezing or crackles appreciated.  Abdomen: Softly distended  Extremities: Bilateral upper extremity edema, no cyanosis, distal pulses intact    Lines & Urinary Catheter:     External Urinary Catheter 05/03  ETT

## 2024-05-06 NOTE — PROGRESS NOTES
[facility] Hospitalist Progress Note    Admitting Date and Time: 5/1/2024  2:21 AM  Admit Dx: Cardiac arrest (HCC) [I46.9]  Synopsis: The patient is a 63-year-old male with past medical history of A-fib, hypertension, obesity, type II DM, AAA who presented to [facility] for cardiac arrest. On EMS arrival reported to be in V. tach/V-fib arrest and received amiodarone and shocked x 3. Patient was noted to have ROSC prior to ED arrival. CT of the head was done in the emergency department without any intracranial findings. Further imaging revealed a small pneumothorax for which a right-sided chest tube was placed: CT of the chest negative for PE. CT of the abdomen and pelvis showed presence of 4.3 x 4 cm aneurysm. Plan was for cardiac catheterization at [facility] [place] hence patient was transferred from [place].  However patient started developing myoclonic jerking and had concern for seizures and anoxic brain injury.  Neurology is currently following and he is on multiple antiseizure medication MRI is pending.  Patient remains on ventilatory support and critical care unit. Patient    Subjective:  Patient seen and examined at bedside  On ventilatory support and sedation  Facial twitching on stimulation    ROS: Unable to obtain     nystatin  5 mL Oral 4x Daily    valproate (DEPACON) 1,000 mg in sodium chloride 0.9 % 100 mL IVPB  1,000 mg IntraVENous Q12H    lisinopril  40 mg Oral Daily    And    hydroCHLOROthiazide  25 mg Oral Daily    enoxaparin  40 mg SubCUTAneous BID    clonazePAM  1 mg Oral Q6H    albuterol  2.5 mg Nebulization Q4H RT    acetylcysteine  4 mL Inhalation TID RT    thiamine  100 mg IntraVENous Daily    levETIRAcetam  1,500 mg IntraVENous Q12H    aspirin  81 mg Oral Daily    metoprolol tartrate  25 mg Oral BID    ampicillin-sulbactam  3,000 mg IntraVENous Q6H    insulin lispro  0-4 Units SubCUTAneous Q4H    sodium chloride flush  5-40 mL IntraVENous 2 times per day     ventilatory care as per ICU team  Seizures - EEG showing A potential for generalized onset seizures, currently on depakote, clonazepam and keppra has been uptitrated due to persistent myoclonic jerks. Neurology following, MRI pending   Elevated troponin 2/2 to ACS  Lactic acidosis, resolved  Transaminitis -downtrending  GENE, resolving -creatinine 1.5> 1.7> 1.1  Pneumothorax status post chest tube placement 4/30  Diabetes type 2 -HbA1c 6.4%. monitor BG  Obesity  Hypertension -lisinopril, hydrochlorothiazide and Toprol   Hyperlipidemia on pravastatin, on hold due to transaminitis  Multiple rib fractures 2/2 to CPR   AAA - .3 x 4 cm aneurysm of the infrarenal segment of the abdominal aorta with eccentric mural thrombus  DNR CCA     NOTE: This report was transcribed using voice recognition software. Every effort was made to ensure accuracy; however, inadvertent computerized transcription errors may be present.  Electronically signed by Keyona Mccloud MD on 5/6/2024 at 11:04 AM

## 2024-05-06 NOTE — PLAN OF CARE
Problem: Respiratory - Adult  Goal: Achieves optimal ventilation and oxygenation  Outcome: Not Progressing   No weaning today  oxygen at 65%  and peep at 10

## 2024-05-06 NOTE — PROGRESS NOTES
Spoke to patients friend while visiting and he stated \"he has no other family\" besides Tisha (patient GF). This was reported to case management (Valery), who has explained that she has not found any other living relatives.

## 2024-05-06 NOTE — PROGRESS NOTES
DAILY VENTILATOR WEANING ASSESSMENT PERFORMED    P/FIO2 Ratio =  105        (<100= do not Wean)                  Cs =   54                       (<32= Instability)  Plat. Pressure = 19  MV =8.65  RSBI =    Instabilities:       Cardiovascular =       CNS =2       Respiratory =1       Metabolic =    Parameters    no    Wean per protocol  no    Ask Physician for a weaning plan yes    Additional Comments: no weaning today  pt on 75% oxygen Peep 10    Performed by Sushma Patterson RCP RRT      Reference Table:    Cardiovascular     CNS      1. Mean BP less than or equal to 75   1. Neuromuscular blockade  2. Heart Rate greater than 130   2. RASS of -3, -4, -5  3. Myocardial Ischemia    3. RASS of +3, +4  4. Mechanical Assist Device    4. ICP greater than 15 or             Intracranial Hypertension         Respiratory      Metabolic  1. PEEP equal to or greater than 10cm/H20  1.Temp. (8hrs) less than 95 or > 103  2. Respiratory Rate greater than 35   2. WBC < 5000 or > 24940  3. Minute Volume greater than 15L  4. pH less than 7.30  5. Deteriorating chest X-ray         05/06/24 0906   Patient Observation   Pulse 61   SpO2 95 %   Vent Information   Ventilator Day(s) 5   Ventilator -15   Vent Mode AC/VC   Ventilator Settings   Vt (Set, mL) 500 mL   Resp Rate (Set) 16 bpm   PEEP/CPAP (cmH2O) 10   FiO2  70 %   Peak Inspiratory Flow (Set) 80 L/sec   Vent Patient Data (Readings)   Vt (Measured) 333 mL   Peak Inspiratory Pressure (cmH2O) 27 cmH2O   Rate Measured 18 br/min   Minute Volume (L/min) 9.8 Liters   Mean Airway Pressure (cmH2O) 14 cmH20   Plateau Pressure (cm H2O) 19 cm H2O   Driving Pressure 9   I:E Ratio 1:2.00   Pressure Sensitivity 2 cm H2O   Static Compliance (L/cm H2O) 50   I Time/ I Time % 0 s   Backup Apnea On   Backup Rate 22 Breaths Per Minute   Backup Vt 550   Vent Alarm Settings   High Pressure (cmH2O) 50 cmH2O   Low Minute Volume (lpm) 7 L/min   Low Exhaled Vt (ml) 400 mL   RR High (bpm) 35 br/min   Apnea

## 2024-05-06 NOTE — CARE COORDINATION
Care Coordination: LOS 5 day.  No visitors at bedside, attempted to reach GF  Tisha, no answer. Left my contact information and requesting return call.  Attempted to do Google search and unable to locate pt, found obituary of sister Delia in [facility]- his mother at age 96- Her , daughter and brother proceeded her in Death.  Nabeel is only relative listed in obituary.  Received a call back from Tisha. She states he works at [employer] off of [place]- She has been in contact with his employer. She states she is beneficiary of his life insurance and house.  She states when his mom  in , he went to , had papers drawn and told her he had everything taken care of.  She is trying to get into his safe to see if he has living will. She has my contact number if she is able to get in. I spoke to Ethics as well- who confirmed unable to do emergency guardianship, and continue to attempt to exhaust search for relative. Otherwise, 2 doctor sign off.    Electronically signed by Valery Beard RN on 2024 at 4:03 PM

## 2024-05-06 NOTE — PLAN OF CARE
Problem: Chronic Conditions and Co-morbidities  Goal: Patient's chronic conditions and co-morbidity symptoms are monitored and maintained or improved  5/5/2024 2145 by Jose Landry RN  Outcome: Not Progressing     Problem: Safety - Adult  Goal: Free from fall injury  5/5/2024 2145 by Jose Landry, RN  Outcome: Progressing     Problem: Pain  Goal: Verbalizes/displays adequate comfort level or baseline comfort level  5/5/2024 2145 by Jose Landry RN  Outcome: Progressing     Problem: Respiratory - Adult  Goal: Achieves optimal ventilation and oxygenation  Outcome: Progressing  Achieves optimal ventilation and oxygenation:   Assess for changes in respiratory status   Assess for changes in mentation and behavior   Position to facilitate oxygenation and minimize respiratory effort   Oxygen supplementation based on oxygen saturation or arterial blood gases   Encourage broncho-pulmonary hygiene including cough, deep breathe, incentive spirometry   Respiratory therapy support as indicated

## 2024-05-07 ENCOUNTER — APPOINTMENT (OUTPATIENT)
Dept: GENERAL RADIOLOGY | Age: 64
DRG: 283 | End: 2024-05-07
Attending: STUDENT IN AN ORGANIZED HEALTH CARE EDUCATION/TRAINING PROGRAM
Payer: COMMERCIAL

## 2024-05-07 LAB
25(OH)D3 SERPL-MCNC: 30.7 NG/ML (ref 30–100)
AADO2: 259.4 MMHG
AADO2: 264.2 MMHG
ALBUMIN SERPL-MCNC: 2.9 G/DL (ref 3.5–5.2)
ALP SERPL-CCNC: 45 U/L (ref 40–129)
ALT SERPL-CCNC: 36 U/L (ref 0–40)
ANION GAP SERPL CALCULATED.3IONS-SCNC: 16 MMOL/L (ref 7–16)
AST SERPL-CCNC: 35 U/L (ref 0–39)
B.E.: 0.8 MMOL/L (ref -3–3)
B.E.: 2.2 MMOL/L (ref -3–3)
BASOPHILS # BLD: 0.03 K/UL (ref 0–0.2)
BASOPHILS NFR BLD: 0 % (ref 0–2)
BILIRUB SERPL-MCNC: 0.5 MG/DL (ref 0–1.2)
BUN SERPL-MCNC: 34 MG/DL (ref 6–23)
CALCIUM SERPL-MCNC: 8.7 MG/DL (ref 8.6–10.2)
CHLORIDE SERPL-SCNC: 104 MMOL/L (ref 98–107)
CO2 SERPL-SCNC: 24 MMOL/L (ref 22–29)
COHB: 0.2 % (ref 0–1.5)
COHB: 0.2 % (ref 0–1.5)
CREAT SERPL-MCNC: 1.1 MG/DL (ref 0.7–1.2)
CRITICAL: ABNORMAL
CRITICAL: ABNORMAL
DATE ANALYZED: ABNORMAL
DATE ANALYZED: ABNORMAL
DATE LAST DOSE: NORMAL
DATE OF COLLECTION: ABNORMAL
DATE OF COLLECTION: ABNORMAL
EOSINOPHIL # BLD: 0.41 K/UL (ref 0.05–0.5)
EOSINOPHILS RELATIVE PERCENT: 5 % (ref 0–6)
ERYTHROCYTE [DISTWIDTH] IN BLOOD BY AUTOMATED COUNT: 14.1 % (ref 11.5–15)
FIO2: 55 %
FIO2: 60 %
FOLATE SERPL-MCNC: 6.1 NG/ML (ref 4.8–24.2)
GFR, ESTIMATED: 73 ML/MIN/1.73M2
GLUCOSE BLD-MCNC: 137 MG/DL (ref 74–99)
GLUCOSE BLD-MCNC: 142 MG/DL (ref 74–99)
GLUCOSE BLD-MCNC: 163 MG/DL (ref 74–99)
GLUCOSE BLD-MCNC: 165 MG/DL (ref 74–99)
GLUCOSE BLD-MCNC: 169 MG/DL (ref 74–99)
GLUCOSE BLD-MCNC: 179 MG/DL (ref 74–99)
GLUCOSE SERPL-MCNC: 164 MG/DL (ref 74–99)
HCO3: 23.5 MMOL/L (ref 22–26)
HCO3: 26.2 MMOL/L (ref 22–26)
HCT VFR BLD AUTO: 34.1 % (ref 37–54)
HGB BLD-MCNC: 11 G/DL (ref 12.5–16.5)
HHB: 1.5 % (ref 0–5)
HHB: 3.9 % (ref 0–5)
IMM GRANULOCYTES # BLD AUTO: 0.12 K/UL (ref 0–0.58)
IMM GRANULOCYTES NFR BLD: 1 % (ref 0–5)
LAB: ABNORMAL
LAB: ABNORMAL
LYMPHOCYTES NFR BLD: 1.07 K/UL (ref 1.5–4)
LYMPHOCYTES RELATIVE PERCENT: 12 % (ref 20–42)
Lab: 1043
Lab: 424
MAGNESIUM SERPL-MCNC: 2.2 MG/DL (ref 1.6–2.6)
MCH RBC QN AUTO: 30.9 PG (ref 26–35)
MCHC RBC AUTO-ENTMCNC: 32.3 G/DL (ref 32–34.5)
MCV RBC AUTO: 95.8 FL (ref 80–99.9)
METHB: 0.2 % (ref 0–1.5)
METHB: 0.3 % (ref 0–1.5)
MODE: AC
MODE: AC
MONOCYTES NFR BLD: 0.95 K/UL (ref 0.1–0.95)
MONOCYTES NFR BLD: 11 % (ref 2–12)
NEUTROPHILS NFR BLD: 70 % (ref 43–80)
NEUTS SEG NFR BLD: 6.05 K/UL (ref 1.8–7.3)
O2 CONTENT: 16.6 ML/DL
O2 CONTENT: 16.6 ML/DL
O2 SATURATION: 96.1 % (ref 92–98.5)
O2 SATURATION: 98.5 % (ref 92–98.5)
O2HB: 95.6 % (ref 94–97)
O2HB: 98.1 % (ref 94–97)
OPERATOR ID: 2593
OPERATOR ID: ABNORMAL
PATIENT TEMP: 37 C
PATIENT TEMP: 37 C
PCO2: 31.5 MMHG (ref 35–45)
PCO2: 38.6 MMHG (ref 35–45)
PEEP/CPAP: 10 CMH2O
PEEP/CPAP: 10 CMH2O
PFO2: 1.55 MMHG/%
PFO2: 2.23 MMHG/%
PH BLOOD GAS: 7.45 (ref 7.35–7.45)
PH BLOOD GAS: 7.49 (ref 7.35–7.45)
PHOSPHATE SERPL-MCNC: 4.3 MG/DL (ref 2.5–4.5)
PLATELET # BLD AUTO: 144 K/UL (ref 130–450)
PMV BLD AUTO: 11.3 FL (ref 7–12)
PO2: 133.8 MMHG (ref 75–100)
PO2: 85 MMHG (ref 75–100)
POTASSIUM SERPL-SCNC: 4 MMOL/L (ref 3.5–5)
PROT SERPL-MCNC: 5.4 G/DL (ref 6.4–8.3)
RBC # BLD AUTO: 3.56 M/UL (ref 3.8–5.8)
RI(T): 1.94
RI(T): 3.11
RPR SER QL: NONREACTIVE
RR MECHANICAL: 16 B/MIN
RR MECHANICAL: 16 B/MIN
SODIUM SERPL-SCNC: 144 MMOL/L (ref 132–146)
SOURCE, BLOOD GAS: ABNORMAL
SOURCE, BLOOD GAS: ABNORMAL
THB: 11.9 G/DL (ref 11.5–16.5)
THB: 12.3 G/DL (ref 11.5–16.5)
TIME ANALYZED: 1051
TIME ANALYZED: 432
TME LAST DOSE: NORMAL H
VALPROATE SERPL-MCNC: 55 UG/ML (ref 50–100)
VANCOMYCIN DOSE: NORMAL MG
VIT B12 SERPL-MCNC: 712 PG/ML (ref 211–946)
VT MECHANICAL: 450 ML
VT MECHANICAL: 500 ML
WBC OTHER # BLD: 8.6 K/UL (ref 4.5–11.5)

## 2024-05-07 PROCEDURE — 2500000003 HC RX 250 WO HCPCS: Performed by: NURSE PRACTITIONER

## 2024-05-07 PROCEDURE — 80053 COMPREHEN METABOLIC PANEL: CPT

## 2024-05-07 PROCEDURE — 6360000002 HC RX W HCPCS: Performed by: STUDENT IN AN ORGANIZED HEALTH CARE EDUCATION/TRAINING PROGRAM

## 2024-05-07 PROCEDURE — 6370000000 HC RX 637 (ALT 250 FOR IP)

## 2024-05-07 PROCEDURE — 85025 COMPLETE CBC W/AUTO DIFF WBC: CPT

## 2024-05-07 PROCEDURE — 83735 ASSAY OF MAGNESIUM: CPT

## 2024-05-07 PROCEDURE — C9113 INJ PANTOPRAZOLE SODIUM, VIA: HCPCS

## 2024-05-07 PROCEDURE — 2580000003 HC RX 258

## 2024-05-07 PROCEDURE — 6370000000 HC RX 637 (ALT 250 FOR IP): Performed by: STUDENT IN AN ORGANIZED HEALTH CARE EDUCATION/TRAINING PROGRAM

## 2024-05-07 PROCEDURE — 71045 X-RAY EXAM CHEST 1 VIEW: CPT

## 2024-05-07 PROCEDURE — 82306 VITAMIN D 25 HYDROXY: CPT

## 2024-05-07 PROCEDURE — 6360000002 HC RX W HCPCS

## 2024-05-07 PROCEDURE — 82962 GLUCOSE BLOOD TEST: CPT

## 2024-05-07 PROCEDURE — 82746 ASSAY OF FOLIC ACID SERUM: CPT

## 2024-05-07 PROCEDURE — 2000000000 HC ICU R&B

## 2024-05-07 PROCEDURE — 2580000003 HC RX 258: Performed by: NURSE PRACTITIONER

## 2024-05-07 PROCEDURE — 99232 SBSQ HOSP IP/OBS MODERATE 35: CPT | Performed by: NURSE PRACTITIONER

## 2024-05-07 PROCEDURE — 82805 BLOOD GASES W/O2 SATURATION: CPT

## 2024-05-07 PROCEDURE — 94669 MECHANICAL CHEST WALL OSCILL: CPT

## 2024-05-07 PROCEDURE — 6360000002 HC RX W HCPCS: Performed by: INTERNAL MEDICINE

## 2024-05-07 PROCEDURE — 99231 SBSQ HOSP IP/OBS SF/LOW 25: CPT | Performed by: INTERNAL MEDICINE

## 2024-05-07 PROCEDURE — 6360000002 HC RX W HCPCS: Performed by: PSYCHIATRY & NEUROLOGY

## 2024-05-07 PROCEDURE — 80164 ASSAY DIPROPYLACETIC ACD TOT: CPT

## 2024-05-07 PROCEDURE — 86592 SYPHILIS TEST NON-TREP QUAL: CPT

## 2024-05-07 PROCEDURE — 94640 AIRWAY INHALATION TREATMENT: CPT

## 2024-05-07 PROCEDURE — 99291 CRITICAL CARE FIRST HOUR: CPT | Performed by: INTERNAL MEDICINE

## 2024-05-07 PROCEDURE — 84100 ASSAY OF PHOSPHORUS: CPT

## 2024-05-07 PROCEDURE — A4216 STERILE WATER/SALINE, 10 ML: HCPCS

## 2024-05-07 PROCEDURE — 6370000000 HC RX 637 (ALT 250 FOR IP): Performed by: INTERNAL MEDICINE

## 2024-05-07 PROCEDURE — 94003 VENT MGMT INPAT SUBQ DAY: CPT

## 2024-05-07 PROCEDURE — 82607 VITAMIN B-12: CPT

## 2024-05-07 RX ORDER — ALBUTEROL SULFATE 2.5 MG/3ML
2.5 SOLUTION RESPIRATORY (INHALATION)
Status: DISCONTINUED | OUTPATIENT
Start: 2024-05-07 | End: 2024-05-18

## 2024-05-07 RX ADMIN — Medication 15 ML: at 20:00

## 2024-05-07 RX ADMIN — ALBUTEROL SULFATE 2.5 MG: 2.5 SOLUTION RESPIRATORY (INHALATION) at 08:40

## 2024-05-07 RX ADMIN — LEVETIRACETAM 1500 MG: 500 INJECTION, SOLUTION, CONCENTRATE INTRAVENOUS at 20:30

## 2024-05-07 RX ADMIN — ALBUTEROL SULFATE 2.5 MG: 2.5 SOLUTION RESPIRATORY (INHALATION) at 20:37

## 2024-05-07 RX ADMIN — NYSTATIN 500000 UNITS: 100000 SUSPENSION ORAL at 20:00

## 2024-05-07 RX ADMIN — APIXABAN 5 MG: 5 TABLET, FILM COATED ORAL at 08:50

## 2024-05-07 RX ADMIN — SODIUM CHLORIDE, PRESERVATIVE FREE 10 ML: 5 INJECTION INTRAVENOUS at 20:00

## 2024-05-07 RX ADMIN — VALPROATE SODIUM 1000 MG: 100 INJECTION, SOLUTION INTRAVENOUS at 00:28

## 2024-05-07 RX ADMIN — CLONAZEPAM 1.5 MG: 0.5 TABLET ORAL at 16:56

## 2024-05-07 RX ADMIN — ACETYLCYSTEINE 400 MG: 100 SOLUTION ORAL; RESPIRATORY (INHALATION) at 08:40

## 2024-05-07 RX ADMIN — CLONAZEPAM 1.5 MG: 0.5 TABLET ORAL at 04:24

## 2024-05-07 RX ADMIN — Medication 15 ML: at 08:50

## 2024-05-07 RX ADMIN — HYDROCHLOROTHIAZIDE 25 MG: 25 TABLET ORAL at 08:51

## 2024-05-07 RX ADMIN — CLONAZEPAM 1.5 MG: 0.5 TABLET ORAL at 11:39

## 2024-05-07 RX ADMIN — NYSTATIN 500000 UNITS: 100000 SUSPENSION ORAL at 16:56

## 2024-05-07 RX ADMIN — NYSTATIN 500000 UNITS: 100000 SUSPENSION ORAL at 08:53

## 2024-05-07 RX ADMIN — APIXABAN 5 MG: 5 TABLET, FILM COATED ORAL at 20:00

## 2024-05-07 RX ADMIN — MIDAZOLAM 2 MG: 1 INJECTION INTRAMUSCULAR; INTRAVENOUS at 09:05

## 2024-05-07 RX ADMIN — VALPROATE SODIUM 1000 MG: 100 INJECTION, SOLUTION INTRAVENOUS at 11:58

## 2024-05-07 RX ADMIN — NYSTATIN 500000 UNITS: 100000 SUSPENSION ORAL at 12:43

## 2024-05-07 RX ADMIN — SODIUM CHLORIDE, PRESERVATIVE FREE 10 ML: 5 INJECTION INTRAVENOUS at 08:53

## 2024-05-07 RX ADMIN — ACETYLCYSTEINE 400 MG: 100 SOLUTION ORAL; RESPIRATORY (INHALATION) at 20:37

## 2024-05-07 RX ADMIN — ASPIRIN 81 MG CHEWABLE TABLET 81 MG: 81 TABLET CHEWABLE at 08:50

## 2024-05-07 RX ADMIN — ALBUTEROL SULFATE 2.5 MG: 2.5 SOLUTION RESPIRATORY (INHALATION) at 04:45

## 2024-05-07 RX ADMIN — ALBUTEROL SULFATE 2.5 MG: 2.5 SOLUTION RESPIRATORY (INHALATION) at 15:18

## 2024-05-07 RX ADMIN — SODIUM CHLORIDE, PRESERVATIVE FREE 40 MG: 5 INJECTION INTRAVENOUS at 08:50

## 2024-05-07 RX ADMIN — ATORVASTATIN CALCIUM 40 MG: 40 TABLET, FILM COATED ORAL at 20:00

## 2024-05-07 RX ADMIN — ACETYLCYSTEINE 400 MG: 100 SOLUTION ORAL; RESPIRATORY (INHALATION) at 15:18

## 2024-05-07 RX ADMIN — ALBUTEROL SULFATE 2.5 MG: 2.5 SOLUTION RESPIRATORY (INHALATION) at 00:40

## 2024-05-07 RX ADMIN — MIDAZOLAM 2 MG: 1 INJECTION INTRAMUSCULAR; INTRAVENOUS at 02:55

## 2024-05-07 RX ADMIN — THIAMINE HYDROCHLORIDE 100 MG: 100 INJECTION, SOLUTION INTRAMUSCULAR; INTRAVENOUS at 08:50

## 2024-05-07 RX ADMIN — LEVETIRACETAM 1500 MG: 500 INJECTION, SOLUTION, CONCENTRATE INTRAVENOUS at 08:49

## 2024-05-07 ASSESSMENT — PULMONARY FUNCTION TESTS
PIF_VALUE: 24
PIF_VALUE: 26
PIF_VALUE: 22
PIF_VALUE: 31
PIF_VALUE: 23
PIF_VALUE: 19
PIF_VALUE: 24
PIF_VALUE: 21
PIF_VALUE: 25
PIF_VALUE: 22
PIF_VALUE: 25
PIF_VALUE: 25
PIF_VALUE: 21
PIF_VALUE: 24
PIF_VALUE: 26
PIF_VALUE: 24
PIF_VALUE: 25
PIF_VALUE: 22
PIF_VALUE: 20
PIF_VALUE: 21
PIF_VALUE: 26
PIF_VALUE: 22
PIF_VALUE: 24
PIF_VALUE: 22
PIF_VALUE: 23
PIF_VALUE: 32

## 2024-05-07 ASSESSMENT — PAIN SCALES - GENERAL
PAINLEVEL_OUTOF10: 0
PAINLEVEL_OUTOF10: 1
PAINLEVEL_OUTOF10: 0
PAINLEVEL_OUTOF10: 1
PAINLEVEL_OUTOF10: 0
PAINLEVEL_OUTOF10: 1

## 2024-05-07 NOTE — PROGRESS NOTES
Hospitalist Progress Note      Synopsis: Patient admitted on 5/1/2024. The patient is a 63-year-old male with past medical history of A-fib, hypertension, obesity, type II DM, AAA who presented to [facility] for cardiac arrest. On EMS arrival reported to be in V. tach/V-fib arrest and received amiodarone and shocked x 3. Patient was noted to have ROSC prior to ED arrival. CT of the head was done in the emergency department without any intracranial findings. Further imaging revealed a small pneumothorax for which a right-sided chest tube was placed: Plan for LHC. Placed on heparin drip and transferred. CT of the chest negative for PE. CT of the abdomen and pelvis showed presence of 4.3 x 4 cm aneurysm. Patient had persistent myoclonic jerking. Placed on Clonazepam and Depakote. EEG showed a potential for generalized seizures but no associated epileptiform activity was found and myoclonic jerks were captured. Patent with aspiration pneumonia. Placed on Unasyn.  Myoclonic jerks continued. Keppra added.  Clonazepam increased to 1.5 mg every 6 hours. Remains on versed drip. Heparin stopped. Transitioned to Eliquis. Completed course of Unasyn. MRI ordered and pending.      GOC have been discussed with girlfriend, but patient has no NOK as per his girlfriend. Girlfriend is trying to access his safe to see if he has a living will. Needs 2 doctor sign off for medical decisions at present.     Assessment and Plan     Cardiac arrest secondary to ACS -cardiology following, plans for cardiac catheterization however continues to have poor prognosis in terms of neurologic recovery, now off heparin drip s/p rewarming   V-fib/V. Tach -now off lidocaine drip  Persistent Afib - on Eliquis at home, completed heparin infusion and transitioned to Eliquis    Aspiration pneumonia - growing gram-positive cocci in pairs on respiratory culture, completed Unasyn.  Acute hypoxic respiratory failure secondary to cardiac arrest -

## 2024-05-07 NOTE — PROGRESS NOTES
[facility]  Neurology Follow Up    Date:  5/7/2024  Patient Name:  Nabeel Appiah  YOB: 1960  MRN: [id number]     PCP:  Jonh Allen DO   Referring:  Destin Gruber DO      Chief Complaint: myoclonus following cardiac arrest    Sig PMH: AAA, HTN,     History of Present Illness:  Nabeel Appiah is a 63 y.o. 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  Patient admitted 4/29 with cardiac arrest, noted to be in V. tach/V-fib and received amiodarone and shocks x 3.  ROSC was achieved prior to ED arrival.  He was then admitted to the MICU.    While in ICU he was noted to develop myoclonic jerking of the face.  Neurology consulted for myoclonic activity--started on Keppra    EEG showed a potential for generalized seizures but no associated epileptiform activity was found and myoclonic jerks were captured    5/4: persistent myoclonic jerking with any stimulation.  Started on routine clonazepam and loaded with valproic acid.  5/5 myoclonic jerks persist. VPA and clonazepam increased  5/6: MRI of the brain was canceled due to body habitus.  VPA increased due to persistent myoclonic jerking    There are medical legal issues going on.  He has no power of  but apparently his longtime girlfriend has stated that the patient would not want trach/PEG.  He is currently a DNR CCA .    Subjective:  He remains intubated and has been off sedation for several days--unresponsive.  Spoke with RN who just gave patient Versed push for myoclonic jerking.  She states that it is effective but does not last, and he received a couple of doses overnight as well.     There is no family present at the bedside.      Unable to complete review of systems as he is unresponsive    Current Medications:      Current Facility-Administered Medications   Medication Dose Route Frequency Provider Last Rate Last Admin    nystatin (MYCOSTATIN) 151646 UNIT/ML suspension 500,000 Units  5 mL Oral  vent  HEENT: Normocephalic, atraumatic--ETT in place  Eyes: sclera clear  Lungs: breathing over the ventilator  Heart: RRR  Extremities/Peripheral vascular: dependent edema noted in BL UEs    Neurologic Examination  Mental Status  Intubated, Unresponsive off sedation    Cranial Nerves  No response to visual threat (bilateral peripheral fields)  Pupils 3 mm and sluggish  Spontaneous eye movements: No  Corneal reflex present bilaterally  Oculocephalic reflex present bilaterally  Gag reflex absent    Motor/Sensory  No purposeful movement of any limb  No convincing response to deep pain in any limb  Triple flexion response both legs  No myoclonic jerking at this time    Labs  Recent Labs     05/04/24  2107 05/04/24  2134 05/07/24  0412 05/07/24  0424      < > 144  --    K 4.0   < > 4.0  --    *   < > 104  --    CO2 26   < > 24  --    BUN 38*   < > 34*  --    CREATININE 1.5*   < > 1.1  --    GLUCOSE 161*   < > 164*  --    CALCIUM 9.1   < > 8.7  --    BILITOT  --    < > 0.5  --    ALKPHOS  --    < > 45  --    AST  --    < > 35  --    ALT  --    < > 36  --    WBC  --    < > 8.6  --    RBC  --    < > 3.56*  --    HGB  --    < > 11.0*  --    HCT  --    < > 34.1*  --    MCV  --    < > 95.8  --    MCH  --    < > 30.9  --    MCHC  --    < > 32.3  --    RDW  --    < > 14.1  --    PLT  --    < > 144  --    MPV  --    < > 11.3  --    PH  --    < >  --  7.491*   PO2  --    < >  --  133.8*   PCO2  --    < >  --  31.5*   HCO3  --    < >  --  23.5   BE  --    < >  --  0.8   O2SAT  --    < >  --  98.5   LACTA 1.9  --   --   --     < > = values in this interval not displayed.       Hemoglobin A1C   Date Value Ref Range Status   04/06/2024 6.4 (H) 4.0 - 5.6 % Final      Latest Reference Range & Units Most Recent   Cholesterol, Total <200 mg/dL 149  4/6/24 09:18   HDL Cholesterol >40 mg/dL 44  4/6/24 09:18   LDL Cholesterol <100 mg/dL 85  4/6/24 09:18   Triglycerides <150 mg/dL 98  4/6/24 09:18   VLDL mg/dL 20  4/6/24 09:18

## 2024-05-07 NOTE — PLAN OF CARE
Problem: Safety - Adult  Goal: Free from fall injury  Outcome: Progressing     Problem: Chronic Conditions and Co-morbidities  Goal: Patient's chronic conditions and co-morbidity symptoms are monitored and maintained or improved  Outcome: Progressing  Flowsheets (Taken 5/7/2024 0800)  Care Plan - Patient's Chronic Conditions and Co-Morbidity Symptoms are Monitored and Maintained or Improved: Monitor and assess patient's chronic conditions and comorbid symptoms for stability, deterioration, or improvement     Problem: Discharge Planning  Goal: Discharge to home or other facility with appropriate resources  Outcome: Progressing  Flowsheets (Taken 5/7/2024 0800)  Discharge to home or other facility with appropriate resources: Identify barriers to discharge with patient and caregiver     Problem: Pain  Goal: Verbalizes/displays adequate comfort level or baseline comfort level  Outcome: Progressing     Problem: Neurosensory - Adult  Goal: Achieves stable or improved neurological status  Outcome: Progressing     Problem: Respiratory - Adult  Goal: Achieves optimal ventilation and oxygenation  Outcome: Progressing     Problem: Skin/Tissue Integrity  Goal: Absence of new skin breakdown  Description: 1.  Monitor for areas of redness and/or skin breakdown  2.  Assess vascular access sites hourly  3.  Every 4-6 hours minimum:  Change oxygen saturation probe site  4.  Every 4-6 hours:  If on nasal continuous positive airway pressure, respiratory therapy assess nares and determine need for appliance change or resting period.  Outcome: Progressing     Problem: ABCDS Injury Assessment  Goal: Absence of physical injury  Outcome: Progressing     Problem: Nutrition Deficit:  Goal: Optimize nutritional status  Outcome: Progressing

## 2024-05-07 NOTE — CARE COORDINATION
Care Coordination: Received Call from Dorian España,  works with legal guardianship and is actually legal guardian of Tisha's Father ( [employer]).  She reached out to him to see if there is anything he could help with. He works with [facility] and he does not believe that  [place] would brooke emergency guardianship for end of life decisions. There are no known relatives.  He feels it would likely be a (2) doctor sign off.  I informed him Palliative care has been consulted and likely will touch base with Tisha as well.  He is available if he can assist in any way.     Electronically signed by Valery Beard RN on 5/7/2024 at 12:32 PM

## 2024-05-07 NOTE — PROGRESS NOTES
DAILY VENTILATOR WEANING ASSESSMENT PERFORMED    P/FIO2 Ratio = 155         (<100= do not Wean)                  Cs =  50                        (<32= Instability)  Plat. Pressure = 19  MV =8.58  RSBI =    Instabilities:       Cardiovascular =       CNS =2       Respiratory =1       Metabolic =    Parameters    no    Wean per protocol  no    Ask Physician for a weaning plan no    Additional Comments: peep still at 10      Performed by Sushma Patterson RCP RRT      Reference Table:    Cardiovascular     CNS      1. Mean BP less than or equal to 75   1. Neuromuscular blockade  2. Heart Rate greater than 130   2. RASS of -3, -4, -5  3. Myocardial Ischemia    3. RASS of +3, +4  4. Mechanical Assist Device    4. ICP greater than 15 or             Intracranial Hypertension         Respiratory      Metabolic  1. PEEP equal to or greater than 10cm/H20  1.Temp. (8hrs) less than 95 or > 103  2. Respiratory Rate greater than 35   2. WBC < 5000 or > 46490  3. Minute Volume greater than 15L  4. pH less than 7.30  5. Deteriorating chest X-ray         05/07/24 0834   Patient Observation   Pulse 61   SpO2 97 %   Vent Information   Ventilator Day(s) 8   Ventilator -31   Vent Mode AC/VC   Ventilator Settings   Vt (Set, mL) 450 mL   Resp Rate (Set) 16 bpm   PEEP/CPAP (cmH2O) 10   FiO2  60 %   Peak Inspiratory Flow (Set) 70 L/sec   Vent Patient Data (Readings)   Vt (Measured) 432 mL   Peak Inspiratory Pressure (cmH2O) 24 cmH2O   Rate Measured 18 br/min   Minute Volume (L/min) 8.11 Liters   Mean Airway Pressure (cmH2O) 13 cmH20   Plateau Pressure (cm H2O) 17 cm H2O   Driving Pressure 7   I:E Ratio 1:4.40   Pressure Sensitivity 2 cm H2O   Static Compliance (L/cm H2O) 60   Backup Apnea On   Backup Rate 16 Breaths Per Minute   Backup Vt 500   Vent Alarm Settings   High Pressure (cmH2O) 58 cmH2O   Low Minute Volume (lpm) 7 L/min   Low Exhaled Vt (ml) 400 mL   RR High (bpm) 35 br/min   Apnea (secs) 20 secs   Additional Respiratoray  Assessments   Humidification Source Heated wire   Humidification Temp 37   Ambu Bag With Mask At Bedside Yes   ETT    Placement Date/Time: 05/02/24 (c) 1005   Placement Verified By: Auscultation;Capnometry  Preoxygenation: Yes  Airway Tube Size: 8 mm  Location: Oral  Insertion attempts: 1  Measured From: Lips  Comments: Anesthesia called to room with patient already ...   Secured At 28 cm   Measured From Lips

## 2024-05-07 NOTE — PROGRESS NOTES
[facility]  Department of Internal Medicine   Internal Medicine Residency   MICU Progress Note    Patient:  Nabeel Appiah 63 y.o. male  MRN: [id number]     Date of Service: 5/7/2024    Allergy: Patient has no known allergies.    Overnight Events: Respiratory Alkalosis overnight. TV decreased to 450.     Subjective     Patient seen and examined at bedside this AM. Remains unresponsive, intubated. Given Versed 2mg prn. Unasyn discontinued yesterday. Metabolic encephalopathy workup negative. MRI canceled due to body habitus. Had multiple BM's overnight.     ROS: unable to be achieved due to current mental status    Objective     VS: BP (!) 118/55   Pulse 62   Temp 97.8 °F (36.6 °C) (Temporal)   Resp 20   Ht 1.905 m (6' 3\")   Wt (!) 157 kg (346 lb 2 oz)   SpO2 94%   BMI 43.26 kg/m²   ABP (Arterial line BP): 100/50  ABP Mean (Arterial Line Mean): (!) 64 mmHg    I & O - 24hr:   Intake/Output Summary (Last 24 hours) at 5/7/2024 1708  Last data filed at 5/7/2024 1700  Gross per 24 hour   Intake 2613.18 ml   Output 2900 ml   Net -286.82 ml       Sedatives: N/A.     Pressors: n/a    Oxygen: Intubated 450/16/55/10    ABG:     Lab Results   Component Value Date/Time    PH 7.450 05/07/2024 10:43 AM    PCO2 38.6 05/07/2024 10:43 AM    PO2 85.0 05/07/2024 10:43 AM    HCO3 26.2 05/07/2024 10:43 AM    BE 2.2 05/07/2024 10:43 AM    THB 12.3 05/07/2024 10:43 AM    O2SAT 96.1 05/07/2024 10:43 AM       Physical Exam:  General Appearance: AO x 0, not following commands    Neuro:Upward gaze, +pupillary reflex, absent corneal reflex, +gag reflex, absent cough reflex, withdrawing to noxious stimuli, myoclonic jerks still present  Cardiovascular: regular rate and rhythm, S1 and S2 heard, no murmurs appreciated   Respiratory: Mechanically ventilated lung sounds. No wheezing or crackles appreciated.  Abdomen: Softly distended  Extremities: Bilateral upper extremity edema- improving, no cyanosis, distal pulses  when compared   to the previous study performed earlier in the day.   2. Improved aeration in the left lower lung field.         XR CHEST PORTABLE   Final Result   1. No significant interval change when compared to the previous study   performed 1 day earlier.         XR CHEST PORTABLE   Final Result   Stable chest.         XR CHEST PORTABLE   Final Result   1. The position and alignment of the tubes and catheters are within normal   range.   2. The bibasilar parenchymal density felt to represent atelectasis appears   stable..   3. Tiny bilateral pleural effusions   4. No signs of pneumothorax.         XR CHEST ABDOMEN NG PLACEMENT   Final Result   Distal enteric tube in good position within the stomach.         XR CHEST PORTABLE   Final Result   1. Endotracheal tube in satisfactory position 4 cm above milla.   2. Orogastric tube in position.   3. Prominent pulmonary vascularity with bibasilar infiltrates.         XR ABDOMEN FOR NG/OG/NE TUBE PLACEMENT   Final Result   Distal enteric tube within the proximal stomach.         XR CHEST PORTABLE   Final Result   1. Cardiomediastinal and interstitial prominence concerning for congestive   heart failure, stable   2. No signs of pneumothorax   3. Stable position of the right chest tube and endotracheal tubes.   4. Small bilateral pleural effusions, stable         XR CHEST PORTABLE   Final Result   Endotracheal tube appears in satisfactory position.         XR CHEST PORTABLE   Final Result   1. Interval development of diffuse interstitial opacities throughout both   lungs, suspicious for pulmonary edema/CHF and when compared to the prior   study performed 1 day earlier.   2. Small right pleural effusion again seen.   3. No evidence of pneumothorax.   4. Tubes and lines as described above.         XR CHEST PORTABLE   Final Result   1. No significant interval change when compared to the previous study   performed earlier in the day.         XR CHEST PORTABLE   Final Result  1.5mg q6 for myoclonic seizures  MRI Brain cancelled due to body habitus, Neurology states possible Versed drip needed if myoclonus persists   Tube feeds at 60ml/hr, monitor for residuals    BMP. MG, Phos electrolytes to maintain K >4, mag >2   Monitor H&H every 12 hours      PT/OT evaluation: not indicated at this time   DVT prophylaxis: PCDs   GI prophylaxis: Protonix   Diet:   Diet NPO  ADULT TUBE FEEDING; Orogastric; Diabetic; Continuous; 20; Yes; 10; Q 4 hours; 60; 300; Q 4 hours; Protein; 1 Dose; Daily   Bowel regimen: Glycolax  Pain management: as needed  Code status: DNR-CCA   Disposition: continue current care   Family: updated as available    Chelsey Pitt MD, PGY-1  Attending physician: Dr. Perez     [facility]  Department of Pulmonary, Critical Care and Sleep Medicine  [facility]  Department of Internal Medicine  Attending Statement    This patient has a high probability of sudden clinically significant deterioration, which requires the highest level of physician preparedness to intervene urgently. I managed/supervised life or organ supporting interventions that required frequent physician assessment. I devoted my full attention to the direct care of this patient for the period of time indicated below. Time I spent with family of surrogate(s) is included only if the patient was incapable of providing the necessary information or participating in medical decision making. In addition to time devoted to teaching and to any procedure. CCT 43 minutes    Ulisses Perez DO, MACOI, FACP, FCCP

## 2024-05-07 NOTE — PLAN OF CARE
Problem: Respiratory - Adult  Goal: Achieves optimal ventilation and oxygenation  5/7/2024 1814 by Sushma Villalobos, P  Outcome: Progressing   Oxygen weaned to 50%  peep down to 8

## 2024-05-08 ENCOUNTER — APPOINTMENT (OUTPATIENT)
Dept: CT IMAGING | Age: 64
DRG: 283 | End: 2024-05-08
Attending: STUDENT IN AN ORGANIZED HEALTH CARE EDUCATION/TRAINING PROGRAM
Payer: COMMERCIAL

## 2024-05-08 LAB
AADO2: 218.3 MMHG
ALBUMIN SERPL-MCNC: 3.3 G/DL (ref 3.5–5.2)
ALP SERPL-CCNC: 53 U/L (ref 40–129)
ALT SERPL-CCNC: 32 U/L (ref 0–40)
ANION GAP SERPL CALCULATED.3IONS-SCNC: 14 MMOL/L (ref 7–16)
AST SERPL-CCNC: 34 U/L (ref 0–39)
B.E.: 2.7 MMOL/L (ref -3–3)
BASOPHILS # BLD: 0.05 K/UL (ref 0–0.2)
BASOPHILS NFR BLD: 1 % (ref 0–2)
BILIRUB SERPL-MCNC: 0.5 MG/DL (ref 0–1.2)
BUN SERPL-MCNC: 30 MG/DL (ref 6–23)
CALCIUM SERPL-MCNC: 9 MG/DL (ref 8.6–10.2)
CHLORIDE SERPL-SCNC: 105 MMOL/L (ref 98–107)
CO2 SERPL-SCNC: 25 MMOL/L (ref 22–29)
COHB: 0.8 % (ref 0–1.5)
CREAT SERPL-MCNC: 1 MG/DL (ref 0.7–1.2)
CRITICAL: ABNORMAL
DATE ANALYZED: ABNORMAL
DATE OF COLLECTION: ABNORMAL
EOSINOPHIL # BLD: 0.53 K/UL (ref 0.05–0.5)
EOSINOPHILS RELATIVE PERCENT: 6 % (ref 0–6)
ERYTHROCYTE [DISTWIDTH] IN BLOOD BY AUTOMATED COUNT: 13.9 % (ref 11.5–15)
FIO2: 50 %
GFR, ESTIMATED: 83 ML/MIN/1.73M2
GLUCOSE BLD-MCNC: 122 MG/DL (ref 74–99)
GLUCOSE BLD-MCNC: 136 MG/DL (ref 74–99)
GLUCOSE BLD-MCNC: 137 MG/DL (ref 74–99)
GLUCOSE BLD-MCNC: 140 MG/DL (ref 74–99)
GLUCOSE BLD-MCNC: 143 MG/DL (ref 74–99)
GLUCOSE BLD-MCNC: 150 MG/DL (ref 74–99)
GLUCOSE SERPL-MCNC: 160 MG/DL (ref 74–99)
HCO3: 26.9 MMOL/L (ref 22–26)
HCT VFR BLD AUTO: 35.7 % (ref 37–54)
HGB BLD-MCNC: 11.4 G/DL (ref 12.5–16.5)
HHB: 3.1 % (ref 0–5)
IMM GRANULOCYTES # BLD AUTO: 0.09 K/UL (ref 0–0.58)
IMM GRANULOCYTES NFR BLD: 1 % (ref 0–5)
LAB: ABNORMAL
LYMPHOCYTES NFR BLD: 1.11 K/UL (ref 1.5–4)
LYMPHOCYTES RELATIVE PERCENT: 12 % (ref 20–42)
Lab: 445
MAGNESIUM SERPL-MCNC: 2.3 MG/DL (ref 1.6–2.6)
MCH RBC QN AUTO: 30.8 PG (ref 26–35)
MCHC RBC AUTO-ENTMCNC: 31.9 G/DL (ref 32–34.5)
MCV RBC AUTO: 96.5 FL (ref 80–99.9)
METHB: 0.3 % (ref 0–1.5)
MODE: AC
MONOCYTES NFR BLD: 0.95 K/UL (ref 0.1–0.95)
MONOCYTES NFR BLD: 10 % (ref 2–12)
NEUTROPHILS NFR BLD: 72 % (ref 43–80)
NEUTS SEG NFR BLD: 6.82 K/UL (ref 1.8–7.3)
O2 CONTENT: 17.5 ML/DL
O2 SATURATION: 96.9 % (ref 92–98.5)
O2HB: 95.8 % (ref 94–97)
OPERATOR ID: 7221
PATIENT TEMP: 37 C
PCO2: 40 MMHG (ref 35–45)
PEEP/CPAP: 8 CMH2O
PFO2: 1.86 MMHG/%
PH BLOOD GAS: 7.45 (ref 7.35–7.45)
PHOSPHATE SERPL-MCNC: 3.1 MG/DL (ref 2.5–4.5)
PLATELET # BLD AUTO: 177 K/UL (ref 130–450)
PMV BLD AUTO: 11.4 FL (ref 7–12)
PO2: 93.2 MMHG (ref 75–100)
POTASSIUM SERPL-SCNC: 4.2 MMOL/L (ref 3.5–5)
PROT SERPL-MCNC: 5.7 G/DL (ref 6.4–8.3)
RBC # BLD AUTO: 3.7 M/UL (ref 3.8–5.8)
RI(T): 2.34
RR MECHANICAL: 16 B/MIN
SODIUM SERPL-SCNC: 144 MMOL/L (ref 132–146)
SOURCE, BLOOD GAS: ABNORMAL
THB: 12.9 G/DL (ref 11.5–16.5)
TIME ANALYZED: 451
VT MECHANICAL: 450 ML
WBC OTHER # BLD: 9.6 K/UL (ref 4.5–11.5)

## 2024-05-08 PROCEDURE — 80053 COMPREHEN METABOLIC PANEL: CPT

## 2024-05-08 PROCEDURE — 6360000002 HC RX W HCPCS: Performed by: STUDENT IN AN ORGANIZED HEALTH CARE EDUCATION/TRAINING PROGRAM

## 2024-05-08 PROCEDURE — 99232 SBSQ HOSP IP/OBS MODERATE 35: CPT | Performed by: NURSE PRACTITIONER

## 2024-05-08 PROCEDURE — 2500000003 HC RX 250 WO HCPCS: Performed by: NURSE PRACTITIONER

## 2024-05-08 PROCEDURE — 6370000000 HC RX 637 (ALT 250 FOR IP)

## 2024-05-08 PROCEDURE — 94640 AIRWAY INHALATION TREATMENT: CPT

## 2024-05-08 PROCEDURE — 85025 COMPLETE CBC W/AUTO DIFF WBC: CPT

## 2024-05-08 PROCEDURE — 82962 GLUCOSE BLOOD TEST: CPT

## 2024-05-08 PROCEDURE — 6370000000 HC RX 637 (ALT 250 FOR IP): Performed by: INTERNAL MEDICINE

## 2024-05-08 PROCEDURE — 6360000002 HC RX W HCPCS: Performed by: PSYCHIATRY & NEUROLOGY

## 2024-05-08 PROCEDURE — 6360000002 HC RX W HCPCS

## 2024-05-08 PROCEDURE — 6370000000 HC RX 637 (ALT 250 FOR IP): Performed by: STUDENT IN AN ORGANIZED HEALTH CARE EDUCATION/TRAINING PROGRAM

## 2024-05-08 PROCEDURE — 94669 MECHANICAL CHEST WALL OSCILL: CPT

## 2024-05-08 PROCEDURE — 99291 CRITICAL CARE FIRST HOUR: CPT | Performed by: INTERNAL MEDICINE

## 2024-05-08 PROCEDURE — 83735 ASSAY OF MAGNESIUM: CPT

## 2024-05-08 PROCEDURE — 2580000003 HC RX 258

## 2024-05-08 PROCEDURE — 2580000003 HC RX 258: Performed by: NURSE PRACTITIONER

## 2024-05-08 PROCEDURE — A4216 STERILE WATER/SALINE, 10 ML: HCPCS

## 2024-05-08 PROCEDURE — 99231 SBSQ HOSP IP/OBS SF/LOW 25: CPT | Performed by: INTERNAL MEDICINE

## 2024-05-08 PROCEDURE — 2000000000 HC ICU R&B

## 2024-05-08 PROCEDURE — 84100 ASSAY OF PHOSPHORUS: CPT

## 2024-05-08 PROCEDURE — 70450 CT HEAD/BRAIN W/O DYE: CPT

## 2024-05-08 PROCEDURE — 82805 BLOOD GASES W/O2 SATURATION: CPT

## 2024-05-08 PROCEDURE — 94003 VENT MGMT INPAT SUBQ DAY: CPT

## 2024-05-08 PROCEDURE — C9113 INJ PANTOPRAZOLE SODIUM, VIA: HCPCS

## 2024-05-08 RX ADMIN — Medication 15 ML: at 20:59

## 2024-05-08 RX ADMIN — ALBUTEROL SULFATE 2.5 MG: 2.5 SOLUTION RESPIRATORY (INHALATION) at 08:36

## 2024-05-08 RX ADMIN — SODIUM CHLORIDE, PRESERVATIVE FREE 40 MG: 5 INJECTION INTRAVENOUS at 09:11

## 2024-05-08 RX ADMIN — APIXABAN 5 MG: 5 TABLET, FILM COATED ORAL at 09:10

## 2024-05-08 RX ADMIN — ACETYLCYSTEINE 400 MG: 100 SOLUTION ORAL; RESPIRATORY (INHALATION) at 20:28

## 2024-05-08 RX ADMIN — MIDAZOLAM 2 MG: 1 INJECTION INTRAMUSCULAR; INTRAVENOUS at 09:32

## 2024-05-08 RX ADMIN — VALPROATE SODIUM 1000 MG: 100 INJECTION, SOLUTION INTRAVENOUS at 12:38

## 2024-05-08 RX ADMIN — SODIUM CHLORIDE, PRESERVATIVE FREE 10 ML: 5 INJECTION INTRAVENOUS at 20:59

## 2024-05-08 RX ADMIN — LEVETIRACETAM 1500 MG: 500 INJECTION, SOLUTION, CONCENTRATE INTRAVENOUS at 20:58

## 2024-05-08 RX ADMIN — THIAMINE HYDROCHLORIDE 100 MG: 100 INJECTION, SOLUTION INTRAMUSCULAR; INTRAVENOUS at 09:11

## 2024-05-08 RX ADMIN — ACETYLCYSTEINE 400 MG: 100 SOLUTION ORAL; RESPIRATORY (INHALATION) at 12:54

## 2024-05-08 RX ADMIN — MINERAL OIL, WHITE PETROLATUM: .03; .94 OINTMENT OPHTHALMIC at 09:12

## 2024-05-08 RX ADMIN — CLONAZEPAM 1.5 MG: 0.5 TABLET ORAL at 21:00

## 2024-05-08 RX ADMIN — NYSTATIN 500000 UNITS: 100000 SUSPENSION ORAL at 12:39

## 2024-05-08 RX ADMIN — SODIUM CHLORIDE, PRESERVATIVE FREE 10 ML: 5 INJECTION INTRAVENOUS at 09:11

## 2024-05-08 RX ADMIN — ALBUTEROL SULFATE 2.5 MG: 2.5 SOLUTION RESPIRATORY (INHALATION) at 12:54

## 2024-05-08 RX ADMIN — ASPIRIN 81 MG CHEWABLE TABLET 81 MG: 81 TABLET CHEWABLE at 09:10

## 2024-05-08 RX ADMIN — NYSTATIN 500000 UNITS: 100000 SUSPENSION ORAL at 09:09

## 2024-05-08 RX ADMIN — ATORVASTATIN CALCIUM 40 MG: 40 TABLET, FILM COATED ORAL at 20:58

## 2024-05-08 RX ADMIN — Medication 15 ML: at 09:09

## 2024-05-08 RX ADMIN — VALPROATE SODIUM 1000 MG: 100 INJECTION, SOLUTION INTRAVENOUS at 00:23

## 2024-05-08 RX ADMIN — METOPROLOL TARTRATE 25 MG: 25 TABLET, FILM COATED ORAL at 20:58

## 2024-05-08 RX ADMIN — LEVETIRACETAM 1500 MG: 500 INJECTION, SOLUTION, CONCENTRATE INTRAVENOUS at 09:11

## 2024-05-08 RX ADMIN — HYDROCHLOROTHIAZIDE 25 MG: 25 TABLET ORAL at 09:30

## 2024-05-08 RX ADMIN — CLONAZEPAM 1.5 MG: 0.5 TABLET ORAL at 16:57

## 2024-05-08 RX ADMIN — ALBUTEROL SULFATE 2.5 MG: 2.5 SOLUTION RESPIRATORY (INHALATION) at 20:28

## 2024-05-08 RX ADMIN — ACETYLCYSTEINE 400 MG: 100 SOLUTION ORAL; RESPIRATORY (INHALATION) at 08:36

## 2024-05-08 RX ADMIN — LISINOPRIL 40 MG: 20 TABLET ORAL at 09:10

## 2024-05-08 RX ADMIN — CLONAZEPAM 1.5 MG: 0.5 TABLET ORAL at 05:28

## 2024-05-08 RX ADMIN — NYSTATIN 500000 UNITS: 100000 SUSPENSION ORAL at 16:55

## 2024-05-08 RX ADMIN — CLONAZEPAM 1.5 MG: 0.5 TABLET ORAL at 00:20

## 2024-05-08 RX ADMIN — NYSTATIN 500000 UNITS: 100000 SUSPENSION ORAL at 20:59

## 2024-05-08 RX ADMIN — CLONAZEPAM 1.5 MG: 0.5 TABLET ORAL at 09:10

## 2024-05-08 ASSESSMENT — PULMONARY FUNCTION TESTS
PIF_VALUE: 21
PIF_VALUE: 23
PIF_VALUE: 21
PIF_VALUE: 24
PIF_VALUE: 25
PIF_VALUE: 25
PIF_VALUE: 19
PIF_VALUE: 23
PIF_VALUE: 23
PIF_VALUE: 25
PIF_VALUE: 20
PIF_VALUE: 23
PIF_VALUE: 23
PIF_VALUE: 22
PIF_VALUE: 33
PIF_VALUE: 24
PIF_VALUE: 22
PIF_VALUE: 23
PIF_VALUE: 26
PIF_VALUE: 21
PIF_VALUE: 23
PIF_VALUE: 21
PIF_VALUE: 40
PIF_VALUE: 41
PIF_VALUE: 25
PIF_VALUE: 23
PIF_VALUE: 24
PIF_VALUE: 20

## 2024-05-08 ASSESSMENT — PAIN SCALES - GENERAL
PAINLEVEL_OUTOF10: 0
PAINLEVEL_OUTOF10: 1

## 2024-05-08 NOTE — PLAN OF CARE
Problem: Chronic Conditions and Co-morbidities  Goal: Patient's chronic conditions and co-morbidity symptoms are monitored and maintained or improved  5/7/2024 2205 by Freya Malone RN  Outcome: Progressing  Flowsheets (Taken 5/7/2024 2000)  Care Plan - Patient's Chronic Conditions and Co-Morbidity Symptoms are Monitored and Maintained or Improved: Monitor and assess patient's chronic conditions and comorbid symptoms for stability, deterioration, or improvement     Problem: Safety - Adult  Goal: Free from fall injury  5/7/2024 2205 by Freya Malone RN  Outcome: Progressing     Problem: Discharge Planning  Goal: Discharge to home or other facility with appropriate resources  5/7/2024 2205 by Freya Malone RN  Outcome: Progressing     Problem: Pain  Goal: Verbalizes/displays adequate comfort level or baseline comfort level  5/7/2024 2205 by Freya Malone RN  Outcome: Progressing  Flowsheets (Taken 5/7/2024 1600 by Gretchen Ospina)  Verbalizes/displays adequate comfort level or baseline comfort level: Encourage patient to monitor pain and request assistance     Problem: Neurosensory - Adult  Goal: Achieves stable or improved neurological status  5/7/2024 2205 by Freya Malone RN  Outcome: Progressing  Flowsheets (Taken 5/7/2024 2000)  Achieves stable or improved neurological status:   Assess for and report changes in neurological status   Maintain blood pressure and fluid volume within ordered parameters to optimize cerebral perfusion and minimize risk of hemorrhage   Monitor temperature, glucose, and sodium. Initiate appropriate interventions as ordered     Problem: Respiratory - Adult  Goal: Achieves optimal ventilation and oxygenation  5/7/2024 2205 by Freya Malone RN  Outcome: Progressing  Flowsheets (Taken 5/7/2024 2000)  Achieves optimal ventilation and oxygenation:   Assess for changes in respiratory status   Position to facilitate oxygenation and minimize respiratory effort   Oxygen  supplementation based on oxygen saturation or arterial blood gases   Assess the need for suctioning and aspirate as needed   Respiratory therapy support as indicated     Problem: Skin/Tissue Integrity  Goal: Absence of new skin breakdown  Description: 1.  Monitor for areas of redness and/or skin breakdown  2.  Assess vascular access sites hourly  3.  Every 4-6 hours minimum:  Change oxygen saturation probe site  4.  Every 4-6 hours:  If on nasal continuous positive airway pressure, respiratory therapy assess nares and determine need for appliance change or resting period.  5/7/2024 2205 by Freya Malone, RN  Outcome: Progressing     Problem: ABCDS Injury Assessment  Goal: Absence of physical injury  5/7/2024 2205 by Freya Malone, RN  Outcome: Progressing     Problem: Nutrition Deficit:  Goal: Optimize nutritional status  5/7/2024 2205 by Freya Malone, RN  Outcome: Progressing

## 2024-05-08 NOTE — PROGRESS NOTES
Hospitalist Progress Note      Synopsis: Patient admitted on 5/1/2024. The patient is a 63-year-old male with past medical history of A-fib, hypertension, obesity, type II DM, AAA who presented to [facility] for cardiac arrest. On EMS arrival reported to be in V. tach/V-fib arrest and received amiodarone and shocked x 3. Patient was noted to have ROSC prior to ED arrival. CT of the head was done in the emergency department without any intracranial findings. Further imaging revealed a small pneumothorax for which a right-sided chest tube was placed: Plan for LHC. Placed on heparin drip and transferred. CT of the chest negative for PE. CT of the abdomen and pelvis showed presence of 4.3 x 4 cm aneurysm. Patient had persistent myoclonic jerking. Placed on Clonazepam and Depakote. EEG showed a potential for generalized seizures but no associated epileptiform activity was found and myoclonic jerks were captured. Patent with aspiration pneumonia. Placed on Unasyn.  Myoclonic jerks continued. Keppra added.  Clonazepam increased to 1.5 mg every 6 hours. Heparin stopped. Transitioned to Eliquis. Completed course of Unasyn. MRI ordered and pending.      GOC have been discussed with girlfriend, but patient has no NOK as per his girlfriend. Girlfriend is trying to access his safe to see if he has a living will. Needs 2 doctor sign off for medical decisions at present.     Assessment and Plan     Cardiac arrest secondary to ACS -cardiology following, plans for cardiac catheterization however continues to have poor prognosis in terms of neurologic recovery, now off heparin drip s/p rewarming   V-fib/V. Tach -now off lidocaine drip  Persistent Afib - on Eliquis at home, completed heparin infusion and transitioned to Eliquis    Aspiration pneumonia - growing gram-positive cocci in pairs on respiratory culture, completed Unasyn.  Acute hypoxic respiratory failure secondary to cardiac arrest - intubated ventilatory care as  transcription errors may be present.

## 2024-05-08 NOTE — PROGRESS NOTES
[facility]  Neurology Follow Up    Date:  5/8/2024  Patient Name:  Nabeel Appiah  YOB: 1960  MRN: [id number]     PCP:  Jonh Allen DO   Referring:  Destin Gruber DO      Chief Complaint: myoclonus following cardiac arrest    Sig PMH: AAA, HTN, afib    History of Present Illness:  Nabeel Appiah is a 63 y.o. 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  Patient admitted 4/29 with cardiac arrest, noted to be in V. tach/V-fib and received amiodarone and shocks x 3.  ROSC was achieved prior to ED arrival.  He was then admitted to the MICU.    While in ICU he was noted to develop myoclonic jerking of the face.  Neurology consulted for myoclonic activity--started on Keppra    EEG showed a potential for generalized seizures but no associated epileptiform activity was found and myoclonic jerks were captured    5/4: persistent myoclonic jerking with any stimulation.  Started on routine clonazepam and loaded with valproic acid.  5/5 myoclonic jerks persist. VPA and clonazepam increased  5/6: MRI of the brain was canceled due to body habitus.  VPA increased due to persistent myoclonic jerking    There are medical legal issues going on.  He has no power of  and no living blood relatives, but apparently his longtime girlfriend has stated that the patient would not want trach/PEG.  He is currently a DNR CCA .    Subjective:  He remains intubated--off sedation for several days and unresponsive.  Unfortunately myoclonic jerking has been relatively persistent despite his medications..    There is no family present at the bedside.      Unable to complete review of systems as he is unresponsive    Current Medications:      Current Facility-Administered Medications   Medication Dose Route Frequency Provider Last Rate Last Admin    albuterol (PROVENTIL) (2.5 MG/3ML) 0.083% nebulizer solution 2.5 mg  2.5 mg Nebulization TID RT Isiah Yusuf MD   2.5 mg at 05/07/24  activity  Structural abnormalities should be considered for the findings above and appropriate imaging obtained if clinically indicated.     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  Unfortunately, he remains unresponsive off sedation. Anoxic myoclonus is classically difficult to control. His brainstem responses are intact, but his prognosis for meaningful recovery remains poor      Plan  -Recommend initiating Versed drip  -Repeat CT head ordered for follow up since he cannot have MRI brain  -Palliative is following for goals of care  -Continue valproic acid 1000 mg BID   -Continue clonazepam to 1 mg every 8 hours  -Continue Keppra 1500 mg BID  -Discussed with Dr. Branch and ICU team  -Will update Dr. Yen     Will follow    Electronically signed by KENNEDI Joshua CNP on 5/8/2024 at 7:57 AM

## 2024-05-08 NOTE — PROGRESS NOTES
[facility]   Department of Internal Medicine   Internal Medicine Residency  MICU Progress Note    Patient:  Nabeel Appiah 63 y.o. male   MRN: [id number]      Room: [address] [place], [place] [address]    Admission date: 5/1/2024  2:21 AM ; Hospital day: 7   ICU day: 7d 17h      Allergy: Patient has no known allergies.    Subjective     Patient was seen and examined this morning at bedside. Remains unresponsive, intubated. Head CT revealed New right parasagittal subdural hematoma along the posterior falx without significant mass effect or midline shif    Objective       I & O - 24hr:    Intake/Output Summary (Last 24 hours) at 5/8/2024 1955  Last data filed at 5/8/2024 1931  Gross per 24 hour   Intake 2326.58 ml   Output 3310 ml   Net -983.42 ml     Net IO Since Admission: 3,060.96 mL [05/08/24 1955]    Physical Exam  BP (!) 105/51   Pulse 59   Temp 99.8 °F (37.7 °C) (Axillary)   Resp 24   Ht 1.905 m (6' 3\")   Wt (!) 157 kg (346 lb 2 oz)   SpO2 95%   BMI 43.26 kg/m²   Ideal body weight: 84.5 kg (186 lb 4.6 oz)    General Appearance: AO x 0, not following commands    Neuro:Upward gaze, +pupillary reflex, absent corneal reflex, +gag reflex, absent cough reflex, withdrawing to noxious stimuli, myoclonic jerks still present  Cardiovascular: regular rate and rhythm, S1 and S2 heard, no murmurs appreciated   Respiratory: Mechanically ventilated lung sounds. No wheezing or crackles appreciated.  Abdomen: Softly distended  Extremities: Bilateral upper extremity edema- improving, no cyanosis, distal pulses intact      Medications     Continuous Infusions:   sodium chloride      dextrose       Scheduled Meds:   albuterol  2.5 mg Nebulization TID RT    nystatin  5 mL Oral 4x Daily    valproate (DEPACON) 1,000 mg in sodium chloride 0.9 % 100 mL IVPB  1,000 mg IntraVENous Q12H    aspirin  81 mg Oral Daily    clonazePAM  1.5 mg Oral Q6H    lisinopril  40 mg Oral Daily    And    hydroCHLOROthiazide  25 mg Oral Daily     Result   New right parasagittal subdural hematoma along the posterior falx without   significant mass effect or midline shift.      Worsening sinusitis involving maxillary, ethmoid and sphenoid sinuses.      New bilateral mastoiditis.         XR CHEST PORTABLE   Final Result   1. ETT 3.4 cm above the milla.   2. Small effusions.   3. Linear atelectasis in the right lower lobe.         XR CHEST PORTABLE   Final Result   1. The position and alignment of the endotracheal tube is within the normal   range.   2. Bilateral patchy parenchymal densities concerning for pneumonia and/or   atelectasis, stable.         XR CHEST PORTABLE   Final Result   1. Interval removal of the right-sided chest tube. No pneumothorax.   2. Otherwise, no significant interval change.         XR CHEST PORTABLE   Final Result   1. No significant interval change in the right lung opacities when compared   to the previous study performed earlier in the day.   2. Improved aeration in the left lower lung field.         XR CHEST PORTABLE   Final Result   1. No significant interval change when compared to the previous study   performed 1 day earlier.         XR CHEST PORTABLE   Final Result   Stable chest.         XR CHEST PORTABLE   Final Result   1. The position and alignment of the tubes and catheters are within normal   range.   2. The bibasilar parenchymal density felt to represent atelectasis appears   stable..   3. Tiny bilateral pleural effusions   4. No signs of pneumothorax.         XR CHEST ABDOMEN NG PLACEMENT   Final Result   Distal enteric tube in good position within the stomach.         XR CHEST PORTABLE   Final Result   1. Endotracheal tube in satisfactory position 4 cm above milla.   2. Orogastric tube in position.   3. Prominent pulmonary vascularity with bibasilar infiltrates.         XR ABDOMEN FOR NG/OG/NE TUBE PLACEMENT   Final Result   Distal enteric tube within the proximal stomach.         XR CHEST PORTABLE   Final Result  [facility]  Department of Internal Medicine  Attending Statement    This patient has a high probability of sudden clinically significant deterioration, which requires the highest level of physician preparedness to intervene urgently. I managed/supervised life or organ supporting interventions that required frequent physician assessment. I devoted my full attention to the direct care of this patient for the period of time indicated below. Time I spent with family of surrogate(s) is included only if the patient was incapable of providing the necessary information or participating in medical decision making. In addition to time devoted to teaching and to any procedure. CCT 35 minutes    Ulisses Perez DO, MACOI, FACP, FCCP

## 2024-05-08 NOTE — PLAN OF CARE
Problem: Discharge Planning  Goal: Discharge to home or other facility with appropriate resources  5/8/2024 0847 by Chava Montoya RN  Outcome: Not Progressing  Flowsheets (Taken 5/8/2024 0800)  Discharge to home or other facility with appropriate resources: Identify barriers to discharge with patient and caregiver  5/7/2024 2205 by Freya Malone RN  Outcome: Progressing     Problem: Neurosensory - Adult  Goal: Achieves stable or improved neurological status  5/8/2024 0847 by Chava Montoya RN  Outcome: Not Progressing  Flowsheets (Taken 5/8/2024 0800)  Achieves stable or improved neurological status:   Assess for and report changes in neurological status   Initiate measures to prevent increased intracranial pressure   Maintain blood pressure and fluid volume within ordered parameters to optimize cerebral perfusion and minimize risk of hemorrhage   Monitor temperature, glucose, and sodium. Initiate appropriate interventions as ordered  5/7/2024 2205 by Freya Malone RN  Outcome: Progressing  Flowsheets (Taken 5/7/2024 2000)  Achieves stable or improved neurological status:   Assess for and report changes in neurological status   Maintain blood pressure and fluid volume within ordered parameters to optimize cerebral perfusion and minimize risk of hemorrhage   Monitor temperature, glucose, and sodium. Initiate appropriate interventions as ordered     Problem: Discharge Planning  Goal: Discharge to home or other facility with appropriate resources  5/8/2024 0847 by Chava Montoya RN  Outcome: Not Progressing  Flowsheets (Taken 5/8/2024 0800)  Discharge to home or other facility with appropriate resources: Identify barriers to discharge with patient and caregiver  5/7/2024 2205 by Freya Malone RN  Outcome: Progressing     Problem: Neurosensory - Adult  Goal: Achieves stable or improved neurological status  5/8/2024 0847 by Chava Montoya RN  Outcome: Not Progressing  Flowsheets (Taken 5/8/2024

## 2024-05-09 LAB
AADO2: 232.4 MMHG
ALBUMIN SERPL-MCNC: 2.8 G/DL (ref 3.5–5.2)
ALP SERPL-CCNC: 52 U/L (ref 40–129)
ALT SERPL-CCNC: 25 U/L (ref 0–40)
ANION GAP SERPL CALCULATED.3IONS-SCNC: 11 MMOL/L (ref 7–16)
AST SERPL-CCNC: 31 U/L (ref 0–39)
B.E.: 4.3 MMOL/L (ref -3–3)
BASOPHILS # BLD: 0.06 K/UL (ref 0–0.2)
BASOPHILS NFR BLD: 1 % (ref 0–2)
BILIRUB SERPL-MCNC: 0.4 MG/DL (ref 0–1.2)
BUN SERPL-MCNC: 35 MG/DL (ref 6–23)
CALCIUM SERPL-MCNC: 8.9 MG/DL (ref 8.6–10.2)
CHLORIDE SERPL-SCNC: 105 MMOL/L (ref 98–107)
CO2 SERPL-SCNC: 24 MMOL/L (ref 22–29)
COHB: 0.5 % (ref 0–1.5)
CREAT SERPL-MCNC: 1.1 MG/DL (ref 0.7–1.2)
CRITICAL: ABNORMAL
DATE ANALYZED: ABNORMAL
DATE OF COLLECTION: ABNORMAL
EOSINOPHIL # BLD: 0.44 K/UL (ref 0.05–0.5)
EOSINOPHILS RELATIVE PERCENT: 4 % (ref 0–6)
ERYTHROCYTE [DISTWIDTH] IN BLOOD BY AUTOMATED COUNT: 13.8 % (ref 11.5–15)
FIO2: 50 %
GFR, ESTIMATED: 79 ML/MIN/1.73M2
GLUCOSE BLD-MCNC: 138 MG/DL (ref 74–99)
GLUCOSE BLD-MCNC: 145 MG/DL (ref 74–99)
GLUCOSE BLD-MCNC: 160 MG/DL (ref 74–99)
GLUCOSE BLD-MCNC: 161 MG/DL (ref 74–99)
GLUCOSE BLD-MCNC: 172 MG/DL (ref 74–99)
GLUCOSE SERPL-MCNC: 168 MG/DL (ref 74–99)
HCO3: 28.9 MMOL/L (ref 22–26)
HCT VFR BLD AUTO: 35.8 % (ref 37–54)
HGB BLD-MCNC: 11.2 G/DL (ref 12.5–16.5)
HHB: 4.3 % (ref 0–5)
IMM GRANULOCYTES # BLD AUTO: 0.08 K/UL (ref 0–0.58)
IMM GRANULOCYTES NFR BLD: 1 % (ref 0–5)
LAB: ABNORMAL
LYMPHOCYTES NFR BLD: 0.98 K/UL (ref 1.5–4)
LYMPHOCYTES RELATIVE PERCENT: 10 % (ref 20–42)
Lab: 416
MAGNESIUM SERPL-MCNC: 2.4 MG/DL (ref 1.6–2.6)
MCH RBC QN AUTO: 30.4 PG (ref 26–35)
MCHC RBC AUTO-ENTMCNC: 31.3 G/DL (ref 32–34.5)
MCV RBC AUTO: 97.3 FL (ref 80–99.9)
METHB: 0.2 % (ref 0–1.5)
MODE: AC
MONOCYTES NFR BLD: 0.88 K/UL (ref 0.1–0.95)
MONOCYTES NFR BLD: 9 % (ref 2–12)
NEUTROPHILS NFR BLD: 76 % (ref 43–80)
NEUTS SEG NFR BLD: 7.62 K/UL (ref 1.8–7.3)
O2 CONTENT: 16.5 ML/DL
O2 SATURATION: 95.7 % (ref 92–98.5)
O2HB: 95 % (ref 94–97)
OPERATOR ID: 914
PATIENT TEMP: 37 C
PCO2: 43.1 MMHG (ref 35–45)
PEEP/CPAP: 8 CMH2O
PFO2: 1.51 MMHG/%
PH BLOOD GAS: 7.44 (ref 7.35–7.45)
PHOSPHATE SERPL-MCNC: 3.3 MG/DL (ref 2.5–4.5)
PLATELET # BLD AUTO: 207 K/UL (ref 130–450)
PMV BLD AUTO: 11.6 FL (ref 7–12)
PO2: 75.6 MMHG (ref 75–100)
POTASSIUM SERPL-SCNC: 3.9 MMOL/L (ref 3.5–5)
PROT SERPL-MCNC: 5.6 G/DL (ref 6.4–8.3)
RBC # BLD AUTO: 3.68 M/UL (ref 3.8–5.8)
RI(T): 3.07
RR MECHANICAL: 16 B/MIN
SODIUM SERPL-SCNC: 140 MMOL/L (ref 132–146)
SOURCE, BLOOD GAS: ABNORMAL
THB: 12.3 G/DL (ref 11.5–16.5)
TIME ANALYZED: 431
VT MECHANICAL: 450 ML
WBC OTHER # BLD: 10.1 K/UL (ref 4.5–11.5)

## 2024-05-09 PROCEDURE — 99231 SBSQ HOSP IP/OBS SF/LOW 25: CPT | Performed by: INTERNAL MEDICINE

## 2024-05-09 PROCEDURE — 99232 SBSQ HOSP IP/OBS MODERATE 35: CPT | Performed by: CLINICAL NURSE SPECIALIST

## 2024-05-09 PROCEDURE — 94003 VENT MGMT INPAT SUBQ DAY: CPT

## 2024-05-09 PROCEDURE — 80053 COMPREHEN METABOLIC PANEL: CPT

## 2024-05-09 PROCEDURE — 82962 GLUCOSE BLOOD TEST: CPT

## 2024-05-09 PROCEDURE — 2500000003 HC RX 250 WO HCPCS: Performed by: NURSE PRACTITIONER

## 2024-05-09 PROCEDURE — 85025 COMPLETE CBC W/AUTO DIFF WBC: CPT

## 2024-05-09 PROCEDURE — C9113 INJ PANTOPRAZOLE SODIUM, VIA: HCPCS

## 2024-05-09 PROCEDURE — 83735 ASSAY OF MAGNESIUM: CPT

## 2024-05-09 PROCEDURE — 6370000000 HC RX 637 (ALT 250 FOR IP)

## 2024-05-09 PROCEDURE — 6370000000 HC RX 637 (ALT 250 FOR IP): Performed by: STUDENT IN AN ORGANIZED HEALTH CARE EDUCATION/TRAINING PROGRAM

## 2024-05-09 PROCEDURE — 6360000002 HC RX W HCPCS: Performed by: STUDENT IN AN ORGANIZED HEALTH CARE EDUCATION/TRAINING PROGRAM

## 2024-05-09 PROCEDURE — 2580000003 HC RX 258

## 2024-05-09 PROCEDURE — 6360000002 HC RX W HCPCS

## 2024-05-09 PROCEDURE — 82805 BLOOD GASES W/O2 SATURATION: CPT

## 2024-05-09 PROCEDURE — 94640 AIRWAY INHALATION TREATMENT: CPT

## 2024-05-09 PROCEDURE — 99291 CRITICAL CARE FIRST HOUR: CPT | Performed by: INTERNAL MEDICINE

## 2024-05-09 PROCEDURE — 2580000003 HC RX 258: Performed by: NURSE PRACTITIONER

## 2024-05-09 PROCEDURE — A4216 STERILE WATER/SALINE, 10 ML: HCPCS

## 2024-05-09 PROCEDURE — 2000000000 HC ICU R&B

## 2024-05-09 PROCEDURE — 94669 MECHANICAL CHEST WALL OSCILL: CPT

## 2024-05-09 PROCEDURE — 6360000002 HC RX W HCPCS: Performed by: PSYCHIATRY & NEUROLOGY

## 2024-05-09 PROCEDURE — 84100 ASSAY OF PHOSPHORUS: CPT

## 2024-05-09 PROCEDURE — 6370000000 HC RX 637 (ALT 250 FOR IP): Performed by: INTERNAL MEDICINE

## 2024-05-09 RX ORDER — FUROSEMIDE 10 MG/ML
40 INJECTION INTRAMUSCULAR; INTRAVENOUS ONCE
Status: COMPLETED | OUTPATIENT
Start: 2024-05-09 | End: 2024-05-09

## 2024-05-09 RX ADMIN — VALPROATE SODIUM 1000 MG: 100 INJECTION, SOLUTION INTRAVENOUS at 00:54

## 2024-05-09 RX ADMIN — ACETYLCYSTEINE 400 MG: 100 SOLUTION ORAL; RESPIRATORY (INHALATION) at 08:43

## 2024-05-09 RX ADMIN — SODIUM CHLORIDE, PRESERVATIVE FREE 10 ML: 5 INJECTION INTRAVENOUS at 21:09

## 2024-05-09 RX ADMIN — VALPROATE SODIUM 1000 MG: 100 INJECTION, SOLUTION INTRAVENOUS at 11:50

## 2024-05-09 RX ADMIN — LEVETIRACETAM 1500 MG: 500 INJECTION, SOLUTION, CONCENTRATE INTRAVENOUS at 09:29

## 2024-05-09 RX ADMIN — CLONAZEPAM 1.5 MG: 0.5 TABLET ORAL at 23:55

## 2024-05-09 RX ADMIN — ALBUTEROL SULFATE 2.5 MG: 2.5 SOLUTION RESPIRATORY (INHALATION) at 20:43

## 2024-05-09 RX ADMIN — ALBUTEROL SULFATE 2.5 MG: 2.5 SOLUTION RESPIRATORY (INHALATION) at 12:12

## 2024-05-09 RX ADMIN — NYSTATIN 500000 UNITS: 100000 SUSPENSION ORAL at 21:08

## 2024-05-09 RX ADMIN — FUROSEMIDE 40 MG: 10 INJECTION, SOLUTION INTRAMUSCULAR; INTRAVENOUS at 15:48

## 2024-05-09 RX ADMIN — NYSTATIN 500000 UNITS: 100000 SUSPENSION ORAL at 13:11

## 2024-05-09 RX ADMIN — SODIUM CHLORIDE, PRESERVATIVE FREE 40 MG: 5 INJECTION INTRAVENOUS at 09:29

## 2024-05-09 RX ADMIN — ALBUTEROL SULFATE 2.5 MG: 2.5 SOLUTION RESPIRATORY (INHALATION) at 08:43

## 2024-05-09 RX ADMIN — Medication 15 ML: at 21:09

## 2024-05-09 RX ADMIN — CLONAZEPAM 1.5 MG: 0.5 TABLET ORAL at 18:19

## 2024-05-09 RX ADMIN — NYSTATIN 500000 UNITS: 100000 SUSPENSION ORAL at 18:19

## 2024-05-09 RX ADMIN — ACETYLCYSTEINE 400 MG: 100 SOLUTION ORAL; RESPIRATORY (INHALATION) at 20:43

## 2024-05-09 RX ADMIN — ASPIRIN 81 MG CHEWABLE TABLET 81 MG: 81 TABLET CHEWABLE at 09:28

## 2024-05-09 RX ADMIN — HYDROCHLOROTHIAZIDE 25 MG: 25 TABLET ORAL at 09:29

## 2024-05-09 RX ADMIN — Medication 15 ML: at 09:29

## 2024-05-09 RX ADMIN — ACETYLCYSTEINE 400 MG: 100 SOLUTION ORAL; RESPIRATORY (INHALATION) at 12:12

## 2024-05-09 RX ADMIN — CLONAZEPAM 1.5 MG: 0.5 TABLET ORAL at 11:49

## 2024-05-09 RX ADMIN — THIAMINE HYDROCHLORIDE 100 MG: 100 INJECTION, SOLUTION INTRAMUSCULAR; INTRAVENOUS at 09:29

## 2024-05-09 RX ADMIN — ATORVASTATIN CALCIUM 40 MG: 40 TABLET, FILM COATED ORAL at 21:07

## 2024-05-09 RX ADMIN — LEVETIRACETAM 1500 MG: 500 INJECTION, SOLUTION, CONCENTRATE INTRAVENOUS at 21:09

## 2024-05-09 RX ADMIN — NYSTATIN 500000 UNITS: 100000 SUSPENSION ORAL at 09:29

## 2024-05-09 RX ADMIN — SODIUM CHLORIDE, PRESERVATIVE FREE 10 ML: 5 INJECTION INTRAVENOUS at 09:36

## 2024-05-09 ASSESSMENT — PULMONARY FUNCTION TESTS
PIF_VALUE: 22
PIF_VALUE: 21
PIF_VALUE: 26
PIF_VALUE: 25
PIF_VALUE: 26
PIF_VALUE: 22
PIF_VALUE: 29
PIF_VALUE: 24
PIF_VALUE: 28
PIF_VALUE: 23
PIF_VALUE: 24
PIF_VALUE: 28
PIF_VALUE: 27
PIF_VALUE: 27
PIF_VALUE: 22
PIF_VALUE: 22
PIF_VALUE: 30
PIF_VALUE: 23
PIF_VALUE: 26
PIF_VALUE: 27
PIF_VALUE: 20
PIF_VALUE: 22
PIF_VALUE: 25
PIF_VALUE: 22
PIF_VALUE: 23
PIF_VALUE: 24
PIF_VALUE: 34
PIF_VALUE: 23
PIF_VALUE: 24
PIF_VALUE: 22
PIF_VALUE: 27
PIF_VALUE: 27

## 2024-05-09 ASSESSMENT — PAIN SCALES - GENERAL
PAINLEVEL_OUTOF10: 0

## 2024-05-09 NOTE — PROGRESS NOTES
Comprehensive Nutrition Assessment    Type and Reason for Visit:  Reassess    Nutrition Recommendations/Plan:   Continue current TF as ordered     Malnutrition Assessment:  Malnutrition Status:  At risk for malnutrition (Comment) (05/09/24 1047)    Context:  Acute Illness     Findings of the 6 clinical characteristics of malnutrition:  Energy Intake:  50% or less of estimated energy requirements for 5 or more days  Weight Loss:  Unable to assess (limited wt hx)     Body Fat Loss:  No significant body fat loss     Muscle Mass Loss:  No significant muscle mass loss    Fluid Accumulation:  No significant fluid accumulation     Strength:  Not Performed    Nutrition Assessment:    Pt remains intubated w/ need for EN support. Noted anoxic myoclonus d/t cardiac arrest. Admit from [facility] for heart cath s/p cardiac arrest/fall at work PTA w/ multiple rib fx & PTX 2/2 CPR. Noted aspiration PNA, GENE & transaminitis - improved. PMHx DM, CAD, AAA.    Nutrition Related Findings:    pt intubated s/p cardiac arrest, NGT w/ TF, active BS, soft abd, +2-3 pitting edema, +I/Os 2.2L Wound Type: None       Current Nutrition Intake & Therapies:    Average Meal Intake: NPO     Diet NPO  ADULT TUBE FEEDING; Orogastric; Diabetic; Continuous; 20; Yes; 10; Q 4 hours; 60; 300; Q 4 hours; Protein; 1 Dose; Daily  Current Tube Feeding (TF) Orders:  Feeding Route: Nasogastric  Formula: Diabetic  Schedule: Continuous  Feeding Regimen: 60 ml/hr  Additives/Modulars: Protein (once daily)  Water Flushes: 300 ml q 4 hrs = 1800 ml water  Current TF & Flush Orders Provides: TF running at goal  Goal TF & Flush Orders Provides: 1440 ml tv, 2160 kcals, 119 gm pro (2264 kcals, 145gm pro w/ mod), 1093 ml free water, 2893 ml total fluids  Additional Calorie Sources:  none    Anthropometric Measures:  Height: 190.5 cm (6' 3\")  Ideal Body Weight (IBW): 196 lbs (89 kg)    Admission Body Weight: 168.7 kg (372 lb) (4/29 first measured @ [facility])  Current Body Weight:

## 2024-05-09 NOTE — PROGRESS NOTES
05/09/24 1214   Patient Observation   Pulse 59   Respirations 20   SpO2 97 %   Vent Information   Equipment Changed Expiratory Filter   Vent Mode AC/VC   Ventilator Settings   Vt (Set, mL) 450 mL   Resp Rate (Set) 16 bpm   PEEP/CPAP (cmH2O) 8   FiO2  50 %   Peak Inspiratory Flow (Set) 70 L/sec   Vent Patient Data (Readings)   Vt (Measured) 461 mL   Peak Inspiratory Pressure (cmH2O) 26 cmH2O   Rate Measured 19 br/min   Minute Volume (L/min) 9.08 Liters   Peak Inspiratory Flow (lpm) 70 L/sec   Mean Airway Pressure (cmH2O) 13 cmH20   Plateau Pressure (cm H2O) 16 cm H2O   Driving Pressure 8   I:E Ratio 1:4.10   Pressure Sensitivity 2 cm H2O   Backup Apnea On   Backup Rate 16 Breaths Per Minute   Backup Vt 450   Backup I Time 20   Vent Alarm Settings   High Pressure (cmH2O) 50 cmH2O   Low Minute Volume (lpm) 5 L/min   High Minute Volume (lpm) 20 L/min   Low Exhaled Vt (ml) 350 mL   High Exhaled Vt (ml) 800 mL   RR High (bpm) 35 br/min   Apnea (secs) 20 secs   Additional Respiratoray Assessments   Humidification Source Heated wire   Humidification Temp 37   Circuit Condensation Drained   Ambu Bag With Mask At Bedside Yes   Airway Clearance   Suction ET Tube   Suction Device Inline suction catheter   Sputum Method Obtained Endotracheal   Sputum Amount Small   Sputum Color/Odor Clear   Sputum Consistency Thick   ETT    Placement Date/Time: 05/02/24 (c) 9048   Placement Verified By: Auscultation;Capnometry  Preoxygenation: Yes  Airway Tube Size: 8 mm  Location: Oral  Insertion attempts: 1  Measured From: Lips  Comments: Anesthesia called to room with patient already ...   Secured At 27 cm   Measured From Lips   Secured By Commercial tube borden   Site Assessment Dry

## 2024-05-09 NOTE — PROGRESS NOTES
[facility]  Department of Internal Medicine   Internal Medicine Residency   MICU Progress Note    Patient:  Nabeel Appiah 63 y.o. male  MRN: [id number]     Date of Service: 5/9/2024    Allergy: Patient has no known allergies.    Overnight Events: Desaturated to 88%. Suctioned and had FiO2 increased to 100%    Subjective     Patient seen and examined at bedside this AM. Remains unresponsive, intubated.     ROS: unable to be achieved due to current mental status    Objective     VS: /68   Pulse 63   Temp 98.4 °F (36.9 °C) (Temporal)   Resp 19   Ht 1.905 m (6' 3\")   Wt (!) 157 kg (346 lb 2 oz)   SpO2 96%   BMI 43.26 kg/m²   ABP (Arterial line BP): 100/50  ABP Mean (Arterial Line Mean): (!) 64 mmHg    I & O - 24hr:   Intake/Output Summary (Last 24 hours) at 5/9/2024 1725  Last data filed at 5/9/2024 1554  Gross per 24 hour   Intake 3326.04 ml   Output 200 ml   Net 3126.04 ml       Sedatives: N/A.     Pressors: n/a    Oxygen: Intubated 450/16/50/8    ABG:     Lab Results   Component Value Date/Time    PH 7.444 05/09/2024 04:16 AM    PCO2 43.1 05/09/2024 04:16 AM    PO2 75.6 05/09/2024 04:16 AM    HCO3 28.9 05/09/2024 04:16 AM    BE 4.3 05/09/2024 04:16 AM    THB 12.3 05/09/2024 04:16 AM    O2SAT 95.7 05/09/2024 04:16 AM       Physical Exam:  General Appearance: AO x 0, not following commands    Neuro:Upward gaze, sluggish pupillary reflex, absent corneal reflex, absent gag reflex, absent cough reflex, withdrawing to noxious stimuli, myoclonic jerks still present  Cardiovascular: regular rate and rhythm, S1 and S2 heard, no murmurs appreciated   Respiratory: Mechanically ventilated lung sounds. No wheezing or crackles appreciated.  Abdomen: Softly distended  Extremities: Bilateral upper extremity edema- improving, no cyanosis, distal pulses intact    Lines & Urinary Catheter:     External Urinary Catheter 05/03  ETT since 4/30     Labs     Basic Labs    Complete Blood Count:   Recent Labs     change.         XR CHEST PORTABLE   Final Result   1. No significant interval change in the right lung opacities when compared   to the previous study performed earlier in the day.   2. Improved aeration in the left lower lung field.         XR CHEST PORTABLE   Final Result   1. No significant interval change when compared to the previous study   performed 1 day earlier.         XR CHEST PORTABLE   Final Result   Stable chest.         XR CHEST PORTABLE   Final Result   1. The position and alignment of the tubes and catheters are within normal   range.   2. The bibasilar parenchymal density felt to represent atelectasis appears   stable..   3. Tiny bilateral pleural effusions   4. No signs of pneumothorax.         XR CHEST ABDOMEN NG PLACEMENT   Final Result   Distal enteric tube in good position within the stomach.         XR CHEST PORTABLE   Final Result   1. Endotracheal tube in satisfactory position 4 cm above milla.   2. Orogastric tube in position.   3. Prominent pulmonary vascularity with bibasilar infiltrates.         XR ABDOMEN FOR NG/OG/NE TUBE PLACEMENT   Final Result   Distal enteric tube within the proximal stomach.         XR CHEST PORTABLE   Final Result   1. Cardiomediastinal and interstitial prominence concerning for congestive   heart failure, stable   2. No signs of pneumothorax   3. Stable position of the right chest tube and endotracheal tubes.   4. Small bilateral pleural effusions, stable         XR CHEST PORTABLE   Final Result   Endotracheal tube appears in satisfactory position.         XR CHEST PORTABLE   Final Result   1. Interval development of diffuse interstitial opacities throughout both   lungs, suspicious for pulmonary edema/CHF and when compared to the prior   study performed 1 day earlier.   2. Small right pleural effusion again seen.   3. No evidence of pneumothorax.   4. Tubes and lines as described above.         XR CHEST PORTABLE   Final Result   1. No significant interval  medical decision making. In addition to time devoted to teaching and to any procedure. CCT 45 minutes    Ulisses Perez DO, MACOI, FACP, FCCP

## 2024-05-09 NOTE — PROGRESS NOTES
05/09/24 0845   Patient Observation   Pulse 55   Respirations 21   SpO2 95 %   Vent Information   Vent Mode AC/VC   Ventilator Settings   Vt (Set, mL) 450 mL   Resp Rate (Set) 16 bpm   PEEP/CPAP (cmH2O) 8   FiO2  50 %   Peak Inspiratory Flow (Set) 70 L/sec   Vent Patient Data (Readings)   Vt (Measured) 446 mL   Peak Inspiratory Pressure (cmH2O) 23 cmH2O   Rate Measured 18 br/min   Minute Volume (L/min) 7.94 Liters   Peak Inspiratory Flow (lpm) 70 L/sec   Mean Airway Pressure (cmH2O) 12 cmH20   Plateau Pressure (cm H2O) 16 cm H2O   Driving Pressure 8   I:E Ratio 1:4.40   Pressure Sensitivity 2 cm H2O   Static Compliance (L/cm H2O) 33   Backup Apnea On   Backup Rate 16 Breaths Per Minute   Backup Vt 450   Backup I Time 20   Vent Alarm Settings   High Pressure (cmH2O) 50 cmH2O   Low Minute Volume (lpm) 5 L/min   High Minute Volume (lpm) 20 L/min   Low Exhaled Vt (ml) 350 mL   High Exhaled Vt (ml) 800 mL   RR High (bpm) 35 br/min   Apnea (secs) 20 secs   Additional Respiratoray Assessments   Humidification Source Heated wire   Humidification Temp 37   Circuit Condensation Drained   Ambu Bag With Mask At Bedside Yes   Airway Clearance   Suction ET Tube;Oral   Suction Device Inline suction catheter;Ericuer   Sputum Method Obtained Endotracheal   Sputum Amount Small   Sputum Color/Odor Clear   Sputum Consistency Thick   ETT    Placement Date/Time: 05/02/24 (c) 9128   Placement Verified By: Auscultation;Capnometry  Preoxygenation: Yes  Airway Tube Size: 8 mm  Location: Oral  Insertion attempts: 1  Measured From: Lips  Comments: Anesthesia called to room with patient already ...   Secured At 27 cm   Measured From Lips   Secured By Commercial tube borden   Site Assessment Dry

## 2024-05-09 NOTE — PLAN OF CARE
Problem: Respiratory - Adult  Goal: Achieves optimal ventilation and oxygenation  5/9/2024 0853 by Rosita Edwards RCP  Outcome: Progressing

## 2024-05-09 NOTE — PROGRESS NOTES
Date:  5/9/2024  Patient Name:  Nabeel Appiah  YOB: 1960  MRN: [id number]     PCP:  Jonh Allen DO   Referring:  Destin Gruber DO    Sig PMH: AAA, HTN, afib    History of Present Illness:  Nabeel Appiah is a 63 y.o. 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  Patient admitted 4/29 with cardiac arrest, noted to be in V. tach/V-fib and received amiodarone and shocks x 3.  ROSC was achieved prior to ED arrival.  He was then admitted to the MICU.    While in ICU he was noted to develop myoclonic jerking of the face.  Neurology consulted for myoclonic activity--started on Keppra    EEG showed a potential for generalized seizures but no associated epileptiform activity was found and myoclonic jerks were captured    5/4: persistent myoclonic jerking with any stimulation.  Started on routine clonazepam and loaded with valproic acid.  5/5 myoclonic jerks persist. VPA and clonazepam increased  5/6: MRI of the brain was canceled due to body habitus.  VPA increased due to persistent myoclonic jerking    He has no power of  and no living blood relatives, but apparently his longtime girlfriend has stated that the patient would not want trach/PEG.  He is currently a DNR CCA .    Case discussed with IM team     He remains intubated--off sedation for several days and unresponsive.  Unfortunately myoclonic jerking has been relatively persistent despite his medications..    There is no family present at the bedside.      Allergies:      No Known Allergies     Physical Examination  Vitals   Vitals:    05/09/24 0845 05/09/24 0930 05/09/24 0935 05/09/24 1003   BP:  (!) 125/58 (!) 125/58    Pulse: 55  57    Resp: 21      Temp:       TempSrc:       SpO2: 95%      Weight:       Height:    1.905 m (6' 3\")      General: intubated, off sedation, in no distress lying in bed  HEENT: Normocephalic, atraumatic--ETT in place  Heart: RRR  Extremities/Peripheral vascular: dependent edema noted in  if clinically indicated.     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  Unfortunately, he remains unresponsive off sedation. Anoxic myoclonus is classically difficult to control. His brainstem responses are intact, but his prognosis for meaningful recovery remains poor      Plan  -Continue valproic acid 1000 mg BID   -Continue clonazepam to 1 mg every 8 hours  -Continue Keppra 1500 mg BID  -Discussed with ICU team    Will follow    Electronically signed by KENNEDI Ye CNS on 5/9/2024 at 11:16 AM

## 2024-05-09 NOTE — PLAN OF CARE
Problem: Safety - Adult  Goal: Free from fall injury  Outcome: Progressing     Problem: Chronic Conditions and Co-morbidities  Goal: Patient's chronic conditions and co-morbidity symptoms are monitored and maintained or improved  Outcome: Progressing     Problem: Discharge Planning  Goal: Discharge to home or other facility with appropriate resources  Outcome: Not Progressing     Problem: Pain  Goal: Verbalizes/displays adequate comfort level or baseline comfort level  Outcome: Progressing     Problem: Neurosensory - Adult  Goal: Achieves stable or improved neurological status  Outcome: Not Progressing     Problem: Respiratory - Adult  Goal: Achieves optimal ventilation and oxygenation  Outcome: Progressing     Problem: Skin/Tissue Integrity  Goal: Absence of new skin breakdown  Description: 1.  Monitor for areas of redness and/or skin breakdown  2.  Assess vascular access sites hourly  3.  Every 4-6 hours minimum:  Change oxygen saturation probe site  4.  Every 4-6 hours:  If on nasal continuous positive airway pressure, respiratory therapy assess nares and determine need for appliance change or resting period.  Outcome: Progressing     Problem: ABCDS Injury Assessment  Goal: Absence of physical injury  Outcome: Progressing     Problem: Nutrition Deficit:  Goal: Optimize nutritional status  Outcome: Progressing     Problem: Discharge Planning  Goal: Discharge to home or other facility with appropriate resources  Outcome: Not Progressing     Problem: Neurosensory - Adult  Goal: Achieves stable or improved neurological status  Outcome: Not Progressing

## 2024-05-09 NOTE — PROGRESS NOTES
Hospitalist Progress Note      Synopsis: Patient admitted on 5/1/2024. The patient is a 63-year-old male with past medical history of A-fib, hypertension, obesity, type II DM, AAA who presented to [facility] for cardiac arrest. On EMS arrival reported to be in V. tach/V-fib arrest and received amiodarone and shocked x 3. Patient was noted to have ROSC prior to ED arrival. CT of the head was done in the emergency department without any intracranial findings. Further imaging revealed a small pneumothorax for which a right-sided chest tube was placed: Plan for LHC. Placed on heparin drip and transferred. CT of the chest negative for PE. CT of the abdomen and pelvis showed presence of 4.3 x 4 cm aneurysm. Patient had persistent myoclonic jerking. Placed on Clonazepam and Depakote. EEG showed a potential for generalized seizures but no associated epileptiform activity was found and myoclonic jerks were captured. Patent with aspiration pneumonia. Placed on Unasyn.  Myoclonic jerks continued. Keppra added.  Clonazepam increased to 1.5 mg every 6 hours. Heparin stopped. Transitioned to Eliquis. Completed course of Unasyn. MRI ordered and pending.      GOC have been discussed with girlfriend, but patient has no NOK as per his girlfriend. Girlfriend is trying to access his safe to see if he has a living will. Needs 2 doctor sign off for medical decisions at present.     Assessment and Plan     Cardiac arrest secondary to ACS -cardiology following, plans for cardiac catheterization however continues to have poor prognosis in terms of neurologic recovery, now off heparin drip s/p rewarming   V-fib/V. Tach -now off lidocaine drip  Persistent Afib - on Eliquis at home, completed heparin infusion and transitioned to Eliquis now stopped for new subdural hematoma.    Aspiration pneumonia - growing gram-positive cocci in pairs on respiratory culture, completed Unasyn.  Acute hypoxic respiratory failure secondary to cardiac  Every effort was made to ensure accuracy; however, inadvertent computerized transcription errors may be present.

## 2024-05-10 ENCOUNTER — APPOINTMENT (OUTPATIENT)
Dept: CT IMAGING | Age: 64
DRG: 283 | End: 2024-05-10
Attending: STUDENT IN AN ORGANIZED HEALTH CARE EDUCATION/TRAINING PROGRAM
Payer: COMMERCIAL

## 2024-05-10 LAB
AADO2: 275.4 MMHG
AADO2: 282.4 MMHG
ALBUMIN SERPL-MCNC: 3 G/DL (ref 3.5–5.2)
ALP SERPL-CCNC: 67 U/L (ref 40–129)
ALT SERPL-CCNC: 29 U/L (ref 0–40)
ANION GAP SERPL CALCULATED.3IONS-SCNC: 11 MMOL/L (ref 7–16)
ANION GAP SERPL CALCULATED.3IONS-SCNC: 13 MMOL/L (ref 7–16)
AST SERPL-CCNC: 40 U/L (ref 0–39)
B.E.: -3.8 MMOL/L (ref -3–3)
B.E.: 6.2 MMOL/L (ref -3–3)
BASOPHILS # BLD: 0.06 K/UL (ref 0–0.2)
BASOPHILS NFR BLD: 1 % (ref 0–2)
BILIRUB SERPL-MCNC: 0.5 MG/DL (ref 0–1.2)
BUN SERPL-MCNC: 35 MG/DL (ref 6–23)
BUN SERPL-MCNC: 37 MG/DL (ref 6–23)
CALCIUM SERPL-MCNC: 9.1 MG/DL (ref 8.6–10.2)
CALCIUM SERPL-MCNC: 9.7 MG/DL (ref 8.6–10.2)
CHLORIDE SERPL-SCNC: 101 MMOL/L (ref 98–107)
CHLORIDE SERPL-SCNC: 103 MMOL/L (ref 98–107)
CO2 SERPL-SCNC: 25 MMOL/L (ref 22–29)
CO2 SERPL-SCNC: 28 MMOL/L (ref 22–29)
COHB: 0.2 % (ref 0–1.5)
COHB: 0.3 % (ref 0–1.5)
CREAT SERPL-MCNC: 1 MG/DL (ref 0.7–1.2)
CREAT SERPL-MCNC: 1.1 MG/DL (ref 0.7–1.2)
CRITICAL: ABNORMAL
CRITICAL: ABNORMAL
DATE ANALYZED: ABNORMAL
DATE ANALYZED: ABNORMAL
DATE OF COLLECTION: ABNORMAL
DATE OF COLLECTION: ABNORMAL
EOSINOPHIL # BLD: 0.41 K/UL (ref 0.05–0.5)
EOSINOPHILS RELATIVE PERCENT: 4 % (ref 0–6)
ERYTHROCYTE [DISTWIDTH] IN BLOOD BY AUTOMATED COUNT: 13.4 % (ref 11.5–15)
FIO2: 60 %
FIO2: 65 %
GFR, ESTIMATED: 77 ML/MIN/1.73M2
GFR, ESTIMATED: 83 ML/MIN/1.73M2
GLUCOSE BLD-MCNC: 144 MG/DL (ref 74–99)
GLUCOSE BLD-MCNC: 149 MG/DL (ref 74–99)
GLUCOSE BLD-MCNC: 150 MG/DL (ref 74–99)
GLUCOSE BLD-MCNC: 153 MG/DL (ref 74–99)
GLUCOSE BLD-MCNC: 164 MG/DL (ref 74–99)
GLUCOSE SERPL-MCNC: 148 MG/DL (ref 74–99)
GLUCOSE SERPL-MCNC: 154 MG/DL (ref 74–99)
HCO3: 23.4 MMOL/L (ref 22–26)
HCO3: 30.1 MMOL/L (ref 22–26)
HCT VFR BLD AUTO: 36.6 % (ref 37–54)
HGB BLD-MCNC: 11.6 G/DL (ref 12.5–16.5)
HHB: 2.1 % (ref 0–5)
HHB: 2.7 % (ref 0–5)
IMM GRANULOCYTES # BLD AUTO: 0.1 K/UL (ref 0–0.58)
IMM GRANULOCYTES NFR BLD: 1 % (ref 0–5)
LAB: ABNORMAL
LAB: ABNORMAL
LYMPHOCYTES NFR BLD: 1.06 K/UL (ref 1.5–4)
LYMPHOCYTES RELATIVE PERCENT: 9 % (ref 20–42)
Lab: 452
Lab: 855
MAGNESIUM SERPL-MCNC: 2.4 MG/DL (ref 1.6–2.6)
MCH RBC QN AUTO: 30.6 PG (ref 26–35)
MCHC RBC AUTO-ENTMCNC: 31.7 G/DL (ref 32–34.5)
MCV RBC AUTO: 96.6 FL (ref 80–99.9)
METHB: 0.3 % (ref 0–1.5)
METHB: 0.4 % (ref 0–1.5)
MODE: AC
MODE: AC
MONOCYTES NFR BLD: 1.01 K/UL (ref 0.1–0.95)
MONOCYTES NFR BLD: 9 % (ref 2–12)
NEUTROPHILS NFR BLD: 77 % (ref 43–80)
NEUTS SEG NFR BLD: 8.71 K/UL (ref 1.8–7.3)
O2 CONTENT: 16.8 ML/DL
O2 CONTENT: 17.5 ML/DL
O2 SATURATION: 97.3 % (ref 92–98.5)
O2 SATURATION: 97.9 % (ref 92–98.5)
O2HB: 96.7 % (ref 94–97)
O2HB: 97.3 % (ref 94–97)
OPERATOR ID: 2593
OPERATOR ID: 7292
PATIENT TEMP: 37 C
PATIENT TEMP: 37 C
PCO2: 41 MMHG (ref 35–45)
PCO2: 51.1 MMHG (ref 35–45)
PEEP/CPAP: 8 CMH2O
PEEP/CPAP: 8 CMH2O
PFO2: 1.67 MMHG/%
PFO2: 2.04 MMHG/%
PH BLOOD GAS: 7.28 (ref 7.35–7.45)
PH BLOOD GAS: 7.48 (ref 7.35–7.45)
PHOSPHATE SERPL-MCNC: 3.5 MG/DL (ref 2.5–4.5)
PLATELET # BLD AUTO: 217 K/UL (ref 130–450)
PMV BLD AUTO: 11.6 FL (ref 7–12)
PO2: 100.3 MMHG (ref 75–100)
PO2: 132.5 MMHG (ref 75–100)
POTASSIUM SERPL-SCNC: 4.1 MMOL/L (ref 3.5–5)
POTASSIUM SERPL-SCNC: 4.4 MMOL/L (ref 3.5–5)
PROT SERPL-MCNC: 5.9 G/DL (ref 6.4–8.3)
RBC # BLD AUTO: 3.79 M/UL (ref 3.8–5.8)
RI(T): 2.08
RI(T): 2.82
RR MECHANICAL: 16 B/MIN
RR MECHANICAL: 16 B/MIN
SODIUM SERPL-SCNC: 140 MMOL/L (ref 132–146)
SODIUM SERPL-SCNC: 141 MMOL/L (ref 132–146)
SOURCE, BLOOD GAS: ABNORMAL
SOURCE, BLOOD GAS: ABNORMAL
THB: 12.3 G/DL (ref 11.5–16.5)
THB: 12.6 G/DL (ref 11.5–16.5)
TIME ANALYZED: 503
TIME ANALYZED: 857
VT MECHANICAL: 450 ML
VT MECHANICAL: 500 ML
WBC OTHER # BLD: 11.4 K/UL (ref 4.5–11.5)

## 2024-05-10 PROCEDURE — 94640 AIRWAY INHALATION TREATMENT: CPT

## 2024-05-10 PROCEDURE — 84100 ASSAY OF PHOSPHORUS: CPT

## 2024-05-10 PROCEDURE — 80053 COMPREHEN METABOLIC PANEL: CPT

## 2024-05-10 PROCEDURE — 6370000000 HC RX 637 (ALT 250 FOR IP): Performed by: INTERNAL MEDICINE

## 2024-05-10 PROCEDURE — 80048 BASIC METABOLIC PNL TOTAL CA: CPT

## 2024-05-10 PROCEDURE — 2000000000 HC ICU R&B

## 2024-05-10 PROCEDURE — 6360000002 HC RX W HCPCS: Performed by: PSYCHIATRY & NEUROLOGY

## 2024-05-10 PROCEDURE — 82805 BLOOD GASES W/O2 SATURATION: CPT

## 2024-05-10 PROCEDURE — 6370000000 HC RX 637 (ALT 250 FOR IP): Performed by: STUDENT IN AN ORGANIZED HEALTH CARE EDUCATION/TRAINING PROGRAM

## 2024-05-10 PROCEDURE — 6360000002 HC RX W HCPCS

## 2024-05-10 PROCEDURE — 6360000002 HC RX W HCPCS: Performed by: STUDENT IN AN ORGANIZED HEALTH CARE EDUCATION/TRAINING PROGRAM

## 2024-05-10 PROCEDURE — 99232 SBSQ HOSP IP/OBS MODERATE 35: CPT | Performed by: INTERNAL MEDICINE

## 2024-05-10 PROCEDURE — 94003 VENT MGMT INPAT SUBQ DAY: CPT

## 2024-05-10 PROCEDURE — 99291 CRITICAL CARE FIRST HOUR: CPT | Performed by: INTERNAL MEDICINE

## 2024-05-10 PROCEDURE — 83735 ASSAY OF MAGNESIUM: CPT

## 2024-05-10 PROCEDURE — 85025 COMPLETE CBC W/AUTO DIFF WBC: CPT

## 2024-05-10 PROCEDURE — 82962 GLUCOSE BLOOD TEST: CPT

## 2024-05-10 PROCEDURE — 6370000000 HC RX 637 (ALT 250 FOR IP)

## 2024-05-10 PROCEDURE — 94669 MECHANICAL CHEST WALL OSCILL: CPT

## 2024-05-10 PROCEDURE — 70450 CT HEAD/BRAIN W/O DYE: CPT

## 2024-05-10 PROCEDURE — 99232 SBSQ HOSP IP/OBS MODERATE 35: CPT | Performed by: CLINICAL NURSE SPECIALIST

## 2024-05-10 PROCEDURE — A4216 STERILE WATER/SALINE, 10 ML: HCPCS

## 2024-05-10 PROCEDURE — 2580000003 HC RX 258

## 2024-05-10 PROCEDURE — C9113 INJ PANTOPRAZOLE SODIUM, VIA: HCPCS

## 2024-05-10 PROCEDURE — 2500000003 HC RX 250 WO HCPCS

## 2024-05-10 RX ORDER — FUROSEMIDE 10 MG/ML
40 INJECTION INTRAMUSCULAR; INTRAVENOUS ONCE
Status: COMPLETED | OUTPATIENT
Start: 2024-05-10 | End: 2024-05-10

## 2024-05-10 RX ADMIN — SODIUM CHLORIDE, PRESERVATIVE FREE 10 ML: 5 INJECTION INTRAVENOUS at 19:43

## 2024-05-10 RX ADMIN — ACETYLCYSTEINE 400 MG: 100 SOLUTION ORAL; RESPIRATORY (INHALATION) at 13:43

## 2024-05-10 RX ADMIN — ASPIRIN 81 MG CHEWABLE TABLET 81 MG: 81 TABLET CHEWABLE at 08:29

## 2024-05-10 RX ADMIN — CLONAZEPAM 1.5 MG: 0.5 TABLET ORAL at 23:52

## 2024-05-10 RX ADMIN — SODIUM CHLORIDE, PRESERVATIVE FREE 40 MG: 5 INJECTION INTRAVENOUS at 08:29

## 2024-05-10 RX ADMIN — LISINOPRIL 40 MG: 20 TABLET ORAL at 08:29

## 2024-05-10 RX ADMIN — ALBUTEROL SULFATE 2.5 MG: 2.5 SOLUTION RESPIRATORY (INHALATION) at 13:43

## 2024-05-10 RX ADMIN — LEVETIRACETAM 1500 MG: 500 INJECTION, SOLUTION, CONCENTRATE INTRAVENOUS at 19:36

## 2024-05-10 RX ADMIN — THIAMINE HYDROCHLORIDE 100 MG: 100 INJECTION, SOLUTION INTRAMUSCULAR; INTRAVENOUS at 08:30

## 2024-05-10 RX ADMIN — NYSTATIN 500000 UNITS: 100000 SUSPENSION ORAL at 08:29

## 2024-05-10 RX ADMIN — Medication 15 ML: at 08:29

## 2024-05-10 RX ADMIN — CLONAZEPAM 1.5 MG: 0.5 TABLET ORAL at 17:05

## 2024-05-10 RX ADMIN — HYDROCHLOROTHIAZIDE 25 MG: 25 TABLET ORAL at 08:29

## 2024-05-10 RX ADMIN — CLONAZEPAM 1.5 MG: 0.5 TABLET ORAL at 10:59

## 2024-05-10 RX ADMIN — NYSTATIN 500000 UNITS: 100000 SUSPENSION ORAL at 19:37

## 2024-05-10 RX ADMIN — VALPROATE SODIUM 1500 MG: 100 INJECTION, SOLUTION INTRAVENOUS at 13:01

## 2024-05-10 RX ADMIN — ALBUTEROL SULFATE 2.5 MG: 2.5 SOLUTION RESPIRATORY (INHALATION) at 20:07

## 2024-05-10 RX ADMIN — ALBUTEROL SULFATE 2.5 MG: 2.5 SOLUTION RESPIRATORY (INHALATION) at 08:21

## 2024-05-10 RX ADMIN — Medication 15 ML: at 19:42

## 2024-05-10 RX ADMIN — FUROSEMIDE 40 MG: 10 INJECTION, SOLUTION INTRAMUSCULAR; INTRAVENOUS at 18:09

## 2024-05-10 RX ADMIN — NYSTATIN 500000 UNITS: 100000 SUSPENSION ORAL at 14:13

## 2024-05-10 RX ADMIN — NYSTATIN 500000 UNITS: 100000 SUSPENSION ORAL at 17:10

## 2024-05-10 RX ADMIN — ACETYLCYSTEINE 400 MG: 100 SOLUTION ORAL; RESPIRATORY (INHALATION) at 20:07

## 2024-05-10 RX ADMIN — LEVETIRACETAM 1500 MG: 500 INJECTION, SOLUTION, CONCENTRATE INTRAVENOUS at 08:37

## 2024-05-10 RX ADMIN — ATORVASTATIN CALCIUM 40 MG: 40 TABLET, FILM COATED ORAL at 19:37

## 2024-05-10 RX ADMIN — SODIUM CHLORIDE, PRESERVATIVE FREE 10 ML: 5 INJECTION INTRAVENOUS at 08:30

## 2024-05-10 RX ADMIN — VALPROATE SODIUM 1500 MG: 100 INJECTION, SOLUTION INTRAVENOUS at 00:10

## 2024-05-10 RX ADMIN — ACETYLCYSTEINE 400 MG: 100 SOLUTION ORAL; RESPIRATORY (INHALATION) at 08:21

## 2024-05-10 RX ADMIN — CLONAZEPAM 1.5 MG: 0.5 TABLET ORAL at 06:18

## 2024-05-10 ASSESSMENT — PULMONARY FUNCTION TESTS
PIF_VALUE: 25
PIF_VALUE: 23
PIF_VALUE: 20
PIF_VALUE: 22
PIF_VALUE: 24
PIF_VALUE: 24
PIF_VALUE: 23
PIF_VALUE: 21
PIF_VALUE: 21
PIF_VALUE: 24
PIF_VALUE: 23
PIF_VALUE: 22
PIF_VALUE: 21
PIF_VALUE: 28
PIF_VALUE: 20
PIF_VALUE: 25
PIF_VALUE: 21
PIF_VALUE: 22
PIF_VALUE: 26
PIF_VALUE: 22
PIF_VALUE: 23
PIF_VALUE: 23
PIF_VALUE: 19
PIF_VALUE: 23
PIF_VALUE: 21

## 2024-05-10 ASSESSMENT — PAIN SCALES - GENERAL
PAINLEVEL_OUTOF10: 0

## 2024-05-10 NOTE — PROGRESS NOTES
05/10/24 0819   Patient Observation   Pulse 67   Respirations 19   SpO2 100 %   Breath Sounds   Right Upper Lobe Diminished   Right Middle Lobe Diminished   Right Lower Lobe Diminished   Left Upper Lobe Diminished   Left Lower Lobe Diminished   Vent Information   Ventilator -31   Equipment Changed (S)  Expiratory Filter   Vent Mode AC/VC   Ventilator Settings   Vt (Set, mL) 500 mL   Resp Rate (Set) 16 bpm   PEEP/CPAP (cmH2O) 8   FiO2  (S)  60 %   Peak Inspiratory Flow (Set) 70 L/sec   Vent Patient Data (Readings)   Vt (Measured) 688 mL   Peak Inspiratory Pressure (cmH2O) 24 cmH2O   Rate Measured 21 br/min   Minute Volume (L/min) 9.66 Liters   Mean Airway Pressure (cmH2O) 13 cmH20   Plateau Pressure (cm H2O) 20 cm H2O   Driving Pressure 12   I:E Ratio 1:3.80   Static Compliance (L/cm H2O) 44   Vent Alarm Settings   High Pressure (cmH2O) 50 cmH2O   Low Minute Volume (lpm) 5 L/min   High Minute Volume (lpm) 20 L/min   Low Exhaled Vt (ml) 350 mL   High Exhaled Vt (ml) 800 mL   RR High (bpm) 35 br/min   Apnea (secs) 20 secs   Additional Respiratoray Assessments   Humidification Source Heated wire   Humidification Temp 37   Circuit Condensation Drained   Ambu Bag With Mask At Bedside Yes   Airway Clearance   Suction ET Tube;Oral   Suction Device Inline suction catheter   Sputum Method Obtained Endotracheal   Sputum Amount Small   Sputum Color/Odor Clear   Sputum Consistency Thick   ETT    Placement Date/Time: 05/02/24 (c) 2354   Placement Verified By: Auscultation;Capnometry  Preoxygenation: Yes  Airway Tube Size: 8 mm  Location: Oral  Insertion attempts: 1  Measured From: Lips  Comments: Anesthesia called to room with patient already ...   Secured At 27 cm   Measured From Lips   ETT Placement Center   Secured By Commercial tube borden   Site Assessment Dry

## 2024-05-10 NOTE — CARE COORDINATION
Care Coordination:  LOS 9 Day.  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 Brainstem responses intact. Remains intubated.  Pt has no relatives, no poa.  Discussed with ICU team, Dr Morrell.  Will need expert eval completed and signed to [facility] If plan is to proceed with trach and peg.  I spoke to [facility], and they cannot accept without POA or guardianship. Discussed with Att Dorian España with guardianship, expedited appeals are not being processed, but can send referral to [facility]. I spoke to Vivian at [facility] , referral form can be accessed online and submitted, and they are behind on guardianships at the moment- possibly 1-2 months.  I called [facility] probate court, spoke to aPulina, she states I am not able to apply for guardianship, it will have to be an applicant that submits for guardianship.  She will confirm with the  and call me back.  In the meantime, I have messaged Dr Hodges and she will fill out expert eval  this evening for case management to submit on Monday. Call to Rafia at [facility] to follow, she asked to keep her updated if they proceed with trach/peg- demos given    Electronically signed by Valery Beard RN on 5/10/2024 at 11:45 AM

## 2024-05-10 NOTE — PROGRESS NOTES
Date:  5/10/2024  Patient Name:  Nabeel Appiah  YOB: 1960  MRN: [id number]     PCP:  Jonh Allen DO   Referring:  Destin Gruber DO    Sig PMH: AAA, HTN, afib    History of Present Illness:  Nabeel Appiah is a 63 y.o. 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  Patient admitted 4/29 with cardiac arrest, noted to be in V. tach/V-fib and received amiodarone and shocks x 3.  ROSC was achieved prior to ED arrival.  He was then admitted to the MICU.    While in ICU he was noted to develop myoclonic jerking of the face.  Neurology consulted for myoclonic activity--started on Keppra    EEG showed a potential for generalized seizures but no associated epileptiform activity was found and myoclonic jerks were captured    5/4: persistent myoclonic jerking with any stimulation.  Started on routine clonazepam and loaded with valproic acid.  5/5 myoclonic jerks persist. VPA and clonazepam increased  5/6: MRI of the brain was canceled due to body habitus.  VPA increased due to persistent myoclonic jerking    He has no power of  and no living blood relatives, but apparently his longtime girlfriend has stated that the patient would not want trach/PEG.  He is currently a DNR CCA .    Case discussed with IM team -neurology to be available for for    He remains intubated--off sedation for several days and unresponsive.  Unfortunately myoclonic jerking has been relatively persistent despite his medications..    Allergies:      No Known Allergies     Physical Examination  Vitals   Vitals:    05/10/24 0830 05/10/24 0900 05/10/24 1000 05/10/24 1100   BP:  129/73 (!) 99/50 (!) 96/51   Pulse: 58 61 53 52   Resp:  17 16 17   Temp:       TempSrc:       SpO2:  99% 95% 97%   Weight:       Height:          General: intubated, off sedation, in no distress lying in bed  HEENT: Normocephalic, atraumatic--ETT in place  Heart: RRR  Extremities/Peripheral vascular: dependent edema noted in BL

## 2024-05-10 NOTE — PROGRESS NOTES
significant interval change when compared to the previous study   performed earlier in the day.         XR CHEST PORTABLE   Final Result   1. ET tube in satisfactory position.   2. No pneumothorax.         XR ABDOMEN FOR NG/OG/NE TUBE PLACEMENT   Final Result   Orogastric tube tip several cm past GE junction and should be advanced the   approximately 7 cm for more optimal placement.         XR CHEST PORTABLE   Final Result   Endotracheal tube high above the level of the clavicles. Nasogastric tube tip   below the level diaphragm. Right chest tube in place with no definite   pneumothorax. Some increased markings seen within the right lower lung field   and left lung base.         CT HEAD WO CONTRAST    (Results Pending)           Resident's Assessment and Plan     Summary of Hospital Course:  The patient is a 63 year old male with HTN, Infrarenal AAA w/o rupture, T2DM, HLD, Afib and Obesity who presented from [facility] cardiac arrest and was transferred to [facility] for cardiac catheterization and found to have acute encephalopathy. Repeat CT head has shown a new parasagittal subdural hematoma.    Consults:   Critical care  Neurology   Cardiology  Palliative Care     Assessment:  Right parasagittal subdural hematoma  Acute encephalopathy likely 2/2 cardiac arrest, s/p cooling protocol  V-fib cardiac arrest,  now off lidocaine drip   Elevated troponin likely 2/2 CPR versus NSTEMI on heparin drip  Abdominal aortic aneurysm  Persistent A-fib , Eliquis 5 mg stopped d/t sub dural hematoma,   Acute respiratory failure 2/2 aspiration pneumonia s/p completed Unasyn   Right-sided pneumothorax s/p chest tube on right side 4/30   Seizures - EEG showing A potential for generalized onset seizures, currently on depakote, clonazepam and keppra has been uptitrated due to persistent myoclonic jerks. Patient has PRN versed pushes   Multiple rib fracture 2/2 CPR  Transaminitis -downtrending   GENE-resolved  Hypertension on  period of time indicated below. Time I spent with family of surrogate(s) is included only if the patient was incapable of providing the necessary information or participating in medical decision making. In addition to time devoted to teaching and to any procedure. CCT 39 minutes    Ulisses Perez DO, MACOI, FACP, FCCP

## 2024-05-10 NOTE — PLAN OF CARE
Problem: Safety - Adult  Goal: Free from fall injury  5/10/2024 1915 by Kevin Lopez RN  Outcome: Progressing     Problem: Chronic Conditions and Co-morbidities  Goal: Patient's chronic conditions and co-morbidity symptoms are monitored and maintained or improved  5/10/2024 1915 by Kevin Lopez RN  Outcome: Progressing     Problem: Discharge Planning  Goal: Discharge to home or other facility with appropriate resources  5/10/2024 1915 by Kevin Lopez RN  Outcome: Progressing     Problem: Pain  Goal: Verbalizes/displays adequate comfort level or baseline comfort level  5/10/2024 1915 by Kevin Lopez RN  Outcome: Progressing     Problem: Neurosensory - Adult  Goal: Achieves stable or improved neurological status  5/10/2024 1915 by Kevin Lopez RN  Outcome: Progressing     Problem: Respiratory - Adult  Goal: Achieves optimal ventilation and oxygenation  5/10/2024 1915 by Kevin Lopez RN  Outcome: Progressing     Problem: Skin/Tissue Integrity  Goal: Absence of new skin breakdown  Description: 1.  Monitor for areas of redness and/or skin breakdown  2.  Assess vascular access sites hourly  3.  Every 4-6 hours minimum:  Change oxygen saturation probe site  4.  Every 4-6 hours:  If on nasal continuous positive airway pressure, respiratory therapy assess nares and determine need for appliance change or resting period.  5/10/2024 1915 by Kevin Lopez RN  Outcome: Progressing     Problem: ABCDS Injury Assessment  Goal: Absence of physical injury  5/10/2024 1915 by Kevin Lopez RN  Outcome: Progressing     Problem: Nutrition Deficit:  Goal: Optimize nutritional status  5/10/2024 1915 by Kevin Lopez RN  Outcome: Progressing     Problem: Discharge Planning  Goal: Discharge to home or other facility with appropriate resources  5/10/2024 1915 by Kevin Lopez RN  Outcome: Progressing  5/10/2024 1616 by Lizzie Watts  Outcome: Not Progressing

## 2024-05-10 NOTE — CONSULTS
GENERAL SURGERY  CONSULT NOTE    Patient's Name/Date of Birth: Nabeel Appiah / 1960    Date: May 10, 2024     PCP: Jonh Allen DO     Chief Complaint: No chief complaint on file.      Physician Consulted: Dr. Ryder  Reason for Consult: Trach/Peg  Referring Physician: Dr. Marva HORNER  Nabeel Appiah is a 63 y.o. male PMHx past medical history of A-fib, hypertension, obesity, type II DM, AAA who presented to [facility] from [facility] for cardiac arrest on 5/1. Patient was intubated upon arrival. Patient has been seen by neurology and has an anoxic brain injury with prognosis for meaningful recovery remains poor. Patient has no NOK and per girlfriend he would not want a trach/peg however patient has no relative and poa and discussion is currently being undertaken for guardianship. Patient is on ASA. BMI 43.26. Peep 8, Fi02 55. Hgb 11.6, Plt 217. General surgery was consulted evaluation of Trach / PEG.       Past Medical History:   Diagnosis Date    A-fib (HCC)     Decreased dorsalis pedis pulse 05/04/2023    Hypertension     Infrarenal abdominal aortic aneurysm (AAA) without rupture (HCC) 05/04/2023    After accounting for the tortuosity, approximately 4 cm x 4.5 cm abdominal aortic aneurysm noted on the ultrasound, April 2023    Type 2 diabetes mellitus (HCC)        Past Surgical History:   Procedure Laterality Date    APPENDECTOMY      COLONOSCOPY      SHOULDER ARTHROPLASTY      TONSILLECTOMY         Medications Prior to Admission:    Prior to Admission medications    Medication Sig Start Date End Date Taking? Authorizing Provider   apixaban (ELIQUIS) 5 MG TABS tablet TAKE 1 TABLET TWICE A DAY 4/26/24   Jonh Allen DO   pravastatin (PRAVACHOL) 40 MG tablet Take 1 tablet by mouth in the morning and at bedtime 4/17/24   Jonh Allen DO   metoprolol succinate (TOPROL XL) 25 MG extended release tablet Take 1 tablet by mouth daily 1/12/24   Jonh Allen DO    lisinopril-hydroCHLOROthiazide (PRINZIDE;ZESTORETIC) 20-12.5 MG per tablet TAKE 2 TABLETS DAILY 5/10/23   Jonh Allen DO   metFORMIN (GLUCOPHAGE-XR) 500 MG extended release tablet Take 1 tablet by mouth 2 times daily (with meals) 5/10/23   Jonh Allen DO   sildenafil (VIAGRA) 100 MG tablet Take 1 tablet by mouth as needed for Erectile Dysfunction Take one half daily, as needed 11/11/20   Sonido Haines MD   Cholecalciferol (VITAMIN D) 50 MCG (2000 UT) TABS tablet Take 1 tablet by mouth daily 3/6/20   Mariel Murdock MD       No Known Allergies    Family History   Problem Relation Age of Onset    No Known Problems Mother     ; No family history of problems with anesthesia     Social History     Tobacco Use    Smoking status: Former     Current packs/day: 0.00     Average packs/day: 0.5 packs/day for 41.9 years (21.0 ttl pk-yrs)     Types: Cigarettes     Start date: 6/4/1979     Quit date: 4/30/2021     Years since quitting: 3.0    Smokeless tobacco: Never   Vaping Use    Vaping Use: Never used   Substance Use Topics    Alcohol use: Yes     Comment: weekly. 1 cup coffee per day    Drug use: Never         Review of Systems   Review of Systems   Unable to perform ROS: Intubated         PHYSICAL EXAM:    Vitals:    05/10/24 1613   BP:    Pulse: 61   Resp: 29   Temp:    SpO2: 95%       General appearance:  Intubated and sedated   Neck: Thick neck, Full ROM  Lungs: Mechanical ventilation   Heart: RR  Abdomen: Obese, no prior surgical scars noted      LABS:  CBC  Recent Labs     05/10/24  0429   WBC 11.4   HGB 11.6*   HCT 36.6*        BMP  Recent Labs     05/10/24  0429      K 4.1      CO2 25   BUN 35*   CREATININE 1.1   CALCIUM 9.1     Liver Function  Recent Labs     05/10/24  0429   BILITOT 0.5   AST 40*   ALT 29   ALKPHOS 67     No results for input(s): \"LACTATE\" in the last 72 hours.  No results for input(s): \"INR\" in the last 72 hours.    Invalid input(s): \"PT\",

## 2024-05-10 NOTE — PLAN OF CARE
Problem: Safety - Adult  Goal: Free from fall injury  Outcome: Progressing     Problem: Chronic Conditions and Co-morbidities  Goal: Patient's chronic conditions and co-morbidity symptoms are monitored and maintained or improved  Outcome: Progressing     Problem: Discharge Planning  Goal: Discharge to home or other facility with appropriate resources  Outcome: Progressing     Problem: Pain  Goal: Verbalizes/displays adequate comfort level or baseline comfort level  Outcome: Progressing     Problem: Neurosensory - Adult  Goal: Achieves stable or improved neurological status  Outcome: Progressing     Problem: Respiratory - Adult  Goal: Achieves optimal ventilation and oxygenation  Outcome: Progressing     Problem: Genitourinary - Adult  Goal: Urinary catheter remains patent     Problem: Skin/Tissue Integrity  Goal: Absence of new skin breakdown  Description: 1.  Monitor for areas of redness and/or skin breakdown  2.  Assess vascular access sites hourly  3.  Every 4-6 hours minimum:  Change oxygen saturation probe site  4.  Every 4-6 hours:  If on nasal continuous positive airway pressure, respiratory therapy assess nares and determine need for appliance change or resting period.  Outcome: Progressing     Problem: ABCDS Injury Assessment  Goal: Absence of physical injury  Outcome: Progressing     Problem: Nutrition Deficit:  Goal: Optimize nutritional status  Outcome: Progressing

## 2024-05-10 NOTE — PROGRESS NOTES
Hospitalist Progress Note      Synopsis: Patient admitted on 5/1/2024. The patient is a 63-year-old male with past medical history of A-fib, hypertension, obesity, type II DM, AAA who presented to [facility] for cardiac arrest. On EMS arrival reported to be in V. tach/V-fib arrest and received amiodarone and shocked x 3. Patient was noted to have ROSC prior to ED arrival. CT of the head was done in the emergency department without any intracranial findings. Further imaging revealed a small pneumothorax for which a right-sided chest tube was placed: Plan for LHC. Placed on heparin drip and transferred. CT of the chest negative for PE. CT of the abdomen and pelvis showed presence of 4.3 x 4 cm aneurysm. Patient had persistent myoclonic jerking. Placed on Clonazepam and Depakote. EEG showed a potential for generalized seizures but no associated epileptiform activity was found and myoclonic jerks were captured. Patent with aspiration pneumonia. Placed on Unasyn.  Myoclonic jerks continued. Keppra added.  Clonazepam increased to 1.5 mg every 6 hours. Heparin stopped. Transitioned to Eliquis. Completed course of Unasyn. MRI ordered and pending.      GOC have been discussed with girlfriend, but patient has no NOK as per his girlfriend. Girlfriend is trying to access his safe to see if he has a living will. Needs 2 doctor sign off for medical decisions at present.     Assessment and Plan     Cardiac arrest secondary to ACS -cardiology following, plans for cardiac catheterization however continues to have poor prognosis in terms of neurologic recovery, now off heparin drip s/p rewarming   V-fib/V. Tach -now off lidocaine drip  Persistent Afib - on Eliquis at home, completed heparin infusion and transitioned to Eliquis now stopped for new subdural hematoma.    Aspiration pneumonia - growing gram-positive cocci in pairs on respiratory culture, completed Unasyn.  Acute hypoxic respiratory failure secondary to cardiac  Nebulization TID RT    nystatin  5 mL Oral 4x Daily    aspirin  81 mg Oral Daily    clonazePAM  1.5 mg Oral Q6H    [Held by provider] lisinopril  40 mg Oral Daily    And    [Held by provider] hydroCHLOROthiazide  25 mg Oral Daily    acetylcysteine  4 mL Inhalation TID RT    thiamine  100 mg IntraVENous Daily    levETIRAcetam  1,500 mg IntraVENous Q12H    insulin lispro  0-4 Units SubCUTAneous Q4H    sodium chloride flush  5-40 mL IntraVENous 2 times per day    pantoprazole (PROTONIX) 40 mg in sodium chloride (PF) 0.9 % 10 mL injection  40 mg IntraVENous Daily    chlorhexidine  15 mL Mouth/Throat BID    atorvastatin  40 mg Oral Nightly     PRN Meds: midazolam, hydrALAZINE, labetalol, sodium chloride flush, sodium chloride, ondansetron **OR** ondansetron, polyethylene glycol, acetaminophen **OR** acetaminophen, glucose, dextrose bolus **OR** dextrose bolus, glucagon (rDNA), dextrose, artificial tears    I/O    Intake/Output Summary (Last 24 hours) at 5/10/2024 1707  Last data filed at 5/10/2024 1413  Gross per 24 hour   Intake 2944.09 ml   Output 3550 ml   Net -605.91 ml       Labs:   Recent Labs     05/08/24  0429 05/09/24  0400 05/10/24  0429   WBC 9.6 10.1 11.4   HGB 11.4* 11.2* 11.6*   HCT 35.7* 35.8* 36.6*    207 217       Recent Labs     05/08/24  0429 05/09/24  0400 05/10/24  0429    140 141   K 4.2 3.9 4.1    105 103   CO2 25 24 25   BUN 30* 35* 35*   CREATININE 1.0 1.1 1.1   CALCIUM 9.0 8.9 9.1   PHOS 3.1 3.3 3.5       Recent Labs     05/08/24  0429 05/09/24  0400 05/10/24  0429   ALKPHOS 53 52 67   ALT 32 25 29   AST 34 31 40*   BILITOT 0.5 0.4 0.5         Chronic labs:  Lab Results   Component Value Date    CHOL 149 04/06/2024    TRIG 98 04/06/2024    HDL 44 04/06/2024    TSH 0.96 04/06/2024    PSA 1.55 04/24/2024    INR 1.3 04/29/2024    LABA1C 6.4 (H) 04/06/2024       +++++++++++++++++++++++++++++++++++++++++++++++++  Perla Hodges DO   [facility]

## 2024-05-10 NOTE — PLAN OF CARE
Problem: Discharge Planning  Goal: Discharge to home or other facility with appropriate resources  Outcome: Not Progressing  Flowsheets (Taken 5/10/2024 0800)  Discharge to home or other facility with appropriate resources: Identify barriers to discharge with patient and caregiver     Problem: Safety - Adult  Goal: Free from fall injury  Outcome: Progressing     Problem: Chronic Conditions and Co-morbidities  Goal: Patient's chronic conditions and co-morbidity symptoms are monitored and maintained or improved  Outcome: Progressing  Flowsheets (Taken 5/10/2024 0800)  Care Plan - Patient's Chronic Conditions and Co-Morbidity Symptoms are Monitored and Maintained or Improved:   Monitor and assess patient's chronic conditions and comorbid symptoms for stability, deterioration, or improvement   Collaborate with multidisciplinary team to address chronic and comorbid conditions and prevent exacerbation or deterioration     Problem: Pain  Goal: Verbalizes/displays adequate comfort level or baseline comfort level  Outcome: Progressing  Flowsheets (Taken 5/10/2024 0800)  Verbalizes/displays adequate comfort level or baseline comfort level:   Assess pain using appropriate pain scale   Administer analgesics based on type and severity of pain and evaluate response   Implement non-pharmacological measures as appropriate and evaluate response     Problem: Neurosensory - Adult  Goal: Achieves stable or improved neurological status  Outcome: Progressing  Flowsheets (Taken 5/10/2024 0800)  Achieves stable or improved neurological status:   Assess for and report changes in neurological status   Initiate measures to prevent increased intracranial pressure   Maintain blood pressure and fluid volume within ordered parameters to optimize cerebral perfusion and minimize risk of hemorrhage   Monitor temperature, glucose, and sodium. Initiate appropriate interventions as ordered     Problem: Respiratory - Adult  Goal: Achieves optimal  ventilation and oxygenation  Outcome: Progressing  Flowsheets (Taken 5/10/2024 0800)  Achieves optimal ventilation and oxygenation:   Assess for changes in respiratory status   Assess for changes in mentation and behavior   Position to facilitate oxygenation and minimize respiratory effort   Oxygen supplementation based on oxygen saturation or arterial blood gases   Encourage broncho-pulmonary hygiene including cough, deep breathe, incentive spirometry   Assess the need for suctioning and aspirate as needed   Respiratory therapy support as indicated     Problem: Skin/Tissue Integrity  Goal: Absence of new skin breakdown  Description: 1.  Monitor for areas of redness and/or skin breakdown  2.  Assess vascular access sites hourly  3.  Every 4-6 hours minimum:  Change oxygen saturation probe site  4.  Every 4-6 hours:  If on nasal continuous positive airway pressure, respiratory therapy assess nares and determine need for appliance change or resting period.  Outcome: Progressing     Problem: ABCDS Injury Assessment  Goal: Absence of physical injury  Outcome: Progressing  Flowsheets (Taken 5/10/2024 0909)  Absence of Physical Injury: Implement safety measures based on patient assessment     Problem: Nutrition Deficit:  Goal: Optimize nutritional status  Outcome: Progressing     Problem: Discharge Planning  Goal: Discharge to home or other facility with appropriate resources  Outcome: Not Progressing  Flowsheets (Taken 5/10/2024 0800)  Discharge to home or other facility with appropriate resources: Identify barriers to discharge with patient and caregiver

## 2024-05-10 NOTE — PROGRESS NOTES
Patient does not me brain death.  Catastropic event with anoxic injury with like permanent injury  No family  Called work even to determine  Has a girlfriend without POA  Needs trach/PEG    Will sign for trach and PEG but I (personally) will not sign to remove support.  Discussed with surgical team    Ulisses Perez DO, MPH, FCCP, ALANNA, FACP  Director, [facility]  Professor of Internal Medicine  [facility]  [employer] [employer] [facility] [employer]  [address]  [place] [id number]

## 2024-05-11 LAB
AADO2: 227.7 MMHG
AADO2: 252.7 MMHG
ALBUMIN SERPL-MCNC: 2.9 G/DL (ref 3.5–5.2)
ALP SERPL-CCNC: 78 U/L (ref 40–129)
ALT SERPL-CCNC: 30 U/L (ref 0–40)
ANION GAP SERPL CALCULATED.3IONS-SCNC: 13 MMOL/L (ref 7–16)
AST SERPL-CCNC: 43 U/L (ref 0–39)
B.E.: 2.8 MMOL/L (ref -3–3)
B.E.: 6.4 MMOL/L (ref -3–3)
BASOPHILS # BLD: 0.09 K/UL (ref 0–0.2)
BASOPHILS NFR BLD: 1 % (ref 0–2)
BILIRUB SERPL-MCNC: 0.4 MG/DL (ref 0–1.2)
BUN SERPL-MCNC: 43 MG/DL (ref 6–23)
CALCIUM SERPL-MCNC: 8.8 MG/DL (ref 8.6–10.2)
CHLORIDE SERPL-SCNC: 100 MMOL/L (ref 98–107)
CO2 SERPL-SCNC: 25 MMOL/L (ref 22–29)
COHB: 0.5 % (ref 0–1.5)
COHB: 0.5 % (ref 0–1.5)
CREAT SERPL-MCNC: 1.1 MG/DL (ref 0.7–1.2)
CRITICAL: ABNORMAL
CRITICAL: ABNORMAL
DATE ANALYZED: ABNORMAL
DATE ANALYZED: ABNORMAL
DATE OF COLLECTION: ABNORMAL
DATE OF COLLECTION: ABNORMAL
EOSINOPHIL # BLD: 0.34 K/UL (ref 0.05–0.5)
EOSINOPHILS RELATIVE PERCENT: 2 % (ref 0–6)
ERYTHROCYTE [DISTWIDTH] IN BLOOD BY AUTOMATED COUNT: 13.2 % (ref 11.5–15)
FIO2: 50 %
FIO2: 55 %
GFR, ESTIMATED: 78 ML/MIN/1.73M2
GLUCOSE BLD-MCNC: 174 MG/DL (ref 74–99)
GLUCOSE SERPL-MCNC: 167 MG/DL (ref 74–99)
HCO3: 26.7 MMOL/L (ref 22–26)
HCO3: 29.5 MMOL/L (ref 22–26)
HCT VFR BLD AUTO: 36.1 % (ref 37–54)
HGB BLD-MCNC: 11.6 G/DL (ref 12.5–16.5)
HHB: 3.2 % (ref 0–5)
HHB: 3.2 % (ref 0–5)
IMM GRANULOCYTES # BLD AUTO: 0.1 K/UL (ref 0–0.58)
IMM GRANULOCYTES NFR BLD: 1 % (ref 0–5)
LAB: ABNORMAL
LAB: ABNORMAL
LYMPHOCYTES NFR BLD: 1.03 K/UL (ref 1.5–4)
LYMPHOCYTES RELATIVE PERCENT: 7 % (ref 20–42)
Lab: 1753
Lab: 400
MAGNESIUM SERPL-MCNC: 2.4 MG/DL (ref 1.6–2.6)
MCH RBC QN AUTO: 30.9 PG (ref 26–35)
MCHC RBC AUTO-ENTMCNC: 32.1 G/DL (ref 32–34.5)
MCV RBC AUTO: 96 FL (ref 80–99.9)
METHB: 0.2 % (ref 0–1.5)
METHB: 0.3 % (ref 0–1.5)
MODE: AC
MODE: AC
MONOCYTES NFR BLD: 1.08 K/UL (ref 0.1–0.95)
MONOCYTES NFR BLD: 8 % (ref 2–12)
NEUTROPHILS NFR BLD: 82 % (ref 43–80)
NEUTS SEG NFR BLD: 11.65 K/UL (ref 1.8–7.3)
O2 CONTENT: 17.1 ML/DL
O2 CONTENT: 17.4 ML/DL
O2 SATURATION: 96.8 % (ref 92–98.5)
O2 SATURATION: 96.8 % (ref 92–98.5)
O2HB: 96 % (ref 94–97)
O2HB: 96.1 % (ref 94–97)
OPERATOR ID: 2863
OPERATOR ID: ABNORMAL
PATIENT TEMP: 37 C
PATIENT TEMP: 37 C
PCO2: 37 MMHG (ref 35–45)
PCO2: 38.8 MMHG (ref 35–45)
PEEP/CPAP: 8 CMH2O
PEEP/CPAP: 8 CMH2O
PFO2: 1.74 MMHG/%
PFO2: 1.75 MMHG/%
PH BLOOD GAS: 7.46 (ref 7.35–7.45)
PH BLOOD GAS: 7.52 (ref 7.35–7.45)
PHOSPHATE SERPL-MCNC: 4 MG/DL (ref 2.5–4.5)
PLATELET # BLD AUTO: 210 K/UL (ref 130–450)
PMV BLD AUTO: 11.9 FL (ref 7–12)
PO2: 87.2 MMHG (ref 75–100)
PO2: 96.3 MMHG (ref 75–100)
POTASSIUM SERPL-SCNC: 4.5 MMOL/L (ref 3.5–5)
PROT SERPL-MCNC: 6 G/DL (ref 6.4–8.3)
RBC # BLD AUTO: 3.76 M/UL (ref 3.8–5.8)
RI(T): 2.61
RI(T): 2.62
RR MECHANICAL: 16 B/MIN
RR MECHANICAL: 16 B/MIN
SODIUM SERPL-SCNC: 138 MMOL/L (ref 132–146)
SOURCE, BLOOD GAS: ABNORMAL
SOURCE, BLOOD GAS: ABNORMAL
THB: 12.6 G/DL (ref 11.5–16.5)
THB: 12.8 G/DL (ref 11.5–16.5)
TIME ANALYZED: 1758
TIME ANALYZED: 418
VT MECHANICAL: 450 ML
VT MECHANICAL: 450 ML
WBC OTHER # BLD: 14.3 K/UL (ref 4.5–11.5)

## 2024-05-11 PROCEDURE — 82805 BLOOD GASES W/O2 SATURATION: CPT

## 2024-05-11 PROCEDURE — 83735 ASSAY OF MAGNESIUM: CPT

## 2024-05-11 PROCEDURE — 99291 CRITICAL CARE FIRST HOUR: CPT | Performed by: INTERNAL MEDICINE

## 2024-05-11 PROCEDURE — 6360000002 HC RX W HCPCS

## 2024-05-11 PROCEDURE — 82962 GLUCOSE BLOOD TEST: CPT

## 2024-05-11 PROCEDURE — 99231 SBSQ HOSP IP/OBS SF/LOW 25: CPT | Performed by: INTERNAL MEDICINE

## 2024-05-11 PROCEDURE — 94669 MECHANICAL CHEST WALL OSCILL: CPT

## 2024-05-11 PROCEDURE — 6360000002 HC RX W HCPCS: Performed by: STUDENT IN AN ORGANIZED HEALTH CARE EDUCATION/TRAINING PROGRAM

## 2024-05-11 PROCEDURE — 6370000000 HC RX 637 (ALT 250 FOR IP)

## 2024-05-11 PROCEDURE — 2580000003 HC RX 258

## 2024-05-11 PROCEDURE — 2500000003 HC RX 250 WO HCPCS

## 2024-05-11 PROCEDURE — 6370000000 HC RX 637 (ALT 250 FOR IP): Performed by: STUDENT IN AN ORGANIZED HEALTH CARE EDUCATION/TRAINING PROGRAM

## 2024-05-11 PROCEDURE — 2000000000 HC ICU R&B

## 2024-05-11 PROCEDURE — A4216 STERILE WATER/SALINE, 10 ML: HCPCS

## 2024-05-11 PROCEDURE — 85025 COMPLETE CBC W/AUTO DIFF WBC: CPT

## 2024-05-11 PROCEDURE — 94640 AIRWAY INHALATION TREATMENT: CPT

## 2024-05-11 PROCEDURE — 84100 ASSAY OF PHOSPHORUS: CPT

## 2024-05-11 PROCEDURE — 6370000000 HC RX 637 (ALT 250 FOR IP): Performed by: INTERNAL MEDICINE

## 2024-05-11 PROCEDURE — 80053 COMPREHEN METABOLIC PANEL: CPT

## 2024-05-11 PROCEDURE — 6360000002 HC RX W HCPCS: Performed by: PSYCHIATRY & NEUROLOGY

## 2024-05-11 PROCEDURE — C9113 INJ PANTOPRAZOLE SODIUM, VIA: HCPCS

## 2024-05-11 PROCEDURE — 94003 VENT MGMT INPAT SUBQ DAY: CPT

## 2024-05-11 RX ORDER — FUROSEMIDE 10 MG/ML
40 INJECTION INTRAMUSCULAR; INTRAVENOUS DAILY
Status: DISCONTINUED | OUTPATIENT
Start: 2024-05-11 | End: 2024-05-18

## 2024-05-11 RX ADMIN — NYSTATIN 500000 UNITS: 100000 SUSPENSION ORAL at 20:53

## 2024-05-11 RX ADMIN — ALBUTEROL SULFATE 2.5 MG: 2.5 SOLUTION RESPIRATORY (INHALATION) at 20:29

## 2024-05-11 RX ADMIN — SODIUM CHLORIDE, PRESERVATIVE FREE 10 ML: 5 INJECTION INTRAVENOUS at 20:53

## 2024-05-11 RX ADMIN — ALBUTEROL SULFATE 2.5 MG: 2.5 SOLUTION RESPIRATORY (INHALATION) at 14:44

## 2024-05-11 RX ADMIN — LEVETIRACETAM 1500 MG: 500 INJECTION, SOLUTION, CONCENTRATE INTRAVENOUS at 20:53

## 2024-05-11 RX ADMIN — ATORVASTATIN CALCIUM 40 MG: 40 TABLET, FILM COATED ORAL at 20:53

## 2024-05-11 RX ADMIN — ACETAZOLAMIDE SODIUM 500 MG: 500 INJECTION, POWDER, LYOPHILIZED, FOR SOLUTION INTRAVENOUS at 16:43

## 2024-05-11 RX ADMIN — SODIUM CHLORIDE, PRESERVATIVE FREE 40 MG: 5 INJECTION INTRAVENOUS at 08:27

## 2024-05-11 RX ADMIN — ASPIRIN 81 MG CHEWABLE TABLET 81 MG: 81 TABLET CHEWABLE at 08:27

## 2024-05-11 RX ADMIN — THIAMINE HYDROCHLORIDE 100 MG: 100 INJECTION, SOLUTION INTRAMUSCULAR; INTRAVENOUS at 08:27

## 2024-05-11 RX ADMIN — NYSTATIN 500000 UNITS: 100000 SUSPENSION ORAL at 16:43

## 2024-05-11 RX ADMIN — ACETYLCYSTEINE 400 MG: 100 SOLUTION ORAL; RESPIRATORY (INHALATION) at 08:06

## 2024-05-11 RX ADMIN — NYSTATIN 500000 UNITS: 100000 SUSPENSION ORAL at 08:27

## 2024-05-11 RX ADMIN — FUROSEMIDE 40 MG: 10 INJECTION, SOLUTION INTRAMUSCULAR; INTRAVENOUS at 12:58

## 2024-05-11 RX ADMIN — ACETAZOLAMIDE SODIUM 500 MG: 500 INJECTION, POWDER, LYOPHILIZED, FOR SOLUTION INTRAVENOUS at 23:23

## 2024-05-11 RX ADMIN — ACETYLCYSTEINE 400 MG: 100 SOLUTION ORAL; RESPIRATORY (INHALATION) at 14:44

## 2024-05-11 RX ADMIN — Medication 15 ML: at 08:27

## 2024-05-11 RX ADMIN — NYSTATIN 500000 UNITS: 100000 SUSPENSION ORAL at 12:59

## 2024-05-11 RX ADMIN — ACETYLCYSTEINE 400 MG: 100 SOLUTION ORAL; RESPIRATORY (INHALATION) at 20:32

## 2024-05-11 RX ADMIN — LEVETIRACETAM 1500 MG: 500 INJECTION, SOLUTION, CONCENTRATE INTRAVENOUS at 08:27

## 2024-05-11 RX ADMIN — VALPROATE SODIUM 1500 MG: 100 INJECTION, SOLUTION INTRAVENOUS at 16:43

## 2024-05-11 RX ADMIN — CLONAZEPAM 1.5 MG: 0.5 TABLET ORAL at 06:06

## 2024-05-11 RX ADMIN — Medication 15 ML: at 20:53

## 2024-05-11 RX ADMIN — CLONAZEPAM 1.5 MG: 0.5 TABLET ORAL at 16:43

## 2024-05-11 RX ADMIN — ALBUTEROL SULFATE 2.5 MG: 2.5 SOLUTION RESPIRATORY (INHALATION) at 08:06

## 2024-05-11 RX ADMIN — VALPROATE SODIUM 1500 MG: 100 INJECTION, SOLUTION INTRAVENOUS at 00:17

## 2024-05-11 RX ADMIN — CLONAZEPAM 1.5 MG: 0.5 TABLET ORAL at 11:34

## 2024-05-11 RX ADMIN — CLONAZEPAM 1.5 MG: 0.5 TABLET ORAL at 23:23

## 2024-05-11 ASSESSMENT — PULMONARY FUNCTION TESTS
PIF_VALUE: 20
PIF_VALUE: 25
PIF_VALUE: 28
PIF_VALUE: 25
PIF_VALUE: 21
PIF_VALUE: 20
PIF_VALUE: 18
PIF_VALUE: 22
PIF_VALUE: 21
PIF_VALUE: 20
PIF_VALUE: 22
PIF_VALUE: 21
PIF_VALUE: 20
PIF_VALUE: 21
PIF_VALUE: 22
PIF_VALUE: 20
PIF_VALUE: 22
PIF_VALUE: 20
PIF_VALUE: 21
PIF_VALUE: 24
PIF_VALUE: 21
PIF_VALUE: 18
PIF_VALUE: 22
PIF_VALUE: 23

## 2024-05-11 ASSESSMENT — PAIN SCALES - GENERAL
PAINLEVEL_OUTOF10: 0

## 2024-05-11 NOTE — PROGRESS NOTES
[facility]  Department of Internal Medicine   Internal Medicine Residency   MICU Progress Note    Patient:  Nabeel Appiah 63 y.o. male  MRN: [id number]     Date of Service: 5/11/2024    Allergy: Patient has no known allergies.    Overnight Events: No acute overnight events. Repeat CT head showing decrease in volume and density of subdural hematoma. Lisinopril, HCTZ, Toprol-XL  discontinued.     Subjective     Patient seen and examined at bedside this AM. Remains unresponsive, intubated.     ROS: unable to be achieved due to current mental status    Objective     VS: /65   Pulse 59   Temp 97.5 °F (36.4 °C) (Temporal)   Resp 25   Ht 1.905 m (6' 3\")   Wt (!) 157 kg (346 lb 2 oz)   SpO2 96%   BMI 43.26 kg/m²   ABP (Arterial line BP): 100/50  ABP Mean (Arterial Line Mean): (!) 64 mmHg    I & O - 24hr:   Intake/Output Summary (Last 24 hours) at 5/11/2024 1146  Last data filed at 5/11/2024 0614  Gross per 24 hour   Intake 3072.44 ml   Output 3200 ml   Net -127.56 ml       Sedatives: N/A.     Pressors: n/a    Oxygen: Intubated 450/16/55/8    ABG:     Lab Results   Component Value Date/Time    PH 7.456 05/11/2024 04:00 AM    PCO2 38.8 05/11/2024 04:00 AM    PO2 96.3 05/11/2024 04:00 AM    HCO3 26.7 05/11/2024 04:00 AM    BE 2.8 05/11/2024 04:00 AM    THB 12.8 05/11/2024 04:00 AM    O2SAT 96.8 05/11/2024 04:00 AM       Physical Exam:  General Appearance: AO x 0, not following commands    Neuro:Upward gaze, +pupillary reflex, absent corneal reflex, absent gag reflex, +cough reflex, withdrawing to noxious stimuli, myoclonic jerks reduced in frequency from prior exam   Cardiovascular: regular rate and rhythm, S1 and S2 heard, no murmurs appreciated   Respiratory: Mechanically ventilated lung sounds. No wheezing or crackles appreciated.  Abdomen: Softly distended  Extremities: Bilateral upper extremity edema- improving, no cyanosis, distal pulses intact    Lines & Urinary Catheter:     External  Urinary Catheter since 05/03  ETT since 04/30  Fecal management system since 05/07  NG tube since 05/03     Labs     Basic Labs    Complete Blood Count:   Recent Labs     05/09/24  0400 05/10/24  0429 05/11/24  0402   WBC 10.1 11.4 14.3*   HGB 11.2* 11.6* 11.6*   HCT 35.8* 36.6* 36.1*    217 210        Last 3 Blood Glucose:   Recent Labs     05/10/24  0429 05/10/24  2010 05/11/24  0402   GLUCOSE 154* 148* 167*        PT/INR:    Lab Results   Component Value Date/Time    PROTIME 13.9 04/29/2024 02:51 PM    INR 1.3 04/29/2024 02:51 PM     PTT:    Lab Results   Component Value Date/Time    APTT 68.4 05/03/2024 04:08 AM       Comprehensive Metabolic Profile:   Recent Labs     05/10/24  0429 05/10/24  2010 05/11/24  0402    140 138   K 4.1 4.4 4.5    101 100   CO2 25 28 25   BUN 35* 37* 43*   CREATININE 1.1 1.0 1.1   GLUCOSE 154* 148* 167*   CALCIUM 9.1 9.7 8.8   BILITOT 0.5  --  0.4   ALKPHOS 67  --  78   AST 40*  --  43*   ALT 29  --  30      Magnesium:   Lab Results   Component Value Date/Time    MG 2.4 05/11/2024 04:02 AM     Phosphorus:   Lab Results   Component Value Date/Time    PHOS 4.0 05/11/2024 04:02 AM     Ionized Calcium:   Lab Results   Component Value Date/Time    CAION 1.18 05/01/2024 03:12 AM      Troponin: No results for input(s): \"TROPONINI\" in the last 72 hours.    Imaging Studies:  CT HEAD WO CONTRAST   Final Result   1. Mild interval decrease in volume and density of the posterior right para   falcine subdural hematoma.   2. No new intracranial hemorrhage.   3. Fluid opacification of the mastoid air cells is unchanged.         CT HEAD WO CONTRAST   Final Result   New right parasagittal subdural hematoma along the posterior falx without   significant mass effect or midline shift.      Worsening sinusitis involving maxillary, ethmoid and sphenoid sinuses.      New bilateral mastoiditis.         XR CHEST PORTABLE   Final Result   1. ETT 3.4 cm above the milla.   2. Small effusions.

## 2024-05-11 NOTE — PROGRESS NOTES
Hospitalist Progress Note      Synopsis: Patient admitted on 5/1/2024. The patient is a 63-year-old male with past medical history of A-fib, hypertension, obesity, type II DM, AAA who presented to [facility] for cardiac arrest. On EMS arrival reported to be in V. tach/V-fib arrest and received amiodarone and shocked x 3. Patient was noted to have ROSC prior to ED arrival. CT of the head was done in the emergency department without any intracranial findings. Further imaging revealed a small pneumothorax for which a right-sided chest tube was placed: Plan for LHC. Placed on heparin drip and transferred. CT of the chest negative for PE. CT of the abdomen and pelvis showed presence of 4.3 x 4 cm aneurysm. Patient had persistent myoclonic jerking. Placed on Clonazepam and Depakote. EEG showed a potential for generalized seizures but no associated epileptiform activity was found and myoclonic jerks were captured. Patent with aspiration pneumonia. Placed on Unasyn.  Myoclonic jerks continued. Keppra added.  Clonazepam increased to 1.5 mg every 6 hours. Heparin stopped. Transitioned to Eliquis. Completed course of Unasyn. Repeat CT showed subdural hematoma. Eliquis stopped. Repeat CTH showed stability of subdural.     GOC have been discussed with girlfriend, but patient has no NOK as per his girlfriend. Girlfriend is trying to access his safe to see if he has a living will. Needs 2 doctor sign off for medical decisions at present. Critical care doc will not sign for withdrawal of care and would like to proceed with Trach/PEG    Assessment and Plan     Cardiac arrest secondary to ACS -cardiology following, plans for cardiac catheterization however continues to have poor prognosis in terms of neurologic recovery, now off heparin drip s/p rewarming   V-fib/V. Tach -now off lidocaine drip  Persistent Afib - on Eliquis at home, completed heparin infusion and transitioned to Eliquis now stopped for new subdural hematoma.      Scheduled Medications    [Held by provider] metoprolol tartrate  12.5 mg Oral BID    valproate (DEPACON) 1,500 mg in sodium chloride 0.9 % 100 mL IVPB  1,500 mg IntraVENous Q12H    albuterol  2.5 mg Nebulization TID RT    nystatin  5 mL Oral 4x Daily    aspirin  81 mg Oral Daily    clonazePAM  1.5 mg Oral Q6H    [Held by provider] lisinopril  40 mg Oral Daily    And    [Held by provider] hydroCHLOROthiazide  25 mg Oral Daily    acetylcysteine  4 mL Inhalation TID RT    thiamine  100 mg IntraVENous Daily    levETIRAcetam  1,500 mg IntraVENous Q12H    insulin lispro  0-4 Units SubCUTAneous Q4H    sodium chloride flush  5-40 mL IntraVENous 2 times per day    pantoprazole (PROTONIX) 40 mg in sodium chloride (PF) 0.9 % 10 mL injection  40 mg IntraVENous Daily    chlorhexidine  15 mL Mouth/Throat BID    atorvastatin  40 mg Oral Nightly     PRN Meds: midazolam, hydrALAZINE, labetalol, sodium chloride flush, sodium chloride, ondansetron **OR** ondansetron, polyethylene glycol, acetaminophen **OR** acetaminophen, glucose, dextrose bolus **OR** dextrose bolus, glucagon (rDNA), dextrose, artificial tears    I/O    Intake/Output Summary (Last 24 hours) at 5/11/2024 1029  Last data filed at 5/11/2024 0614  Gross per 24 hour   Intake 3072.44 ml   Output 3200 ml   Net -127.56 ml       Labs:   Recent Labs     05/09/24  0400 05/10/24  0429 05/11/24  0402   WBC 10.1 11.4 14.3*   HGB 11.2* 11.6* 11.6*   HCT 35.8* 36.6* 36.1*    217 210       Recent Labs     05/09/24  0400 05/10/24  0429 05/10/24  2010 05/11/24  0402    141 140 138   K 3.9 4.1 4.4 4.5    103 101 100   CO2 24 25 28 25   BUN 35* 35* 37* 43*   CREATININE 1.1 1.1 1.0 1.1   CALCIUM 8.9 9.1 9.7 8.8   PHOS 3.3 3.5  --  4.0       Recent Labs     05/09/24  0400 05/10/24  0429 05/11/24  0402   ALKPHOS 52 67 78   ALT 25 29 30   AST 31 40* 43*   BILITOT 0.4 0.5 0.4         Chronic labs:  Lab Results   Component Value Date    CHOL 149 04/06/2024    TRIG 98

## 2024-05-11 NOTE — PLAN OF CARE
Problem: Safety - Adult  Goal: Free from fall injury  Outcome: Progressing     Problem: Respiratory - Adult  Goal: Achieves optimal ventilation and oxygenation  Outcome: Progressing     Problem: Skin/Tissue Integrity  Goal: Absence of new skin breakdown  Description: 1.  Monitor for areas of redness and/or skin breakdown  2.  Assess vascular access sites hourly  3.  Every 4-6 hours minimum:  Change oxygen saturation probe site  4.  Every 4-6 hours:  If on nasal continuous positive airway pressure, respiratory therapy assess nares and determine need for appliance change or resting period.  Outcome: Progressing     Problem: ABCDS Injury Assessment  Goal: Absence of physical injury  Outcome: Progressing     Problem: Nutrition Deficit:  Goal: Optimize nutritional status  Outcome: Progressing

## 2024-05-12 LAB
AADO2: 205.1 MMHG
ALBUMIN SERPL-MCNC: 3.2 G/DL (ref 3.5–5.2)
ALP SERPL-CCNC: 88 U/L (ref 40–129)
ALT SERPL-CCNC: 27 U/L (ref 0–40)
ANION GAP SERPL CALCULATED.3IONS-SCNC: 15 MMOL/L (ref 7–16)
AST SERPL-CCNC: 41 U/L (ref 0–39)
B.E.: 0.9 MMOL/L (ref -3–3)
BASOPHILS # BLD: 0.07 K/UL (ref 0–0.2)
BASOPHILS NFR BLD: 1 % (ref 0–2)
BILIRUB SERPL-MCNC: 0.5 MG/DL (ref 0–1.2)
BUN SERPL-MCNC: 45 MG/DL (ref 6–23)
CALCIUM SERPL-MCNC: 9.1 MG/DL (ref 8.6–10.2)
CHLORIDE SERPL-SCNC: 101 MMOL/L (ref 98–107)
CO2 SERPL-SCNC: 25 MMOL/L (ref 22–29)
COHB: 0.6 % (ref 0–1.5)
CREAT SERPL-MCNC: 1 MG/DL (ref 0.7–1.2)
CRITICAL: ABNORMAL
DATE ANALYZED: ABNORMAL
DATE OF COLLECTION: ABNORMAL
EOSINOPHIL # BLD: 0.25 K/UL (ref 0.05–0.5)
EOSINOPHILS RELATIVE PERCENT: 2 % (ref 0–6)
ERYTHROCYTE [DISTWIDTH] IN BLOOD BY AUTOMATED COUNT: 13.4 % (ref 11.5–15)
FIO2: 45 %
GFR, ESTIMATED: 82 ML/MIN/1.73M2
GLUCOSE BLD-MCNC: 158 MG/DL (ref 74–99)
GLUCOSE BLD-MCNC: 176 MG/DL (ref 74–99)
GLUCOSE BLD-MCNC: 176 MG/DL (ref 74–99)
GLUCOSE BLD-MCNC: 180 MG/DL (ref 74–99)
GLUCOSE BLD-MCNC: 189 MG/DL (ref 74–99)
GLUCOSE BLD-MCNC: 196 MG/DL (ref 74–99)
GLUCOSE SERPL-MCNC: 164 MG/DL (ref 74–99)
HCO3: 24.5 MMOL/L (ref 22–26)
HCT VFR BLD AUTO: 36.2 % (ref 37–54)
HGB BLD-MCNC: 11.9 G/DL (ref 12.5–16.5)
HHB: 5.4 % (ref 0–5)
IMM GRANULOCYTES # BLD AUTO: 0.1 K/UL (ref 0–0.58)
IMM GRANULOCYTES NFR BLD: 1 % (ref 0–5)
LAB: ABNORMAL
LYMPHOCYTES NFR BLD: 1 K/UL (ref 1.5–4)
LYMPHOCYTES RELATIVE PERCENT: 7 % (ref 20–42)
Lab: 355
MAGNESIUM SERPL-MCNC: 2.7 MG/DL (ref 1.6–2.6)
MCH RBC QN AUTO: 31.2 PG (ref 26–35)
MCHC RBC AUTO-ENTMCNC: 32.9 G/DL (ref 32–34.5)
MCV RBC AUTO: 95 FL (ref 80–99.9)
METHB: 0.3 % (ref 0–1.5)
MODE: AC
MONOCYTES NFR BLD: 1.51 K/UL (ref 0.1–0.95)
MONOCYTES NFR BLD: 10 % (ref 2–12)
NEUTROPHILS NFR BLD: 81 % (ref 43–80)
NEUTS SEG NFR BLD: 12.27 K/UL (ref 1.8–7.3)
O2 CONTENT: 16.8 ML/DL
O2 SATURATION: 94.6 % (ref 92–98.5)
O2HB: 93.7 % (ref 94–97)
OPERATOR ID: ABNORMAL
PATIENT TEMP: 37 C
PCO2: 35.8 MMHG (ref 35–45)
PEEP/CPAP: 8 CMH2O
PFO2: 1.67 MMHG/%
PH BLOOD GAS: 7.45 (ref 7.35–7.45)
PHOSPHATE SERPL-MCNC: 3.8 MG/DL (ref 2.5–4.5)
PLATELET # BLD AUTO: 207 K/UL (ref 130–450)
PMV BLD AUTO: 12.2 FL (ref 7–12)
PO2: 75 MMHG (ref 75–100)
POTASSIUM SERPL-SCNC: 4 MMOL/L (ref 3.5–5)
PROT SERPL-MCNC: 6.6 G/DL (ref 6.4–8.3)
RBC # BLD AUTO: 3.81 M/UL (ref 3.8–5.8)
RBC # BLD: ABNORMAL 10*6/UL
RI(T): 2.74
RR MECHANICAL: 16 B/MIN
SODIUM SERPL-SCNC: 141 MMOL/L (ref 132–146)
SOURCE, BLOOD GAS: ABNORMAL
THB: 12.7 G/DL (ref 11.5–16.5)
TIME ANALYZED: 357
VT MECHANICAL: 450 ML
WBC OTHER # BLD: 15.2 K/UL (ref 4.5–11.5)

## 2024-05-12 PROCEDURE — 2500000003 HC RX 250 WO HCPCS

## 2024-05-12 PROCEDURE — 6370000000 HC RX 637 (ALT 250 FOR IP): Performed by: STUDENT IN AN ORGANIZED HEALTH CARE EDUCATION/TRAINING PROGRAM

## 2024-05-12 PROCEDURE — 85025 COMPLETE CBC W/AUTO DIFF WBC: CPT

## 2024-05-12 PROCEDURE — 2580000003 HC RX 258

## 2024-05-12 PROCEDURE — 6370000000 HC RX 637 (ALT 250 FOR IP)

## 2024-05-12 PROCEDURE — 83735 ASSAY OF MAGNESIUM: CPT

## 2024-05-12 PROCEDURE — 94003 VENT MGMT INPAT SUBQ DAY: CPT

## 2024-05-12 PROCEDURE — 6360000002 HC RX W HCPCS

## 2024-05-12 PROCEDURE — 6370000000 HC RX 637 (ALT 250 FOR IP): Performed by: INTERNAL MEDICINE

## 2024-05-12 PROCEDURE — 6360000002 HC RX W HCPCS: Performed by: STUDENT IN AN ORGANIZED HEALTH CARE EDUCATION/TRAINING PROGRAM

## 2024-05-12 PROCEDURE — 6360000002 HC RX W HCPCS: Performed by: PSYCHIATRY & NEUROLOGY

## 2024-05-12 PROCEDURE — 84100 ASSAY OF PHOSPHORUS: CPT

## 2024-05-12 PROCEDURE — 99291 CRITICAL CARE FIRST HOUR: CPT | Performed by: INTERNAL MEDICINE

## 2024-05-12 PROCEDURE — 82962 GLUCOSE BLOOD TEST: CPT

## 2024-05-12 PROCEDURE — 94669 MECHANICAL CHEST WALL OSCILL: CPT

## 2024-05-12 PROCEDURE — 94640 AIRWAY INHALATION TREATMENT: CPT

## 2024-05-12 PROCEDURE — A4216 STERILE WATER/SALINE, 10 ML: HCPCS

## 2024-05-12 PROCEDURE — 80053 COMPREHEN METABOLIC PANEL: CPT

## 2024-05-12 PROCEDURE — 82805 BLOOD GASES W/O2 SATURATION: CPT

## 2024-05-12 PROCEDURE — 2000000000 HC ICU R&B

## 2024-05-12 PROCEDURE — C9113 INJ PANTOPRAZOLE SODIUM, VIA: HCPCS

## 2024-05-12 RX ADMIN — ACETYLCYSTEINE 400 MG: 100 SOLUTION ORAL; RESPIRATORY (INHALATION) at 08:49

## 2024-05-12 RX ADMIN — FUROSEMIDE 40 MG: 10 INJECTION, SOLUTION INTRAMUSCULAR; INTRAVENOUS at 09:34

## 2024-05-12 RX ADMIN — ALBUTEROL SULFATE 2.5 MG: 2.5 SOLUTION RESPIRATORY (INHALATION) at 08:49

## 2024-05-12 RX ADMIN — CLONAZEPAM 1.5 MG: 0.5 TABLET ORAL at 23:39

## 2024-05-12 RX ADMIN — ASPIRIN 81 MG CHEWABLE TABLET 81 MG: 81 TABLET CHEWABLE at 09:34

## 2024-05-12 RX ADMIN — ALBUTEROL SULFATE 2.5 MG: 2.5 SOLUTION RESPIRATORY (INHALATION) at 12:08

## 2024-05-12 RX ADMIN — SODIUM CHLORIDE, PRESERVATIVE FREE 40 MG: 5 INJECTION INTRAVENOUS at 09:44

## 2024-05-12 RX ADMIN — LEVETIRACETAM 1500 MG: 500 INJECTION, SOLUTION, CONCENTRATE INTRAVENOUS at 09:33

## 2024-05-12 RX ADMIN — VALPROATE SODIUM 1500 MG: 100 INJECTION, SOLUTION INTRAVENOUS at 11:18

## 2024-05-12 RX ADMIN — CLONAZEPAM 1.5 MG: 0.5 TABLET ORAL at 09:34

## 2024-05-12 RX ADMIN — LEVETIRACETAM 1500 MG: 500 INJECTION, SOLUTION, CONCENTRATE INTRAVENOUS at 20:41

## 2024-05-12 RX ADMIN — VALPROATE SODIUM 1500 MG: 100 INJECTION, SOLUTION INTRAVENOUS at 00:23

## 2024-05-12 RX ADMIN — ACETYLCYSTEINE 400 MG: 100 SOLUTION ORAL; RESPIRATORY (INHALATION) at 20:21

## 2024-05-12 RX ADMIN — NYSTATIN 500000 UNITS: 100000 SUSPENSION ORAL at 09:44

## 2024-05-12 RX ADMIN — Medication 15 ML: at 20:41

## 2024-05-12 RX ADMIN — CLONAZEPAM 1.5 MG: 0.5 TABLET ORAL at 17:21

## 2024-05-12 RX ADMIN — NYSTATIN 500000 UNITS: 100000 SUSPENSION ORAL at 13:51

## 2024-05-12 RX ADMIN — NYSTATIN 500000 UNITS: 100000 SUSPENSION ORAL at 17:23

## 2024-05-12 RX ADMIN — SODIUM CHLORIDE, PRESERVATIVE FREE 10 ML: 5 INJECTION INTRAVENOUS at 20:40

## 2024-05-12 RX ADMIN — SODIUM CHLORIDE, PRESERVATIVE FREE 10 ML: 5 INJECTION INTRAVENOUS at 07:40

## 2024-05-12 RX ADMIN — ACETYLCYSTEINE 400 MG: 100 SOLUTION ORAL; RESPIRATORY (INHALATION) at 12:08

## 2024-05-12 RX ADMIN — VALPROATE SODIUM 1500 MG: 100 INJECTION, SOLUTION INTRAVENOUS at 23:40

## 2024-05-12 RX ADMIN — CLONAZEPAM 1.5 MG: 0.5 TABLET ORAL at 05:11

## 2024-05-12 RX ADMIN — ATORVASTATIN CALCIUM 40 MG: 40 TABLET, FILM COATED ORAL at 20:41

## 2024-05-12 RX ADMIN — ACETAZOLAMIDE SODIUM 500 MG: 500 INJECTION, POWDER, LYOPHILIZED, FOR SOLUTION INTRAVENOUS at 09:34

## 2024-05-12 RX ADMIN — THIAMINE HYDROCHLORIDE 100 MG: 100 INJECTION, SOLUTION INTRAMUSCULAR; INTRAVENOUS at 09:34

## 2024-05-12 RX ADMIN — Medication 15 ML: at 07:40

## 2024-05-12 RX ADMIN — NYSTATIN 500000 UNITS: 100000 SUSPENSION ORAL at 20:41

## 2024-05-12 RX ADMIN — ALBUTEROL SULFATE 2.5 MG: 2.5 SOLUTION RESPIRATORY (INHALATION) at 20:21

## 2024-05-12 ASSESSMENT — PULMONARY FUNCTION TESTS
PIF_VALUE: 24
PIF_VALUE: 20
PIF_VALUE: 19
PIF_VALUE: 20
PIF_VALUE: 27
PIF_VALUE: 19
PIF_VALUE: 18
PIF_VALUE: 18
PIF_VALUE: 19
PIF_VALUE: 18
PIF_VALUE: 24
PIF_VALUE: 19
PIF_VALUE: 19
PIF_VALUE: 20
PIF_VALUE: 19
PIF_VALUE: 19
PIF_VALUE: 20
PIF_VALUE: 18
PIF_VALUE: 20
PIF_VALUE: 19
PIF_VALUE: 20
PIF_VALUE: 18
PIF_VALUE: 27
PIF_VALUE: 31
PIF_VALUE: 20
PIF_VALUE: 19
PIF_VALUE: 14

## 2024-05-12 ASSESSMENT — PAIN SCALES - GENERAL
PAINLEVEL_OUTOF10: 0

## 2024-05-12 NOTE — PLAN OF CARE
Problem: Pain  Goal: Verbalizes/displays adequate comfort level or baseline comfort level  Outcome: Progressing     Problem: Respiratory - Adult  Goal: Achieves optimal ventilation and oxygenation  5/11/2024 2034 by Kayleen Nieves, RN  Outcome: Progressing  Flowsheets (Taken 5/11/2024 2000)  Achieves optimal ventilation and oxygenation:   Assess for changes in respiratory status   Position to facilitate oxygenation and minimize respiratory effort   Assess for changes in mentation and behavior   Oxygen supplementation based on oxygen saturation or arterial blood gases   Initiate smoking cessation protocol as indicated   Encourage broncho-pulmonary hygiene including cough, deep breathe, incentive spirometry  5/11/2024 1815 by Kaylynn Shelton RN  Outcome: Progressing     Problem: Chronic Conditions and Co-morbidities  Goal: Patient's chronic conditions and co-morbidity symptoms are monitored and maintained or improved  Outcome: Not Progressing  Flowsheets (Taken 5/11/2024 2000)  Care Plan - Patient's Chronic Conditions and Co-Morbidity Symptoms are Monitored and Maintained or Improved:   Monitor and assess patient's chronic conditions and comorbid symptoms for stability, deterioration, or improvement   Collaborate with multidisciplinary team to address chronic and comorbid conditions and prevent exacerbation or deterioration   Update acute care plan with appropriate goals if chronic or comorbid symptoms are exacerbated and prevent overall improvement and discharge     Problem: Discharge Planning  Goal: Discharge to home or other facility with appropriate resources  Outcome: Not Progressing  Flowsheets (Taken 5/11/2024 2000)  Discharge to home or other facility with appropriate resources:   Identify barriers to discharge with patient and caregiver   Arrange for needed discharge resources and transportation as appropriate   Identify discharge learning needs (meds, wound care, etc)   Arrange for interpreters to assist at

## 2024-05-12 NOTE — PROGRESS NOTES
Hospitalist Progress Note      Synopsis: Patient admitted on 5/1/2024. The patient is a 63-year-old male with past medical history of A-fib, hypertension, obesity, type II DM, AAA who presented to [facility] for cardiac arrest. On EMS arrival reported to be in V. tach/V-fib arrest and received amiodarone and shocked x 3. Patient was noted to have ROSC prior to ED arrival. CT of the head was done in the emergency department without any intracranial findings. Further imaging revealed a small pneumothorax for which a right-sided chest tube was placed: Plan for LHC. Placed on heparin drip and transferred. CT of the chest negative for PE. CT of the abdomen and pelvis showed presence of 4.3 x 4 cm aneurysm. Patient had persistent myoclonic jerking. Placed on Clonazepam and Depakote. EEG showed a potential for generalized seizures but no associated epileptiform activity was found and myoclonic jerks were captured. Patent with aspiration pneumonia. Placed on Unasyn.  Myoclonic jerks continued. Keppra added.  Clonazepam increased to 1.5 mg every 6 hours. Heparin stopped. Transitioned to Eliquis. Completed course of Unasyn. Repeat CT showed subdural hematoma. Eliquis stopped. Repeat CTH showed stability of subdural.     GOC have been discussed with girlfriend, but patient has no NOK as per his girlfriend. Girlfriend is trying to access his safe to see if he has a living will. Needs 2 doctor sign off for medical decisions at present. Critical care doc will not sign for withdrawal of care and would like to proceed with Trach/PEG. General surgery does not feel it is ethical to place Trach/PEG as long standing girlfriend who has been involved with his care, stated that he would not want that. I think this should be taken into consideration along with his poor prognosis.     Assessment and Plan     Cardiac arrest secondary to ACS -cardiology following, plans for cardiac catheterization however continues to have poor  prognosis in terms of neurologic recovery, now off heparin drip s/p rewarming   V-fib/V. Tach -now off lidocaine drip  Persistent Afib - on Eliquis at home, completed heparin infusion and transitioned to Eliquis now stopped for new subdural hematoma.    Aspiration pneumonia - growing gram-positive cocci in pairs on respiratory culture, completed Unasyn.  Acute hypoxic respiratory failure secondary to cardiac arrest - intubated ventilatory care as per ICU team  Seizures - EEG showing A potential for generalized onset seizures, currently on depakote, clonazepam and keppra has been uptitrated due to persistent myoclonic jerks. Patient has PRN versed pushes.  Neurology following, MRI pending   Elevated troponin 2/2 to ACS  Lactic acidosis, resolved  Transaminitis -downtrending  GENE, resolving -creatinine 1.5> 1.7> 1.1  Pneumothorax status post chest tube placement 4/30  Diabetes type 2 -HbA1c 6.4%. monitor BG  Obesity  Hypertension -lisinopril, hydrochlorothiazide and Toprol   Hyperlipidemia on pravastatin, on hold due to transaminitis  Multiple rib fractures 2/2 to CPR   AAA - .3 x 4 cm aneurysm of the infrarenal segment of the abdominal aorta with eccentric mural thrombus  DNR CCA   Subdural hematoma-new subdural. Eliquis held. Repeat CTH stable.       Disposition: Remains intubated and sedated.Guardian paperwork started. Surgery declining to ethically place trach/PEG. I did submit guardianship paperwork. Consider ethics consult as there seems to be difference of opinion between physicians.       Subjective    Does not respond     Exam:  /60   Pulse 61   Temp 98.2 °F (36.8 °C) (Temporal)   Resp 18   Ht 1.905 m (6' 3\")   Wt (!) 146.8 kg (323 lb 10.2 oz)   SpO2 96%   BMI 40.45 kg/m²   General appearance: Intubated and non responsive.   Respiratory:  Normal respiratory effort. Clear to auscultation, bilaterally without Rales/Wheezes/Rhonchi.  Cardiovascular: Regular rate and rhythm with normal S1/S2 without

## 2024-05-12 NOTE — PLAN OF CARE
Problem: Respiratory - Adult  Goal: Achieves optimal ventilation and oxygenation  5/12/2024 1328 by Phyllis Ospina  Outcome: Progressing     Patient does not pass SAT. No SBT performed.

## 2024-05-12 NOTE — PROGRESS NOTES
system since 05/07  NG tube since 05/03     Labs     Basic Labs    Complete Blood Count:   Recent Labs     05/10/24  0429 05/11/24  0402 05/12/24  0337   WBC 11.4 14.3* 15.2*   HGB 11.6* 11.6* 11.9*   HCT 36.6* 36.1* 36.2*    210 207        Last 3 Blood Glucose:   Recent Labs     05/10/24  2010 05/11/24  0402 05/12/24  0337   GLUCOSE 148* 167* 164*        PT/INR:    Lab Results   Component Value Date/Time    PROTIME 13.9 04/29/2024 02:51 PM    INR 1.3 04/29/2024 02:51 PM     PTT:    Lab Results   Component Value Date/Time    APTT 68.4 05/03/2024 04:08 AM       Comprehensive Metabolic Profile:   Recent Labs     05/10/24  2010 05/11/24  0402 05/12/24  0337    138 141   K 4.4 4.5 4.0    100 101   CO2 28 25 25   BUN 37* 43* 45*   CREATININE 1.0 1.1 1.0   GLUCOSE 148* 167* 164*   CALCIUM 9.7 8.8 9.1   BILITOT  --  0.4 0.5   ALKPHOS  --  78 88   AST  --  43* 41*   ALT  --  30 27      Magnesium:   Lab Results   Component Value Date/Time    MG 2.7 05/12/2024 03:37 AM     Phosphorus:   Lab Results   Component Value Date/Time    PHOS 3.8 05/12/2024 03:37 AM     Ionized Calcium:   Lab Results   Component Value Date/Time    CAION 1.18 05/01/2024 03:12 AM      Troponin: No results for input(s): \"TROPONINI\" in the last 72 hours.    Imaging Studies:  CT HEAD WO CONTRAST   Final Result   1. Mild interval decrease in volume and density of the posterior right para   falcine subdural hematoma.   2. No new intracranial hemorrhage.   3. Fluid opacification of the mastoid air cells is unchanged.         CT HEAD WO CONTRAST   Final Result   New right parasagittal subdural hematoma along the posterior falx without   significant mass effect or midline shift.      Worsening sinusitis involving maxillary, ethmoid and sphenoid sinuses.      New bilateral mastoiditis.         XR CHEST PORTABLE   Final Result   1. ETT 3.4 cm above the milla.   2. Small effusions.   3. Linear atelectasis in the right lower lobe.         XR  NSTEMI on heparin drip  Abdominal aortic aneurysm  Persistent A-fib , Eliquis 5 mg stopped d/t sub dural hematoma, plan to start heparin drip after hematoma stabilized   Acute respiratory failure 2/2 aspiration pneumonia s/p completed Unasyn   Right-sided pneumothorax s/p chest tube on right side 4/30   Seizures - EEG showing A potential for generalized onset seizures, currently on depakote, clonazepam and keppra has been uptitrated due to persistent myoclonic jerks. Patient has PRN versed pushes   Multiple rib fracture 2/2 CPR  Transaminitis -downtrending   GENE-resolved  Hypertension on lisinopril-hydrochlorothiazide 20-12.5 mg 2 tablets daily, Toprol-XL 25 mg daily at home  Hyperlipidemia on pravastatin 40 mg twice daily  Type 2 diabetes mellitus on metformin 500 mg extended release tablet twice daily  DNRCCA    Plan:    GS declined to sign off for PEG/Trach   Sub dural hematoma - improving on CT Head 05/10, continue holding anticoagulation   Palliative Care following, guardian ship forms pending   Monitor WBC, consider pan culture if continues to increase or if patient is febrile  On Keppra, Depacon and Klonopin for myoclonic jerks   Strict I's and O's, monitor urine output on daily lasix and diamox   Tube feeds at 60ml/hr, monitor for residuals    BMP. MG, Phos electrolytes to maintain K >4, mag >2   Monitor H&H       PT/OT evaluation: not indicated at this time   DVT prophylaxis: PCDs   GI prophylaxis: Protonix   Diet:   Diet NPO  ADULT TUBE FEEDING; Orogastric; Diabetic; Continuous; 20; Yes; 10; Q 4 hours; 60; 300; Q 4 hours; Protein; 1 Dose; Daily   Bowel regimen: Glycolax  Pain management: as needed  Code status: DNR-CCA   Disposition: continue current care   Family: updated as available    Chelsey Pitt MD, PGY-1  Attending physician: Dr. Perez     [facility]  Department of Pulmonary, Critical Care and Sleep Medicine  [facility]  Department

## 2024-05-13 LAB
AADO2: 200.5 MMHG
ALBUMIN SERPL-MCNC: 3.1 G/DL (ref 3.5–5.2)
ALP SERPL-CCNC: 98 U/L (ref 40–129)
ALT SERPL-CCNC: 30 U/L (ref 0–40)
AMMONIA PLAS-SCNC: 38 UMOL/L (ref 16–60)
ANION GAP SERPL CALCULATED.3IONS-SCNC: 16 MMOL/L (ref 7–16)
AST SERPL-CCNC: 45 U/L (ref 0–39)
B.E.: 4.6 MMOL/L (ref -3–3)
BASOPHILS # BLD: 0.09 K/UL (ref 0–0.2)
BASOPHILS NFR BLD: 1 % (ref 0–2)
BILIRUB SERPL-MCNC: 0.4 MG/DL (ref 0–1.2)
BUN SERPL-MCNC: 51 MG/DL (ref 6–23)
CALCIUM SERPL-MCNC: 8.6 MG/DL (ref 8.6–10.2)
CHLORIDE SERPL-SCNC: 102 MMOL/L (ref 98–107)
CO2 SERPL-SCNC: 23 MMOL/L (ref 22–29)
COHB: 0.4 % (ref 0–1.5)
CREAT SERPL-MCNC: 1 MG/DL (ref 0.7–1.2)
CRITICAL: ABNORMAL
DATE ANALYZED: ABNORMAL
DATE LAST DOSE: NORMAL
DATE OF COLLECTION: ABNORMAL
EOSINOPHIL # BLD: 0.18 K/UL (ref 0.05–0.5)
EOSINOPHILS RELATIVE PERCENT: 1 % (ref 0–6)
ERYTHROCYTE [DISTWIDTH] IN BLOOD BY AUTOMATED COUNT: 13.3 % (ref 11.5–15)
FIO2: 45 %
GFR, ESTIMATED: 83 ML/MIN/1.73M2
GLUCOSE BLD-MCNC: 159 MG/DL (ref 74–99)
GLUCOSE BLD-MCNC: 170 MG/DL (ref 74–99)
GLUCOSE BLD-MCNC: 176 MG/DL (ref 74–99)
GLUCOSE BLD-MCNC: 180 MG/DL (ref 74–99)
GLUCOSE BLD-MCNC: 184 MG/DL (ref 74–99)
GLUCOSE BLD-MCNC: 224 MG/DL (ref 74–99)
GLUCOSE SERPL-MCNC: 192 MG/DL (ref 74–99)
HCO3: 28.5 MMOL/L (ref 22–26)
HCT VFR BLD AUTO: 35.3 % (ref 37–54)
HGB BLD-MCNC: 11.3 G/DL (ref 12.5–16.5)
HHB: 5 % (ref 0–5)
IMM GRANULOCYTES # BLD AUTO: 0.11 K/UL (ref 0–0.58)
IMM GRANULOCYTES NFR BLD: 1 % (ref 0–5)
LAB: ABNORMAL
LYMPHOCYTES NFR BLD: 0.85 K/UL (ref 1.5–4)
LYMPHOCYTES RELATIVE PERCENT: 6 % (ref 20–42)
Lab: 655
MAGNESIUM SERPL-MCNC: 2.5 MG/DL (ref 1.6–2.6)
MCH RBC QN AUTO: 30.5 PG (ref 26–35)
MCHC RBC AUTO-ENTMCNC: 32 G/DL (ref 32–34.5)
MCV RBC AUTO: 95.4 FL (ref 80–99.9)
METHB: 0.4 % (ref 0–1.5)
MODE: AC
MONOCYTES NFR BLD: 1.54 K/UL (ref 0.1–0.95)
MONOCYTES NFR BLD: 11 % (ref 2–12)
NEUTROPHILS NFR BLD: 80 % (ref 43–80)
NEUTS SEG NFR BLD: 11.03 K/UL (ref 1.8–7.3)
O2 CONTENT: 16.5 ML/DL
O2 SATURATION: 95 % (ref 92–98.5)
O2HB: 94.2 % (ref 94–97)
OPERATOR ID: ABNORMAL
PATIENT TEMP: 37 C
PCO2: 39.5 MMHG (ref 35–45)
PEEP/CPAP: 8 CMH2O
PFO2: 1.68 MMHG/%
PH BLOOD GAS: 7.48 (ref 7.35–7.45)
PHOSPHATE SERPL-MCNC: 2.9 MG/DL (ref 2.5–4.5)
PLATELET # BLD AUTO: 208 K/UL (ref 130–450)
PMV BLD AUTO: 12.1 FL (ref 7–12)
PO2: 75.4 MMHG (ref 75–100)
POTASSIUM SERPL-SCNC: 3.4 MMOL/L (ref 3.5–5)
PROT SERPL-MCNC: 6.2 G/DL (ref 6.4–8.3)
RBC # BLD AUTO: 3.7 M/UL (ref 3.8–5.8)
RBC # BLD: ABNORMAL 10*6/UL
RI(T): 2.66
RR MECHANICAL: 16 B/MIN
SODIUM SERPL-SCNC: 141 MMOL/L (ref 132–146)
SOURCE, BLOOD GAS: ABNORMAL
THB: 12.4 G/DL (ref 11.5–16.5)
TIME ANALYZED: 701
TME LAST DOSE: NORMAL H
VALPROATE SERPL-MCNC: 66 UG/ML (ref 50–100)
VANCOMYCIN DOSE: NORMAL MG
VT MECHANICAL: 450 ML
WBC OTHER # BLD: 13.8 K/UL (ref 4.5–11.5)

## 2024-05-13 PROCEDURE — 80164 ASSAY DIPROPYLACETIC ACD TOT: CPT

## 2024-05-13 PROCEDURE — A4216 STERILE WATER/SALINE, 10 ML: HCPCS

## 2024-05-13 PROCEDURE — 85025 COMPLETE CBC W/AUTO DIFF WBC: CPT

## 2024-05-13 PROCEDURE — C9113 INJ PANTOPRAZOLE SODIUM, VIA: HCPCS

## 2024-05-13 PROCEDURE — 6360000002 HC RX W HCPCS

## 2024-05-13 PROCEDURE — 6370000000 HC RX 637 (ALT 250 FOR IP): Performed by: STUDENT IN AN ORGANIZED HEALTH CARE EDUCATION/TRAINING PROGRAM

## 2024-05-13 PROCEDURE — 6370000000 HC RX 637 (ALT 250 FOR IP)

## 2024-05-13 PROCEDURE — 2580000003 HC RX 258

## 2024-05-13 PROCEDURE — 6360000002 HC RX W HCPCS: Performed by: PSYCHIATRY & NEUROLOGY

## 2024-05-13 PROCEDURE — 82140 ASSAY OF AMMONIA: CPT

## 2024-05-13 PROCEDURE — 2000000000 HC ICU R&B

## 2024-05-13 PROCEDURE — 2500000003 HC RX 250 WO HCPCS

## 2024-05-13 PROCEDURE — 6360000002 HC RX W HCPCS: Performed by: STUDENT IN AN ORGANIZED HEALTH CARE EDUCATION/TRAINING PROGRAM

## 2024-05-13 PROCEDURE — 82962 GLUCOSE BLOOD TEST: CPT

## 2024-05-13 PROCEDURE — 82805 BLOOD GASES W/O2 SATURATION: CPT

## 2024-05-13 PROCEDURE — 94003 VENT MGMT INPAT SUBQ DAY: CPT

## 2024-05-13 PROCEDURE — 6370000000 HC RX 637 (ALT 250 FOR IP): Performed by: INTERNAL MEDICINE

## 2024-05-13 PROCEDURE — 51701 INSERT BLADDER CATHETER: CPT

## 2024-05-13 PROCEDURE — 84100 ASSAY OF PHOSPHORUS: CPT

## 2024-05-13 PROCEDURE — 51798 US URINE CAPACITY MEASURE: CPT

## 2024-05-13 PROCEDURE — 83735 ASSAY OF MAGNESIUM: CPT

## 2024-05-13 PROCEDURE — 99232 SBSQ HOSP IP/OBS MODERATE 35: CPT | Performed by: STUDENT IN AN ORGANIZED HEALTH CARE EDUCATION/TRAINING PROGRAM

## 2024-05-13 PROCEDURE — 99233 SBSQ HOSP IP/OBS HIGH 50: CPT | Performed by: PSYCHIATRY & NEUROLOGY

## 2024-05-13 PROCEDURE — 94640 AIRWAY INHALATION TREATMENT: CPT

## 2024-05-13 PROCEDURE — 80053 COMPREHEN METABOLIC PANEL: CPT

## 2024-05-13 PROCEDURE — 99291 CRITICAL CARE FIRST HOUR: CPT | Performed by: INTERNAL MEDICINE

## 2024-05-13 RX ADMIN — Medication 15 ML: at 07:53

## 2024-05-13 RX ADMIN — THIAMINE HYDROCHLORIDE 100 MG: 100 INJECTION, SOLUTION INTRAMUSCULAR; INTRAVENOUS at 07:53

## 2024-05-13 RX ADMIN — ALBUTEROL SULFATE 2.5 MG: 2.5 SOLUTION RESPIRATORY (INHALATION) at 19:45

## 2024-05-13 RX ADMIN — SODIUM CHLORIDE, PRESERVATIVE FREE 40 MG: 5 INJECTION INTRAVENOUS at 07:55

## 2024-05-13 RX ADMIN — ASPIRIN 81 MG CHEWABLE TABLET 81 MG: 81 TABLET CHEWABLE at 07:53

## 2024-05-13 RX ADMIN — LEVETIRACETAM 1500 MG: 500 INJECTION, SOLUTION, CONCENTRATE INTRAVENOUS at 07:54

## 2024-05-13 RX ADMIN — ALBUTEROL SULFATE 2.5 MG: 2.5 SOLUTION RESPIRATORY (INHALATION) at 14:22

## 2024-05-13 RX ADMIN — NYSTATIN 500000 UNITS: 100000 SUSPENSION ORAL at 18:00

## 2024-05-13 RX ADMIN — CLONAZEPAM 1.5 MG: 0.5 TABLET ORAL at 17:59

## 2024-05-13 RX ADMIN — VALPROATE SODIUM 1500 MG: 100 INJECTION, SOLUTION INTRAVENOUS at 10:31

## 2024-05-13 RX ADMIN — NYSTATIN 500000 UNITS: 100000 SUSPENSION ORAL at 11:58

## 2024-05-13 RX ADMIN — CLONAZEPAM 1.5 MG: 0.5 TABLET ORAL at 11:57

## 2024-05-13 RX ADMIN — POTASSIUM BICARBONATE 40 MEQ: 782 TABLET, EFFERVESCENT ORAL at 06:51

## 2024-05-13 RX ADMIN — Medication 15 ML: at 21:09

## 2024-05-13 RX ADMIN — ATORVASTATIN CALCIUM 40 MG: 40 TABLET, FILM COATED ORAL at 21:10

## 2024-05-13 RX ADMIN — SODIUM CHLORIDE, PRESERVATIVE FREE 10 ML: 5 INJECTION INTRAVENOUS at 21:09

## 2024-05-13 RX ADMIN — ALBUTEROL SULFATE 2.5 MG: 2.5 SOLUTION RESPIRATORY (INHALATION) at 09:16

## 2024-05-13 RX ADMIN — SODIUM CHLORIDE, PRESERVATIVE FREE 10 ML: 5 INJECTION INTRAVENOUS at 07:56

## 2024-05-13 RX ADMIN — ACETAMINOPHEN 650 MG: 325 TABLET ORAL at 08:42

## 2024-05-13 RX ADMIN — NYSTATIN 500000 UNITS: 100000 SUSPENSION ORAL at 21:10

## 2024-05-13 RX ADMIN — LEVETIRACETAM 1500 MG: 500 INJECTION, SOLUTION, CONCENTRATE INTRAVENOUS at 21:10

## 2024-05-13 RX ADMIN — CLONAZEPAM 1.5 MG: 0.5 TABLET ORAL at 23:10

## 2024-05-13 RX ADMIN — FUROSEMIDE 40 MG: 10 INJECTION, SOLUTION INTRAMUSCULAR; INTRAVENOUS at 07:54

## 2024-05-13 RX ADMIN — NYSTATIN 500000 UNITS: 100000 SUSPENSION ORAL at 07:53

## 2024-05-13 RX ADMIN — INSULIN LISPRO 1 UNITS: 100 INJECTION, SOLUTION INTRAVENOUS; SUBCUTANEOUS at 03:46

## 2024-05-13 RX ADMIN — VALPROATE SODIUM 1500 MG: 100 INJECTION, SOLUTION INTRAVENOUS at 23:10

## 2024-05-13 RX ADMIN — ACETAZOLAMIDE SODIUM 500 MG: 500 INJECTION, POWDER, LYOPHILIZED, FOR SOLUTION INTRAVENOUS at 07:54

## 2024-05-13 RX ADMIN — CLONAZEPAM 1.5 MG: 0.5 TABLET ORAL at 04:39

## 2024-05-13 ASSESSMENT — PULMONARY FUNCTION TESTS
PIF_VALUE: 20
PIF_VALUE: 16
PIF_VALUE: 19
PIF_VALUE: 19
PIF_VALUE: 20
PIF_VALUE: 23
PIF_VALUE: 27
PIF_VALUE: 20
PIF_VALUE: 17
PIF_VALUE: 18
PIF_VALUE: 21
PIF_VALUE: 18
PIF_VALUE: 19
PIF_VALUE: 22
PIF_VALUE: 18
PIF_VALUE: 19
PIF_VALUE: 19
PIF_VALUE: 23
PIF_VALUE: 20
PIF_VALUE: 22
PIF_VALUE: 17

## 2024-05-13 ASSESSMENT — PAIN SCALES - GENERAL
PAINLEVEL_OUTOF10: 0

## 2024-05-13 NOTE — PROGRESS NOTES
[facility]  Neurology Progress Note    Date:  5/13/2024  Patient Name:  Nabeel Appiah  YOB: 1960  MRN: [id number]     PCP:  Jonh Allen DO   Referring:  Destin Gruber DO      Chief Complaint: altered mental status following cardiac arrest    History obtained from: chart, staff    Assessment  Nabeel Appiah is a 63 y.o. male with persistent altered mental status following cardiac arrest and myoclonic seizures. His clinical picture is most consistent with that of anoxic brain injury, though, MRI brain cannot be obtained due to size limitations.     He has a high likelihood for a poor neurologic prognosis, potentially remaining in a comatose state indefinitely. In a best case scenario I would expect him to require a tracheostomy and PEG tube as he would likely have significant neurologic impairment and would be unlikely to achieve independent function with ADLs.    We will check levels and EEG as below to ensure no other confounding factors in this assessment.    Plan  Continue antiseizure medications  Levetiracetam 1500 BID  Valproate 1500 BID  Check VPA and NH3 levels  Repeat EEG        History of Present Illness:  Nabeel Appiah is a 63 y.o. male presenting for evaluation of altered mental status following cardiac arrest. The patient was admitted on 4/30/24 following cardiac arrest with subsequent myoclonic seizure activity. He has remained poorly responsive despite treatment.     A small right parasagittal SDH was noted over the course of his stay which was noted to be improving on subsequent imaging. He is reportedly too large for our MRI machine for further neuro imaging. He has a complex medical situation and does not have next of kin or POA to make decisions regarding medical care and he has remained unresponsive despite being off of sedation for multiple days.    Neurology is consulted for neurologic prognosis.         Medical History:   Past Medical  compared to the previous study   performed earlier in the day.         XR CHEST PORTABLE   Final Result   1. ET tube in satisfactory position.   2. No pneumothorax.         XR ABDOMEN FOR NG/OG/NE TUBE PLACEMENT   Final Result   Orogastric tube tip several cm past GE junction and should be advanced the   approximately 7 cm for more optimal placement.         XR CHEST PORTABLE   Final Result   Endotracheal tube high above the level of the clavicles. Nasogastric tube tip   below the level diaphragm. Right chest tube in place with no definite   pneumothorax. Some increased markings seen within the right lower lung field   and left lung base.                 Electronically signed by Gordo Deleon DO on 5/13/2024 at 6:06 PM

## 2024-05-13 NOTE — CARE COORDINATION
CM update: Per IDR today,m attempting to have 2 physicians sign off for trache /peg. Statement of expert eval faxed to  [employer] for guardianship initiation, documented by Dr Hodges to [phone].[email or web address]. [place] Phone number is [phone]. Per Chin at [facility], they cannot accept without POA or guardianship. Per [facility], Expedited appeals are not being processed, but can send referral to [facility].  They are behind on guardianships at the moment- possibly 1-2 months. Rafia at [facility] to follow, she asked to keep her updated if they proceed with trach/peg- demos given CM will follow.  Electronically signed by Roverto Eddy RN on 5/13/2024 at 3:58 PM

## 2024-05-13 NOTE — PROGRESS NOTES
HOSPITALIST PROGRESS NOTE    Patient's name: Nabeel Appiah  : 1960  Admission date: 2024  Date of service: 2024   Primary care physician: Jonh Allen DO    Assessment   Patient admitted on 2024     Nabeel Appiah is a 63 y.o. male patient of Jonh Allen DO with history of atrial fibrillation, hypertension, obesity, type 2 diabetes mellitus, AAA presented to [facility] on 2024 after cardiac arrest. On EMS arrival reported to be in V. tach/V-fib arrest and received amiodarone and shocked x 3. Patient was noted to have ROSC prior to ED arrival.     GOC have been discussed with girlfriend, but patient has no NOK as per his girlfriend. Girlfriend is trying to access his safe to see if he has a living will. Needs 2 doctor sign off for medical decisions at present. Critical care doc will not sign for withdrawal of care and would like to proceed with Trach/PEG. General surgery does not feel it is ethical to place Trach/PEG as long standing girlfriend who has been involved with his care, stated that he would not want that.   Girlfriend has filed for guardianship paperwork but it may take up to 3 to 4 months.  In the meantime as patient does not have POA I think that is reasonable to consider trach and PEG and once guardianship has been completed with girlfriend she can make decisions for the patient.    Cardiac arrest secondary to ACS -cardiology following, plans for cardiac catheterization however continues to have poor prognosis in terms of neurologic recovery, now off heparin drip s/p rewarming   V-fib/V. Tach -now off lidocaine drip  Persistent Afib - on Eliquis at home, completed heparin infusion and transitioned to Eliquis now stopped for new subdural hematoma.    Aspiration pneumonia - growing gram-positive cocci in pairs on respiratory culture, completed Unasyn.  Acute hypoxic respiratory failure secondary to cardiac arrest - intubated ventilatory

## 2024-05-13 NOTE — PROGRESS NOTES
[facility]  Department of Internal Medicine   Internal Medicine Residency   MICU Progress Note    Patient:  Nabeel Appiah 63 y.o. male  MRN: [id number]     Date of Service: 5/13/2024    Allergy: Patient has no known allergies.    Overnight Events: No acute overnight events. K+ replaced     Subjective     Patient seen and examined at bedside this AM. Remains unresponsive, intubated.     ROS: unable to be achieved due to current mental status    Objective     VS: /71   Pulse 68   Temp 98.5 °F (36.9 °C) (Temporal)   Resp 22   Ht 1.905 m (6' 3\")   Wt (!) 146.9 kg (323 lb 13.7 oz)   SpO2 95%   BMI 40.48 kg/m²   ABP (Arterial line BP): 100/50  ABP Mean (Arterial Line Mean): (!) 64 mmHg    I & O - 24hr:   Intake/Output Summary (Last 24 hours) at 5/13/2024 0805  Last data filed at 5/13/2024 0600  Gross per 24 hour   Intake 2909.48 ml   Output 2950 ml   Net -40.52 ml       Sedatives: N/A    Pressors: N/A     Oxygen: Intubated 450/16/45/8    ABG:     Lab Results   Component Value Date/Time    PH 7.476 05/13/2024 06:55 AM    PCO2 39.5 05/13/2024 06:55 AM    PO2 75.4 05/13/2024 06:55 AM    HCO3 28.5 05/13/2024 06:55 AM    BE 4.6 05/13/2024 06:55 AM    THB 12.4 05/13/2024 06:55 AM    O2SAT 95.0 05/13/2024 06:55 AM       Physical Exam:  General Appearance: AO x 0, not following commands    Neuro:Upward gaze, +pupillary reflex, absent corneal reflex, absent gag reflex, +cough reflex, withdrawing to noxious stimuli, myoclonic jerks reduced in frequency from prior exam   Cardiovascular: regular rate and rhythm, S1 and S2 heard, no murmurs appreciated   Respiratory: Mechanically ventilated lung sounds. No wheezing or crackles appreciated.  Abdomen: Softly distended  Extremities: Bilateral upper extremity edema- improving, no cyanosis, distal pulses intact    Lines & Urinary Catheter:     External Urinary Catheter since 05/03  ETT since 04/30  Fecal management system since 05/07  NG tube since 05/03

## 2024-05-13 NOTE — PROGRESS NOTES
DAILY VENTILATOR WEANING ASSESSMENT PERFORMED    P/FIO2 Ratio =          (<100= do not Wean)                  Cs =                          (<32= Instability)  Plat. Pressure =   MV =  RSBI =    Instabilities:       Cardiovascular =       CNS =       Respiratory =       Metabolic =    Parameters    {yes no:473288}    Wean per protocol  {yes no:388470}    Ask Physician for a weaning plan {yes no:073560}    Additional Comments:     Performed by Sushma Patterson RCP RRT      Reference Table:    Cardiovascular     CNS      1. Mean BP less than or equal to 75   1. Neuromuscular blockade  2. Heart Rate greater than 130   2. RASS of -3, -4, -5  3. Myocardial Ischemia    3. RASS of +3, +4  4. Mechanical Assist Device    4. ICP greater than 15 or             Intracranial Hypertension         Respiratory      Metabolic  1. PEEP equal to or greater than 10cm/H20  1.Temp. (8hrs) less than 95 or > 103  2. Respiratory Rate greater than 35   2. WBC < 5000 or > 75339  3. Minute Volume greater than 15L  4. pH less than 7.30  5. Deteriorating chest X-ray         05/13/24 0929   Patient Observation   Pulse 60   SpO2 96 %   Vent Information   Ventilator Day(s) 13   Ventilator -15   Vent Mode AC/PRVC   Ventilator Settings   Vt (Set, mL) 450 mL   Resp Rate (Set) 16 bpm   PEEP/CPAP (cmH2O) 8   FiO2  45 %   Insp Time (sec) 0.8 sec   Vent Patient Data (Readings)   Vt (Measured) 443 mL   Peak Inspiratory Pressure (cmH2O) 22 cmH2O   Rate Measured 27 br/min   Minute Volume (L/min) 12.6 Liters   Mean Airway Pressure (cmH2O) 13 cmH20   Plateau Pressure (cm H2O) 17 cm H2O   Driving Pressure 9   I:E Ratio 1:2.70   Flow Sensitivity 3 L/min   Static Compliance (L/cm H2O) 46   I Time/ I Time % 0.8 s   Insp Rise Time (%) 50 %   Backup Apnea On   Backup Rate 16 Breaths Per Minute   Backup Vt 450   Vent Alarm Settings   High Pressure (cmH2O) 50 cmH2O   Low Minute Volume (lpm) 10 L/min   Low Exhaled Vt (ml) 400 mL   RR High (bpm) 35 br/min   Apnea

## 2024-05-13 NOTE — PLAN OF CARE
Problem: Chronic Conditions and Co-morbidities  Goal: Patient's chronic conditions and co-morbidity symptoms are monitored and maintained or improved  Outcome: Not Progressing  Flowsheets (Taken 5/12/2024 2000)  Care Plan - Patient's Chronic Conditions and Co-Morbidity Symptoms are Monitored and Maintained or Improved:   Monitor and assess patient's chronic conditions and comorbid symptoms for stability, deterioration, or improvement   Collaborate with multidisciplinary team to address chronic and comorbid conditions and prevent exacerbation or deterioration   Update acute care plan with appropriate goals if chronic or comorbid symptoms are exacerbated and prevent overall improvement and discharge     Problem: Pain  Goal: Verbalizes/displays adequate comfort level or baseline comfort level  Outcome: Not Progressing  Flowsheets (Taken 5/12/2024 2000)  Verbalizes/displays adequate comfort level or baseline comfort level:   Assess pain using appropriate pain scale   Administer analgesics based on type and severity of pain and evaluate response   Implement non-pharmacological measures as appropriate and evaluate response   Consider cultural and social influences on pain and pain management   Notify Licensed Independent Practitioner if interventions unsuccessful or patient reports new pain     Problem: Neurosensory - Adult  Goal: Achieves stable or improved neurological status  Outcome: Not Progressing  Flowsheets (Taken 5/12/2024 2000)  Achieves stable or improved neurological status:   Assess for and report changes in neurological status   Initiate measures to prevent increased intracranial pressure   Maintain blood pressure and fluid volume within ordered parameters to optimize cerebral perfusion and minimize risk of hemorrhage   Monitor temperature, glucose, and sodium. Initiate appropriate interventions as ordered

## 2024-05-14 ENCOUNTER — APPOINTMENT (OUTPATIENT)
Dept: NEUROLOGY | Age: 64
DRG: 283 | End: 2024-05-14
Attending: STUDENT IN AN ORGANIZED HEALTH CARE EDUCATION/TRAINING PROGRAM
Payer: COMMERCIAL

## 2024-05-14 LAB
AADO2: 205.2 MMHG
ALBUMIN SERPL-MCNC: 2.9 G/DL (ref 3.5–5.2)
ALP SERPL-CCNC: 110 U/L (ref 40–129)
ALT SERPL-CCNC: 31 U/L (ref 0–40)
ANION GAP SERPL CALCULATED.3IONS-SCNC: 15 MMOL/L (ref 7–16)
AST SERPL-CCNC: 49 U/L (ref 0–39)
B.E.: 2.5 MMOL/L (ref -3–3)
BASOPHILS # BLD: 0.13 K/UL (ref 0–0.2)
BASOPHILS NFR BLD: 1 % (ref 0–2)
BILIRUB SERPL-MCNC: 0.4 MG/DL (ref 0–1.2)
BUN SERPL-MCNC: 41 MG/DL (ref 6–23)
CALCIUM SERPL-MCNC: 8.5 MG/DL (ref 8.6–10.2)
CHLORIDE SERPL-SCNC: 99 MMOL/L (ref 98–107)
CO2 SERPL-SCNC: 25 MMOL/L (ref 22–29)
COHB: 0.1 % (ref 0–1.5)
CREAT SERPL-MCNC: 0.9 MG/DL (ref 0.7–1.2)
CRITICAL: ABNORMAL
DATE ANALYZED: ABNORMAL
DATE OF COLLECTION: ABNORMAL
EOSINOPHIL # BLD: 0.38 K/UL (ref 0.05–0.5)
EOSINOPHILS RELATIVE PERCENT: 3 % (ref 0–6)
ERYTHROCYTE [DISTWIDTH] IN BLOOD BY AUTOMATED COUNT: 13.3 % (ref 11.5–15)
FIO2: 45 %
GFR, ESTIMATED: >90 ML/MIN/1.73M2
GLUCOSE BLD-MCNC: 159 MG/DL (ref 74–99)
GLUCOSE BLD-MCNC: 183 MG/DL (ref 74–99)
GLUCOSE BLD-MCNC: 185 MG/DL (ref 74–99)
GLUCOSE BLD-MCNC: 198 MG/DL (ref 74–99)
GLUCOSE BLD-MCNC: 230 MG/DL (ref 74–99)
GLUCOSE SERPL-MCNC: 196 MG/DL (ref 74–99)
HCO3: 25.8 MMOL/L (ref 22–26)
HCT VFR BLD AUTO: 35.9 % (ref 37–54)
HGB BLD-MCNC: 11.3 G/DL (ref 12.5–16.5)
HHB: 5 % (ref 0–5)
LAB: ABNORMAL
LYMPHOCYTES NFR BLD: 0.38 K/UL (ref 1.5–4)
LYMPHOCYTES RELATIVE PERCENT: 3 % (ref 20–42)
Lab: 432
MAGNESIUM SERPL-MCNC: 2.5 MG/DL (ref 1.6–2.6)
MCH RBC QN AUTO: 30.3 PG (ref 26–35)
MCHC RBC AUTO-ENTMCNC: 31.5 G/DL (ref 32–34.5)
MCV RBC AUTO: 96.2 FL (ref 80–99.9)
METHB: 0.3 % (ref 0–1.5)
MODE: AC
MONOCYTES NFR BLD: 0.75 K/UL (ref 0.1–0.95)
MONOCYTES NFR BLD: 5 % (ref 2–12)
NEUTROPHILS NFR BLD: 89 % (ref 43–80)
NEUTS SEG NFR BLD: 12.77 K/UL (ref 1.8–7.3)
O2 CONTENT: 16.3 ML/DL
O2 SATURATION: 95 % (ref 92–98.5)
O2HB: 94.6 % (ref 94–97)
OPERATOR ID: 2067
PATIENT TEMP: 37 C
PCO2: 35.4 MMHG (ref 35–45)
PEEP/CPAP: 8 CMH2O
PFO2: 1.68 MMHG/%
PH BLOOD GAS: 7.48 (ref 7.35–7.45)
PHOSPHATE SERPL-MCNC: 2.5 MG/DL (ref 2.5–4.5)
PLATELET # BLD AUTO: 217 K/UL (ref 130–450)
PMV BLD AUTO: 11.8 FL (ref 7–12)
PO2: 75.4 MMHG (ref 75–100)
POTASSIUM SERPL-SCNC: 3.2 MMOL/L (ref 3.5–5)
PROT SERPL-MCNC: 6.2 G/DL (ref 6.4–8.3)
RBC # BLD AUTO: 3.73 M/UL (ref 3.8–5.8)
RBC # BLD: ABNORMAL 10*6/UL
RBC # BLD: ABNORMAL 10*6/UL
RI(T): 2.72
RR MECHANICAL: 16 B/MIN
SODIUM SERPL-SCNC: 139 MMOL/L (ref 132–146)
SOURCE, BLOOD GAS: ABNORMAL
THB: 12.2 G/DL (ref 11.5–16.5)
TIME ANALYZED: 436
VT MECHANICAL: 450 ML
WBC OTHER # BLD: 14.4 K/UL (ref 4.5–11.5)

## 2024-05-14 PROCEDURE — 6370000000 HC RX 637 (ALT 250 FOR IP)

## 2024-05-14 PROCEDURE — 99232 SBSQ HOSP IP/OBS MODERATE 35: CPT

## 2024-05-14 PROCEDURE — 99291 CRITICAL CARE FIRST HOUR: CPT | Performed by: INTERNAL MEDICINE

## 2024-05-14 PROCEDURE — 6360000002 HC RX W HCPCS

## 2024-05-14 PROCEDURE — 2580000003 HC RX 258

## 2024-05-14 PROCEDURE — 82962 GLUCOSE BLOOD TEST: CPT

## 2024-05-14 PROCEDURE — 6370000000 HC RX 637 (ALT 250 FOR IP): Performed by: INTERNAL MEDICINE

## 2024-05-14 PROCEDURE — 6370000000 HC RX 637 (ALT 250 FOR IP): Performed by: STUDENT IN AN ORGANIZED HEALTH CARE EDUCATION/TRAINING PROGRAM

## 2024-05-14 PROCEDURE — A4216 STERILE WATER/SALINE, 10 ML: HCPCS

## 2024-05-14 PROCEDURE — 94003 VENT MGMT INPAT SUBQ DAY: CPT

## 2024-05-14 PROCEDURE — 95822 EEG COMA OR SLEEP ONLY: CPT

## 2024-05-14 PROCEDURE — 85025 COMPLETE CBC W/AUTO DIFF WBC: CPT

## 2024-05-14 PROCEDURE — 2000000000 HC ICU R&B

## 2024-05-14 PROCEDURE — 2500000003 HC RX 250 WO HCPCS

## 2024-05-14 PROCEDURE — 6360000002 HC RX W HCPCS: Performed by: PSYCHIATRY & NEUROLOGY

## 2024-05-14 PROCEDURE — 6360000002 HC RX W HCPCS: Performed by: STUDENT IN AN ORGANIZED HEALTH CARE EDUCATION/TRAINING PROGRAM

## 2024-05-14 PROCEDURE — 95822 EEG COMA OR SLEEP ONLY: CPT | Performed by: PSYCHIATRY & NEUROLOGY

## 2024-05-14 PROCEDURE — 83735 ASSAY OF MAGNESIUM: CPT

## 2024-05-14 PROCEDURE — 80053 COMPREHEN METABOLIC PANEL: CPT

## 2024-05-14 PROCEDURE — C9113 INJ PANTOPRAZOLE SODIUM, VIA: HCPCS

## 2024-05-14 PROCEDURE — 84100 ASSAY OF PHOSPHORUS: CPT

## 2024-05-14 PROCEDURE — 94640 AIRWAY INHALATION TREATMENT: CPT

## 2024-05-14 PROCEDURE — 82805 BLOOD GASES W/O2 SATURATION: CPT

## 2024-05-14 RX ADMIN — THIAMINE HYDROCHLORIDE 100 MG: 100 INJECTION, SOLUTION INTRAMUSCULAR; INTRAVENOUS at 08:59

## 2024-05-14 RX ADMIN — ALBUTEROL SULFATE 2.5 MG: 2.5 SOLUTION RESPIRATORY (INHALATION) at 08:11

## 2024-05-14 RX ADMIN — SODIUM CHLORIDE, PRESERVATIVE FREE 10 ML: 5 INJECTION INTRAVENOUS at 08:59

## 2024-05-14 RX ADMIN — FUROSEMIDE 40 MG: 10 INJECTION, SOLUTION INTRAMUSCULAR; INTRAVENOUS at 08:51

## 2024-05-14 RX ADMIN — INSULIN LISPRO 1 UNITS: 100 INJECTION, SOLUTION INTRAVENOUS; SUBCUTANEOUS at 07:50

## 2024-05-14 RX ADMIN — CLONAZEPAM 1.5 MG: 0.5 TABLET ORAL at 04:33

## 2024-05-14 RX ADMIN — ACETAMINOPHEN 650 MG: 325 TABLET ORAL at 20:37

## 2024-05-14 RX ADMIN — ALBUTEROL SULFATE 2.5 MG: 2.5 SOLUTION RESPIRATORY (INHALATION) at 12:23

## 2024-05-14 RX ADMIN — NYSTATIN 500000 UNITS: 100000 SUSPENSION ORAL at 16:54

## 2024-05-14 RX ADMIN — SODIUM CHLORIDE, PRESERVATIVE FREE 40 MG: 5 INJECTION INTRAVENOUS at 08:57

## 2024-05-14 RX ADMIN — POTASSIUM BICARBONATE 40 MEQ: 782 TABLET, EFFERVESCENT ORAL at 06:34

## 2024-05-14 RX ADMIN — LEVETIRACETAM 1500 MG: 500 INJECTION, SOLUTION, CONCENTRATE INTRAVENOUS at 20:08

## 2024-05-14 RX ADMIN — NYSTATIN 500000 UNITS: 100000 SUSPENSION ORAL at 13:41

## 2024-05-14 RX ADMIN — ACETAMINOPHEN 650 MG: 325 TABLET ORAL at 04:27

## 2024-05-14 RX ADMIN — POTASSIUM BICARBONATE 20 MEQ: 782 TABLET, EFFERVESCENT ORAL at 06:34

## 2024-05-14 RX ADMIN — Medication 15 ML: at 08:49

## 2024-05-14 RX ADMIN — VALPROATE SODIUM 1500 MG: 100 INJECTION, SOLUTION INTRAVENOUS at 22:52

## 2024-05-14 RX ADMIN — ACETAZOLAMIDE SODIUM 500 MG: 500 INJECTION, POWDER, LYOPHILIZED, FOR SOLUTION INTRAVENOUS at 08:48

## 2024-05-14 RX ADMIN — CLONAZEPAM 1.5 MG: 0.5 TABLET ORAL at 16:54

## 2024-05-14 RX ADMIN — NYSTATIN 500000 UNITS: 100000 SUSPENSION ORAL at 20:08

## 2024-05-14 RX ADMIN — ALBUTEROL SULFATE 2.5 MG: 2.5 SOLUTION RESPIRATORY (INHALATION) at 20:06

## 2024-05-14 RX ADMIN — CLONAZEPAM 1.5 MG: 0.5 TABLET ORAL at 10:26

## 2024-05-14 RX ADMIN — VALPROATE SODIUM 1500 MG: 100 INJECTION, SOLUTION INTRAVENOUS at 10:31

## 2024-05-14 RX ADMIN — LEVETIRACETAM 1500 MG: 500 INJECTION, SOLUTION, CONCENTRATE INTRAVENOUS at 08:52

## 2024-05-14 RX ADMIN — ATORVASTATIN CALCIUM 40 MG: 40 TABLET, FILM COATED ORAL at 20:08

## 2024-05-14 RX ADMIN — Medication 15 ML: at 20:08

## 2024-05-14 RX ADMIN — ASPIRIN 81 MG CHEWABLE TABLET 81 MG: 81 TABLET CHEWABLE at 08:49

## 2024-05-14 RX ADMIN — NYSTATIN 500000 UNITS: 100000 SUSPENSION ORAL at 08:54

## 2024-05-14 RX ADMIN — CLONAZEPAM 1.5 MG: 0.5 TABLET ORAL at 22:51

## 2024-05-14 RX ADMIN — SODIUM CHLORIDE, PRESERVATIVE FREE 10 ML: 5 INJECTION INTRAVENOUS at 20:08

## 2024-05-14 ASSESSMENT — PULMONARY FUNCTION TESTS
PIF_VALUE: 16
PIF_VALUE: 20
PIF_VALUE: 24
PIF_VALUE: 19
PIF_VALUE: 20
PIF_VALUE: 25
PIF_VALUE: 22
PIF_VALUE: 19
PIF_VALUE: 20
PIF_VALUE: 18
PIF_VALUE: 18
PIF_VALUE: 17
PIF_VALUE: 20
PIF_VALUE: 20
PIF_VALUE: 18
PIF_VALUE: 20
PIF_VALUE: 21
PIF_VALUE: 18
PIF_VALUE: 18
PIF_VALUE: 23
PIF_VALUE: 17
PIF_VALUE: 18
PIF_VALUE: 20
PIF_VALUE: 22
PIF_VALUE: 21
PIF_VALUE: 20
PIF_VALUE: 20
PIF_VALUE: 18
PIF_VALUE: 18
PIF_VALUE: 14
PIF_VALUE: 18

## 2024-05-14 ASSESSMENT — PAIN SCALES - GENERAL
PAINLEVEL_OUTOF10: 0

## 2024-05-14 NOTE — PROGRESS NOTES
HOSPITALIST PROGRESS NOTE    Patient's name: Nabeel Appiah  : 1960  Admission date: 2024  Date of service: 2024   Primary care physician: Jonh Allen DO    Assessment   Patient admitted on 2024     Nabeel Appiah is a 63 y.o. male patient of Jonh Allen DO with history of atrial fibrillation, hypertension, obesity, type 2 diabetes mellitus, AAA presented to [facility] on 2024 after cardiac arrest. On EMS arrival reported to be in V. tach/V-fib arrest and received amiodarone and shocked x 3. Patient was noted to have ROSC prior to ED arrival.     GOC have been discussed with girlfriend, but patient has no NOK as per his girlfriend. Girlfriend is trying to access his safe to see if he has a living will. Needs 2 doctor sign off for medical decisions at present. Critical care doc will not sign for withdrawal of care and would like to proceed with Trach/PEG. General surgery does not feel it is ethical to place Trach/PEG as long standing girlfriend who has been involved with his care, stated that he would not want that.   Girlfriend has filed for guardianship paperwork but it may take up to 3 to 4 months.  In the meantime as patient does not have POA I think that is reasonable to consider trach and PEG and once guardianship has been completed with girlfriend she can make decisions for the patient.    Cardiac arrest secondary to ACS -cardiology following, plans for cardiac catheterization however continues to have poor prognosis in terms of neurologic recovery, now off heparin drip s/p rewarming   V-fib/V. Tach -now off lidocaine drip  Persistent Afib - on Eliquis at home, completed heparin infusion and transitioned to Eliquis now stopped for new subdural hematoma.    Aspiration pneumonia - growing gram-positive cocci in pairs on respiratory culture, completed Unasyn.  Acute hypoxic respiratory failure secondary to cardiac arrest - intubated ventilatory

## 2024-05-14 NOTE — PROGRESS NOTES
Nabeel Appiah is a 63 y.o.  male     Neurology following for altered mental status following cardiac arrest    PMH of A-fib, hypertension, diabetes    Assessment:     Myoclonic seizures s/p cardiac arrest  His clinical picture is consistent with anoxic brain injury however unable to obtain brain MRI due to body habitus    Per Dr. Deleon \"He has a high likelihood for a poor neurologic prognosis, potentially remaining in a comatose state indefinitely. In a best case scenario I would expect him to require a tracheostomy and PEG tube as he would likely have significant neurologic impairment and would be unlikely to achieve independent function with ADLs\".     Plan:     Continue Keppra 1500 mg twice daily  Continue valproate 1500 mg twice daily  Poor prognosis  Need 2 physicians to change CODE STATUS to a DNR CC OR for trach and PEG  Girlfriend trying to obtain guardianship, may take 3 to 4 months  Neurology will follow peripherally    History of Present Illness:     Patient presented to the ER on 4/29/2024 following a cardiac arrest.  He was noted to be in V. tach/V-fib and received amiodarone and shocks x 3. ROSC was achieved prior to ED arrival. He was then admitted to the MICU.     While he was in the ICU he was noted to develop myoclonic jerking of the face. Neurology consulted for myoclonic activity--started on Keppra .    EEG showed a potential for generalized seizures but no associated epileptiform activity was found and myoclonic jerks were captured     5/4: persistent myoclonic jerking with any stimulation.  Started on routine clonazepam and loaded with valproic acid.  5/5 myoclonic jerks persist. VPA and clonazepam increased  5/6: MRI of the brain was canceled due to body habitus.  VPA increased due to persistent myoclonic jerking     He has no power of  and no living blood relatives, but apparently his longtime girlfriend has stated that the patient would not want trach/PEG.  He is currently a  (cold caloric) reflex not tested at this time  Gag reflex present    Motor  No spontaneous movements  Able to withdraw to painful stimuli all 4 extremities  Tone: Obese bulk, normal tone    Sensation   Able to withdraw to painful stimuli in all 4 extremities    Reflexes:       Right Left   Biceps 2 2   Brachioradialis 2 2   Quadriceps 2 2   Achilles 2 2   Ankle clonus absent absent   Babinski present present       Laboratory/Radiology:     CBC with Differential:    Lab Results   Component Value Date/Time    WBC 14.4 05/14/2024 04:10 AM    RBC 3.73 05/14/2024 04:10 AM    HGB 11.3 05/14/2024 04:10 AM    HCT 35.9 05/14/2024 04:10 AM     05/14/2024 04:10 AM    MCV 96.2 05/14/2024 04:10 AM    MCH 30.3 05/14/2024 04:10 AM    MCHC 31.5 05/14/2024 04:10 AM    RDW 13.3 05/14/2024 04:10 AM    LYMPHOPCT 3 05/14/2024 04:10 AM    MONOPCT 5 05/14/2024 04:10 AM    EOSPCT 3 05/14/2024 04:10 AM    BASOPCT 1 05/14/2024 04:10 AM    MONOSABS 0.75 05/14/2024 04:10 AM    EOSABS 0.38 05/14/2024 04:10 AM    BASOSABS 0.13 05/14/2024 04:10 AM     CMP:    Lab Results   Component Value Date/Time     05/14/2024 04:10 AM    K 3.2 05/14/2024 04:10 AM    K 4.6 09/10/2020 10:19 PM    CL 99 05/14/2024 04:10 AM    CO2 25 05/14/2024 04:10 AM    BUN 41 05/14/2024 04:10 AM    CREATININE 0.9 05/14/2024 04:10 AM    GFRAA >60 01/11/2022 12:58 PM    LABGLOM >90 05/14/2024 04:10 AM    LABGLOM 66 04/30/2024 05:30 PM    GLUCOSE 196 05/14/2024 04:10 AM    CALCIUM 8.5 05/14/2024 04:10 AM    BILITOT 0.4 05/14/2024 04:10 AM    ALKPHOS 110 05/14/2024 04:10 AM    AST 49 05/14/2024 04:10 AM    ALT 31 05/14/2024 04:10 AM     HgBA1c:    Lab Results   Component Value Date/Time    LABA1C 6.4 04/06/2024 09:18 AM     FLP:    Lab Results   Component Value Date/Time    TRIG 98 04/06/2024 09:18 AM    HDL 44 04/06/2024 09:18 AM       Ref Range & Units 5/13/24 2030 5/7/24 0412 5/6/24 0352 5/5/24 0410   Valproic Acid Lvl  50 - 100 ug/mL 66 55 53 56   Valproic Dose

## 2024-05-14 NOTE — PROCEDURES
[facility] Neurodiagnostic Report    MRN: [id number]  PATIENT NAME: Nabeel Appiah  DATE OF REPORT: 2024    DATE OF SERVICE: 2024  PHYSICIAN NAME: Gordo Deleon DO  STUDY ORDERED BY: Dr Deleon      Patient's : 1960  Patient's Age: 63 y.o.  Gender: male    PROCEDURE: Routine EEG with video      Clinical Interpretation:   This abnormal study showed evidence of:    A potential for generalized onset seizures  A severe nonspecific encephalopathy    Structural abnormalities should be considered for the findings above and appropriate imaging obtained if clinically indicated.     No seizures were noted during this study.      ____________________________  Electronically signed by: Gordo Deleon DO, 2024 7:54 AM      Patient Clinical Information   Reason for Study: The patient is undergoing evaluation for altered mental status  Patient State: Comatose  Primary neurological diagnosis: Altered mental status  Primary indication for monitoring: Diagnosis of nonconvulsive seizures    Pertinent Medications and Treatments    valproate     clonazepam     levetiracetam     Sedatives administered: No  Intubated: Yes  Pharmacological paralytic: No    Reporting Period  Start of Study: 751, 2024   End of Study:  816, 2024       EEG Description  Digital video and scalp EEG monitoring was performed using the standard protocol for this laboratory. Scalp electrodes were applied in the international 10/20 system. Multiple digital montage arrangements were utilized for evaluation. EKG and video were recorded.     Background:      Occipital rhythm (posterior dominant rhythm or PDR): Absent   Voltage: Low  Organization: poor  Reactivity to eye opening/closure: Not tested    Drowsiness: Absent  Sleep: Absent    Comments: The background is composed predominantly of very low amplitude generalized irregular delta.     Technical and Activation Procedures:  Hyperventilation: Not

## 2024-05-14 NOTE — PLAN OF CARE
Problem: Safety - Adult  Goal: Free from fall injury  5/14/2024 0835 by Yoandy Downey RN  Outcome: Progressing     Problem: Pain  Goal: Verbalizes/displays adequate comfort level or baseline comfort level  5/14/2024 0835 by Yoandy Downey RN  Outcome: Progressing     Problem: Respiratory - Adult  Goal: Achieves optimal ventilation and oxygenation  5/14/2024 0835 by Yoandy Downey RN  Outcome: Progressing     Problem: Skin/Tissue Integrity  Goal: Absence of new skin breakdown  Description: 1.  Monitor for areas of redness and/or skin breakdown  2.  Assess vascular access sites hourly  3.  Every 4-6 hours minimum:  Change oxygen saturation probe site  4.  Every 4-6 hours:  If on nasal continuous positive airway pressure, respiratory therapy assess nares and determine need for appliance change or resting period.  5/14/2024 0835 by Yoandy Downey RN  Outcome: Progressing     Problem: ABCDS Injury Assessment  Goal: Absence of physical injury  5/14/2024 0835 by Yoandy Downey RN  Outcome: Progressing     Problem: Nutrition Deficit:  Goal: Optimize nutritional status  5/14/2024 0835 by Yoandy Downey RN  Outcome: Progressing       Problem: Chronic Conditions and Co-morbidities  Goal: Patient's chronic conditions and co-morbidity symptoms are monitored and maintained or improved  5/14/2024 0214 by Jess King RN  Outcome: Not Progressing  Flowsheets (Taken 5/13/2024 2000)  Care Plan - Patient's Chronic Conditions and Co-Morbidity Symptoms are Monitored and Maintained or Improved: Monitor and assess patient's chronic conditions and comorbid symptoms for stability, deterioration, or improvement     Problem: Discharge Planning  Goal: Discharge to home or other facility with appropriate resources  5/14/2024 0214 by Jess King, RN  Outcome: Not Progressing  Flowsheets (Taken 5/13/2024 2000)  Discharge to home or other facility with appropriate resources: Identify barriers to discharge with patient and  caregiver     Problem: Pain  Goal: Verbalizes/displays adequate comfort level or baseline comfort level  5/14/2024 0835 by Yoandy Downey RN  Outcome: Progressing  5/14/2024 0214 by Jess King RN  Outcome: Not Progressing  Flowsheets (Taken 5/13/2024 2000)  Verbalizes/displays adequate comfort level or baseline comfort level:   Encourage patient to monitor pain and request assistance   Assess pain using appropriate pain scale   Administer analgesics based on type and severity of pain and evaluate response   Implement non-pharmacological measures as appropriate and evaluate response   Consider cultural and social influences on pain and pain management     Problem: Neurosensory - Adult  Goal: Achieves stable or improved neurological status  5/14/2024 0214 by Jess King RN  Outcome: Not Progressing  Flowsheets (Taken 5/13/2024 2000)  Achieves stable or improved neurological status:   Assess for and report changes in neurological status   Maintain blood pressure and fluid volume within ordered parameters to optimize cerebral perfusion and minimize risk of hemorrhage   Initiate measures to prevent increased intracranial pressure   Monitor temperature, glucose, and sodium. Initiate appropriate interventions as ordered

## 2024-05-14 NOTE — PROGRESS NOTES
[facility]  Department of Internal Medicine   Internal Medicine Residency   MICU Progress Note    Patient:  Nabeel Appiah 63 y.o. male  MRN: [id number]     Date of Service: 5/14/2024    Allergy: Patient has no known allergies.    Overnight Events: No acute overnight events.     Subjective     Patient seen and examined at bedside this AM. Remains unresponsive, intubated.     ROS: unable to be achieved due to current mental status    Objective     VS: /71   Pulse 73   Temp 97.4 °F (36.3 °C) (Temporal)   Resp 24   Ht 1.905 m (6' 3\")   Wt (!) 146.9 kg (323 lb 13.7 oz)   SpO2 95%   BMI 40.48 kg/m²   ABP (Arterial line BP): 100/50  ABP Mean (Arterial Line Mean): (!) 64 mmHg    I & O - 24hr:   Intake/Output Summary (Last 24 hours) at 5/14/2024 0908  Last data filed at 5/14/2024 0900  Gross per 24 hour   Intake 3730.29 ml   Output 3925 ml   Net -194.71 ml       Sedatives: N/A    Pressors: N/A     Oxygen: Intubated 450/16/45/8    ABG:     Lab Results   Component Value Date/Time    PH 7.480 05/14/2024 04:32 AM    PCO2 35.4 05/14/2024 04:32 AM    PO2 75.4 05/14/2024 04:32 AM    HCO3 25.8 05/14/2024 04:32 AM    BE 2.5 05/14/2024 04:32 AM    THB 12.2 05/14/2024 04:32 AM    O2SAT 95.0 05/14/2024 04:32 AM       Physical Exam:  General Appearance: AO x 0, not following commands    Neuro:Upward gaze, +pupillary reflex, absent corneal reflex, absent gag reflex, +cough reflex, withdrawing to noxious stimuli, myoclonic jerks reduced in frequency from prior exam   Cardiovascular: regular rate and rhythm, S1 and S2 heard, no murmurs appreciated   Respiratory: Mechanically ventilated lung sounds. No wheezing or crackles appreciated.  Abdomen: Softly distended  Extremities: Bilateral upper extremity edema- improving, no cyanosis, distal pulses intact    Lines & Urinary Catheter:     External Urinary Catheter since 05/03  ETT since 04/30  Fecal management system since 05/07  NG tube since 05/03     Labs

## 2024-05-14 NOTE — PLAN OF CARE
Problem: Chronic Conditions and Co-morbidities  Goal: Patient's chronic conditions and co-morbidity symptoms are monitored and maintained or improved  Outcome: Not Progressing  Flowsheets (Taken 5/13/2024 2000)  Care Plan - Patient's Chronic Conditions and Co-Morbidity Symptoms are Monitored and Maintained or Improved: Monitor and assess patient's chronic conditions and comorbid symptoms for stability, deterioration, or improvement     Problem: Discharge Planning  Goal: Discharge to home or other facility with appropriate resources  Outcome: Not Progressing  Flowsheets (Taken 5/13/2024 2000)  Discharge to home or other facility with appropriate resources: Identify barriers to discharge with patient and caregiver     Problem: Pain  Goal: Verbalizes/displays adequate comfort level or baseline comfort level  Outcome: Not Progressing  Flowsheets (Taken 5/13/2024 2000)  Verbalizes/displays adequate comfort level or baseline comfort level:   Encourage patient to monitor pain and request assistance   Assess pain using appropriate pain scale   Administer analgesics based on type and severity of pain and evaluate response   Implement non-pharmacological measures as appropriate and evaluate response   Consider cultural and social influences on pain and pain management     Problem: Neurosensory - Adult  Goal: Achieves stable or improved neurological status  Outcome: Not Progressing  Flowsheets (Taken 5/13/2024 2000)  Achieves stable or improved neurological status:   Assess for and report changes in neurological status   Maintain blood pressure and fluid volume within ordered parameters to optimize cerebral perfusion and minimize risk of hemorrhage   Initiate measures to prevent increased intracranial pressure   Monitor temperature, glucose, and sodium. Initiate appropriate interventions as ordered        Problem: Safety - Adult  Goal: Free from fall injury  Outcome: Progressing  Flowsheets (Taken 5/14/2024 0200)  Free From  Fall Injury:   Instruct family/caregiver on patient safety   Based on caregiver fall risk screen, instruct family/caregiver to ask for assistance with transferring infant if caregiver noted to have fall risk factors     Problem: Respiratory - Adult  Goal: Achieves optimal ventilation and oxygenation  Outcome: Progressing  Flowsheets (Taken 5/13/2024 2000)  Achieves optimal ventilation and oxygenation:   Assess for changes in respiratory status   Assess for changes in mentation and behavior   Position to facilitate oxygenation and minimize respiratory effort   Oxygen supplementation based on oxygen saturation or arterial blood gases   Initiate smoking cessation protocol as indicated   Encourage broncho-pulmonary hygiene including cough, deep breathe, incentive spirometry   Assess the need for suctioning and aspirate as needed   Assess and instruct to report shortness of breath or any respiratory difficulty   Respiratory therapy support as indicated     Problem: Skin/Tissue Integrity  Goal: Absence of new skin breakdown  Description: 1.  Monitor for areas of redness and/or skin breakdown  2.  Assess vascular access sites hourly  3.  Every 4-6 hours minimum:  Change oxygen saturation probe site  4.  Every 4-6 hours:  If on nasal continuous positive airway pressure, respiratory therapy assess nares and determine need for appliance change or resting period.  Outcome: Progressing     Problem: ABCDS Injury Assessment  Goal: Absence of physical injury  Outcome: Progressing  Flowsheets (Taken 5/14/2024 0200)  Absence of Physical Injury: Implement safety measures based on patient assessment     Problem: Nutrition Deficit:  Goal: Optimize nutritional status  Outcome: Progressing

## 2024-05-15 ENCOUNTER — APPOINTMENT (OUTPATIENT)
Dept: GENERAL RADIOLOGY | Age: 64
DRG: 283 | End: 2024-05-15
Attending: STUDENT IN AN ORGANIZED HEALTH CARE EDUCATION/TRAINING PROGRAM
Payer: COMMERCIAL

## 2024-05-15 ENCOUNTER — APPOINTMENT (OUTPATIENT)
Dept: NEUROLOGY | Age: 64
DRG: 283 | End: 2024-05-15
Attending: STUDENT IN AN ORGANIZED HEALTH CARE EDUCATION/TRAINING PROGRAM
Payer: COMMERCIAL

## 2024-05-15 LAB
AADO2: 169.3 MMHG
ALBUMIN SERPL-MCNC: 2.9 G/DL (ref 3.5–5.2)
ALP SERPL-CCNC: 109 U/L (ref 40–129)
ALT SERPL-CCNC: 31 U/L (ref 0–40)
ANION GAP SERPL CALCULATED.3IONS-SCNC: 15 MMOL/L (ref 7–16)
AST SERPL-CCNC: 47 U/L (ref 0–39)
B.E.: 0.8 MMOL/L (ref -3–3)
BASOPHILS # BLD: 0.21 K/UL (ref 0–0.2)
BASOPHILS NFR BLD: 2 % (ref 0–2)
BILIRUB SERPL-MCNC: 0.3 MG/DL (ref 0–1.2)
BUN SERPL-MCNC: 35 MG/DL (ref 6–23)
CALCIUM SERPL-MCNC: 8.8 MG/DL (ref 8.6–10.2)
CHLORIDE SERPL-SCNC: 102 MMOL/L (ref 98–107)
CO2 SERPL-SCNC: 25 MMOL/L (ref 22–29)
COHB: 0.6 % (ref 0–1.5)
CREAT SERPL-MCNC: 0.8 MG/DL (ref 0.7–1.2)
CRITICAL: ABNORMAL
DATE ANALYZED: ABNORMAL
DATE OF COLLECTION: ABNORMAL
EOSINOPHIL # BLD: 0.21 K/UL (ref 0.05–0.5)
EOSINOPHILS RELATIVE PERCENT: 2 % (ref 0–6)
ERYTHROCYTE [DISTWIDTH] IN BLOOD BY AUTOMATED COUNT: 13.2 % (ref 11.5–15)
FIO2: 40 %
GFR, ESTIMATED: >90 ML/MIN/1.73M2
GLUCOSE BLD-MCNC: 153 MG/DL (ref 74–99)
GLUCOSE BLD-MCNC: 173 MG/DL (ref 74–99)
GLUCOSE BLD-MCNC: 186 MG/DL (ref 74–99)
GLUCOSE BLD-MCNC: 193 MG/DL (ref 74–99)
GLUCOSE BLD-MCNC: 199 MG/DL (ref 74–99)
GLUCOSE BLD-MCNC: 219 MG/DL (ref 74–99)
GLUCOSE SERPL-MCNC: 180 MG/DL (ref 74–99)
HCO3: 23.8 MMOL/L (ref 22–26)
HCT VFR BLD AUTO: 34.1 % (ref 37–54)
HGB BLD-MCNC: 11 G/DL (ref 12.5–16.5)
HHB: 4.3 % (ref 0–5)
LAB: ABNORMAL
LYMPHOCYTES NFR BLD: 0.74 K/UL (ref 1.5–4)
LYMPHOCYTES RELATIVE PERCENT: 6 % (ref 20–42)
Lab: 425
MAGNESIUM SERPL-MCNC: 2.5 MG/DL (ref 1.6–2.6)
MCH RBC QN AUTO: 30.6 PG (ref 26–35)
MCHC RBC AUTO-ENTMCNC: 32.3 G/DL (ref 32–34.5)
MCV RBC AUTO: 95 FL (ref 80–99.9)
METAMYELOCYTES ABSOLUTE COUNT: 0.11 K/UL (ref 0–0.12)
METAMYELOCYTES: 1 % (ref 0–1)
METHB: 0.4 % (ref 0–1.5)
MODE: AC
MONOCYTES NFR BLD: 0.84 K/UL (ref 0.1–0.95)
MONOCYTES NFR BLD: 7 % (ref 2–12)
NEUTROPHILS NFR BLD: 83 % (ref 43–80)
NEUTS SEG NFR BLD: 9.89 K/UL (ref 1.8–7.3)
O2 CONTENT: 16.4 ML/DL
O2 SATURATION: 95.7 % (ref 92–98.5)
O2HB: 94.7 % (ref 94–97)
OPERATOR ID: 914
PATIENT TEMP: 37 C
PCO2: 32.9 MMHG (ref 35–45)
PEEP/CPAP: 8 CMH2O
PFO2: 1.95 MMHG/%
PH BLOOD GAS: 7.48 (ref 7.35–7.45)
PHOSPHATE SERPL-MCNC: 2.7 MG/DL (ref 2.5–4.5)
PLATELET # BLD AUTO: 225 K/UL (ref 130–450)
PMV BLD AUTO: 11.6 FL (ref 7–12)
PO2: 78.1 MMHG (ref 75–100)
POTASSIUM SERPL-SCNC: 3.2 MMOL/L (ref 3.5–5)
POTASSIUM SERPL-SCNC: 3.4 MMOL/L (ref 3.5–5)
PROCALCITONIN SERPL-MCNC: 0.15 NG/ML (ref 0–0.08)
PROT SERPL-MCNC: 6.2 G/DL (ref 6.4–8.3)
RBC # BLD AUTO: 3.59 M/UL (ref 3.8–5.8)
RBC # BLD: ABNORMAL 10*6/UL
RI(T): 2.17
RR MECHANICAL: 16 B/MIN
SODIUM SERPL-SCNC: 142 MMOL/L (ref 132–146)
SOURCE, BLOOD GAS: ABNORMAL
THB: 12.3 G/DL (ref 11.5–16.5)
TIME ANALYZED: 439
VT MECHANICAL: 450 ML
WBC OTHER # BLD: 12 K/UL (ref 4.5–11.5)

## 2024-05-15 PROCEDURE — 6370000000 HC RX 637 (ALT 250 FOR IP): Performed by: STUDENT IN AN ORGANIZED HEALTH CARE EDUCATION/TRAINING PROGRAM

## 2024-05-15 PROCEDURE — 2580000003 HC RX 258

## 2024-05-15 PROCEDURE — 6360000002 HC RX W HCPCS

## 2024-05-15 PROCEDURE — 87205 SMEAR GRAM STAIN: CPT

## 2024-05-15 PROCEDURE — 87086 URINE CULTURE/COLONY COUNT: CPT

## 2024-05-15 PROCEDURE — 6370000000 HC RX 637 (ALT 250 FOR IP)

## 2024-05-15 PROCEDURE — 82962 GLUCOSE BLOOD TEST: CPT

## 2024-05-15 PROCEDURE — 85025 COMPLETE CBC W/AUTO DIFF WBC: CPT

## 2024-05-15 PROCEDURE — 84100 ASSAY OF PHOSPHORUS: CPT

## 2024-05-15 PROCEDURE — 6360000002 HC RX W HCPCS: Performed by: STUDENT IN AN ORGANIZED HEALTH CARE EDUCATION/TRAINING PROGRAM

## 2024-05-15 PROCEDURE — 95714 VEEG EA 12-26 HR UNMNTR: CPT

## 2024-05-15 PROCEDURE — A4216 STERILE WATER/SALINE, 10 ML: HCPCS

## 2024-05-15 PROCEDURE — 36415 COLL VENOUS BLD VENIPUNCTURE: CPT

## 2024-05-15 PROCEDURE — 99291 CRITICAL CARE FIRST HOUR: CPT | Performed by: INTERNAL MEDICINE

## 2024-05-15 PROCEDURE — 99232 SBSQ HOSP IP/OBS MODERATE 35: CPT | Performed by: STUDENT IN AN ORGANIZED HEALTH CARE EDUCATION/TRAINING PROGRAM

## 2024-05-15 PROCEDURE — 6370000000 HC RX 637 (ALT 250 FOR IP): Performed by: INTERNAL MEDICINE

## 2024-05-15 PROCEDURE — 94640 AIRWAY INHALATION TREATMENT: CPT

## 2024-05-15 PROCEDURE — 2580000003 HC RX 258: Performed by: CHIROPRACTOR

## 2024-05-15 PROCEDURE — 84132 ASSAY OF SERUM POTASSIUM: CPT

## 2024-05-15 PROCEDURE — 2500000003 HC RX 250 WO HCPCS

## 2024-05-15 PROCEDURE — 87070 CULTURE OTHR SPECIMN AEROBIC: CPT

## 2024-05-15 PROCEDURE — 87040 BLOOD CULTURE FOR BACTERIA: CPT

## 2024-05-15 PROCEDURE — 94003 VENT MGMT INPAT SUBQ DAY: CPT

## 2024-05-15 PROCEDURE — 80053 COMPREHEN METABOLIC PANEL: CPT

## 2024-05-15 PROCEDURE — 84145 PROCALCITONIN (PCT): CPT

## 2024-05-15 PROCEDURE — 6360000002 HC RX W HCPCS: Performed by: CHIROPRACTOR

## 2024-05-15 PROCEDURE — 87077 CULTURE AEROBIC IDENTIFY: CPT

## 2024-05-15 PROCEDURE — C9113 INJ PANTOPRAZOLE SODIUM, VIA: HCPCS

## 2024-05-15 PROCEDURE — 83735 ASSAY OF MAGNESIUM: CPT

## 2024-05-15 PROCEDURE — 95720 EEG PHY/QHP EA INCR W/VEEG: CPT | Performed by: PSYCHIATRY & NEUROLOGY

## 2024-05-15 PROCEDURE — 6360000002 HC RX W HCPCS: Performed by: PSYCHIATRY & NEUROLOGY

## 2024-05-15 PROCEDURE — 2000000000 HC ICU R&B

## 2024-05-15 PROCEDURE — 82805 BLOOD GASES W/O2 SATURATION: CPT

## 2024-05-15 PROCEDURE — 87186 SC STD MICRODIL/AGAR DIL: CPT

## 2024-05-15 RX ORDER — POTASSIUM CHLORIDE 7.45 MG/ML
10 INJECTION INTRAVENOUS
Status: COMPLETED | OUTPATIENT
Start: 2024-05-15 | End: 2024-05-15

## 2024-05-15 RX ADMIN — POTASSIUM CHLORIDE 10 MEQ: 7.46 INJECTION, SOLUTION INTRAVENOUS at 19:27

## 2024-05-15 RX ADMIN — ACETAZOLAMIDE SODIUM 500 MG: 500 INJECTION, POWDER, LYOPHILIZED, FOR SOLUTION INTRAVENOUS at 08:44

## 2024-05-15 RX ADMIN — CLONAZEPAM 1.5 MG: 0.5 TABLET ORAL at 21:55

## 2024-05-15 RX ADMIN — LEVETIRACETAM 1500 MG: 500 INJECTION, SOLUTION, CONCENTRATE INTRAVENOUS at 22:13

## 2024-05-15 RX ADMIN — POTASSIUM CHLORIDE 10 MEQ: 7.46 INJECTION, SOLUTION INTRAVENOUS at 18:14

## 2024-05-15 RX ADMIN — NYSTATIN 500000 UNITS: 100000 SUSPENSION ORAL at 17:11

## 2024-05-15 RX ADMIN — CLONAZEPAM 1.5 MG: 0.5 TABLET ORAL at 17:12

## 2024-05-15 RX ADMIN — Medication 15 ML: at 08:47

## 2024-05-15 RX ADMIN — POTASSIUM BICARBONATE 40 MEQ: 782 TABLET, EFFERVESCENT ORAL at 09:05

## 2024-05-15 RX ADMIN — LEVETIRACETAM 1500 MG: 500 INJECTION, SOLUTION, CONCENTRATE INTRAVENOUS at 08:50

## 2024-05-15 RX ADMIN — CLONAZEPAM 1.5 MG: 0.5 TABLET ORAL at 04:19

## 2024-05-15 RX ADMIN — ACETAMINOPHEN 650 MG: 325 TABLET ORAL at 17:44

## 2024-05-15 RX ADMIN — INSULIN LISPRO 1 UNITS: 100 INJECTION, SOLUTION INTRAVENOUS; SUBCUTANEOUS at 22:47

## 2024-05-15 RX ADMIN — VANCOMYCIN HYDROCHLORIDE 3000 MG: 1 INJECTION, POWDER, LYOPHILIZED, FOR SOLUTION INTRAVENOUS at 10:26

## 2024-05-15 RX ADMIN — ALBUTEROL SULFATE 2.5 MG: 2.5 SOLUTION RESPIRATORY (INHALATION) at 10:44

## 2024-05-15 RX ADMIN — ALBUTEROL SULFATE 2.5 MG: 2.5 SOLUTION RESPIRATORY (INHALATION) at 07:47

## 2024-05-15 RX ADMIN — SODIUM CHLORIDE, PRESERVATIVE FREE 40 MG: 5 INJECTION INTRAVENOUS at 08:51

## 2024-05-15 RX ADMIN — CLONAZEPAM 1.5 MG: 0.5 TABLET ORAL at 10:31

## 2024-05-15 RX ADMIN — POTASSIUM CHLORIDE 10 MEQ: 7.46 INJECTION, SOLUTION INTRAVENOUS at 17:10

## 2024-05-15 RX ADMIN — VALPROATE SODIUM 1500 MG: 100 INJECTION, SOLUTION INTRAVENOUS at 10:32

## 2024-05-15 RX ADMIN — ATORVASTATIN CALCIUM 40 MG: 40 TABLET, FILM COATED ORAL at 21:55

## 2024-05-15 RX ADMIN — POTASSIUM CHLORIDE 10 MEQ: 7.46 INJECTION, SOLUTION INTRAVENOUS at 22:00

## 2024-05-15 RX ADMIN — POTASSIUM CHLORIDE 10 MEQ: 7.46 INJECTION, SOLUTION INTRAVENOUS at 23:15

## 2024-05-15 RX ADMIN — ASPIRIN 81 MG CHEWABLE TABLET 81 MG: 81 TABLET CHEWABLE at 08:46

## 2024-05-15 RX ADMIN — NYSTATIN 500000 UNITS: 100000 SUSPENSION ORAL at 21:56

## 2024-05-15 RX ADMIN — PIPERACILLIN AND TAZOBACTAM 4500 MG: 4; .5 INJECTION, POWDER, FOR SOLUTION INTRAVENOUS at 14:11

## 2024-05-15 RX ADMIN — ALBUTEROL SULFATE 2.5 MG: 2.5 SOLUTION RESPIRATORY (INHALATION) at 21:29

## 2024-05-15 RX ADMIN — POTASSIUM BICARBONATE 40 MEQ: 782 TABLET, EFFERVESCENT ORAL at 06:55

## 2024-05-15 RX ADMIN — SODIUM CHLORIDE, PRESERVATIVE FREE 10 ML: 5 INJECTION INTRAVENOUS at 22:01

## 2024-05-15 RX ADMIN — PIPERACILLIN AND TAZOBACTAM 4500 MG: 4; .5 INJECTION, POWDER, FOR SOLUTION INTRAVENOUS at 09:04

## 2024-05-15 RX ADMIN — NYSTATIN 500000 UNITS: 100000 SUSPENSION ORAL at 13:02

## 2024-05-15 RX ADMIN — VALPROATE SODIUM 1500 MG: 100 INJECTION, SOLUTION INTRAVENOUS at 22:05

## 2024-05-15 RX ADMIN — Medication 15 ML: at 21:56

## 2024-05-15 RX ADMIN — NYSTATIN 500000 UNITS: 100000 SUSPENSION ORAL at 08:51

## 2024-05-15 RX ADMIN — SODIUM CHLORIDE, PRESERVATIVE FREE 10 ML: 5 INJECTION INTRAVENOUS at 08:53

## 2024-05-15 RX ADMIN — POTASSIUM CHLORIDE 10 MEQ: 7.46 INJECTION, SOLUTION INTRAVENOUS at 20:39

## 2024-05-15 RX ADMIN — THIAMINE HYDROCHLORIDE 100 MG: 100 INJECTION, SOLUTION INTRAMUSCULAR; INTRAVENOUS at 08:53

## 2024-05-15 ASSESSMENT — PULMONARY FUNCTION TESTS
PIF_VALUE: 21
PIF_VALUE: 18
PIF_VALUE: 22
PIF_VALUE: 20
PIF_VALUE: 21
PIF_VALUE: 21
PIF_VALUE: 24
PIF_VALUE: 21
PIF_VALUE: 20
PIF_VALUE: 19
PIF_VALUE: 20
PIF_VALUE: 22
PIF_VALUE: 22
PIF_VALUE: 18
PIF_VALUE: 20
PIF_VALUE: 22
PIF_VALUE: 18
PIF_VALUE: 19
PIF_VALUE: 19
PIF_VALUE: 21
PIF_VALUE: 19
PIF_VALUE: 17
PIF_VALUE: 20
PIF_VALUE: 19
PIF_VALUE: 25

## 2024-05-15 ASSESSMENT — PAIN SCALES - GENERAL
PAINLEVEL_OUTOF10: 0

## 2024-05-15 NOTE — PLAN OF CARE
Problem: Safety - Adult  Goal: Free from fall injury  Outcome: Progressing     Problem: Pain  Goal: Verbalizes/displays adequate comfort level or baseline comfort level  Outcome: Progressing     Problem: Respiratory - Adult  Goal: Achieves optimal ventilation and oxygenation  Outcome: Progressing     Problem: Skin/Tissue Integrity  Goal: Absence of new skin breakdown  Description: 1.  Monitor for areas of redness and/or skin breakdown  2.  Assess vascular access sites hourly  3.  Every 4-6 hours minimum:  Change oxygen saturation probe site  4.  Every 4-6 hours:  If on nasal continuous positive airway pressure, respiratory therapy assess nares and determine need for appliance change or resting period.  Outcome: Progressing     Problem: ABCDS Injury Assessment  Goal: Absence of physical injury  Outcome: Progressing     Problem: Nutrition Deficit:  Goal: Optimize nutritional status  Outcome: Progressing

## 2024-05-15 NOTE — PROGRESS NOTES
Antibiotic Extended Infusion Policy     This patient is on medication that requires renal, weight, and/or indication dose adjustment.      Date Body Weight IBW  Adjusted BW SCr  CrCl Dialysis status BMI   5/15/2024 (!) 149.5 kg (329 lb 9.4 oz) Ideal body weight: 84.5 kg (186 lb 4.6 oz)  Adjusted ideal body weight: 110.5 kg (243 lb 9.7 oz) Serum creatinine: 0.8 mg/dL 05/15/24 0415  Estimated creatinine clearance: 146 mL/min N/a Body mass index is 41.2 kg/m².       Pharmacy has dose-adjusted the following medication(s):    Ordered Medication: Zosyn 4.5 gm Q6h      Order Changed/converted to: Zosyn 4500mg q8h    These changes were made per protocol according to the [facility]   Automatic Extended Infusion Dose Adjustment Policy.     *Please note this dose may need readjusted if patient's condition changes.    Please contact pharmacy with any questions regarding these changes.      Gordo Bailey, PharmD 5/15/2024 8:49 AM

## 2024-05-15 NOTE — PROGRESS NOTES
at 05/15/24 1026    [Held by provider] furosemide (LASIX) injection 40 mg  40 mg IntraVENous Daily Chelsey Pitt MD   40 mg at 05/14/24 0851    acetaZOLAMIDE (DIAMOX) 500 mg in sterile water 5 mL injection  500 mg IntraVENous Daily Chelsey Pitt MD   500 mg at 05/15/24 0844    [Held by provider] metoprolol tartrate (LOPRESSOR) tablet 12.5 mg  12.5 mg Oral BID Ulisses Perez DO        valproate (DEPACON) 1,500 mg in sodium chloride 0.9 % 100 mL IVPB  1,500 mg IntraVENous Q12H Chelsey Pitt  mL/hr at 05/15/24 1032 1,500 mg at 05/15/24 1032    albuterol (PROVENTIL) (2.5 MG/3ML) 0.083% nebulizer solution 2.5 mg  2.5 mg Nebulization TID RT Isiah Yusuf MD   2.5 mg at 05/15/24 0747    nystatin (MYCOSTATIN) 133884 UNIT/ML suspension 500,000 Units  5 mL Oral 4x Daily Chelsey Pitt MD   500,000 Units at 05/15/24 0851    aspirin chewable tablet 81 mg  81 mg Oral Daily Ulisses Perez DO   81 mg at 05/15/24 0846    clonazePAM (KLONOPIN) tablet 1.5 mg  1.5 mg Oral Q6H Isiah Yusuf MD   1.5 mg at 05/15/24 1031    [Held by provider] lisinopril (PRINIVIL;ZESTRIL) tablet 40 mg  40 mg Oral Daily Feli May MD   40 mg at 05/10/24 0829    And    [Held by provider] hydroCHLOROthiazide (HYDRODIURIL) tablet 25 mg  25 mg Oral Daily Feli May MD   25 mg at 05/10/24 0829    midazolam PF (VERSED) injection 2 mg  2 mg IntraVENous Q2H PRN Feli May MD   2 mg at 05/08/24 0932    hydrALAZINE (APRESOLINE) injection 10 mg  10 mg IntraVENous Q4H PRN Nicole Dhaliwal MD   10 mg at 05/05/24 2214    labetalol (NORMODYNE;TRANDATE) injection 10 mg  10 mg IntraVENous Q4H PRN Nicole Dhaliwal MD        thiamine (B-1) injection 100 mg  100 mg IntraVENous Daily Fely Garcia MD   100 mg at 05/15/24 0853    levETIRAcetam (KEPPRA) injection 1,500 mg  1,500 mg IntraVENous Q12H Gordo Deleon DO   1,500 mg at 05/15/24 0850    insulin lispro (HUMALOG) injection vial 0-4 Units  0-4 Units SubCUTAneous Q4H  **OR** acetaminophen, glucose, dextrose bolus **OR** dextrose bolus, glucagon (rDNA), dextrose, artificial tears    +++++++++++++++++++++++++++++++++++++++++++++++++  Naman Barr MD    [place], [place]  +++++++++++++++++++++++++++++++++++++++++++++++++  NOTE: This report was transcribed using voice recognition software. Every effort was made to ensure accuracy; however, inadvertent computerized transcription errors may be present.    I can be reached through [employer].

## 2024-05-15 NOTE — PROGRESS NOTES
Comprehensive Nutrition Assessment    Type and Reason for Visit:  Reassess    Nutrition Recommendations/Plan:   Continue current TF as ordered   Meets 94% estimated calorie needs & 100% estimated protein needs     Malnutrition Assessment:  Malnutrition Status:  At risk for malnutrition (Comment) (05/09/24 1047)    Context:  Acute Illness     Findings of the 6 clinical characteristics of malnutrition:  Energy Intake:  50% or less of estimated energy requirements for 5 or more days  Weight Loss:  Unable to assess (limited wt hx)     Body Fat Loss:  No significant body fat loss     Muscle Mass Loss:  No significant muscle mass loss    Fluid Accumulation:  No significant fluid accumulation     Strength:  Not Performed    Nutrition Assessment:    Pt remains at risk w/ ongoing need for EN support, intubated off sedation. Admit from [facility] for heart cath s/p cardiac arrest/fall at work PTA w/ multiple rib fx & PTX 2/2 CPR. Noted anoxic myoclonus, aspiration PNA, GENE & transaminitis (improved). PMHx DM, CAD, AAA. Pending possible trach/PEG vs CCO. Will continue to monitor.    Nutrition Related Findings:    pt intubated off sedation s/p cardiac arrest, NGT w/ TF, FMS, active BS, soft abd, +2 edema, -I/Os 5.4L, hypokalemia, hyperglycemia     Wound Type: Surgical Incision, Open Wounds       Current Nutrition Intake & Therapies:    Average Meal Intake: NPO     Diet NPO  ADULT TUBE FEEDING; Orogastric; Diabetic; Continuous; 20; Yes; 10; Q 4 hours; 60; 300; Q 4 hours; Protein; 1 Dose; Daily  Current Tube Feeding (TF) Orders:  Feeding Route: Nasogastric  Formula: Diabetic  Schedule: Continuous  Feeding Regimen: 60 ml/hr  Additives/Modulars: Protein (once daily)  Water Flushes: 300 ml q 4 hrs = 1800 ml water  Current TF & Flush Orders Provides: TF running at goal  Goal TF & Flush Orders Provides: 1400 ml tv, 2160 kcals, 119 gm pro (2264 kcals, 145 gm pro w/ mod), 1093 ml free water, 2893 ml total fluids  Additional Calorie  Sources:  none    Anthropometric Measures:  Height: 190.5 cm (6' 3\")  Ideal Body Weight (IBW): 196 lbs (89 kg)    Admission Body Weight: 168.7 kg (372 lb) (4/29 first measured @ [facility])  Current Body Weight: 149.2 kg (329 lb) (5/15 bed scale), 167.9 % IBW.    Current BMI (kg/m2): 41.1  Usual Body Weight: 159.7 kg (352 lb) (3/2023 EMR measured wt x 14 mon back)  % Weight Change (Calculated): 5.7                    BMI Categories: Obese Class 3 (BMI 40.0 or greater)    Estimated Daily Nutrient Needs:  Energy Requirements Based On: Formula  Weight Used for Energy Requirements: Current  Energy (kcal/day): ; 7687-5278  Weight Used for Protein Requirements: Ideal  Protein (g/day): 135-160 (1.5-1.8 gm/kg IBW)     Fluid (ml/day): per critical care    Nutrition Diagnosis:   Inadequate oral intake related to impaired respiratory function as evidenced by NPO or clear liquid status due to medical condition, intubation, nutrition support - enteral nutrition    Nutrition Interventions:   Food and/or Nutrient Delivery: Continue NPO, Continue Current Tube Feeding  Nutrition Education/Counseling: Education not indicated  Coordination of Nutrition Care: Continue to monitor while inpatient       Goals:  Previous Goal Met: Progressing toward Goal(s)  Goals: Tolerate nutrition support at goal rate, by next RD assessment  Specify Other Goals: .    Nutrition Monitoring and Evaluation:      Food/Nutrient Intake Outcomes: Enteral Nutrition Intake/Tolerance  Physical Signs/Symptoms Outcomes: Biochemical Data, GI Status, Fluid Status or Edema, Hemodynamic Status, Nutrition Focused Physical Findings, Skin, Weight    Discharge Planning:    Too soon to determine     Elle Altamirano, MS, RD, LD  Contact: [phone]

## 2024-05-15 NOTE — PROGRESS NOTES
Pharmacy Consultation Note  (Antibiotic Dosing and Monitoring)    Initial consult date: 5/15/24  Consulting physician/provider: Dr. Palacio  Drug: Vancomycin  Indication: Empiric    Age/  Gender Height Weight IBW  Allergy Information   63 y.o./male 191.8 cm (6' 3.51\") (!) 168.5 kg (371 lb 6.4 oz)     Ideal body weight: 84.5 kg (186 lb 4.6 oz)  Adjusted ideal body weight: 110.5 kg (243 lb 9.7 oz)   Patient has no known allergies.      Renal Function:  Recent Labs     05/13/24  0452 05/14/24  0410 05/15/24  0415   BUN 51* 41* 35*   CREATININE 1.0 0.9 0.8       Intake/Output Summary (Last 24 hours) at 5/15/2024 1129  Last data filed at 5/15/2024 0800  Gross per 24 hour   Intake 1873.37 ml   Output 3600 ml   Net -1726.63 ml       Vancomycin Monitoring:  Trough:  No results for input(s): \"VANCOTROUGH\" in the last 72 hours.  Random:  No results for input(s): \"VANCORANDOM\" in the last 72 hours.    Vancomycin Administration Times:  Recent vancomycin administrations                     vancomycin (VANCOCIN) 3,000 mg in sodium chloride 0.9 % 500 mL IVPB (mg) 3,000 mg New Bag 05/15/24 1026                    Assessment:  Patient is a 63 y.o. male who has been initiated on vancomycin  Estimated Creatinine Clearance: 146 mL/min (based on SCr of 0.8 mg/dL).    Plan:  Vancomycin 1500 mg IV q12h    Katlin Schaffer, NataliaD, BCPS, BCCCP 5/15/2024 11:30 AM

## 2024-05-15 NOTE — CARE COORDINATION
CM Update: Patient is intubated without sedation. Patient has no NOK. Tisha (girlfriend) has expressed that patient would not want to be trach/PEG. Gen Surgery refused to place trach/PEG. Tisha has filed for guardianship. Statement of expert eval faxed to  [employer] for guardianship initiation, documented by Dr Hodges to [phone].[employer] [place] Phone number is [phone]. Process may take several months. Neuro wants to complete a 24 hr EEG before signing DNRCC. Plan to begin EEG 5/15. CM/SW to follow. MT

## 2024-05-15 NOTE — PROGRESS NOTES
[facility]  Department of Internal Medicine   Internal Medicine Residency   MICU Progress Note    Patient:  Nabeel Appiah 63 y.o. male  MRN: [id number]     Date of Service: 5/15/2024    Allergy: Patient has no known allergies.    Overnight Events: No acute overnight events.     Subjective     Patient seen and examined at bedside this AM.  Patient remains on, no acute events overnight.   Some fever episodes over the last 24 hours    Objective     VS: BP (!) 98/54   Pulse 63   Temp 97.4 °F (36.3 °C)   Resp 19   Ht 1.905 m (6' 3\")   Wt (!) 149.5 kg (329 lb 9.4 oz)   SpO2 94%   BMI 41.20 kg/m²   ABP (Arterial line BP): 100/50  ABP Mean (Arterial Line Mean): (!) 64 mmHg    I & O - 24hr:   Intake/Output Summary (Last 24 hours) at 5/15/2024 0817  Last data filed at 5/15/2024 0658  Gross per 24 hour   Intake 1873.37 ml   Output 3700 ml   Net -1826.63 ml         Sedatives: N/A    Pressors: N/A     Oxygen: Intubated 450/16/45/8    ABG:     Lab Results   Component Value Date/Time    PH 7.477 05/15/2024 04:25 AM    PCO2 32.9 05/15/2024 04:25 AM    PO2 78.1 05/15/2024 04:25 AM    HCO3 23.8 05/15/2024 04:25 AM    BE 0.8 05/15/2024 04:25 AM    THB 12.3 05/15/2024 04:25 AM    O2SAT 95.7 05/15/2024 04:25 AM       Physical Exam:  General Appearance: AO x 0, not following commands    Neuro:, no pupillary reflexes , absent corneal reflex, absent gag reflex, +cough reflex, does not withdraw to painful stimuli, myoclonic jerks reduced in frequency from prior exam, patient is not synchronous with the ventilation machine.  cardiovascular: regular rate and rhythm, S1 and S2 heard, no murmurs appreciated   Respiratory: Mechanically ventilated lung sounds. No wheezing or crackles appreciated.  Abdomen: Softly distended  Extremities: Bilateral upper extremity edema- improving, no cyanosis, distal pulses intact    Lines & Urinary Catheter:     External Urinary Catheter since 05/03  ETT since 04/30  Fecal management  Interval development of diffuse interstitial opacities throughout both   lungs, suspicious for pulmonary edema/CHF and when compared to the prior   study performed 1 day earlier.   2. Small right pleural effusion again seen.   3. No evidence of pneumothorax.   4. Tubes and lines as described above.         XR CHEST PORTABLE   Final Result   1. No significant interval change when compared to the previous study   performed earlier in the day.         XR CHEST PORTABLE   Final Result   1. ET tube in satisfactory position.   2. No pneumothorax.         XR ABDOMEN FOR NG/OG/NE TUBE PLACEMENT   Final Result   Orogastric tube tip several cm past GE junction and should be advanced the   approximately 7 cm for more optimal placement.         XR CHEST PORTABLE   Final Result   Endotracheal tube high above the level of the clavicles. Nasogastric tube tip   below the level diaphragm. Right chest tube in place with no definite   pneumothorax. Some increased markings seen within the right lower lung field   and left lung base.                 Resident's Assessment and Plan     Assessment:  Right parasagittal subdural hematoma  Anoxic brain injury secondary to cardiac arrest, patient was not withdrawing to painful stimuli today for me, he has some minimal cough reflexes and is not synchronous with the vent  V-fib out of hospital cardiac arrest, unknown downtime, currently sinus rhythm off pressors and off lidocaine  Elevated troponin in setting of cardiac arrest  Abdominal aortic aneurysm  History of persistent A-fib, was on Eliquis prior to the admission  Acute hypoxic respiratory failure secondary to aspiration pneumonia completed Unasyn  Right-sided pneumothorax, s/p chest tube on right side 4/30  Possible seizure  Multiple rib fractures secondary to CPR  Transaminitis secondary to shock liver  GENE resolved  History of hypertension  History of diabetes          Plan:  Panculture (given the fever episodes, however WBC continues

## 2024-05-16 ENCOUNTER — APPOINTMENT (OUTPATIENT)
Dept: GENERAL RADIOLOGY | Age: 64
DRG: 283 | End: 2024-05-16
Attending: STUDENT IN AN ORGANIZED HEALTH CARE EDUCATION/TRAINING PROGRAM
Payer: COMMERCIAL

## 2024-05-16 PROBLEM — J96.01 ACUTE RESPIRATORY FAILURE WITH HYPOXIA (HCC): Status: ACTIVE | Noted: 2024-05-16

## 2024-05-16 PROBLEM — Z71.89 ACP (ADVANCE CARE PLANNING): Status: ACTIVE | Noted: 2024-05-16

## 2024-05-16 PROBLEM — Z51.5 PALLIATIVE CARE ENCOUNTER: Status: ACTIVE | Noted: 2024-05-16

## 2024-05-16 LAB
AADO2: 172.8 MMHG
ALBUMIN SERPL-MCNC: 2.8 G/DL (ref 3.5–5.2)
ALP SERPL-CCNC: 113 U/L (ref 40–129)
ALT SERPL-CCNC: 32 U/L (ref 0–40)
ANION GAP SERPL CALCULATED.3IONS-SCNC: 11 MMOL/L (ref 7–16)
ANION GAP SERPL CALCULATED.3IONS-SCNC: 14 MMOL/L (ref 7–16)
AST SERPL-CCNC: 49 U/L (ref 0–39)
B.E.: 3.8 MMOL/L (ref -3–3)
BASOPHILS # BLD: 0.11 K/UL (ref 0–0.2)
BASOPHILS NFR BLD: 1 % (ref 0–2)
BILIRUB SERPL-MCNC: 0.4 MG/DL (ref 0–1.2)
BUN SERPL-MCNC: 31 MG/DL (ref 6–23)
BUN SERPL-MCNC: 33 MG/DL (ref 6–23)
CALCIUM SERPL-MCNC: 8.3 MG/DL (ref 8.6–10.2)
CALCIUM SERPL-MCNC: 8.9 MG/DL (ref 8.6–10.2)
CHLORIDE SERPL-SCNC: 105 MMOL/L (ref 98–107)
CHLORIDE SERPL-SCNC: 107 MMOL/L (ref 98–107)
CO2 SERPL-SCNC: 24 MMOL/L (ref 22–29)
CO2 SERPL-SCNC: 26 MMOL/L (ref 22–29)
COHB: 0.7 % (ref 0–1.5)
CREAT SERPL-MCNC: 0.8 MG/DL (ref 0.7–1.2)
CREAT SERPL-MCNC: 0.8 MG/DL (ref 0.7–1.2)
CRITICAL: ABNORMAL
DATE ANALYZED: ABNORMAL
DATE LAST DOSE: NORMAL
DATE OF COLLECTION: ABNORMAL
EOSINOPHIL # BLD: 0.21 K/UL (ref 0.05–0.5)
EOSINOPHILS RELATIVE PERCENT: 2 % (ref 0–6)
ERYTHROCYTE [DISTWIDTH] IN BLOOD BY AUTOMATED COUNT: 13.2 % (ref 11.5–15)
FIO2: 40 %
GFR, ESTIMATED: >90 ML/MIN/1.73M2
GFR, ESTIMATED: >90 ML/MIN/1.73M2
GLUCOSE BLD-MCNC: 186 MG/DL (ref 74–99)
GLUCOSE BLD-MCNC: 186 MG/DL (ref 74–99)
GLUCOSE BLD-MCNC: 187 MG/DL (ref 74–99)
GLUCOSE BLD-MCNC: 191 MG/DL (ref 74–99)
GLUCOSE BLD-MCNC: 227 MG/DL (ref 74–99)
GLUCOSE BLD-MCNC: 237 MG/DL (ref 74–99)
GLUCOSE SERPL-MCNC: 200 MG/DL (ref 74–99)
GLUCOSE SERPL-MCNC: 205 MG/DL (ref 74–99)
HCO3: 27.8 MMOL/L (ref 22–26)
HCT VFR BLD AUTO: 35.1 % (ref 37–54)
HGB BLD-MCNC: 11.1 G/DL (ref 12.5–16.5)
HHB: 6.7 % (ref 0–5)
IMM GRANULOCYTES # BLD AUTO: 0.28 K/UL (ref 0–0.58)
IMM GRANULOCYTES NFR BLD: 2 % (ref 0–5)
LAB: ABNORMAL
LYMPHOCYTES NFR BLD: 0.83 K/UL (ref 1.5–4)
LYMPHOCYTES RELATIVE PERCENT: 7 % (ref 20–42)
Lab: 453
MAGNESIUM SERPL-MCNC: 2.4 MG/DL (ref 1.6–2.6)
MCH RBC QN AUTO: 30.2 PG (ref 26–35)
MCHC RBC AUTO-ENTMCNC: 31.6 G/DL (ref 32–34.5)
MCV RBC AUTO: 95.6 FL (ref 80–99.9)
METHB: 0.3 % (ref 0–1.5)
MICROORGANISM SPEC CULT: NO GROWTH
MODE: AC
MONOCYTES NFR BLD: 1.43 K/UL (ref 0.1–0.95)
MONOCYTES NFR BLD: 12 % (ref 2–12)
NEUTROPHILS NFR BLD: 76 % (ref 43–80)
NEUTS SEG NFR BLD: 8.91 K/UL (ref 1.8–7.3)
O2 CONTENT: 17.1 ML/DL
O2 SATURATION: 93.2 % (ref 92–98.5)
O2HB: 92.3 % (ref 94–97)
OPERATOR ID: 2962
PATIENT TEMP: 37 C
PCO2: 39.8 MMHG (ref 35–45)
PEEP/CPAP: 5 CMH2O
PFO2: 1.66 MMHG/%
PH BLOOD GAS: 7.46 (ref 7.35–7.45)
PHOSPHATE SERPL-MCNC: 2.2 MG/DL (ref 2.5–4.5)
PLATELET # BLD AUTO: 253 K/UL (ref 130–450)
PMV BLD AUTO: 11.5 FL (ref 7–12)
PO2: 66.6 MMHG (ref 75–100)
POTASSIUM SERPL-SCNC: 3.5 MMOL/L (ref 3.5–5)
POTASSIUM SERPL-SCNC: 3.5 MMOL/L (ref 3.5–5)
PROT SERPL-MCNC: 6.1 G/DL (ref 6.4–8.3)
RBC # BLD AUTO: 3.67 M/UL (ref 3.8–5.8)
RI(T): 2.6
RR MECHANICAL: 16 B/MIN
SODIUM SERPL-SCNC: 143 MMOL/L (ref 132–146)
SODIUM SERPL-SCNC: 144 MMOL/L (ref 132–146)
SOURCE, BLOOD GAS: ABNORMAL
SPECIMEN DESCRIPTION: NORMAL
THB: 13.2 G/DL (ref 11.5–16.5)
TIME ANALYZED: 511
TME LAST DOSE: NORMAL H
VANCOMYCIN DOSE: NORMAL MG
VANCOMYCIN TROUGH SERPL-MCNC: 10.6 UG/ML (ref 5–16)
VT MECHANICAL: 450 ML
WBC OTHER # BLD: 11.8 K/UL (ref 4.5–11.5)

## 2024-05-16 PROCEDURE — 6370000000 HC RX 637 (ALT 250 FOR IP): Performed by: INTERNAL MEDICINE

## 2024-05-16 PROCEDURE — 6360000002 HC RX W HCPCS: Performed by: PSYCHIATRY & NEUROLOGY

## 2024-05-16 PROCEDURE — 2580000003 HC RX 258

## 2024-05-16 PROCEDURE — 6360000002 HC RX W HCPCS

## 2024-05-16 PROCEDURE — 6370000000 HC RX 637 (ALT 250 FOR IP): Performed by: STUDENT IN AN ORGANIZED HEALTH CARE EDUCATION/TRAINING PROGRAM

## 2024-05-16 PROCEDURE — 84100 ASSAY OF PHOSPHORUS: CPT

## 2024-05-16 PROCEDURE — 80048 BASIC METABOLIC PNL TOTAL CA: CPT

## 2024-05-16 PROCEDURE — 80202 ASSAY OF VANCOMYCIN: CPT

## 2024-05-16 PROCEDURE — 2000000000 HC ICU R&B

## 2024-05-16 PROCEDURE — 95714 VEEG EA 12-26 HR UNMNTR: CPT

## 2024-05-16 PROCEDURE — 94003 VENT MGMT INPAT SUBQ DAY: CPT

## 2024-05-16 PROCEDURE — C9113 INJ PANTOPRAZOLE SODIUM, VIA: HCPCS

## 2024-05-16 PROCEDURE — 94640 AIRWAY INHALATION TREATMENT: CPT

## 2024-05-16 PROCEDURE — 71045 X-RAY EXAM CHEST 1 VIEW: CPT

## 2024-05-16 PROCEDURE — 2580000003 HC RX 258: Performed by: CHIROPRACTOR

## 2024-05-16 PROCEDURE — 6360000002 HC RX W HCPCS: Performed by: STUDENT IN AN ORGANIZED HEALTH CARE EDUCATION/TRAINING PROGRAM

## 2024-05-16 PROCEDURE — 2500000003 HC RX 250 WO HCPCS

## 2024-05-16 PROCEDURE — 99291 CRITICAL CARE FIRST HOUR: CPT | Performed by: INTERNAL MEDICINE

## 2024-05-16 PROCEDURE — 99231 SBSQ HOSP IP/OBS SF/LOW 25: CPT | Performed by: NURSE PRACTITIONER

## 2024-05-16 PROCEDURE — 6360000002 HC RX W HCPCS: Performed by: INTERNAL MEDICINE

## 2024-05-16 PROCEDURE — 6370000000 HC RX 637 (ALT 250 FOR IP)

## 2024-05-16 PROCEDURE — 80053 COMPREHEN METABOLIC PANEL: CPT

## 2024-05-16 PROCEDURE — 83735 ASSAY OF MAGNESIUM: CPT

## 2024-05-16 PROCEDURE — 82962 GLUCOSE BLOOD TEST: CPT

## 2024-05-16 PROCEDURE — 99497 ADVNCD CARE PLAN 30 MIN: CPT | Performed by: NURSE PRACTITIONER

## 2024-05-16 PROCEDURE — 85025 COMPLETE CBC W/AUTO DIFF WBC: CPT

## 2024-05-16 PROCEDURE — 82805 BLOOD GASES W/O2 SATURATION: CPT

## 2024-05-16 PROCEDURE — 6360000002 HC RX W HCPCS: Performed by: CHIROPRACTOR

## 2024-05-16 PROCEDURE — 99232 SBSQ HOSP IP/OBS MODERATE 35: CPT | Performed by: STUDENT IN AN ORGANIZED HEALTH CARE EDUCATION/TRAINING PROGRAM

## 2024-05-16 PROCEDURE — 2580000003 HC RX 258: Performed by: INTERNAL MEDICINE

## 2024-05-16 RX ORDER — FUROSEMIDE 10 MG/ML
40 INJECTION INTRAMUSCULAR; INTRAVENOUS ONCE
Status: COMPLETED | OUTPATIENT
Start: 2024-05-16 | End: 2024-05-16

## 2024-05-16 RX ADMIN — POTASSIUM PHOSPHATE, MONOBASIC AND POTASSIUM PHOSPHATE, DIBASIC 10 MMOL: 224; 236 INJECTION, SOLUTION, CONCENTRATE INTRAVENOUS at 09:07

## 2024-05-16 RX ADMIN — LEVETIRACETAM 1500 MG: 500 INJECTION, SOLUTION, CONCENTRATE INTRAVENOUS at 20:55

## 2024-05-16 RX ADMIN — INSULIN LISPRO 1 UNITS: 100 INJECTION, SOLUTION INTRAVENOUS; SUBCUTANEOUS at 19:28

## 2024-05-16 RX ADMIN — FUROSEMIDE 40 MG: 10 INJECTION, SOLUTION INTRAMUSCULAR; INTRAVENOUS at 14:28

## 2024-05-16 RX ADMIN — CLONAZEPAM 1.5 MG: 0.5 TABLET ORAL at 11:23

## 2024-05-16 RX ADMIN — CLONAZEPAM 1.5 MG: 0.5 TABLET ORAL at 17:10

## 2024-05-16 RX ADMIN — SODIUM CHLORIDE, PRESERVATIVE FREE 10 ML: 5 INJECTION INTRAVENOUS at 08:41

## 2024-05-16 RX ADMIN — NYSTATIN 500000 UNITS: 100000 SUSPENSION ORAL at 19:42

## 2024-05-16 RX ADMIN — INSULIN LISPRO 1 UNITS: 100 INJECTION, SOLUTION INTRAVENOUS; SUBCUTANEOUS at 13:11

## 2024-05-16 RX ADMIN — ASPIRIN 81 MG CHEWABLE TABLET 81 MG: 81 TABLET CHEWABLE at 08:41

## 2024-05-16 RX ADMIN — NYSTATIN 500000 UNITS: 100000 SUSPENSION ORAL at 17:10

## 2024-05-16 RX ADMIN — ATORVASTATIN CALCIUM 40 MG: 40 TABLET, FILM COATED ORAL at 19:42

## 2024-05-16 RX ADMIN — SODIUM CHLORIDE, PRESERVATIVE FREE 10 ML: 5 INJECTION INTRAVENOUS at 19:42

## 2024-05-16 RX ADMIN — Medication 1500 MG: at 19:21

## 2024-05-16 RX ADMIN — MIDAZOLAM 2 MG: 1 INJECTION INTRAMUSCULAR; INTRAVENOUS at 10:23

## 2024-05-16 RX ADMIN — ALBUTEROL SULFATE 2.5 MG: 2.5 SOLUTION RESPIRATORY (INHALATION) at 19:11

## 2024-05-16 RX ADMIN — CLONAZEPAM 1.5 MG: 0.5 TABLET ORAL at 22:49

## 2024-05-16 RX ADMIN — ALBUTEROL SULFATE 2.5 MG: 2.5 SOLUTION RESPIRATORY (INHALATION) at 12:29

## 2024-05-16 RX ADMIN — CLONAZEPAM 1.5 MG: 0.5 TABLET ORAL at 04:35

## 2024-05-16 RX ADMIN — SODIUM CHLORIDE, PRESERVATIVE FREE 40 MG: 5 INJECTION INTRAVENOUS at 08:39

## 2024-05-16 RX ADMIN — VALPROATE SODIUM 1500 MG: 100 INJECTION, SOLUTION INTRAVENOUS at 22:47

## 2024-05-16 RX ADMIN — Medication 15 ML: at 08:42

## 2024-05-16 RX ADMIN — LEVETIRACETAM 1500 MG: 500 INJECTION, SOLUTION, CONCENTRATE INTRAVENOUS at 08:40

## 2024-05-16 RX ADMIN — PIPERACILLIN AND TAZOBACTAM 4500 MG: 4; .5 INJECTION, POWDER, FOR SOLUTION INTRAVENOUS at 06:24

## 2024-05-16 RX ADMIN — Medication 1500 MG: at 06:29

## 2024-05-16 RX ADMIN — NYSTATIN 500000 UNITS: 100000 SUSPENSION ORAL at 08:39

## 2024-05-16 RX ADMIN — Medication 15 ML: at 19:42

## 2024-05-16 RX ADMIN — THIAMINE HYDROCHLORIDE 100 MG: 100 INJECTION, SOLUTION INTRAMUSCULAR; INTRAVENOUS at 08:40

## 2024-05-16 RX ADMIN — PIPERACILLIN AND TAZOBACTAM 4500 MG: 4; .5 INJECTION, POWDER, FOR SOLUTION INTRAVENOUS at 15:08

## 2024-05-16 RX ADMIN — VALPROATE SODIUM 1500 MG: 100 INJECTION, SOLUTION INTRAVENOUS at 11:23

## 2024-05-16 RX ADMIN — ALBUTEROL SULFATE 2.5 MG: 2.5 SOLUTION RESPIRATORY (INHALATION) at 08:44

## 2024-05-16 RX ADMIN — PIPERACILLIN AND TAZOBACTAM 4500 MG: 4; .5 INJECTION, POWDER, FOR SOLUTION INTRAVENOUS at 00:50

## 2024-05-16 RX ADMIN — PIPERACILLIN AND TAZOBACTAM 4500 MG: 4; .5 INJECTION, POWDER, FOR SOLUTION INTRAVENOUS at 22:48

## 2024-05-16 RX ADMIN — NYSTATIN 500000 UNITS: 100000 SUSPENSION ORAL at 12:47

## 2024-05-16 ASSESSMENT — PAIN SCALES - GENERAL
PAINLEVEL_OUTOF10: 0

## 2024-05-16 ASSESSMENT — PULMONARY FUNCTION TESTS
PIF_VALUE: 21
PIF_VALUE: 24
PIF_VALUE: 23
PIF_VALUE: 20
PIF_VALUE: 25
PIF_VALUE: 21
PIF_VALUE: 19
PIF_VALUE: 22
PIF_VALUE: 27
PIF_VALUE: 21
PIF_VALUE: 21
PIF_VALUE: 17
PIF_VALUE: 20
PIF_VALUE: 19
PIF_VALUE: 30
PIF_VALUE: 21
PIF_VALUE: 20
PIF_VALUE: 22
PIF_VALUE: 24
PIF_VALUE: 21
PIF_VALUE: 19
PIF_VALUE: 20
PIF_VALUE: 21
PIF_VALUE: 25
PIF_VALUE: 18
PIF_VALUE: 21

## 2024-05-16 NOTE — PROCEDURES
[facility] Neurodiagnostic Report    MRN: [id number]  PATIENT NAME: Nabeel Appiah  DATE OF SERVICE: 2024  PHYSICIAN NAME: Gordo Deleon DO  Referring Physician: Isiah Yusuf      Patient's : 1960  Patient's Age: 63 y.o.  Gender: male    PROCEDURE: Routine EEG without video      Clinical Interpretation:   This abnormal study showed evidence of:    A potential for generalized onset seizures  A severe nonspecific encephalopathy    Structural abnormalities should be considered for the findings above and appropriate imaging obtained if clinically indicated.     No seizures were noted during this study.     ____________________________  Electronically signed by: Gordo Deleon DO, 2024 9:08 AM      Patient Clinical Information   Reason for Study: Patient undergoing evaluation for altered mental status following cardiac arrest with myoclonus  Patient State: Comatose  Primary neurological diagnosis: Altered mental status  Primary indication for monitoring: Diagnosis of nonconvulsive seizures    Pertinent Medications and Treatments    valproate     clonazepam     thiamine     levetiracetam     Sedatives administered: No  Intubated: Yes  Pharmacological paralytic: No    Reporting Period  Start of Study: , 5/15/2024  End of Study:  708, 2024      EEG Description  Digital video and scalp EEG monitoring was performed using the standard protocol for this laboratory. Scalp electrodes were applied in the international 10/20 system. Multiple digital montage arrangements were utilized for evaluation. EKG was recorded.     Background:      Occipital rhythm (posterior dominant rhythm or PDR): Absent   Voltage: Low  Organization: poor  Reactivity to eye opening/closure: not tested    Drowsiness: absent   Sleep: absent    Comments: The background is composed primarily of very low amplitude generalized irregular delta activity.    Technical and Activation

## 2024-05-16 NOTE — PROGRESS NOTES
care discussion as below  At this time, Nabeel Appiah, Does Not have capacity for medical decision-making.  Capacity is time limited and situation/question specific  During encounter there was no surrogate medical decision-maker  Outcome of goals of care meeting:   Continue current medical management for now  Prognosis poor given patient's severe encephalopathy  Will require 2 physician signatures to change CODE STATUS to DNR CC and transition into comfort measures  Patient has no living legal next of kin  Code status DNR-CCA  Advanced Directives: no POA or living will in epic  Surrogate/Legal NOK:  Tisha Marylin (significant other): [phone]    Spiritual assessment: no spiritual distress identified  Bereavement and grief: to be determined  Referrals to: none today  SUBJECTIVE:     Current medical issues leading to Palliative Medicine involvement include   Active Hospital Problems    Diagnosis Date Noted    Myoclonus [G25.3] 2024    Anoxic brain injury (HCC) [G93.1] 2024    Cardiac arrest (HCC) [I46.9] 2024    Goals of care, counseling/discussion [Z71.89] 2024    Palliative care by specialist [Z51.5] 2024       Details of Conversation:    Chart review.  Patient seen at the bedside, intubated, unresponsive.  Patient unable to partake in meaningful conversation and unable to make decisions for himself.  No family present at the bedside.  Update provided by ICU team.  Call placed to patient's significant other, Tisha.  She states that she has been with the patient for 26 years.  She thought that the patient had taken care of his living will and healthcare power of  documents but she has been unable to find those.  She states that the patient has no children, his mother, father, and sister are .  She states that he has no living nieces/nephews/cousins.  She expresses that maybe he did not want to be here because he has no family left.  Unfortunately, Tisha is  unable to make medical decisions for Nabeel.  Case management has started legal guardianship process but the process may take several months.  Neurology has evaluated patient and EEG results show severe encephalopathy.  The patient has developed myoclonic jerking and has been maintained on Keppra and valproate.  Neurology has stated that there is a high likelihood that patient will not have a meaningful neurological recovery and potentially remain in a comatose state.  The patient's quality of life remains poor at this time.  Tisha states that she does not feel the patient would want to be on long-term life support but they never had a direct conversation about this.  She states he would want to live on long-term life support with no quality of life.  Emotional support provided to Tisha.  If there is no meaningful neurological recovery and 2 physicians feel prognosis remains poor, they will need to sign DNR form and transition into comfort measures.  Palliative medicine will continue to follow.    OBJECTIVE:   Prognosis: Poor    ROS:   DEMAR, intubated/sedated    Physical Exam:  BP (!) 152/73   Pulse 66   Temp 98.1 °F (36.7 °C) (Temporal)   Resp 23   Ht 1.905 m (6' 3\")   Wt (!) 161.5 kg (356 lb 0.7 oz)   SpO2 96%   BMI 44.50 kg/m²   Constitutional: Ill-appearing, intubated  Lungs: + Vent, diminished  Heart: Bradycardia  Abd:  Soft, + rotund, bowel sounds present  Ext: Trace edema, pulses present  Skin:  Warm and dry, no rashes on visible skin  Psych: non-anxious affect  Neuro: Intubated, unresponsive, unable to follow commands    Objective data reviewed: labs, images, records, medication use, vitals, and chart    Discussed patient and the plan of care with the other IDT members: Palliative Medicine IDT Team    Time/Communication  Greater than 50% of time spent, total 25 minutes in counseling and coordination of care at the bedside regarding  CODE STATUS discussion, goals of care, and diagnosis and

## 2024-05-16 NOTE — PROGRESS NOTES
Pharmacy Consultation Note  (Antibiotic Dosing and Monitoring)    Initial consult date: 5/15/24  Consulting physician/provider: Dr. Palacio  Drug: Vancomycin  Indication: Empiric    Age/  Gender Height Weight IBW  Allergy Information   63 y.o./male 191.8 cm (6' 3.51\") (!) 168.5 kg (371 lb 6.4 oz)     Ideal body weight: 84.5 kg (186 lb 4.6 oz)  Adjusted ideal body weight: 115.3 kg (254 lb 3 oz)   Patient has no known allergies.      Renal Function:  Recent Labs     05/14/24  0410 05/15/24  0415 05/16/24  0413   BUN 41* 35* 33*   CREATININE 0.9 0.8 0.8         Intake/Output Summary (Last 24 hours) at 5/16/2024 1412  Last data filed at 5/16/2024 1400  Gross per 24 hour   Intake 3324.8 ml   Output 1600 ml   Net 1724.8 ml         Vancomycin Monitoring:  Trough:  No results for input(s): \"VANCOTROUGH\" in the last 72 hours.  Random:  No results for input(s): \"VANCORANDOM\" in the last 72 hours.    Vancomycin Administration Times:  Recent vancomycin administrations                     vancomycin (VANCOCIN) 3,000 mg in sodium chloride 0.9 % 500 mL IVPB (mg) 3,000 mg New Bag 05/15/24 1026                    Assessment:  Patient is a 63 y.o. male who has been initiated on vancomycin  Estimated Creatinine Clearance: 152 mL/min (based on SCr of 0.8 mg/dL).    Plan:  Vancomycin 1500 mg IV q12h  Trough tonight at 1800    Katlin Schaffer PharmD, BCPS, BCCCP 5/16/2024 2:12 PM

## 2024-05-16 NOTE — PROGRESS NOTES
HOSPITALIST PROGRESS NOTE    Patient's name: Nabeel Appiah  : 1960  Admission date: 2024  Date of service: 2024   Primary care physician: Jonh Allen DO    Assessment   Patient admitted on 2024     Nabeel Appiah is a 63 y.o. male patient of Jonh Allen DO with history of atrial fibrillation, hypertension, obesity, type 2 diabetes mellitus, AAA presented to [facility] on 2024 after cardiac arrest. On EMS arrival reported to be in V. tach/V-fib arrest and received amiodarone and shocked x 3. Patient was noted to have ROSC prior to ED arrival.     GOC have been discussed with girlfriend, but patient has no NOK as per his girlfriend. Girlfriend is trying to access his safe to see if he has a living will. Needs 2 doctor sign off for medical decisions at present. Critical care doc will not sign for withdrawal of care and would like to proceed with Trach/PEG. General surgery does not feel it is ethical to place Trach/PEG as long standing girlfriend who has been involved with his care, stated that he would not want that.   Girlfriend has filed for guardianship paperwork but it may take up to 3 to 4 months.  In the meantime as patient does not have POA critical care thinking to consider trach and PEG and once guardianship has been completed with girlfriend she can make decisions for the patient.  Surgical team refused to place trach and PEG as they feel that it is unethical as his longstanding girlfriend has been involved in his care and said that he would not want that.    Neurology has been consulted and is planning on 24-hour EEG before deciding on DNRCC versus trach/PEG    Cardiac arrest secondary to ACS -cardiology following, plans for cardiac catheterization however continues to have poor prognosis in terms of neurologic recovery, now off heparin drip s/p rewarming   V-fib/V. Tach -now off lidocaine drip  Persistent Afib - on Eliquis at home,  0839    aspirin chewable tablet 81 mg  81 mg Oral Daily Ulisses Perez DO   81 mg at 05/16/24 0841    clonazePAM (KLONOPIN) tablet 1.5 mg  1.5 mg Oral Q6H Isiah Yusuf MD   1.5 mg at 05/16/24 0435    [Held by provider] lisinopril (PRINIVIL;ZESTRIL) tablet 40 mg  40 mg Oral Daily Feli May MD   40 mg at 05/10/24 0829    And    [Held by provider] hydroCHLOROthiazide (HYDRODIURIL) tablet 25 mg  25 mg Oral Daily Feli May MD   25 mg at 05/10/24 0829    midazolam PF (VERSED) injection 2 mg  2 mg IntraVENous Q2H PRN Feli May MD   2 mg at 05/16/24 1023    hydrALAZINE (APRESOLINE) injection 10 mg  10 mg IntraVENous Q4H PRN Nicole Dhaliwal MD   10 mg at 05/05/24 2214    labetalol (NORMODYNE;TRANDATE) injection 10 mg  10 mg IntraVENous Q4H PRN Nicole Dhaliwal MD        thiamine (B-1) injection 100 mg  100 mg IntraVENous Daily Fely Garcia MD   100 mg at 05/16/24 0840    levETIRAcetam (KEPPRA) injection 1,500 mg  1,500 mg IntraVENous Q12H Gordo Deleon DO   1,500 mg at 05/16/24 0840    insulin lispro (HUMALOG) injection vial 0-4 Units  0-4 Units SubCUTAneous Q4H Austin Salas MD   1 Units at 05/15/24 2247    sodium chloride flush 0.9 % injection 5-40 mL  5-40 mL IntraVENous 2 times per day Ashley Rivera MD   10 mL at 05/16/24 0841    sodium chloride flush 0.9 % injection 5-40 mL  5-40 mL IntraVENous PRN Ashley Rivera MD        0.9 % sodium chloride infusion   IntraVENous PRN Ashley Rivera MD        ondansetron (ZOFRAN-ODT) disintegrating tablet 4 mg  4 mg Oral Q8H PRN Ashley Rivera MD        Or    ondansetron (ZOFRAN) injection 4 mg  4 mg IntraVENous Q6H PRN Ashley Rivera MD        polyethylene glycol (GLYCOLAX) packet 17 g  17 g Oral Daily PRN Ashley Rivera MD        acetaminophen (TYLENOL) tablet 650 mg  650 mg Oral Q6H PRN Ashley Rivera MD   650 mg at 05/15/24 1744    Or    acetaminophen (TYLENOL) suppository 650 mg  650 mg Rectal Q6H PRN Miguel  again noted.         CT HEAD WO CONTRAST   Final Result   1. Mild interval decrease in volume and density of the posterior right para   falcine subdural hematoma.   2. No new intracranial hemorrhage.   3. Fluid opacification of the mastoid air cells is unchanged.         CT HEAD WO CONTRAST   Final Result   New right parasagittal subdural hematoma along the posterior falx without   significant mass effect or midline shift.      Worsening sinusitis involving maxillary, ethmoid and sphenoid sinuses.      New bilateral mastoiditis.         XR CHEST PORTABLE   Final Result   1. ETT 3.4 cm above the milla.   2. Small effusions.   3. Linear atelectasis in the right lower lobe.         XR CHEST PORTABLE   Final Result   1. The position and alignment of the endotracheal tube is within the normal   range.   2. Bilateral patchy parenchymal densities concerning for pneumonia and/or   atelectasis, stable.         XR CHEST PORTABLE   Final Result   1. Interval removal of the right-sided chest tube. No pneumothorax.   2. Otherwise, no significant interval change.         XR CHEST PORTABLE   Final Result   1. No significant interval change in the right lung opacities when compared   to the previous study performed earlier in the day.   2. Improved aeration in the left lower lung field.         XR CHEST PORTABLE   Final Result   1. No significant interval change when compared to the previous study   performed 1 day earlier.         XR CHEST PORTABLE   Final Result   Stable chest.         XR CHEST PORTABLE   Final Result   1. The position and alignment of the tubes and catheters are within normal   range.   2. The bibasilar parenchymal density felt to represent atelectasis appears   stable..   3. Tiny bilateral pleural effusions   4. No signs of pneumothorax.         XR CHEST ABDOMEN NG PLACEMENT   Final Result   Distal enteric tube in good position within the stomach.         XR CHEST PORTABLE   Final Result   1. Endotracheal tube

## 2024-05-16 NOTE — PLAN OF CARE
Neurology plan of care note    Patient presented to the ER on 4/29/2024 after experiencing cardiac arrest at home. He was noted to be in V. tach/V-fib and received amiodarone and shocks x 3. ROSC was achieved prior to ED arrival. He was then admitted to the MICU     While he was in the ICU he developed myoclonic jerking.  He has been maintained on Keppra 1500 mg daily and valproate 1500 mg twice daily    Dr. Deleon assessed patient on 5/13/2024 and said  \"He has a high likelihood for a poor neurologic prognosis, potentially remaining in a comatose state indefinitely. In a best case scenario I would expect him to require a tracheostomy and PEG tube as he would likely have significant neurologic impairment and would be unlikely to achieve independent function with ADLs\".     Critical care team will not sign for withdrawal of care and would like to proceed with trach and PEG. General surgery does not feel is ethical to place a trach and PEG as his long standing girlfriend who has been involved in his care said he would not want that.     Spoke with resident, they may go forward with signing a DNR CC order     Patient has a girlfriend who is trying to obtain guardianship but this may take up to 3 to 4 months.     No seizure activity on continuous EEG, did show severe encephalopathy    Plan  Continue Keppra 1500 mg twice daily  Continue valproate 1500 mg twice daily  Patient continues to have a poor prognosis  Need 2 physicians to change CODE STATUS to a DNR CC OR for trach and PEG  Girlfriend trying to obtain guardianship, may take 3 to 4 months  Neurology will sign off call if needed

## 2024-05-16 NOTE — PROGRESS NOTES
[facility]  Department of Internal Medicine   Internal Medicine Residency   MICU Progress Note    Patient:  Nabeel Appiah 63 y.o. male  MRN: [id number]     Date of Service: 5/16/2024    Allergy: Patient has no known allergies.    Overnight Events: No acute overnight events.     Subjective     Patient seen and examined at bedside this AM.  Patient remains intubated, no acute events overnight. Continue to have fever, Day 2 of Vanc and Zosyn. WBC is downtrending.    Objective     VS: BP (!) 144/70   Pulse 70   Temp 98.1 °F (36.7 °C) (Temporal)   Resp 23   Ht 1.905 m (6' 3\")   Wt (!) 161.5 kg (356 lb 0.7 oz)   SpO2 95%   BMI 44.50 kg/m²   ABP (Arterial line BP): 100/50  ABP Mean (Arterial Line Mean): (!) 64 mmHg    I & O - 24hr:   Intake/Output Summary (Last 24 hours) at 5/16/2024 1442  Last data filed at 5/16/2024 1400  Gross per 24 hour   Intake 3324.8 ml   Output 1600 ml   Net 1724.8 ml       Sedatives: N/A    Pressors: N/A     Oxygen: Intubated 450/16/40/8    ABG:     Lab Results   Component Value Date/Time    PH 7.462 05/16/2024 04:53 AM    PCO2 39.8 05/16/2024 04:53 AM    PO2 66.6 05/16/2024 04:53 AM    HCO3 27.8 05/16/2024 04:53 AM    BE 3.8 05/16/2024 04:53 AM    THB 13.2 05/16/2024 04:53 AM    O2SAT 93.2 05/16/2024 04:53 AM       Physical Exam:  General Appearance: AO x 0, not following commands    Neuro: PERRL, +cough reflex, withdraw to painful stimuli, myoclonic jerks reduced in frequency from prior exam, patient is not synchronous with the ventilation machine.  cardiovascular: regular rate and rhythm, S1 and S2 heard, no murmurs appreciated   Respiratory: Mechanically ventilated lung sounds. No wheezing or crackles appreciated.  Abdomen: Softly distended  Extremities: Bilateral upper extremity edema- improving, no cyanosis, distal pulses intact    Lines & Urinary Catheter:     External Urinary Catheter since 05/03  ETT since 04/30  Fecal management system since 05/07  NG tube since  failure, stable   2. No signs of pneumothorax   3. Stable position of the right chest tube and endotracheal tubes.   4. Small bilateral pleural effusions, stable         XR CHEST PORTABLE   Final Result   Endotracheal tube appears in satisfactory position.         XR CHEST PORTABLE   Final Result   1. Interval development of diffuse interstitial opacities throughout both   lungs, suspicious for pulmonary edema/CHF and when compared to the prior   study performed 1 day earlier.   2. Small right pleural effusion again seen.   3. No evidence of pneumothorax.   4. Tubes and lines as described above.         XR CHEST PORTABLE   Final Result   1. No significant interval change when compared to the previous study   performed earlier in the day.         XR CHEST PORTABLE   Final Result   1. ET tube in satisfactory position.   2. No pneumothorax.         XR ABDOMEN FOR NG/OG/NE TUBE PLACEMENT   Final Result   Orogastric tube tip several cm past GE junction and should be advanced the   approximately 7 cm for more optimal placement.         XR CHEST PORTABLE   Final Result   Endotracheal tube high above the level of the clavicles. Nasogastric tube tip   below the level diaphragm. Right chest tube in place with no definite   pneumothorax. Some increased markings seen within the right lower lung field   and left lung base.         XR CHEST PORTABLE    (Results Pending)           Resident's Assessment and Plan     Assessment:  Right parasagittal subdural hematoma  Anoxic brain injury secondary to cardiac arrest  V-fib out of hospital cardiac arrest, unknown downtime, currently sinus rhythm off pressors and off lidocaine  Elevated troponin in setting of cardiac arrest  Abdominal aortic aneurysm  History of persistent A-fib, was on Eliquis prior to the admission  Acute hypoxic respiratory failure secondary to aspiration pneumonia completed Unasyn  Right-sided pneumothorax, s/p chest tube on right side 4/30  Possible  confirmed and changes made if needed.     Family is updated at the bedside as available. Key issues of the case were discussed among consultants.  Critical Care time is documented if appropriate.      Critical Care time: 36 minutes    Antonella Ross DO

## 2024-05-16 NOTE — PROGRESS NOTES
Interim EEG Progress Note    EEG was reviewed from 1637 through 2254, 5/15/2024     Background consistent with severe encephalopathy. No seizures noted.      Gordo Deleon DO, 10:56 PM, 5/15/2024     EEG addendum  EEG reviewed through 0700, 5/16/2024      No significant change from previous period. Rare generalized sharp waves noted, at times with stimulation. No seizures noted.      Gordo Deleon DO, 9:02 AM, 5/16/2024

## 2024-05-16 NOTE — PLAN OF CARE
Problem: Neurosensory - Adult  Goal: Achieves stable or improved neurological status  Outcome: Not Progressing     Problem: Safety - Adult  Goal: Free from fall injury  Outcome: Progressing     Problem: Respiratory - Adult  Goal: Achieves optimal ventilation and oxygenation  Outcome: Progressing     Problem: Skin/Tissue Integrity  Goal: Absence of new skin breakdown  Outcome: Progressing     Problem: ABCDS Injury Assessment  Goal: Absence of physical injury  Outcome: Progressing     Problem: Neurosensory - Adult  Goal: Achieves stable or improved neurological status  Outcome: Not Progressing

## 2024-05-16 NOTE — ACP (ADVANCE CARE PLANNING)
Advance Care Planning      Palliative Medicine Provider (MD/NP)  Advance Care Planning (ACP) Conversation      Date of Conversation: 05/16/24  The patient and/or authorized decision maker consented to a voluntary Advance Care Planning conversation.   Individuals present for the conversation:   No legal NOK    Legal Healthcare Agent(s):  None    ACP documents available in EMR prior to discussion:  -None    Primary Palliative Diagnosis(es):  Cardiac arrest  Severe encephalopathy    Conversation Summary:  Patient seen at the bedside, intubated, unresponsive.  The patient is unable to partake in meaningful conversation and unable to make decisions for himself.  No advanced directives within our records.  The patient has a significant other for the past 26 years, Tisha.  Spoke with Tisha and she states that she is unable to find advanced directives for the patient.  She states that the patient had no children and is single.  She states that his mother, father, and sister passed away.  She states that he does not have any nieces/nephews, cousins, aunts/uncles.  She states he has no living relatives.  Case management has started the guardianship process.    Resuscitation Status:    Code Status: DNR-CCA    Outcomes / Completed Documentation:  An explanation of advance directives and their importance was provided and the following forms completed:    -No new documents completed.    If new document completed, original was provided to patient and/or family member.    Copy was placed for scanning into the [facility] EMR.      I spent 30 minutes providing separately identifiable ACP services with the patient and/or surrogate decision maker in a voluntary, in-person conversation discussing the patient's wishes and goals as detailed in the above note.       Jana Bass, KENNEDI - CNP

## 2024-05-17 ENCOUNTER — APPOINTMENT (OUTPATIENT)
Dept: GENERAL RADIOLOGY | Age: 64
DRG: 283 | End: 2024-05-17
Attending: STUDENT IN AN ORGANIZED HEALTH CARE EDUCATION/TRAINING PROGRAM
Payer: COMMERCIAL

## 2024-05-17 LAB
AADO2: 170.6 MMHG
ALBUMIN SERPL-MCNC: 2.5 G/DL (ref 3.5–5.2)
ALP SERPL-CCNC: 110 U/L (ref 40–129)
ALT SERPL-CCNC: 41 U/L (ref 0–40)
ANION GAP SERPL CALCULATED.3IONS-SCNC: 14 MMOL/L (ref 7–16)
AST SERPL-CCNC: 59 U/L (ref 0–39)
B.E.: 4.2 MMOL/L (ref -3–3)
BASOPHILS # BLD: 0.13 K/UL (ref 0–0.2)
BASOPHILS NFR BLD: 1 % (ref 0–2)
BILIRUB SERPL-MCNC: 0.3 MG/DL (ref 0–1.2)
BUN SERPL-MCNC: 32 MG/DL (ref 6–23)
CALCIUM SERPL-MCNC: 8.3 MG/DL (ref 8.6–10.2)
CHLORIDE SERPL-SCNC: 108 MMOL/L (ref 98–107)
CO2 SERPL-SCNC: 25 MMOL/L (ref 22–29)
COHB: 0.6 % (ref 0–1.5)
CREAT SERPL-MCNC: 0.8 MG/DL (ref 0.7–1.2)
CRITICAL: ABNORMAL
DATE ANALYZED: ABNORMAL
DATE OF COLLECTION: ABNORMAL
EOSINOPHIL # BLD: 0.24 K/UL (ref 0.05–0.5)
EOSINOPHILS RELATIVE PERCENT: 2 % (ref 0–6)
ERYTHROCYTE [DISTWIDTH] IN BLOOD BY AUTOMATED COUNT: 13.1 % (ref 11.5–15)
FIO2: 40 %
GFR, ESTIMATED: >90 ML/MIN/1.73M2
GLUCOSE BLD-MCNC: 172 MG/DL (ref 74–99)
GLUCOSE BLD-MCNC: 173 MG/DL (ref 74–99)
GLUCOSE BLD-MCNC: 193 MG/DL (ref 74–99)
GLUCOSE BLD-MCNC: 197 MG/DL (ref 74–99)
GLUCOSE BLD-MCNC: 203 MG/DL (ref 74–99)
GLUCOSE BLD-MCNC: 203 MG/DL (ref 74–99)
GLUCOSE SERPL-MCNC: 209 MG/DL (ref 74–99)
HCO3: 27.6 MMOL/L (ref 22–26)
HCT VFR BLD AUTO: 34.3 % (ref 37–54)
HGB BLD-MCNC: 10.9 G/DL (ref 12.5–16.5)
HHB: 4.7 % (ref 0–5)
IMM GRANULOCYTES # BLD AUTO: 0.36 K/UL (ref 0–0.58)
IMM GRANULOCYTES NFR BLD: 4 % (ref 0–5)
LAB: ABNORMAL
LYMPHOCYTES NFR BLD: 0.99 K/UL (ref 1.5–4)
LYMPHOCYTES RELATIVE PERCENT: 10 % (ref 20–42)
Lab: 413
MAGNESIUM SERPL-MCNC: 2.2 MG/DL (ref 1.6–2.6)
MCH RBC QN AUTO: 30.5 PG (ref 26–35)
MCHC RBC AUTO-ENTMCNC: 31.8 G/DL (ref 32–34.5)
MCV RBC AUTO: 96.1 FL (ref 80–99.9)
METHB: 0.3 % (ref 0–1.5)
MODE: AC
MONOCYTES NFR BLD: 1.25 K/UL (ref 0.1–0.95)
MONOCYTES NFR BLD: 12 % (ref 2–12)
NEUTROPHILS NFR BLD: 71 % (ref 43–80)
NEUTS SEG NFR BLD: 7.17 K/UL (ref 1.8–7.3)
O2 CONTENT: 16 ML/DL
O2 SATURATION: 95.3 % (ref 92–98.5)
O2HB: 94.4 % (ref 94–97)
OPERATOR ID: 2962
PATIENT TEMP: 37 C
PCO2: 37 MMHG (ref 35–45)
PEEP/CPAP: 8 CMH2O
PFO2: 1.8 MMHG/%
PH BLOOD GAS: 7.49 (ref 7.35–7.45)
PHOSPHATE SERPL-MCNC: 2.1 MG/DL (ref 2.5–4.5)
PLATELET # BLD AUTO: 252 K/UL (ref 130–450)
PMV BLD AUTO: 11.1 FL (ref 7–12)
PO2: 72.1 MMHG (ref 75–100)
POTASSIUM SERPL-SCNC: 3.3 MMOL/L (ref 3.5–5)
PROT SERPL-MCNC: 5.8 G/DL (ref 6.4–8.3)
RBC # BLD AUTO: 3.57 M/UL (ref 3.8–5.8)
RI(T): 2.37
RR MECHANICAL: 16 B/MIN
SODIUM SERPL-SCNC: 147 MMOL/L (ref 132–146)
SOURCE, BLOOD GAS: ABNORMAL
THB: 12 G/DL (ref 11.5–16.5)
TIME ANALYZED: 419
VT MECHANICAL: 450 ML
WBC OTHER # BLD: 10.1 K/UL (ref 4.5–11.5)

## 2024-05-17 PROCEDURE — 82805 BLOOD GASES W/O2 SATURATION: CPT

## 2024-05-17 PROCEDURE — A4216 STERILE WATER/SALINE, 10 ML: HCPCS

## 2024-05-17 PROCEDURE — 6360000002 HC RX W HCPCS: Performed by: STUDENT IN AN ORGANIZED HEALTH CARE EDUCATION/TRAINING PROGRAM

## 2024-05-17 PROCEDURE — 6370000000 HC RX 637 (ALT 250 FOR IP)

## 2024-05-17 PROCEDURE — 6360000002 HC RX W HCPCS: Performed by: INTERNAL MEDICINE

## 2024-05-17 PROCEDURE — 99232 SBSQ HOSP IP/OBS MODERATE 35: CPT | Performed by: STUDENT IN AN ORGANIZED HEALTH CARE EDUCATION/TRAINING PROGRAM

## 2024-05-17 PROCEDURE — 6360000002 HC RX W HCPCS

## 2024-05-17 PROCEDURE — 2580000003 HC RX 258

## 2024-05-17 PROCEDURE — 2000000000 HC ICU R&B

## 2024-05-17 PROCEDURE — 84100 ASSAY OF PHOSPHORUS: CPT

## 2024-05-17 PROCEDURE — 6360000002 HC RX W HCPCS: Performed by: CHIROPRACTOR

## 2024-05-17 PROCEDURE — C9113 INJ PANTOPRAZOLE SODIUM, VIA: HCPCS

## 2024-05-17 PROCEDURE — 6370000000 HC RX 637 (ALT 250 FOR IP): Performed by: INTERNAL MEDICINE

## 2024-05-17 PROCEDURE — 2580000003 HC RX 258: Performed by: INTERNAL MEDICINE

## 2024-05-17 PROCEDURE — 80053 COMPREHEN METABOLIC PANEL: CPT

## 2024-05-17 PROCEDURE — 6370000000 HC RX 637 (ALT 250 FOR IP): Performed by: STUDENT IN AN ORGANIZED HEALTH CARE EDUCATION/TRAINING PROGRAM

## 2024-05-17 PROCEDURE — 94640 AIRWAY INHALATION TREATMENT: CPT

## 2024-05-17 PROCEDURE — 2580000003 HC RX 258: Performed by: CHIROPRACTOR

## 2024-05-17 PROCEDURE — 83735 ASSAY OF MAGNESIUM: CPT

## 2024-05-17 PROCEDURE — 2500000003 HC RX 250 WO HCPCS

## 2024-05-17 PROCEDURE — 94003 VENT MGMT INPAT SUBQ DAY: CPT

## 2024-05-17 PROCEDURE — 85025 COMPLETE CBC W/AUTO DIFF WBC: CPT

## 2024-05-17 PROCEDURE — 82962 GLUCOSE BLOOD TEST: CPT

## 2024-05-17 PROCEDURE — 6360000002 HC RX W HCPCS: Performed by: PSYCHIATRY & NEUROLOGY

## 2024-05-17 PROCEDURE — 71045 X-RAY EXAM CHEST 1 VIEW: CPT

## 2024-05-17 PROCEDURE — 99291 CRITICAL CARE FIRST HOUR: CPT | Performed by: INTERNAL MEDICINE

## 2024-05-17 PROCEDURE — 99231 SBSQ HOSP IP/OBS SF/LOW 25: CPT | Performed by: NURSE PRACTITIONER

## 2024-05-17 RX ADMIN — PIPERACILLIN AND TAZOBACTAM 4500 MG: 4; .5 INJECTION, POWDER, FOR SOLUTION INTRAVENOUS at 14:43

## 2024-05-17 RX ADMIN — Medication 15 ML: at 08:51

## 2024-05-17 RX ADMIN — LEVETIRACETAM 1500 MG: 500 INJECTION, SOLUTION, CONCENTRATE INTRAVENOUS at 08:59

## 2024-05-17 RX ADMIN — Medication 15 ML: at 20:04

## 2024-05-17 RX ADMIN — ACETAZOLAMIDE SODIUM 250 MG: 500 INJECTION, POWDER, LYOPHILIZED, FOR SOLUTION INTRAVENOUS at 09:47

## 2024-05-17 RX ADMIN — ALBUTEROL SULFATE 2.5 MG: 2.5 SOLUTION RESPIRATORY (INHALATION) at 12:18

## 2024-05-17 RX ADMIN — VALPROATE SODIUM 1500 MG: 100 INJECTION, SOLUTION INTRAVENOUS at 22:25

## 2024-05-17 RX ADMIN — POTASSIUM BICARBONATE 40 MEQ: 782 TABLET, EFFERVESCENT ORAL at 06:25

## 2024-05-17 RX ADMIN — CLONAZEPAM 1.5 MG: 0.5 TABLET ORAL at 09:48

## 2024-05-17 RX ADMIN — NYSTATIN 500000 UNITS: 100000 SUSPENSION ORAL at 20:04

## 2024-05-17 RX ADMIN — NYSTATIN 500000 UNITS: 100000 SUSPENSION ORAL at 12:36

## 2024-05-17 RX ADMIN — LEVETIRACETAM 1500 MG: 500 INJECTION, SOLUTION, CONCENTRATE INTRAVENOUS at 20:04

## 2024-05-17 RX ADMIN — INSULIN LISPRO 1 UNITS: 100 INJECTION, SOLUTION INTRAVENOUS; SUBCUTANEOUS at 08:58

## 2024-05-17 RX ADMIN — SODIUM CHLORIDE, PRESERVATIVE FREE 10 ML: 5 INJECTION INTRAVENOUS at 08:51

## 2024-05-17 RX ADMIN — SODIUM CHLORIDE, PRESERVATIVE FREE 10 ML: 5 INJECTION INTRAVENOUS at 20:05

## 2024-05-17 RX ADMIN — NYSTATIN 500000 UNITS: 100000 SUSPENSION ORAL at 17:14

## 2024-05-17 RX ADMIN — ATORVASTATIN CALCIUM 40 MG: 40 TABLET, FILM COATED ORAL at 20:04

## 2024-05-17 RX ADMIN — FUROSEMIDE 40 MG: 10 INJECTION, SOLUTION INTRAMUSCULAR; INTRAVENOUS at 08:58

## 2024-05-17 RX ADMIN — PIPERACILLIN AND TAZOBACTAM 4500 MG: 4; .5 INJECTION, POWDER, FOR SOLUTION INTRAVENOUS at 22:28

## 2024-05-17 RX ADMIN — ALBUTEROL SULFATE 2.5 MG: 2.5 SOLUTION RESPIRATORY (INHALATION) at 20:49

## 2024-05-17 RX ADMIN — CLONAZEPAM 1.5 MG: 0.5 TABLET ORAL at 22:23

## 2024-05-17 RX ADMIN — THIAMINE HYDROCHLORIDE 100 MG: 100 INJECTION, SOLUTION INTRAMUSCULAR; INTRAVENOUS at 09:00

## 2024-05-17 RX ADMIN — NYSTATIN 500000 UNITS: 100000 SUSPENSION ORAL at 09:00

## 2024-05-17 RX ADMIN — CLONAZEPAM 1.5 MG: 0.5 TABLET ORAL at 17:14

## 2024-05-17 RX ADMIN — Medication 1500 MG: at 06:31

## 2024-05-17 RX ADMIN — CLONAZEPAM 1.5 MG: 0.5 TABLET ORAL at 04:39

## 2024-05-17 RX ADMIN — INSULIN LISPRO 1 UNITS: 100 INJECTION, SOLUTION INTRAVENOUS; SUBCUTANEOUS at 12:37

## 2024-05-17 RX ADMIN — PIPERACILLIN AND TAZOBACTAM 4500 MG: 4; .5 INJECTION, POWDER, FOR SOLUTION INTRAVENOUS at 06:30

## 2024-05-17 RX ADMIN — VALPROATE SODIUM 1500 MG: 100 INJECTION, SOLUTION INTRAVENOUS at 10:24

## 2024-05-17 RX ADMIN — ALBUTEROL SULFATE 2.5 MG: 2.5 SOLUTION RESPIRATORY (INHALATION) at 09:24

## 2024-05-17 RX ADMIN — SODIUM CHLORIDE, PRESERVATIVE FREE 40 MG: 5 INJECTION INTRAVENOUS at 09:00

## 2024-05-17 RX ADMIN — POTASSIUM PHOSPHATE, MONOBASIC AND POTASSIUM PHOSPHATE, DIBASIC 10 MMOL: 224; 236 INJECTION, SOLUTION, CONCENTRATE INTRAVENOUS at 08:49

## 2024-05-17 RX ADMIN — ASPIRIN 81 MG CHEWABLE TABLET 81 MG: 81 TABLET CHEWABLE at 08:59

## 2024-05-17 ASSESSMENT — PULMONARY FUNCTION TESTS
PIF_VALUE: 19
PIF_VALUE: 21
PIF_VALUE: 20
PIF_VALUE: 22
PIF_VALUE: 21
PIF_VALUE: 23
PIF_VALUE: 19
PIF_VALUE: 21
PIF_VALUE: 22
PIF_VALUE: 22
PIF_VALUE: 19
PIF_VALUE: 21
PIF_VALUE: 20
PIF_VALUE: 23
PIF_VALUE: 19
PIF_VALUE: 24
PIF_VALUE: 25
PIF_VALUE: 21
PIF_VALUE: 19
PIF_VALUE: 23
PIF_VALUE: 20
PIF_VALUE: 19
PIF_VALUE: 20
PIF_VALUE: 21
PIF_VALUE: 19
PIF_VALUE: 21
PIF_VALUE: 21
PIF_VALUE: 22

## 2024-05-17 ASSESSMENT — PAIN SCALES - GENERAL
PAINLEVEL_OUTOF10: 0
PAINLEVEL_OUTOF10: 0

## 2024-05-17 NOTE — PROGRESS NOTES
HOSPITALIST PROGRESS NOTE    Patient's name: Nabeel Appiah  : 1960  Admission date: 2024  Date of service: 2024   Primary care physician: Jonh Allen DO    Assessment   Patient admitted on 2024     Nabeel Appiah is a 63 y.o. male patient of Jonh Allen DO with history of atrial fibrillation, hypertension, obesity, type 2 diabetes mellitus, AAA presented to [facility] on 2024 after cardiac arrest. On EMS arrival reported to be in V. tach/V-fib arrest and received amiodarone and shocked x 3. Patient was noted to have ROSC prior to ED arrival.     GOC have been discussed with girlfriend, but patient has no NOK as per his girlfriend. Girlfriend is trying to access his safe to see if he has a living will. Needs 2 doctor sign off for medical decisions at present. Critical care doc will not sign for withdrawal of care and would like to proceed with Trach/PEG. General surgery does not feel it is ethical to place Trach/PEG as long standing girlfriend who has been involved with his care, stated that he would not want that.   Girlfriend has filed for guardianship paperwork but it may take up to 3 to 4 months.  In the meantime as patient does not have POA critical care thinking to consider trach and PEG and once guardianship has been completed with girlfriend she can make decisions for the patient.  Surgical team refused to place trach and PEG as they feel that it is unethical as his longstanding girlfriend has been involved in his care and said that he would not want that.    Neurology has been consulted and is planning on 24-hour EEG before deciding on DNRCC versus trach/PEG    Cardiac arrest secondary to ACS -cardiology following, plans for cardiac catheterization however continues to have poor prognosis in terms of neurologic recovery, now off heparin drip s/p rewarming   V-fib/V. Tach -now off lidocaine drip  Persistent Afib - on Eliquis at home,  increase in bilateral lower lung field opacities when compared to   the prior study performed 05/07/2024.  Rule out increasing pulmonary   edema/CHF versus pneumonia. Small bilateral pleural effusions are again noted.         CT HEAD WO CONTRAST   Final Result   1. Mild interval decrease in volume and density of the posterior right para   falcine subdural hematoma.   2. No new intracranial hemorrhage.   3. Fluid opacification of the mastoid air cells is unchanged.         CT HEAD WO CONTRAST   Final Result   New right parasagittal subdural hematoma along the posterior falx without   significant mass effect or midline shift.      Worsening sinusitis involving maxillary, ethmoid and sphenoid sinuses.      New bilateral mastoiditis.         XR CHEST PORTABLE   Final Result   1. ETT 3.4 cm above the milla.   2. Small effusions.   3. Linear atelectasis in the right lower lobe.         XR CHEST PORTABLE   Final Result   1. The position and alignment of the endotracheal tube is within the normal   range.   2. Bilateral patchy parenchymal densities concerning for pneumonia and/or   atelectasis, stable.         XR CHEST PORTABLE   Final Result   1. Interval removal of the right-sided chest tube. No pneumothorax.   2. Otherwise, no significant interval change.         XR CHEST PORTABLE   Final Result   1. No significant interval change in the right lung opacities when compared   to the previous study performed earlier in the day.   2. Improved aeration in the left lower lung field.         XR CHEST PORTABLE   Final Result   1. No significant interval change when compared to the previous study   performed 1 day earlier.         XR CHEST PORTABLE   Final Result   Stable chest.         XR CHEST PORTABLE   Final Result   1. The position and alignment of the tubes and catheters are within normal   range.   2. The bibasilar parenchymal density felt to represent atelectasis appears   stable..   3. Tiny bilateral pleural effusions

## 2024-05-17 NOTE — PROGRESS NOTES
Coordination of care discussion and chart review with PM team, awaiting return call from significant other Tisha to review plan of care. Pt has no known family, Tisha is significant other of 26 years and stated to NP that she does not feel the patient would want to be on long-term life support but they never had a direct conversation about this.

## 2024-05-17 NOTE — PROGRESS NOTES
Palliative Care Department  [phone]  Palliative Care Progress Note  Provider Jana Bass, APRN - CNP     Nabeel Appiah  [id number]  Hospital Day: 17  Date of Initial Consult: 5/1/24  Referring Provider: Ashley Rivera MD   Palliative Medicine was consulted for assistance with: goals of care    HPI:   Nabeel Appiah is a 63 y.o. with a medical history of HTN, HLD, atrial fibrillation on Eliquis, DM, AAA who was admitted on 5/1/2024 from home with a CHIEF COMPLAINT of cardiac arrest.  Patient originally presented to [facility] after a witnessed fall at work 4/29 and was unresponsive.  ROSC achieved in the field and patient was intubated prior to arriving to [place] ED.  Patient was started on lidocaine drip for ventricular arrhythmias.  CXR showed small right pneumothorax, right-sided chest tube placed.  Lactic acid 6.1, creatinine 1.5, mild transaminitis, temperature increasing and Arctic sun applied.  Patient was progressively becoming more hypertensive requiring nitroglycerin drip.  CT head showed no acute intracranial abnormality, area of focal contusion of the scalp of the left occipital area.  CTA chest showed multiple displaced fractures of the right and left anterior rib cages, small right pneumothorax, no pulmonary emboli.  CT A/P showed no indication for acute trauma.  Cardiology consulted.  Heparin drip started and patient transferred to [place] for possible need for cardiac catheterization.  Echo done while at [place] showed EF 50-55%, severely dilated left atrium, normal wall motion of left ventricle.  Vecuronium drip stopped early a.m. of 5/1.  Palliative medicine consulted for goals of care.    ASSESSMENT/PLAN:     Pertinent Hospital Diagnoses     Cardiac arrest  Acute hypoxic respiratory failure  Lactic acidosis  Multiple bilateral rib fractures  Small right pneumothorax  Hypertensive urgency    Palliative Care Encounter / Counseling Regarding Goals of Care  Please see detailed goals of

## 2024-05-17 NOTE — PROGRESS NOTES
Notified Tisha of plan for compassionate extubation tomorrow (5/17) at 0900 per residents request. Support provided.

## 2024-05-17 NOTE — PROGRESS NOTES
Pharmacy Consultation Note  (Antibiotic Dosing and Monitoring)    Initial consult date: 5/15/24  Consulting physician/provider: Dr. Palacio  Drug: Vancomycin  Indication: Empiric    Vancomycin has been discontinued. Clinical pharmacy will sign off, please reconsult if further assistance is needed.     Katlin Schaffer, PharmD, BCPS, BCCCP 5/17/2024 10:12 AM

## 2024-05-17 NOTE — PROGRESS NOTES
[facility]  Department of Internal Medicine   Internal Medicine Residency   MICU Progress Note    Patient:  Nabeel Appiah 63 y.o. male  MRN: [id number]     Date of Service: 5/17/2024    Allergy: Patient has no known allergies.    Overnight Events: No acute overnight events.     Subjective     Patient seen and examined at bedside this AM. Remains unresponsive, intubated. Has had intermittent fevers overnight    ROS: unable to be achieved due to current mental status    Objective     VS: /65   Pulse 73   Temp (!) 100.6 °F (38.1 °C) (Axillary)   Resp 22   Ht 1.905 m (6' 3\")   Wt (!) 161.5 kg (356 lb)   SpO2 92%   BMI 44.50 kg/m²   ABP (Arterial line BP): 100/50  ABP Mean (Arterial Line Mean): (!) 64 mmHg    I & O - 24hr:   Intake/Output Summary (Last 24 hours) at 5/17/2024 1152  Last data filed at 5/17/2024 1138  Gross per 24 hour   Intake 2983.5 ml   Output 4500 ml   Net -1516.5 ml       Sedatives: N/A    Pressors: N/A     Oxygen: Intubated 450/16/45/8    ABG:     Lab Results   Component Value Date/Time    PH 7.490 05/17/2024 04:13 AM    PCO2 37.0 05/17/2024 04:13 AM    PO2 72.1 05/17/2024 04:13 AM    HCO3 27.6 05/17/2024 04:13 AM    BE 4.2 05/17/2024 04:13 AM    THB 12.0 05/17/2024 04:13 AM    O2SAT 95.3 05/17/2024 04:13 AM       Physical Exam:  General Appearance: AO x 0, not following commands    Neuro:Upward gaze, +pupillary reflex, absent corneal reflex, absent gag reflex, +cough reflex, withdrawing to noxious stimuli, myoclonic jerks reduced in frequency from prior exam   Cardiovascular: regular rate and rhythm, S1 and S2 heard, no murmurs appreciated   Respiratory: Mechanically ventilated lung sounds. No wheezing or crackles appreciated.  Abdomen: Softly distended  Extremities: Bilateral upper extremity edema- improving, no cyanosis, distal pulses intact    Lines & Urinary Catheter:     External Urinary Catheter since 05/03  ETT since 04/30  Fecal management system since 05/07  NG  reviewed or with radiologist or consultant if felt dis-concordant with the exam or history. The above findings were corroborated, plans confirmed and changes made if needed.     Family is updated at the bedside as available. Key issues of the case were discussed among consultants.  Critical Care time is documented if appropriate.      Critical Care time: 35 minutes    Antonella Ross DO

## 2024-05-17 NOTE — PROGRESS NOTES
Attempted to call significant other, Tisha, to provide an update on patient's current condition and prognosis.  No answer, LVM.  Will await return call.

## 2024-05-17 NOTE — PLAN OF CARE
Called SIN Tisha regarding patient's current condition and prognosis. Informed her of tentative decision for 2 physician sign off for code status change to DNR-CC. Tisha stated that she was fine with that decision as she felt that patient had already been gone for weeks and all that was remaining was his body. Stated that she has gone through and is still going through the motions of grieving him. Stated that she did not want to be present if the patient would be extubated and removed off the ventilator. Informed Tisha that the team could provide emotional support or direct her to services for support. Tisha politely declined as she stated that she did not want to talk to anyone.     Electronically signed by Chelsey Pitt MD on 5/17/2024 at 2:02 PM

## 2024-05-17 NOTE — PLAN OF CARE
Problem: Coping  Goal: Pt/Family able to verbalize concerns and demonstrate effective coping strategies  Description: INTERVENTIONS:  1. Assist patient/family to identify coping skills, available support systems and cultural and spiritual values  2. Provide emotional support, including active listening and acknowledgement of concerns of patient and caregivers  3. Reduce environmental stimuli, as able  4. Instruct patient/family in relaxation techniques, as appropriate  5. Assess for spiritual pain/suffering and initiate Spiritual Care, Psychosocial Clinical Specialist consults as needed  Outcome: Not Progressing     Problem: Decision Making  Goal: Pt/Family able to effectively weigh alternatives and participate in decision making related to treatment and care  Description: INTERVENTIONS:  1. Determine when there are differences between patient's view, family's view, and healthcare provider's view of condition  2. Facilitate patient and family articulation of goals for care  3. Help patient and family identify pros/cons of alternative solutions  4. Provide information as requested by patient/family  5. Respect patient/family right to receive or not to receive information  6. Serve as a liaison between patient and family and health care team  7. Initiate Consults from Ethics, Palliative Care or initiate Family Care Conference as is appropriate  Outcome: Not Progressing     Problem: Safety - Adult  Goal: Free from fall injury  5/16/2024 2011 by Kaleigh Posada RN  Outcome: Progressing     Problem: Chronic Conditions and Co-morbidities  Goal: Patient's chronic conditions and co-morbidity symptoms are monitored and maintained or improved  Outcome: Progressing     Problem: Discharge Planning  Goal: Discharge to home or other facility with appropriate resources  Outcome: Progressing     Problem: Neurosensory - Adult  Goal: Achieves stable or improved neurological status  5/16/2024 2011 by Kaleigh Posada RN  Outcome:  POST OPERATIVE VISIT     PROCEDURE: trigger release thumb     POST  OP  TIME: 10 days    GENERAL CONDITION:  Doing well  Denies pain  Denies triggering    EXAMINATION:    The wound is healing well with no signs of infection.   Sutures removed today   AROM digit WNL   Mild tenderness only, at incisional site.    IMPRESSION:  S/p trigger thumb release    PLAN:   The patient was given scar massage techniques, to soften the scar tissue.   Mild stretching any feeling of tightness   Activities as tolerated   RTC me as needed    Godfery Josue Pa-C  1/30/2017     Progressing     Problem: Respiratory - Adult  Goal: Achieves optimal ventilation and oxygenation  5/16/2024 2011 by Kaleigh Posada RN  Outcome: Progressing     Problem: Skin/Tissue Integrity  Goal: Absence of new skin breakdown  Description: 1.  Monitor for areas of redness and/or skin breakdown  2.  Assess vascular access sites hourly  3.  Every 4-6 hours minimum:  Change oxygen saturation probe site  4.  Every 4-6 hours:  If on nasal continuous positive airway pressure, respiratory therapy assess nares and determine need for appliance change or resting period.  5/16/2024 2011 by Kaleigh Posada RN  Outcome: Progressing     Problem: ABCDS Injury Assessment  Goal: Absence of physical injury  5/16/2024 2011 by Kaleigh Posada RN  Outcome: Progressing     Problem: Nutrition Deficit:  Goal: Optimize nutritional status  Outcome: Progressing     Problem: Neurosensory - Adult  Goal: Achieves stable or improved neurological status  5/16/2024 2011 by Kaleigh Posada RN  Outcome: Progressing  5/16/2024 1821 by Sisi Hurley RN  Outcome: Not Progressing     Problem: Coping  Goal: Pt/Family able to verbalize concerns and demonstrate effective coping strategies  Description: INTERVENTIONS:  1. Assist patient/family to identify coping skills, available support systems and cultural and spiritual values  2. Provide emotional support, including active listening and acknowledgement of concerns of patient and caregivers  3. Reduce environmental stimuli, as able  4. Instruct patient/family in relaxation techniques, as appropriate  5. Assess for spiritual pain/suffering and initiate Spiritual Care, Psychosocial Clinical Specialist consults as needed  Outcome: Not Progressing     Problem: Decision Making  Goal: Pt/Family able to effectively weigh alternatives and participate in decision making related to treatment and care  Description: INTERVENTIONS:  1. Determine when there are differences between patient's view,

## 2024-05-17 NOTE — CARE COORDINATION
CM update note.   Palliative has spoken to patient girlfriend.  She agreeable to physician decision to proceed with comfort measure and companionate extubation.  DNR form will require 2 physician to sign for DNRCC.  DOMENICO/MILADY to follow.  Cole Paul RN [phone].

## 2024-05-17 NOTE — PLAN OF CARE
Problem: Safety - Adult  Goal: Free from fall injury  5/17/2024 0932 by Kayleen Nieves RN  Outcome: Progressing  Flowsheets (Taken 5/17/2024 0800)  Free From Fall Injury:   Instruct family/caregiver on patient safety   Based on caregiver fall risk screen, instruct family/caregiver to ask for assistance with transferring infant if caregiver noted to have fall risk factors  5/16/2024 2011 by Kaleigh Posada RN  Outcome: Progressing     Problem: Pain  Goal: Verbalizes/displays adequate comfort level or baseline comfort level  Outcome: Progressing  Flowsheets (Taken 5/17/2024 0800)  Verbalizes/displays adequate comfort level or baseline comfort level:   Assess pain using appropriate pain scale   Administer analgesics based on type and severity of pain and evaluate response     Problem: Respiratory - Adult  Goal: Achieves optimal ventilation and oxygenation  5/17/2024 0932 by Kayleen Nieves RN  Outcome: Progressing  Flowsheets (Taken 5/17/2024 0800)  Achieves optimal ventilation and oxygenation:   Assess for changes in respiratory status   Position to facilitate oxygenation and minimize respiratory effort   Assess for changes in mentation and behavior   Oxygen supplementation based on oxygen saturation or arterial blood gases   Initiate smoking cessation protocol as indicated   Assess the need for suctioning and aspirate as needed   Encourage broncho-pulmonary hygiene including cough, deep breathe, incentive spirometry  5/16/2024 2011 by Kaleigh Posada, RN  Outcome: Progressing     Problem: Skin/Tissue Integrity  Goal: Absence of new skin breakdown  Description: 1.  Monitor for areas of redness and/or skin breakdown  2.  Assess vascular access sites hourly  3.  Every 4-6 hours minimum:  Change oxygen saturation probe site  4.  Every 4-6 hours:  If on nasal continuous positive airway pressure, respiratory therapy assess nares and determine need for appliance change or resting period.  5/17/2024 0932 by Kayleen Nieves

## 2024-05-18 ENCOUNTER — APPOINTMENT (OUTPATIENT)
Dept: GENERAL RADIOLOGY | Age: 64
DRG: 283 | End: 2024-05-18
Attending: STUDENT IN AN ORGANIZED HEALTH CARE EDUCATION/TRAINING PROGRAM
Payer: COMMERCIAL

## 2024-05-18 LAB
AADO2: 153 MMHG
ALBUMIN SERPL-MCNC: 2.5 G/DL (ref 3.5–5.2)
ALP SERPL-CCNC: 103 U/L (ref 40–129)
ALT SERPL-CCNC: 49 U/L (ref 0–40)
ANION GAP SERPL CALCULATED.3IONS-SCNC: 14 MMOL/L (ref 7–16)
AST SERPL-CCNC: 66 U/L (ref 0–39)
B.E.: 1.8 MMOL/L (ref -3–3)
BASOPHILS # BLD: 0.16 K/UL (ref 0–0.2)
BASOPHILS NFR BLD: 1 % (ref 0–2)
BILIRUB SERPL-MCNC: 0.3 MG/DL (ref 0–1.2)
BUN SERPL-MCNC: 33 MG/DL (ref 6–23)
CALCIUM SERPL-MCNC: 8.4 MG/DL (ref 8.6–10.2)
CHLORIDE SERPL-SCNC: 109 MMOL/L (ref 98–107)
CO2 SERPL-SCNC: 22 MMOL/L (ref 22–29)
COHB: 0.6 % (ref 0–1.5)
CREAT SERPL-MCNC: 0.8 MG/DL (ref 0.7–1.2)
CRITICAL: ABNORMAL
DATE ANALYZED: ABNORMAL
DATE OF COLLECTION: ABNORMAL
EOSINOPHIL # BLD: 0.21 K/UL (ref 0.05–0.5)
EOSINOPHILS RELATIVE PERCENT: 2 % (ref 0–6)
ERYTHROCYTE [DISTWIDTH] IN BLOOD BY AUTOMATED COUNT: 13.2 % (ref 11.5–15)
FIO2: 40 %
GFR, ESTIMATED: >90 ML/MIN/1.73M2
GLUCOSE BLD-MCNC: 176 MG/DL (ref 74–99)
GLUCOSE BLD-MCNC: 209 MG/DL (ref 74–99)
GLUCOSE SERPL-MCNC: 200 MG/DL (ref 74–99)
HCO3: 24.9 MMOL/L (ref 22–26)
HCT VFR BLD AUTO: 35.3 % (ref 37–54)
HGB BLD-MCNC: 11.2 G/DL (ref 12.5–16.5)
HHB: 3.1 % (ref 0–5)
IMM GRANULOCYTES # BLD AUTO: 0.45 K/UL (ref 0–0.58)
IMM GRANULOCYTES NFR BLD: 4 % (ref 0–5)
LAB: ABNORMAL
LYMPHOCYTES NFR BLD: 1.19 K/UL (ref 1.5–4)
LYMPHOCYTES RELATIVE PERCENT: 10 % (ref 20–42)
Lab: 458
MAGNESIUM SERPL-MCNC: 2.2 MG/DL (ref 1.6–2.6)
MCH RBC QN AUTO: 30.7 PG (ref 26–35)
MCHC RBC AUTO-ENTMCNC: 31.7 G/DL (ref 32–34.5)
MCV RBC AUTO: 96.7 FL (ref 80–99.9)
METHB: 0.4 % (ref 0–1.5)
MODE: AC
MONOCYTES NFR BLD: 1.55 K/UL (ref 0.1–0.95)
MONOCYTES NFR BLD: 13 % (ref 2–12)
NEUTROPHILS NFR BLD: 69 % (ref 43–80)
NEUTS SEG NFR BLD: 7.99 K/UL (ref 1.8–7.3)
O2 CONTENT: 15.6 ML/DL
O2 SATURATION: 96.9 % (ref 92–98.5)
O2HB: 95.9 % (ref 94–97)
OPERATOR ID: 2863
PATIENT TEMP: 37 C
PCO2: 33.9 MMHG (ref 35–45)
PEEP/CPAP: 8 CMH2O
PFO2: 2.33 MMHG/%
PH BLOOD GAS: 7.48 (ref 7.35–7.45)
PHOSPHATE SERPL-MCNC: 2.7 MG/DL (ref 2.5–4.5)
PLATELET # BLD AUTO: 237 K/UL (ref 130–450)
PMV BLD AUTO: 10.9 FL (ref 7–12)
PO2: 93.2 MMHG (ref 75–100)
POTASSIUM SERPL-SCNC: 3.6 MMOL/L (ref 3.5–5)
PROT SERPL-MCNC: 5.8 G/DL (ref 6.4–8.3)
RBC # BLD AUTO: 3.65 M/UL (ref 3.8–5.8)
RI(T): 1.64
RR MECHANICAL: 16 B/MIN
SODIUM SERPL-SCNC: 145 MMOL/L (ref 132–146)
SOURCE, BLOOD GAS: ABNORMAL
THB: 11.5 G/DL (ref 11.5–16.5)
TIME ANALYZED: 513
VT MECHANICAL: 450 ML
WBC OTHER # BLD: 11.6 K/UL (ref 4.5–11.5)

## 2024-05-18 PROCEDURE — 6370000000 HC RX 637 (ALT 250 FOR IP): Performed by: INTERNAL MEDICINE

## 2024-05-18 PROCEDURE — 83735 ASSAY OF MAGNESIUM: CPT

## 2024-05-18 PROCEDURE — 82805 BLOOD GASES W/O2 SATURATION: CPT

## 2024-05-18 PROCEDURE — 6370000000 HC RX 637 (ALT 250 FOR IP): Performed by: STUDENT IN AN ORGANIZED HEALTH CARE EDUCATION/TRAINING PROGRAM

## 2024-05-18 PROCEDURE — C9113 INJ PANTOPRAZOLE SODIUM, VIA: HCPCS

## 2024-05-18 PROCEDURE — 71045 X-RAY EXAM CHEST 1 VIEW: CPT

## 2024-05-18 PROCEDURE — 94003 VENT MGMT INPAT SUBQ DAY: CPT

## 2024-05-18 PROCEDURE — 6360000002 HC RX W HCPCS: Performed by: PSYCHIATRY & NEUROLOGY

## 2024-05-18 PROCEDURE — 6360000002 HC RX W HCPCS: Performed by: STUDENT IN AN ORGANIZED HEALTH CARE EDUCATION/TRAINING PROGRAM

## 2024-05-18 PROCEDURE — 82962 GLUCOSE BLOOD TEST: CPT

## 2024-05-18 PROCEDURE — 99231 SBSQ HOSP IP/OBS SF/LOW 25: CPT | Performed by: STUDENT IN AN ORGANIZED HEALTH CARE EDUCATION/TRAINING PROGRAM

## 2024-05-18 PROCEDURE — 6360000002 HC RX W HCPCS

## 2024-05-18 PROCEDURE — 2700000000 HC OXYGEN THERAPY PER DAY

## 2024-05-18 PROCEDURE — 6370000000 HC RX 637 (ALT 250 FOR IP)

## 2024-05-18 PROCEDURE — 6360000002 HC RX W HCPCS: Performed by: CHIROPRACTOR

## 2024-05-18 PROCEDURE — 6360000002 HC RX W HCPCS: Performed by: INTERNAL MEDICINE

## 2024-05-18 PROCEDURE — 85025 COMPLETE CBC W/AUTO DIFF WBC: CPT

## 2024-05-18 PROCEDURE — 2580000003 HC RX 258

## 2024-05-18 PROCEDURE — 94640 AIRWAY INHALATION TREATMENT: CPT

## 2024-05-18 PROCEDURE — 99291 CRITICAL CARE FIRST HOUR: CPT | Performed by: INTERNAL MEDICINE

## 2024-05-18 PROCEDURE — 94799 UNLISTED PULMONARY SVC/PX: CPT

## 2024-05-18 PROCEDURE — 84100 ASSAY OF PHOSPHORUS: CPT

## 2024-05-18 PROCEDURE — 2580000003 HC RX 258: Performed by: CHIROPRACTOR

## 2024-05-18 PROCEDURE — 80053 COMPREHEN METABOLIC PANEL: CPT

## 2024-05-18 PROCEDURE — A4216 STERILE WATER/SALINE, 10 ML: HCPCS

## 2024-05-18 PROCEDURE — 2000000000 HC ICU R&B

## 2024-05-18 RX ORDER — LORAZEPAM 2 MG/ML
1 INJECTION INTRAMUSCULAR
Status: DISCONTINUED | OUTPATIENT
Start: 2024-05-18 | End: 2024-05-20

## 2024-05-18 RX ORDER — LORAZEPAM 2 MG/ML
0.5 INJECTION INTRAMUSCULAR
Status: DISCONTINUED | OUTPATIENT
Start: 2024-05-18 | End: 2024-05-20

## 2024-05-18 RX ORDER — ACETAMINOPHEN 650 MG/1
650 SUPPOSITORY RECTAL EVERY 4 HOURS PRN
Status: DISCONTINUED | OUTPATIENT
Start: 2024-05-18 | End: 2024-05-25 | Stop reason: HOSPADM

## 2024-05-18 RX ORDER — ACETAMINOPHEN 160 MG/5ML
650 LIQUID ORAL EVERY 4 HOURS PRN
Status: DISCONTINUED | OUTPATIENT
Start: 2024-05-18 | End: 2024-05-25 | Stop reason: HOSPADM

## 2024-05-18 RX ORDER — MORPHINE SULFATE 4 MG/ML
4 INJECTION, SOLUTION INTRAMUSCULAR; INTRAVENOUS
Status: DISCONTINUED | OUTPATIENT
Start: 2024-05-18 | End: 2024-05-18

## 2024-05-18 RX ORDER — LORAZEPAM 2 MG/ML
1 INJECTION INTRAMUSCULAR EVERY 4 HOURS PRN
Status: DISCONTINUED | OUTPATIENT
Start: 2024-05-18 | End: 2024-05-18

## 2024-05-18 RX ORDER — ONDANSETRON 2 MG/ML
4 INJECTION INTRAMUSCULAR; INTRAVENOUS EVERY 6 HOURS PRN
Status: DISCONTINUED | OUTPATIENT
Start: 2024-05-18 | End: 2024-05-25 | Stop reason: HOSPADM

## 2024-05-18 RX ORDER — MORPHINE SULFATE 2 MG/ML
2 INJECTION, SOLUTION INTRAMUSCULAR; INTRAVENOUS
Status: DISCONTINUED | OUTPATIENT
Start: 2024-05-18 | End: 2024-05-20

## 2024-05-18 RX ORDER — MORPHINE SULFATE 2 MG/ML
2 INJECTION, SOLUTION INTRAMUSCULAR; INTRAVENOUS
Status: DISCONTINUED | OUTPATIENT
Start: 2024-05-18 | End: 2024-05-18

## 2024-05-18 RX ORDER — LORAZEPAM 2 MG/ML
1 INJECTION INTRAMUSCULAR EVERY 6 HOURS PRN
Status: DISCONTINUED | OUTPATIENT
Start: 2024-05-18 | End: 2024-05-18

## 2024-05-18 RX ORDER — MORPHINE SULFATE 4 MG/ML
4 INJECTION, SOLUTION INTRAMUSCULAR; INTRAVENOUS
Status: DISCONTINUED | OUTPATIENT
Start: 2024-05-18 | End: 2024-05-20

## 2024-05-18 RX ORDER — DIPHENOXYLATE HYDROCHLORIDE AND ATROPINE SULFATE 2.5; .025 MG/1; MG/1
1 TABLET ORAL 4 TIMES DAILY PRN
Status: DISCONTINUED | OUTPATIENT
Start: 2024-05-18 | End: 2024-05-25 | Stop reason: HOSPADM

## 2024-05-18 RX ORDER — GLYCOPYRROLATE 0.2 MG/ML
0.2 INJECTION INTRAMUSCULAR; INTRAVENOUS EVERY 4 HOURS PRN
Status: DISCONTINUED | OUTPATIENT
Start: 2024-05-18 | End: 2024-05-21

## 2024-05-18 RX ADMIN — SODIUM CHLORIDE, PRESERVATIVE FREE 40 MG: 5 INJECTION INTRAVENOUS at 08:36

## 2024-05-18 RX ADMIN — MORPHINE SULFATE 2 MG: 2 INJECTION, SOLUTION INTRAMUSCULAR; INTRAVENOUS at 16:47

## 2024-05-18 RX ADMIN — NYSTATIN 500000 UNITS: 100000 SUSPENSION ORAL at 09:00

## 2024-05-18 RX ADMIN — LEVETIRACETAM 1500 MG: 500 INJECTION, SOLUTION, CONCENTRATE INTRAVENOUS at 20:09

## 2024-05-18 RX ADMIN — LORAZEPAM 0.5 MG: 2 INJECTION INTRAMUSCULAR; INTRAVENOUS at 20:10

## 2024-05-18 RX ADMIN — CLONAZEPAM 1.5 MG: 0.5 TABLET ORAL at 12:51

## 2024-05-18 RX ADMIN — LORAZEPAM 0.5 MG: 2 INJECTION INTRAMUSCULAR; INTRAVENOUS at 16:48

## 2024-05-18 RX ADMIN — MORPHINE SULFATE 2 MG: 2 INJECTION, SOLUTION INTRAMUSCULAR; INTRAVENOUS at 15:22

## 2024-05-18 RX ADMIN — GLYCOPYRROLATE 0.2 MG: 0.2 INJECTION INTRAMUSCULAR; INTRAVENOUS at 16:48

## 2024-05-18 RX ADMIN — MORPHINE SULFATE 2 MG: 2 INJECTION, SOLUTION INTRAMUSCULAR; INTRAVENOUS at 23:28

## 2024-05-18 RX ADMIN — CLONAZEPAM 1.5 MG: 0.5 TABLET ORAL at 18:29

## 2024-05-18 RX ADMIN — CLONAZEPAM 1.5 MG: 0.5 TABLET ORAL at 05:15

## 2024-05-18 RX ADMIN — MORPHINE SULFATE 2 MG: 2 INJECTION, SOLUTION INTRAMUSCULAR; INTRAVENOUS at 20:09

## 2024-05-18 RX ADMIN — GLYCOPYRROLATE 0.2 MG: 0.2 INJECTION INTRAMUSCULAR; INTRAVENOUS at 21:07

## 2024-05-18 RX ADMIN — ACETAZOLAMIDE SODIUM 250 MG: 500 INJECTION, POWDER, LYOPHILIZED, FOR SOLUTION INTRAVENOUS at 08:38

## 2024-05-18 RX ADMIN — FUROSEMIDE 40 MG: 10 INJECTION, SOLUTION INTRAMUSCULAR; INTRAVENOUS at 08:38

## 2024-05-18 RX ADMIN — MORPHINE SULFATE 2 MG: 2 INJECTION, SOLUTION INTRAMUSCULAR; INTRAVENOUS at 13:03

## 2024-05-18 RX ADMIN — MORPHINE SULFATE 2 MG: 2 INJECTION, SOLUTION INTRAMUSCULAR; INTRAVENOUS at 21:06

## 2024-05-18 RX ADMIN — CLONAZEPAM 1.5 MG: 0.5 TABLET ORAL at 22:28

## 2024-05-18 RX ADMIN — GLYCOPYRROLATE 0.2 MG: 0.2 INJECTION INTRAMUSCULAR; INTRAVENOUS at 12:52

## 2024-05-18 RX ADMIN — LEVETIRACETAM 1500 MG: 500 INJECTION, SOLUTION, CONCENTRATE INTRAVENOUS at 08:36

## 2024-05-18 RX ADMIN — ALBUTEROL SULFATE 2.5 MG: 2.5 SOLUTION RESPIRATORY (INHALATION) at 08:50

## 2024-05-18 RX ADMIN — THIAMINE HYDROCHLORIDE 100 MG: 100 INJECTION, SOLUTION INTRAMUSCULAR; INTRAVENOUS at 08:38

## 2024-05-18 RX ADMIN — SODIUM CHLORIDE, PRESERVATIVE FREE 10 ML: 5 INJECTION INTRAVENOUS at 09:00

## 2024-05-18 RX ADMIN — ASPIRIN 81 MG CHEWABLE TABLET 81 MG: 81 TABLET CHEWABLE at 08:39

## 2024-05-18 RX ADMIN — INSULIN LISPRO 1 UNITS: 100 INJECTION, SOLUTION INTRAVENOUS; SUBCUTANEOUS at 08:38

## 2024-05-18 RX ADMIN — LORAZEPAM 0.5 MG: 2 INJECTION INTRAMUSCULAR; INTRAVENOUS at 21:07

## 2024-05-18 RX ADMIN — LORAZEPAM 0.5 MG: 2 INJECTION INTRAMUSCULAR; INTRAVENOUS at 23:28

## 2024-05-18 RX ADMIN — Medication 15 ML: at 08:36

## 2024-05-18 RX ADMIN — LORAZEPAM 1 MG: 2 INJECTION INTRAMUSCULAR; INTRAVENOUS at 13:17

## 2024-05-18 RX ADMIN — PIPERACILLIN AND TAZOBACTAM 4500 MG: 4; .5 INJECTION, POWDER, FOR SOLUTION INTRAVENOUS at 05:42

## 2024-05-18 ASSESSMENT — PULMONARY FUNCTION TESTS
PIF_VALUE: 18
PIF_VALUE: 23
PIF_VALUE: 21
PIF_VALUE: 19
PIF_VALUE: 19
PIF_VALUE: 20
PIF_VALUE: 20
PIF_VALUE: 19
PIF_VALUE: 20
PIF_VALUE: 20
PIF_VALUE: 25
PIF_VALUE: 21
PIF_VALUE: 23
PIF_VALUE: 20
PIF_VALUE: 37

## 2024-05-18 NOTE — PROGRESS NOTES
cardiac arrest, unknown downtime, currently sinus rhythm off pressors and off lidocaine  Elevated troponin in setting of cardiac arrest  Abdominal aortic aneurysm  History of persistent A-fib, was on Eliquis prior to the admission  Acute hypoxic respiratory failure secondary to aspiration pneumonia completed Unasyn  Right-sided pneumothorax, s/p chest tube on right side 4/30  Possible seizure  Multiple rib fractures secondary to CPR  Transaminitis secondary to shock liver  GENE resolved  History of hypertension  History of diabetes      Plan:  2 physician sign off for code status completed. Plan for compassionate extubate today   Respiratory Culture + Pseudomonas Fluorescens normal respiratory elyssa   Palliative Care on board   Comfort Care order placed       PT/OT evaluation: not indicated at this time   DVT prophylaxis: PCDs due to subdural hematoma  GI prophylaxis: not indicated   Diet:   No diet orders on file   Bowel regimen: Glycolax  Pain management: as needed  Code status: DNR-CC   Disposition: continue current care   Family: updated as available    Chelsey Pitt MD, PGY-1      [facility]  Department of Pulmonary, Critical Care and Sleep Medicine  [facility]  Department of Internal Medicine      During multidisciplinary team rounds Nabeel Appiah is a 63 y.o. male was seen, examined and discussed. This is confirmation that I have personally seen and examined the patient and that the key elements of the encounter were performed by me (> 85 % time).  The medications & laboratory data was discussed and adjusted where necessary. The radiographic images were reviewed or with radiologist or consultant if felt dis-concordant with the exam or history. The above findings were corroborated, plans confirmed and changes made if needed.     Family is updated at the bedside as available. Key issues of the case were discussed among consultants.  Critical Care  time is documented if appropriate.      Case discussed with Dr. aBrr and with Dr. Deleon  Compassionately extubated today.   Orders place for comfort care medications.    Critical Care time: 38 minutes    Antonella Ross DO

## 2024-05-18 NOTE — PLAN OF CARE
Patient seen and examined. Remains in persistent vegetative state. Does not respond to noxious or verbal stimuli. Case discussed with Dr. Barr, Dr. Deleon. Patient's long term significant other states that long term life support would not be in line with patients wishes. Unfortunately he has no known living relatives and no legal POA documentation. Plan for compassionate extubation and change to DNR-CC with two physician consent to be in line with patients wishes.     Antonella Ross, DO

## 2024-05-18 NOTE — PLAN OF CARE
Patient seen and examined. Patient remains with persistent comatose state. Discussed high likelihood of poor neurologic prognosis with ICU staff. Patient's significant other has communicated to staff that long term life support would not be desired by the patient. Paperwork for change to DNR-CC signed in conjunction with Dr. Ross.    Electronically signed by Gordo Deleon DO on 5/18/2024

## 2024-05-18 NOTE — PROGRESS NOTES
Chart reviewed.  Patient compassionately extubated, he is DNR CC, comfort medications reviewed and adjusted.  Will continue to follow peripherally.

## 2024-05-18 NOTE — PLAN OF CARE
Problem: Dyspnea Due to End of Life  Goal: Demonstrate understanding of and ability to manage respiratory symptoms at end of life  Description: Patient  and or family/caregiver will verbalize recall of breathing strategies to maintain an effective breathing pattern during the inpatient hospice stay.        Outcome: Progressing

## 2024-05-18 NOTE — PROGRESS NOTES
HOSPITALIST PROGRESS NOTE    Patient's name: Nabeel Appiah  : 1960  Admission date: 2024  Date of service: 2024   Primary care physician: Jonh Allen DO    Assessment   Patient admitted on 2024     Nabeel Appiah is a 63 y.o. male patient of Jonh Allen DO with history of atrial fibrillation, hypertension, obesity, type 2 diabetes mellitus, AAA presented to [facility] on 2024 after cardiac arrest. On EMS arrival reported to be in V. tach/V-fib arrest and received amiodarone and shocked x 3. Patient was noted to have ROSC prior to ED arrival.     GOC have been discussed with girlfriend, but patient has no NOK as per his girlfriend. Girlfriend is trying to access his safe to see if he has a living will. Needs 2 doctor sign off for medical decisions at present. Critical care doc will not sign for withdrawal of care and would like to proceed with Trach/PEG. General surgery does not feel it is ethical to place Trach/PEG as long standing girlfriend who has been involved with his care, stated that he would not want that.   Girlfriend has filed for guardianship paperwork but it may take up to 3 to 4 months.  In the meantime as patient does not have POA critical care initially thought to consider trach and PEG,  Surgical team refused to place trach and PEG as they feel that it is unethical as his longstanding girlfriend has been involved in his care and said that he would not want that.  After prolonged discussion plan is made to compassionately extubate patient on     Cardiac arrest secondary to ACS -cardiology following, plans for cardiac catheterization however continues to have poor prognosis in terms of neurologic recovery, now off heparin drip s/p rewarming   V-fib/V. Tach -now off lidocaine drip  Persistent Afib - on Eliquis at home, completed heparin infusion and transitioned to Eliquis now stopped for new subdural hematoma.    Aspiration   40 mg at 05/10/24 0829    And    [Held by provider] hydroCHLOROthiazide (HYDRODIURIL) tablet 25 mg  25 mg Oral Daily Feli May MD   25 mg at 05/10/24 0829    midazolam PF (VERSED) injection 2 mg  2 mg IntraVENous Q2H PRN Feli May MD   2 mg at 05/16/24 1023    hydrALAZINE (APRESOLINE) injection 10 mg  10 mg IntraVENous Q4H PRN Nicole Dhaliwal MD   10 mg at 05/05/24 2214    labetalol (NORMODYNE;TRANDATE) injection 10 mg  10 mg IntraVENous Q4H PRN Nicole Dhaliwal MD        thiamine (B-1) injection 100 mg  100 mg IntraVENous Daily Fely Garcia MD   100 mg at 05/18/24 0838    levETIRAcetam (KEPPRA) injection 1,500 mg  1,500 mg IntraVENous Q12H Gordo Deleon DO   1,500 mg at 05/18/24 0836    insulin lispro (HUMALOG) injection vial 0-4 Units  0-4 Units SubCUTAneous Q4H Austin Salas MD   1 Units at 05/18/24 0838    sodium chloride flush 0.9 % injection 5-40 mL  5-40 mL IntraVENous 2 times per day Ashley Rivera MD   10 mL at 05/17/24 2005    sodium chloride flush 0.9 % injection 5-40 mL  5-40 mL IntraVENous PRN Ashley Rivera MD        0.9 % sodium chloride infusion   IntraVENous PRN Ashley Rivera MD        ondansetron (ZOFRAN-ODT) disintegrating tablet 4 mg  4 mg Oral Q8H PRN Ashley Rivera MD        Or    ondansetron (ZOFRAN) injection 4 mg  4 mg IntraVENous Q6H PRAshley Garza MD        polyethylene glycol (GLYCOLAX) packet 17 g  17 g Oral Daily PRN Ashley Rivera MD        acetaminophen (TYLENOL) tablet 650 mg  650 mg Oral Q6H PRN Ashley Rivera MD   650 mg at 05/15/24 1744    Or    acetaminophen (TYLENOL) suppository 650 mg  650 mg Rectal Q6H PRN Ashley Rivera MD        pantoprazole (PROTONIX) 40 mg in sodium chloride (PF) 0.9 % 10 mL injection  40 mg IntraVENous Daily Ashley Rivera MD   40 mg at 05/18/24 0836    glucose chewable tablet 16 g  4 tablet Oral PRN Ashley Rivera MD        dextrose bolus 10% 125 mL  125 mL IntraVENous PRN    Distal enteric tube within the proximal stomach.         XR CHEST PORTABLE   Final Result   1. Cardiomediastinal and interstitial prominence concerning for congestive   heart failure, stable   2. No signs of pneumothorax   3. Stable position of the right chest tube and endotracheal tubes.   4. Small bilateral pleural effusions, stable         XR CHEST PORTABLE   Final Result   Endotracheal tube appears in satisfactory position.         XR CHEST PORTABLE   Final Result   1. Interval development of diffuse interstitial opacities throughout both   lungs, suspicious for pulmonary edema/CHF and when compared to the prior   study performed 1 day earlier.   2. Small right pleural effusion again seen.   3. No evidence of pneumothorax.   4. Tubes and lines as described above.         XR CHEST PORTABLE   Final Result   1. No significant interval change when compared to the previous study   performed earlier in the day.         XR CHEST PORTABLE   Final Result   1. ET tube in satisfactory position.   2. No pneumothorax.         XR ABDOMEN FOR NG/OG/NE TUBE PLACEMENT   Final Result   Orogastric tube tip several cm past GE junction and should be advanced the   approximately 7 cm for more optimal placement.         XR CHEST PORTABLE   Final Result   Endotracheal tube high above the level of the clavicles. Nasogastric tube tip   below the level diaphragm. Right chest tube in place with no definite   pneumothorax. Some increased markings seen within the right lower lung field   and left lung base.         XR CHEST PORTABLE    (Results Pending)   XR CHEST PORTABLE    (Results Pending)       Hospital Medications  Current Facility-Administered Medications   Medication Dose Route Frequency Provider Last Rate Last Admin    acetaZOLAMIDE (DIAMOX) 250 mg in sterile water 2.5 mL injection  250 mg IntraVENous Daily Chelsey Pitt MD   250 mg at 05/18/24 0838    piperacillin-tazobactam (ZOSYN) 4,500 mg in sodium chloride 0.9 % 100 mL

## 2024-05-19 PROCEDURE — 6370000000 HC RX 637 (ALT 250 FOR IP): Performed by: STUDENT IN AN ORGANIZED HEALTH CARE EDUCATION/TRAINING PROGRAM

## 2024-05-19 PROCEDURE — 99231 SBSQ HOSP IP/OBS SF/LOW 25: CPT | Performed by: STUDENT IN AN ORGANIZED HEALTH CARE EDUCATION/TRAINING PROGRAM

## 2024-05-19 PROCEDURE — 6360000002 HC RX W HCPCS: Performed by: PSYCHIATRY & NEUROLOGY

## 2024-05-19 PROCEDURE — 3E033XZ INTRODUCTION OF VASOPRESSOR INTO PERIPHERAL VEIN, PERCUTANEOUS APPROACH: ICD-10-PCS | Performed by: STUDENT IN AN ORGANIZED HEALTH CARE EDUCATION/TRAINING PROGRAM

## 2024-05-19 PROCEDURE — 6360000002 HC RX W HCPCS: Performed by: INTERNAL MEDICINE

## 2024-05-19 PROCEDURE — 6360000002 HC RX W HCPCS

## 2024-05-19 PROCEDURE — 1200000000 HC SEMI PRIVATE

## 2024-05-19 RX ADMIN — LORAZEPAM 0.5 MG: 2 INJECTION INTRAMUSCULAR; INTRAVENOUS at 05:32

## 2024-05-19 RX ADMIN — LEVETIRACETAM 1500 MG: 500 INJECTION, SOLUTION, CONCENTRATE INTRAVENOUS at 11:17

## 2024-05-19 RX ADMIN — MORPHINE SULFATE 2 MG: 2 INJECTION, SOLUTION INTRAMUSCULAR; INTRAVENOUS at 08:40

## 2024-05-19 RX ADMIN — LORAZEPAM 0.5 MG: 2 INJECTION INTRAMUSCULAR; INTRAVENOUS at 11:39

## 2024-05-19 RX ADMIN — MORPHINE SULFATE 2 MG: 2 INJECTION, SOLUTION INTRAMUSCULAR; INTRAVENOUS at 20:38

## 2024-05-19 RX ADMIN — CLONAZEPAM 1.5 MG: 0.5 TABLET ORAL at 18:04

## 2024-05-19 RX ADMIN — LORAZEPAM 0.5 MG: 2 INJECTION INTRAMUSCULAR; INTRAVENOUS at 08:49

## 2024-05-19 RX ADMIN — GLYCOPYRROLATE 0.2 MG: 0.2 INJECTION INTRAMUSCULAR; INTRAVENOUS at 20:43

## 2024-05-19 RX ADMIN — CLONAZEPAM 1.5 MG: 0.5 TABLET ORAL at 05:25

## 2024-05-19 RX ADMIN — CLONAZEPAM 1.5 MG: 0.5 TABLET ORAL at 11:17

## 2024-05-19 RX ADMIN — MORPHINE SULFATE 2 MG: 2 INJECTION, SOLUTION INTRAMUSCULAR; INTRAVENOUS at 03:51

## 2024-05-19 RX ADMIN — LORAZEPAM 1 MG: 2 INJECTION INTRAMUSCULAR; INTRAVENOUS at 20:29

## 2024-05-19 RX ADMIN — MORPHINE SULFATE 2 MG: 2 INJECTION, SOLUTION INTRAMUSCULAR; INTRAVENOUS at 18:14

## 2024-05-19 RX ADMIN — GLYCOPYRROLATE 0.2 MG: 0.2 INJECTION INTRAMUSCULAR; INTRAVENOUS at 08:27

## 2024-05-19 RX ADMIN — LORAZEPAM 0.5 MG: 2 INJECTION INTRAMUSCULAR; INTRAVENOUS at 18:15

## 2024-05-19 RX ADMIN — LORAZEPAM 0.5 MG: 2 INJECTION INTRAMUSCULAR; INTRAVENOUS at 02:38

## 2024-05-19 RX ADMIN — MORPHINE SULFATE 2 MG: 2 INJECTION, SOLUTION INTRAMUSCULAR; INTRAVENOUS at 11:34

## 2024-05-19 RX ADMIN — LEVETIRACETAM 1500 MG: 500 INJECTION, SOLUTION, CONCENTRATE INTRAVENOUS at 20:29

## 2024-05-19 RX ADMIN — GLYCOPYRROLATE 0.2 MG: 0.2 INJECTION INTRAMUSCULAR; INTRAVENOUS at 13:26

## 2024-05-19 RX ADMIN — GLYCOPYRROLATE 0.2 MG: 0.2 INJECTION INTRAMUSCULAR; INTRAVENOUS at 18:15

## 2024-05-19 ASSESSMENT — PAIN SCALES - WONG BAKER: WONGBAKER_NUMERICALRESPONSE: NO HURT

## 2024-05-19 ASSESSMENT — PAIN DESCRIPTION - ORIENTATION
ORIENTATION: OTHER (COMMENT)
ORIENTATION: OTHER (COMMENT)

## 2024-05-19 ASSESSMENT — PAIN DESCRIPTION - LOCATION
LOCATION: OTHER (COMMENT)
LOCATION: OTHER (COMMENT)

## 2024-05-19 ASSESSMENT — PAIN SCALES - GENERAL: PAINLEVEL_OUTOF10: 0

## 2024-05-19 ASSESSMENT — PAIN DESCRIPTION - DESCRIPTORS
DESCRIPTORS: OTHER (COMMENT)
DESCRIPTORS: OTHER (COMMENT)

## 2024-05-19 NOTE — PROGRESS NOTES
Ran Narcs report in am shows given and showed discrepancy for 2 Ativans pulled. Called Pharmacy and spoke with Shaggy at 7:04 am and he checked and said everything looks good on their end.

## 2024-05-19 NOTE — PROGRESS NOTES
HOSPITALIST PROGRESS NOTE    Patient's name: Nabeel Appiah  : 1960  Admission date: 2024  Date of service: 2024   Primary care physician: Jonh Allen DO    Assessment   Patient admitted on 2024     Nabeel Appiah is a 63 y.o. male patient of Jonh Allen DO with history of atrial fibrillation, hypertension, obesity, type 2 diabetes mellitus, AAA presented to [facility] on 2024 after cardiac arrest. On EMS arrival reported to be in V. tach/V-fib arrest and received amiodarone and shocked x 3. Patient was noted to have ROSC prior to ED arrival.     GOC have been discussed with girlfriend, but patient has no NOK as per his girlfriend. Girlfriend is trying to access his safe to see if he has a living will. Needs 2 doctor sign off for medical decisions at present. Critical care doc will not sign for withdrawal of care and would like to proceed with Trach/PEG. General surgery does not feel it is ethical to place Trach/PEG as long standing girlfriend who has been involved with his care, stated that he would not want that.   Girlfriend has filed for guardianship paperwork but it may take up to 3 to 4 months.  In the meantime as patient does not have POA critical care initially thought to consider trach and PEG,  Surgical team refused to place trach and PEG as they feel that it is unethical as his longstanding girlfriend has been involved in his care and said that he would not want that.  After prolonged discussion, and high likelihood of poor neurological prognosis after talking to ICU and neurology plan is made to compassionately extubate patient on .  Patient was compassionately extubated and moved out of ICU with comfort care orders.    Hospice consulted.    Cardiac arrest secondary to ACS -  V-fib/V. Tach   Persistent Afib    Aspiration pneumonia   Acute hypoxic respiratory failure secondary to cardiac arrest -   Seizures  Elevated troponin 2/2 to       XR CHEST PORTABLE   Final Result   1. The position and alignment of the tubes and catheters are within normal   range.   2. The bibasilar parenchymal density felt to represent atelectasis appears   stable..   3. Tiny bilateral pleural effusions   4. No signs of pneumothorax.         XR CHEST ABDOMEN NG PLACEMENT   Final Result   Distal enteric tube in good position within the stomach.         XR CHEST PORTABLE   Final Result   1. Endotracheal tube in satisfactory position 4 cm above milla.   2. Orogastric tube in position.   3. Prominent pulmonary vascularity with bibasilar infiltrates.         XR ABDOMEN FOR NG/OG/NE TUBE PLACEMENT   Final Result   Distal enteric tube within the proximal stomach.         XR CHEST PORTABLE   Final Result   1. Cardiomediastinal and interstitial prominence concerning for congestive   heart failure, stable   2. No signs of pneumothorax   3. Stable position of the right chest tube and endotracheal tubes.   4. Small bilateral pleural effusions, stable         XR CHEST PORTABLE   Final Result   Endotracheal tube appears in satisfactory position.         XR CHEST PORTABLE   Final Result   1. Interval development of diffuse interstitial opacities throughout both   lungs, suspicious for pulmonary edema/CHF and when compared to the prior   study performed 1 day earlier.   2. Small right pleural effusion again seen.   3. No evidence of pneumothorax.   4. Tubes and lines as described above.         XR CHEST PORTABLE   Final Result   1. No significant interval change when compared to the previous study   performed earlier in the day.         XR CHEST PORTABLE   Final Result   1. ET tube in satisfactory position.   2. No pneumothorax.         XR ABDOMEN FOR NG/OG/NE TUBE PLACEMENT   Final Result   Orogastric tube tip several cm past GE junction and should be advanced the   approximately 7 cm for more optimal placement.         XR CHEST PORTABLE   Final Result   Endotracheal tube high above  the level of the clavicles. Nasogastric tube tip   below the level diaphragm. Right chest tube in place with no definite   pneumothorax. Some increased markings seen within the right lower lung field   and left lung base.             Hospital Medications  Current Facility-Administered Medications   Medication Dose Route Frequency Provider Last Rate Last Admin    diphenoxylate-atropine (LOMOTIL) 2.5-0.025 MG per tablet 1 tablet  1 tablet Oral 4x Daily PRN Antonella Ross, DO        glycopyrrolate (ROBINUL) injection 0.2 mg  0.2 mg IntraVENous Q4H PRN Antonella Ross, DO   0.2 mg at 05/19/24 0827    acetaminophen (TYLENOL) suppository 650 mg  650 mg Rectal Q4H PRN Antonella Ross, DO        acetaminophen (TYLENOL) 160 MG/5ML solution 650 mg  650 mg Oral Q4H PRN Antonella Ross, DO        ondansetron (ZOFRAN) injection 4 mg  4 mg IntraVENous Q6H PRN Antonella Ross, DO        morphine (PF) injection 2 mg  2 mg IntraVENous Q15 Min PRN Costlow, Janet A, APRN - CNP   2 mg at 05/19/24 0840    Or    morphine sulfate (PF) injection 4 mg  4 mg IntraVENous Q15 Min PRN Costlow, Janet A, APRN - CNP        LORazepam (ATIVAN) injection 0.5 mg  0.5 mg IntraVENous Q15 Min PRN Costlow, Janet A, APRN - CNP   0.5 mg at 05/19/24 0849    Or    LORazepam (ATIVAN) injection 1 mg  1 mg IntraVENous Q15 Min PRN Costlow, Janet A, APRN - CNP        clonazePAM (KLONOPIN) tablet 1.5 mg  1.5 mg Oral Q6H Isiah Yusuf MD   1.5 mg at 05/19/24 0525    midazolam PF (VERSED) injection 2 mg  2 mg IntraVENous Q2H PRN Feli May MD   2 mg at 05/16/24 1023    levETIRAcetam (KEPPRA) injection 1,500 mg  1,500 mg IntraVENous Q12H Gordo Deleon DO   1,500 mg at 05/18/24 2009       PRN Medications  diphenoxylate-atropine, glycopyrrolate, acetaminophen, acetaminophen, ondansetron, morphine **OR** morphine, LORazepam **OR** LORazepam, midazolam    +++++++++++++++++++++++++++++++++++++++++++++++++  Naman Barr MD    [facility]

## 2024-05-19 NOTE — PLAN OF CARE
Came to sign the paperwork for hospice transition, patient has not been accepted to hospice yet and needs to be accepted to hospice before any paperwork needs to be signed. Will defer for now.     Naman Barr MD

## 2024-05-19 NOTE — PROGRESS NOTES
Messaged Dr. Irwin to clarify if morphine and Ativan are to be given every 15 minutes per orders or if that is an ICU order

## 2024-05-19 NOTE — PROGRESS NOTES
Hospice called, as he does not have any family, 2 physicians will have to sign for him to go into Hospice Care.

## 2024-05-20 LAB
MICROORGANISM SPEC CULT: ABNORMAL
MICROORGANISM SPEC CULT: ABNORMAL
MICROORGANISM SPEC CULT: NORMAL
MICROORGANISM SPEC CULT: NORMAL
MICROORGANISM/AGENT SPEC: ABNORMAL
SERVICE CMNT-IMP: NORMAL
SERVICE CMNT-IMP: NORMAL
SPECIMEN DESCRIPTION: ABNORMAL
SPECIMEN DESCRIPTION: NORMAL
SPECIMEN DESCRIPTION: NORMAL

## 2024-05-20 PROCEDURE — 6360000002 HC RX W HCPCS

## 2024-05-20 PROCEDURE — 99231 SBSQ HOSP IP/OBS SF/LOW 25: CPT | Performed by: INTERNAL MEDICINE

## 2024-05-20 PROCEDURE — 6370000000 HC RX 637 (ALT 250 FOR IP): Performed by: STUDENT IN AN ORGANIZED HEALTH CARE EDUCATION/TRAINING PROGRAM

## 2024-05-20 PROCEDURE — 1200000000 HC SEMI PRIVATE

## 2024-05-20 PROCEDURE — 6360000002 HC RX W HCPCS: Performed by: PSYCHIATRY & NEUROLOGY

## 2024-05-20 PROCEDURE — 2700000000 HC OXYGEN THERAPY PER DAY

## 2024-05-20 PROCEDURE — 6360000002 HC RX W HCPCS: Performed by: NURSE PRACTITIONER

## 2024-05-20 PROCEDURE — 6360000002 HC RX W HCPCS: Performed by: INTERNAL MEDICINE

## 2024-05-20 RX ORDER — LORAZEPAM 2 MG/ML
1 INJECTION INTRAMUSCULAR
Status: DISCONTINUED | OUTPATIENT
Start: 2024-05-20 | End: 2024-05-25 | Stop reason: HOSPADM

## 2024-05-20 RX ORDER — MORPHINE SULFATE 4 MG/ML
4 INJECTION, SOLUTION INTRAMUSCULAR; INTRAVENOUS
Status: DISCONTINUED | OUTPATIENT
Start: 2024-05-20 | End: 2024-05-25 | Stop reason: HOSPADM

## 2024-05-20 RX ORDER — LORAZEPAM 2 MG/ML
0.5 INJECTION INTRAMUSCULAR
Status: DISCONTINUED | OUTPATIENT
Start: 2024-05-20 | End: 2024-05-25 | Stop reason: HOSPADM

## 2024-05-20 RX ORDER — MORPHINE SULFATE 2 MG/ML
2 INJECTION, SOLUTION INTRAMUSCULAR; INTRAVENOUS
Status: DISCONTINUED | OUTPATIENT
Start: 2024-05-20 | End: 2024-05-25 | Stop reason: HOSPADM

## 2024-05-20 RX ADMIN — MORPHINE SULFATE 4 MG: 4 INJECTION, SOLUTION INTRAMUSCULAR; INTRAVENOUS at 05:02

## 2024-05-20 RX ADMIN — CLONAZEPAM 1.5 MG: 0.5 TABLET ORAL at 05:02

## 2024-05-20 RX ADMIN — MORPHINE SULFATE 4 MG: 4 INJECTION, SOLUTION INTRAMUSCULAR; INTRAVENOUS at 02:10

## 2024-05-20 RX ADMIN — MORPHINE SULFATE 2 MG: 2 INJECTION, SOLUTION INTRAMUSCULAR; INTRAVENOUS at 13:36

## 2024-05-20 RX ADMIN — LEVETIRACETAM 1500 MG: 500 INJECTION, SOLUTION, CONCENTRATE INTRAVENOUS at 09:03

## 2024-05-20 RX ADMIN — LORAZEPAM 1 MG: 2 INJECTION INTRAMUSCULAR; INTRAVENOUS at 16:18

## 2024-05-20 RX ADMIN — MORPHINE SULFATE 4 MG: 4 INJECTION, SOLUTION INTRAMUSCULAR; INTRAVENOUS at 16:18

## 2024-05-20 RX ADMIN — MORPHINE SULFATE 4 MG: 4 INJECTION, SOLUTION INTRAMUSCULAR; INTRAVENOUS at 09:04

## 2024-05-20 RX ADMIN — LEVETIRACETAM 1500 MG: 500 INJECTION, SOLUTION, CONCENTRATE INTRAVENOUS at 21:24

## 2024-05-20 RX ADMIN — CLONAZEPAM 1.5 MG: 0.5 TABLET ORAL at 00:36

## 2024-05-20 RX ADMIN — LORAZEPAM 1 MG: 2 INJECTION INTRAMUSCULAR; INTRAVENOUS at 06:42

## 2024-05-20 RX ADMIN — LORAZEPAM 1 MG: 2 INJECTION INTRAMUSCULAR; INTRAVENOUS at 18:24

## 2024-05-20 RX ADMIN — GLYCOPYRROLATE 0.2 MG: 0.2 INJECTION INTRAMUSCULAR; INTRAVENOUS at 18:24

## 2024-05-20 RX ADMIN — GLYCOPYRROLATE 0.2 MG: 0.2 INJECTION INTRAMUSCULAR; INTRAVENOUS at 13:37

## 2024-05-20 RX ADMIN — GLYCOPYRROLATE 0.2 MG: 0.2 INJECTION INTRAMUSCULAR; INTRAVENOUS at 00:37

## 2024-05-20 RX ADMIN — GLYCOPYRROLATE 0.2 MG: 0.2 INJECTION INTRAMUSCULAR; INTRAVENOUS at 06:19

## 2024-05-20 RX ADMIN — LORAZEPAM 1 MG: 2 INJECTION INTRAMUSCULAR; INTRAVENOUS at 09:04

## 2024-05-20 RX ADMIN — LORAZEPAM 1 MG: 2 INJECTION INTRAMUSCULAR; INTRAVENOUS at 00:38

## 2024-05-20 RX ADMIN — LORAZEPAM 1 MG: 2 INJECTION INTRAMUSCULAR; INTRAVENOUS at 03:35

## 2024-05-20 RX ADMIN — MORPHINE SULFATE 4 MG: 4 INJECTION, SOLUTION INTRAMUSCULAR; INTRAVENOUS at 01:14

## 2024-05-20 RX ADMIN — MORPHINE SULFATE 4 MG: 4 INJECTION, SOLUTION INTRAMUSCULAR; INTRAVENOUS at 18:24

## 2024-05-20 ASSESSMENT — PAIN DESCRIPTION - LOCATION
LOCATION: OTHER (COMMENT)

## 2024-05-20 ASSESSMENT — PAIN DESCRIPTION - DESCRIPTORS
DESCRIPTORS: OTHER (COMMENT)

## 2024-05-20 ASSESSMENT — PAIN DESCRIPTION - ORIENTATION
ORIENTATION: OTHER (COMMENT)

## 2024-05-20 NOTE — PLAN OF CARE
Problem: Chronic Conditions and Co-morbidities  Goal: Patient's chronic conditions and co-morbidity symptoms are monitored and maintained or improved  Outcome: Not Progressing     Problem: Pain  Goal: Verbalizes/displays adequate comfort level or baseline comfort level  Outcome: Not Progressing     Problem: Neurosensory - Adult  Goal: Achieves stable or improved neurological status  Outcome: Not Progressing     Problem: Respiratory - Adult  Goal: Achieves optimal ventilation and oxygenation  Outcome: Not Progressing     Problem: Nutrition Deficit:  Goal: Optimize nutritional status  Outcome: Not Progressing     Problem: Coping  Goal: Pt/Family able to verbalize concerns and demonstrate effective coping strategies  Description: INTERVENTIONS:  1. Assist patient/family to identify coping skills, available support systems and cultural and spiritual values  2. Provide emotional support, including active listening and acknowledgement of concerns of patient and caregivers  3. Reduce environmental stimuli, as able  4. Instruct patient/family in relaxation techniques, as appropriate  5. Assess for spiritual pain/suffering and initiate Spiritual Care, Psychosocial Clinical Specialist consults as needed  Outcome: Not Progressing     Problem: Decision Making  Goal: Pt/Family able to effectively weigh alternatives and participate in decision making related to treatment and care  Description: INTERVENTIONS:  1. Determine when there are differences between patient's view, family's view, and healthcare provider's view of condition  2. Facilitate patient and family articulation of goals for care  3. Help patient and family identify pros/cons of alternative solutions  4. Provide information as requested by patient/family  5. Respect patient/family right to receive or not to receive information  6. Serve as a liaison between patient and family and health care team  7. Initiate Consults from Ethics, Palliative Care or initiate  support systems and cultural and spiritual values  2. Provide emotional support, including active listening and acknowledgement of concerns of patient and caregivers  3. Reduce environmental stimuli, as able  4. Instruct patient/family in relaxation techniques, as appropriate  5. Assess for spiritual pain/suffering and initiate Spiritual Care, Psychosocial Clinical Specialist consults as needed  Outcome: Not Progressing     Problem: Decision Making  Goal: Pt/Family able to effectively weigh alternatives and participate in decision making related to treatment and care  Description: INTERVENTIONS:  1. Determine when there are differences between patient's view, family's view, and healthcare provider's view of condition  2. Facilitate patient and family articulation of goals for care  3. Help patient and family identify pros/cons of alternative solutions  4. Provide information as requested by patient/family  5. Respect patient/family right to receive or not to receive information  6. Serve as a liaison between patient and family and health care team  7. Initiate Consults from Ethics, Palliative Care or initiate Family Care Conference as is appropriate  Outcome: Not Progressing     Problem: Dyspnea Due to End of Life  Goal: Demonstrate understanding of and ability to manage respiratory symptoms at end of life  Description: Patient  and or family/caregiver will verbalize recall of breathing strategies to maintain an effective breathing pattern during the inpatient hospice stay.        Outcome: Not Progressing

## 2024-05-20 NOTE — CARE COORDINATION
05/20/24 Update CM Note: Spoke with Quintin, [phone] and  Patria who states the expert evaluation must be delivered or mailed to [address] [place] [address] at [phone]. Expert evaluation will take up to 30 business days to complete per eddie España from time mail is received at the above address. Patient is unable to be discharged to hospice/or skilled facility without an assigned guardian thru the courts. Electronically signed by Loree Riley RN on 5/20/2024 at 9:49 AM

## 2024-05-20 NOTE — CARE COORDINATION
05/20/24 Update CM Note: Patient is DNRCC. Hospice consult called to [facility] and given to Brigitte. She will give to liaison to see patient. Patient is unresponsive. He is receiving iv robinul/iv ativan/iv ms. Plan is to follow with hospice. Electronically signed by Loree Riley RN CM on 5/20/2024 at 8:51 AM

## 2024-05-20 NOTE — CARE COORDINATION
05/20/24 Update CM Note: Spoke with liaison, Brigitte, from [facility]. She states patient cannot advance into hospice care due to no living relative able to sign him into hospice and is not able himself. She states no one has applied for emergency guardianship for the patient thru the courts also. She will further investigate with her manager and give any update. Electronically signed by Loree Riley RN CM on 5/20/2024 at 9:04 AM

## 2024-05-20 NOTE — PROGRESS NOTES
Mr. Appiah is actively dying and can we please d/c his fecal management system and ng tube please and thank you. Message sent to Dr. Hodges

## 2024-05-20 NOTE — PROGRESS NOTES
Consult received and chart reviewed. In review of chart it is noted that pt does not have any living relatives and no HCPOA. Due to this [facility] is unable to provide services as we need to have a living relative or HCPOA sign consent paperwork. Pt is unable to sign for himself. In review of the chart it does not appear that emergency guardianship has been applied for. If pt were to gain a guardian, hospice services could then be provided. Updates provided to CM.    Electronically signed by Brigitte Lara RN on 5/20/2024 at 9:59 AM  [phone]

## 2024-05-20 NOTE — PROGRESS NOTES
Chart reviewed. Patient seen at the bedside, unresponsive. Comfort medications adjusted per floor policy. Continue comfort measures. Palliative will continue to follow.

## 2024-05-20 NOTE — PROGRESS NOTES
Hospitalist Progress Note        Synopsis: Patient admitted on 5/1/2024. The patient is a 63-year-old male with past medical history of A-fib, hypertension, obesity, type II DM, AAA who presented to [facility] for cardiac arrest. On EMS arrival reported to be in V. tach/V-fib arrest and received amiodarone and shocked x 3. Patient was noted to have ROSC prior to ED arrival. CT of the head was done in the emergency department without any intracranial findings. Further imaging revealed a small pneumothorax for which a right-sided chest tube was placed: Plan for LHC. Placed on heparin drip and transferred. CT of the chest negative for PE. CT of the abdomen and pelvis showed presence of 4.3 x 4 cm aneurysm. Patient had persistent myoclonic jerking. Placed on Clonazepam and Depakote. EEG showed a potential for generalized seizures but no associated epileptiform activity was found and myoclonic jerks were captured. Patent with aspiration pneumonia. Placed on Unasyn.  Myoclonic jerks continued. Keppra added.  Clonazepam increased to 1.5 mg every 6 hours. Heparin stopped. Transitioned to Eliquis. Completed course of Unasyn. Repeat CT showed subdural hematoma. Eliquis stopped. Repeat CTH showed stability of subdural.      GOC have been discussed with girlfriend, but patient has no NOK as per his girlfriend. Girlfriend is trying to access his safe to see if he has a living will. Needs 2 doctor sign off for medical decisions at present. Critical care doc will not sign for withdrawal of care and would like to proceed with Trach/PEG. General surgery does not feel it is ethical to place Trach/PEG as long standing girlfriend who has been involved with his care, stated that he would not want that. Critical care and Neurology did sign off for DNR-CC and compassionate extubation considering grave prognosis.      Assessment and Plan      Cardiac arrest   ACS  Vfib/Vtach   Aspiration PNA  Seizures  Acute Hypoxic respiratory

## 2024-05-21 PROCEDURE — 6360000002 HC RX W HCPCS: Performed by: INTERNAL MEDICINE

## 2024-05-21 PROCEDURE — 2700000000 HC OXYGEN THERAPY PER DAY

## 2024-05-21 PROCEDURE — 6360000002 HC RX W HCPCS: Performed by: PSYCHIATRY & NEUROLOGY

## 2024-05-21 PROCEDURE — 99238 HOSP IP/OBS DSCHRG MGMT 30/<: CPT | Performed by: INTERNAL MEDICINE

## 2024-05-21 PROCEDURE — 6360000002 HC RX W HCPCS: Performed by: NURSE PRACTITIONER

## 2024-05-21 PROCEDURE — 1200000000 HC SEMI PRIVATE

## 2024-05-21 RX ORDER — LEVETIRACETAM 500 MG/5ML
INJECTION, SOLUTION, CONCENTRATE INTRAVENOUS
Status: DISPENSED
Start: 2024-05-21 | End: 2024-05-21

## 2024-05-21 RX ORDER — GLYCOPYRROLATE 0.2 MG/ML
0.2 INJECTION INTRAMUSCULAR; INTRAVENOUS
Status: DISCONTINUED | OUTPATIENT
Start: 2024-05-21 | End: 2024-05-25 | Stop reason: HOSPADM

## 2024-05-21 RX ADMIN — LORAZEPAM 1 MG: 2 INJECTION INTRAMUSCULAR; INTRAVENOUS at 00:21

## 2024-05-21 RX ADMIN — LEVETIRACETAM 1500 MG: 500 INJECTION, SOLUTION, CONCENTRATE INTRAVENOUS at 09:45

## 2024-05-21 RX ADMIN — MORPHINE SULFATE 2 MG: 2 INJECTION, SOLUTION INTRAMUSCULAR; INTRAVENOUS at 06:48

## 2024-05-21 RX ADMIN — MORPHINE SULFATE 2 MG: 2 INJECTION, SOLUTION INTRAMUSCULAR; INTRAVENOUS at 10:16

## 2024-05-21 RX ADMIN — GLYCOPYRROLATE 0.2 MG: 0.2 INJECTION INTRAMUSCULAR; INTRAVENOUS at 13:14

## 2024-05-21 RX ADMIN — LORAZEPAM 0.5 MG: 2 INJECTION INTRAMUSCULAR; INTRAVENOUS at 04:16

## 2024-05-21 RX ADMIN — GLYCOPYRROLATE 0.2 MG: 0.2 INJECTION INTRAMUSCULAR; INTRAVENOUS at 19:58

## 2024-05-21 RX ADMIN — GLYCOPYRROLATE 0.2 MG: 0.2 INJECTION INTRAMUSCULAR; INTRAVENOUS at 22:07

## 2024-05-21 RX ADMIN — LORAZEPAM 0.5 MG: 2 INJECTION INTRAMUSCULAR; INTRAVENOUS at 19:51

## 2024-05-21 RX ADMIN — GLYCOPYRROLATE 0.2 MG: 0.2 INJECTION INTRAMUSCULAR; INTRAVENOUS at 00:22

## 2024-05-21 RX ADMIN — MORPHINE SULFATE 4 MG: 4 INJECTION, SOLUTION INTRAMUSCULAR; INTRAVENOUS at 00:22

## 2024-05-21 RX ADMIN — MORPHINE SULFATE 2 MG: 2 INJECTION, SOLUTION INTRAMUSCULAR; INTRAVENOUS at 19:57

## 2024-05-21 RX ADMIN — GLYCOPYRROLATE 0.2 MG: 0.2 INJECTION INTRAMUSCULAR; INTRAVENOUS at 06:48

## 2024-05-21 RX ADMIN — LORAZEPAM 0.5 MG: 2 INJECTION INTRAMUSCULAR; INTRAVENOUS at 06:48

## 2024-05-21 RX ADMIN — MORPHINE SULFATE 2 MG: 2 INJECTION, SOLUTION INTRAMUSCULAR; INTRAVENOUS at 04:16

## 2024-05-21 RX ADMIN — MORPHINE SULFATE 2 MG: 2 INJECTION, SOLUTION INTRAMUSCULAR; INTRAVENOUS at 16:14

## 2024-05-21 RX ADMIN — LEVETIRACETAM 1500 MG: 500 INJECTION, SOLUTION, CONCENTRATE INTRAVENOUS at 21:44

## 2024-05-21 RX ADMIN — MORPHINE SULFATE 4 MG: 4 INJECTION, SOLUTION INTRAMUSCULAR; INTRAVENOUS at 21:45

## 2024-05-21 RX ADMIN — LORAZEPAM 1 MG: 2 INJECTION INTRAMUSCULAR; INTRAVENOUS at 21:45

## 2024-05-21 ASSESSMENT — PAIN SCALES - GENERAL: PAINLEVEL_OUTOF10: 6

## 2024-05-21 NOTE — CARE COORDINATION
05/21/24 Update Cm Note; Per Blanca at [facility] and Rafia at [facility] the patient will not be approved by [facility] for skilled under palliative due to patient not able to participate in any way. They will not be able to do a medicaid application because it would not be approved before the patient passes and therefore the skilled facility would have to cover the stay. Also the guardianship will not be completed before the patient passes to assess financials and girlfriend is not financially able to access his funds. [facility] cancelled.Electronically signed by Loree Riley RN CM on 5/21/2024 at 2:50 PM

## 2024-05-21 NOTE — PROGRESS NOTES
Chart reviewed. Patient remains unresponsive. He is comfort care only. Continue to medicate for symptoms for provide comfort. CM attempting discharge plan given the patient does not have a legal NOK or guardian. Guardianship in process. Palliative medicine will continue to follow.

## 2024-05-21 NOTE — PLAN OF CARE
Problem: Chronic Conditions and Co-morbidities  Goal: Patient's chronic conditions and co-morbidity symptoms are monitored and maintained or improved  Outcome: Progressing     Problem: Discharge Planning  Goal: Discharge to home or other facility with appropriate resources  Outcome: Progressing     Problem: Pain  Goal: Verbalizes/displays adequate comfort level or baseline comfort level  Outcome: Progressing     Problem: Neurosensory - Adult  Goal: Achieves stable or improved neurological status  Outcome: Progressing     Problem: Respiratory - Adult  Goal: Achieves optimal ventilation and oxygenation  Outcome: Progressing     Problem: Skin/Tissue Integrity  Goal: Absence of new skin breakdown  Description: 1.  Monitor for areas of redness and/or skin breakdown  2.  Assess vascular access sites hourly  3.  Every 4-6 hours minimum:  Change oxygen saturation probe site  4.  Every 4-6 hours:  If on nasal continuous positive airway pressure, respiratory therapy assess nares and determine need for appliance change or resting period.  Outcome: Progressing     Problem: Nutrition Deficit:  Goal: Optimize nutritional status  Outcome: Progressing     Problem: Coping  Goal: Pt/Family able to verbalize concerns and demonstrate effective coping strategies  Description: INTERVENTIONS:  1. Assist patient/family to identify coping skills, available support systems and cultural and spiritual values  2. Provide emotional support, including active listening and acknowledgement of concerns of patient and caregivers  3. Reduce environmental stimuli, as able  4. Instruct patient/family in relaxation techniques, as appropriate  5. Assess for spiritual pain/suffering and initiate Spiritual Care, Psychosocial Clinical Specialist consults as needed  Outcome: Progressing     Problem: Decision Making  Goal: Pt/Family able to effectively weigh alternatives and participate in decision making related to treatment and care  Description:

## 2024-05-21 NOTE — CARE COORDINATION
05/21/24 Update CM Note: Rafia at [facility] to review patient for skilled under palliative care. Will contact CM with determination. Electronically signed by Loree Riley RN CM on 5/21/2024 at 8:58 AM

## 2024-05-21 NOTE — CARE COORDINATION
05/21/24 Update CM Note; Plan now is for patient to go to [facility] as [facility] does not have a bed currently. [facility] changed to [facility]. [facility] ambulance set up to pickup patient at 7:00pm via stretcher. Envelope completed and in soft chart.Electronically signed by Loree Riley RN CM on 5/21/2024 at 2:31 PM

## 2024-05-21 NOTE — PROGRESS NOTES
Hospitalist Progress Note        Synopsis: Patient admitted on 5/1/2024. The patient is a 63-year-old male with past medical history of A-fib, hypertension, obesity, type II DM, AAA who presented to [facility] for cardiac arrest. On EMS arrival reported to be in V. tach/V-fib arrest and received amiodarone and shocked x 3. Patient was noted to have ROSC prior to ED arrival. CT of the head was done in the emergency department without any intracranial findings. Further imaging revealed a small pneumothorax for which a right-sided chest tube was placed: Plan for LHC. Placed on heparin drip and transferred. CT of the chest negative for PE. CT of the abdomen and pelvis showed presence of 4.3 x 4 cm aneurysm. Patient had persistent myoclonic jerking. Placed on Clonazepam and Depakote. EEG showed a potential for generalized seizures but no associated epileptiform activity was found and myoclonic jerks were captured. Patent with aspiration pneumonia. Placed on Unasyn.  Myoclonic jerks continued. Keppra added.  Clonazepam increased to 1.5 mg every 6 hours. Heparin stopped. Transitioned to Eliquis. Completed course of Unasyn. Repeat CT showed subdural hematoma. Eliquis stopped. Repeat CTH showed stability of subdural.      GOC have been discussed with girlfriend, but patient has no NOK as per his girlfriend. Girlfriend is trying to access his safe to see if he has a living will. Needs 2 doctor sign off for medical decisions at present. Critical care doc will not sign for withdrawal of care and would like to proceed with Trach/PEG. General surgery does not feel it is ethical to place Trach/PEG as long standing girlfriend who has been involved with his care, stated that he would not want that. Critical care and Neurology did sign off for DNR-CC and compassionate extubation considering grave prognosis.      Assessment and Plan      Cardiac arrest   ACS  Vfib/Vtach   Aspiration PNA  Seizures  Acute Hypoxic respiratory

## 2024-05-21 NOTE — PROGRESS NOTES
Per note pt is going to [facility]. [facility] will sign off d/t no NOK or legal guardian to sign pt into hospice care.    Electronically signed by Brigitte Lara RN on 5/21/2024 at 2:44 PM  [phone]

## 2024-05-21 NOTE — DISCHARGE SUMMARY
Hospital Medicine Discharge Summary    Patient ID: Nabeel Appiah      Patient's PCP: Jonh Allen DO    Admit Date: 5/1/2024     Discharge Date:   5/21/2024    Admitting Physician: Chely Saenz MD     Discharge Physician: Perla Hodges DO     Discharge Diagnoses:       Active Hospital Problems    Diagnosis Date Noted    Palliative care encounter [Z51.5] 05/16/2024    ACP (advance care planning) [Z71.89] 05/16/2024    Acute respiratory failure with hypoxia (HCC) [J96.01] 05/16/2024    Myoclonus [G25.3] 05/03/2024    Anoxic brain injury (HCC) [G93.1] 05/03/2024    Cardiac arrest (HCC) [I46.9] 05/01/2024    Goals of care, counseling/discussion [Z71.89] 05/01/2024    Palliative care by specialist [Z51.5] 05/01/2024       The patient was seen and examined on day of discharge and this discharge summary is in conjunction with any daily progress note from day of discharge.    Hospital Course:     Patient admitted on 5/1/2024. The patient is a 63-year-old male with past medical history of A-fib, hypertension, obesity, type II DM, AAA who presented to [facility] for cardiac arrest. On EMS arrival reported to be in V. tach/V-fib arrest and received amiodarone and shocked x 3. Patient was noted to have ROSC prior to ED arrival. CT of the head was done in the emergency department without any intracranial findings. Further imaging revealed a small pneumothorax for which a right-sided chest tube was placed: Plan for LHC. Placed on heparin drip and transferred. CT of the chest negative for PE. CT of the abdomen and pelvis showed presence of 4.3 x 4 cm aneurysm. Patient had persistent myoclonic jerking. Placed on Clonazepam and Depakote. EEG showed a potential for generalized seizures but no associated epileptiform activity was found and myoclonic jerks were captured. Patent with aspiration pneumonia. Placed on Unasyn.  Myoclonic jerks continued. Keppra added.  Clonazepam increased to 1.5 mg every 6

## 2024-05-21 NOTE — DISCHARGE INSTR - COC
Continuity of Care Form    Patient Name: Nabeel Appiah   :  1960  MRN:  [id number]    Admit date:  2024  Discharge date:  ***    Code Status Order: DNR-CC   Advance Directives:     Admitting Physician:  Chely Saenz MD  PCP: Jonh Allen DO    Discharging Nurse: ***  Discharging Hospital Unit/Room#: 5203/5203-A  Discharging Unit Phone Number: ***    Emergency Contact:   Extended Emergency Contact Information  Primary Emergency Contact: Tisha Coulter  [facility]  Home Phone: [phone]  Mobile Phone: [phone]  Relation: Girlfriend    Past Surgical History:  Past Surgical History:   Procedure Laterality Date    APPENDECTOMY      COLONOSCOPY      SHOULDER ARTHROPLASTY      TONSILLECTOMY         Immunization History:   There is no immunization history for the selected administration types on file for this patient.    Active Problems:  Patient Active Problem List   Diagnosis Code    Essential hypertension I10    Nicotine dependence, uncomplicated F17.200    Back pain of lumbar region with sciatica M54.40    Morbid obesity ([facility]) E66.01    Diabetes mellitus due to underlying condition with hyperglycemia, without long-term current use of insulin ([facility]) E08.65    Mixed hyperlipidemia E78.2    Vitamin D deficiency E55.9    Polycythemia D75.1    Atrial fibrillation ([facility]) I48.91    Erectile dysfunction N52.9    History of kidney stones Z87.442    Cyst of left kidney N28.1    Non-recurrent bilateral inguinal hernia without obstruction or gangrene K40.20    Venous stasis I87.8    Localized swelling of lower extremity M79.89    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    Decreased dorsalis pedis pulse R09.89    Infrarenal abdominal aortic aneurysm (AAA) without rupture ([facility]) I71.43    Cardiopulmonary arrest ([facility]) I46.9    NSTEMI (non-ST elevated myocardial infarction) ([facility]) I21.4    Ventricular dysrhythmia I49.9    Lactic acidosis E87.20    Transaminitis R74.01     24 0830   Closure Open to air 24 0800   Margins Approximated 24 1615   Incision Assessment Dry 24 1800   Drainage Amount None (dry) 24 0830   Charleen-incision Assessment Intact 24 0800   Number of days: 8        Elimination:  Continence:   Bowel: {YES / NO:}  Bladder: {YES / NO:}  Urinary Catheter: {Urinary Catheter:893960695}   Colostomy/Ileostomy/Ileal Conduit: {YES / NO:}  [REMOVED] Fecal Management System 24-Stool Appearance: Other (Comment) (discontinue per order  no drainage for 2 days actively dying balloon deflated and tube pulled without difficulty)  [REMOVED] Fecal Management System 24-Stool Color: Brown  [REMOVED] Fecal Management System 24-Stool Amount: Small    Date of Last BM: ***    Intake/Output Summary (Last 24 hours) at 2024 1342  Last data filed at 2024 0843  Gross per 24 hour   Intake --   Output 1400 ml   Net -1400 ml     I/O last 3 completed shifts:  In: -   Out: 800 [Urine:800]    Safety Concerns:     { MIKE Safety Concerns:949714076}    Impairments/Disabilities:      { MIKE Impairments/Disabilities:091337764}    Nutrition Therapy:  Current Nutrition Therapy:   { MIKE Diet List:595516654}    Routes of Feeding: {CHP DME Other Feedings:852879991}  Liquids: {Slp liquid thickness:25068}  Daily Fluid Restriction: {CHP DME Yes amt example:623632682}  Last Modified Barium Swallow with Video (Video Swallowing Test): {Done Not Done Date:}    Treatments at the Time of Hospital Discharge:   Respiratory Treatments: ***  Oxygen Therapy:  {Therapy; copd oxygen:05243}  Ventilator:    { CC Vent List:893952803}    Rehab Therapies: {THERAPEUTIC INTERVENTION:7258533863}  Weight Bearing Status/Restrictions: { CC Weight Bearin}  Other Medical Equipment (for information only, NOT a DME order):  {EQUIPMENT:786294023}  Other Treatments: ***    Patient's personal belongings (please select all that are sent with

## 2024-05-21 NOTE — PLAN OF CARE
Problem: Chronic Conditions and Co-morbidities  Goal: Patient's chronic conditions and co-morbidity symptoms are monitored and maintained or improved  Outcome: Adequate for Discharge     Problem: Discharge Planning  Goal: Discharge to home or other facility with appropriate resources  Outcome: Adequate for Discharge     Problem: Pain  Goal: Verbalizes/displays adequate comfort level or baseline comfort level  Outcome: Adequate for Discharge     Problem: Neurosensory - Adult  Goal: Achieves stable or improved neurological status  Outcome: Adequate for Discharge     Problem: Respiratory - Adult  Goal: Achieves optimal ventilation and oxygenation  Outcome: Adequate for Discharge     Problem: Skin/Tissue Integrity  Goal: Absence of new skin breakdown  Description: 1.  Monitor for areas of redness and/or skin breakdown  2.  Assess vascular access sites hourly  3.  Every 4-6 hours minimum:  Change oxygen saturation probe site  4.  Every 4-6 hours:  If on nasal continuous positive airway pressure, respiratory therapy assess nares and determine need for appliance change or resting period.  Outcome: Adequate for Discharge     Problem: Nutrition Deficit:  Goal: Optimize nutritional status  Outcome: Adequate for Discharge     Problem: Coping  Goal: Pt/Family able to verbalize concerns and demonstrate effective coping strategies  Description: INTERVENTIONS:  1. Assist patient/family to identify coping skills, available support systems and cultural and spiritual values  2. Provide emotional support, including active listening and acknowledgement of concerns of patient and caregivers  3. Reduce environmental stimuli, as able  4. Instruct patient/family in relaxation techniques, as appropriate  5. Assess for spiritual pain/suffering and initiate Spiritual Care, Psychosocial Clinical Specialist consults as needed  Outcome: Adequate for Discharge     Problem: Decision Making  Goal: Pt/Family able to effectively weigh alternatives

## 2024-05-21 NOTE — CARE COORDINATION
5/21/24, SW contacted [facility]) for the 7 pm  for patient to go to [facility].  Per Blanca patient is unable to go to facility at this time.  SW to follow.    AddendumL: ambulance cancelled  TANNER Toure  [facility] Case [facility]  [phone]

## 2024-05-21 NOTE — CARE COORDINATION
05/21/24 Update CM Note: Patient is accepted at [facility]. PASSAR has been completed/gordy/destination completed. Envelope done with ambulance form initiated and will require completion at discharge. Girlfriend called in and notified of possible discharge.Electronically signed by Loree Riley RN CM on 5/21/2024 at 1:41 PM

## 2024-05-22 PROCEDURE — 6360000002 HC RX W HCPCS: Performed by: NURSE PRACTITIONER

## 2024-05-22 PROCEDURE — 6360000002 HC RX W HCPCS: Performed by: PSYCHIATRY & NEUROLOGY

## 2024-05-22 PROCEDURE — 6360000002 HC RX W HCPCS: Performed by: INTERNAL MEDICINE

## 2024-05-22 PROCEDURE — 99233 SBSQ HOSP IP/OBS HIGH 50: CPT | Performed by: INTERNAL MEDICINE

## 2024-05-22 PROCEDURE — 1200000000 HC SEMI PRIVATE

## 2024-05-22 PROCEDURE — 2700000000 HC OXYGEN THERAPY PER DAY

## 2024-05-22 RX ADMIN — MORPHINE SULFATE 2 MG: 2 INJECTION, SOLUTION INTRAMUSCULAR; INTRAVENOUS at 22:19

## 2024-05-22 RX ADMIN — MORPHINE SULFATE 2 MG: 2 INJECTION, SOLUTION INTRAMUSCULAR; INTRAVENOUS at 15:17

## 2024-05-22 RX ADMIN — MORPHINE SULFATE 4 MG: 4 INJECTION, SOLUTION INTRAMUSCULAR; INTRAVENOUS at 00:07

## 2024-05-22 RX ADMIN — MORPHINE SULFATE 2 MG: 2 INJECTION, SOLUTION INTRAMUSCULAR; INTRAVENOUS at 06:48

## 2024-05-22 RX ADMIN — LORAZEPAM 0.5 MG: 2 INJECTION INTRAMUSCULAR; INTRAVENOUS at 06:48

## 2024-05-22 RX ADMIN — GLYCOPYRROLATE 0.2 MG: 0.2 INJECTION INTRAMUSCULAR; INTRAVENOUS at 06:48

## 2024-05-22 RX ADMIN — LEVETIRACETAM 1500 MG: 500 INJECTION, SOLUTION, CONCENTRATE INTRAVENOUS at 08:36

## 2024-05-22 RX ADMIN — GLYCOPYRROLATE 0.2 MG: 0.2 INJECTION INTRAMUSCULAR; INTRAVENOUS at 18:16

## 2024-05-22 RX ADMIN — GLYCOPYRROLATE 0.2 MG: 0.2 INJECTION INTRAMUSCULAR; INTRAVENOUS at 12:50

## 2024-05-22 RX ADMIN — GLYCOPYRROLATE 0.2 MG: 0.2 INJECTION INTRAMUSCULAR; INTRAVENOUS at 22:19

## 2024-05-22 RX ADMIN — GLYCOPYRROLATE 0.2 MG: 0.2 INJECTION INTRAMUSCULAR; INTRAVENOUS at 00:07

## 2024-05-22 RX ADMIN — MORPHINE SULFATE 2 MG: 2 INJECTION, SOLUTION INTRAMUSCULAR; INTRAVENOUS at 18:13

## 2024-05-22 RX ADMIN — MORPHINE SULFATE 2 MG: 2 INJECTION, SOLUTION INTRAMUSCULAR; INTRAVENOUS at 10:23

## 2024-05-22 RX ADMIN — LORAZEPAM 1 MG: 2 INJECTION INTRAMUSCULAR; INTRAVENOUS at 00:07

## 2024-05-22 RX ADMIN — LEVETIRACETAM 1500 MG: 500 INJECTION, SOLUTION, CONCENTRATE INTRAVENOUS at 22:19

## 2024-05-22 RX ADMIN — LORAZEPAM 0.5 MG: 2 INJECTION INTRAMUSCULAR; INTRAVENOUS at 12:45

## 2024-05-22 NOTE — CONSULTS
Clinical Ethics Consultation Note    History and Context:    Principal Problem:    Cardiac arrest (HCC)  Active Problems:    Goals of care, counseling/discussion    Palliative care by specialist    Myoclonus    Anoxic brain injury (HCC)    Palliative care encounter    ACP (advance care planning)    Acute respiratory failure with hypoxia (HCC)  Resolved Problems:    * No resolved hospital problems. *    Age: 63 y.o.  Gender: male  Gender Identity: male  Admitted Date: 5/1/2024  Consult Date: 05/22/24  MRN: [id number]  Valid Advanced Medical Directive: No    Process:  Plan of care meeting with myself, CMO, CM/manager, Palliative Care manager, attending, , palliative care NP via Zoom.    Ethical Question(s) and Concern(s):  What is ethical and appropriate treatment for Mr. Appiah at this point of this admission given his current medical condition and prognosis..    Analysis:  Mr. Appiah is day 21 of this hospital admission. He is s/p cardiac arrest and has had minimal to no signs of neurological improvement d/t his anoxic brain injury.  His care team and providers are currently providing him with care and treatments to keep him comfortable based on his prognosis. Due to lack of legal next of kin- and guardianship pending and length of time to approve guardianship, and given Mr. Appiah's prognosis, the treatment team is left to make decisions for Mr. Appiah's care that are in his best interest. Mr. Appiah does have a long term girlfriend who has verbalized to the team over the course of this stay that he (Mr. Appiah)  would not have wanted a tracheostomy and or peg tube based on his current prognosis and medical condition.    Group discussed beneficial vs.potentially non-beneficial care. Physicians are not obligated to provide care that is non-beneficial based on prognosis and medical condition. Group concurred that comfort care is appropriate for Mr. Appiah.    Recommendations:  Consensus:  1, Ensure prognosis is  documented and all supporting orders and treatments support comfort care   2. Continue with palliative care and spiritual health support.     Closing:  Thank you for providing skilled and compassionate care to Mr. Appiah and Tisha at this difficult time.     Delicia Mcclendon RN  N/A    Primary Ethical Theme: Primary Ethical Themes: Clarify legal surrogate decision maker  Secondary Ethical Theme: Secondary Ethical Themes: Unrepresented patient (No family, only significant other identified at this time)

## 2024-05-22 NOTE — PROGRESS NOTES
Discontinued nasal canula per order.     Patient having no symptoms or air hunger. If patient develops air hunger or appears uncomfortable. Please replace Nc from 2-3L  Maritza Hodges

## 2024-05-22 NOTE — PROGRESS NOTES
Coordination of care discussion and chart review with PM team. Call placed to pts significant other of 26 years to offer emotional support during pts terminal illness. Per Tisha pt has no family as previously noted in chart.  Supportive listening and anticipatory grief support provided. Tisha would appreciate a  visit to see pt, Tisha finds it too emotionally difficult to come visit herself. Spiritual care contacted for a  visit

## 2024-05-22 NOTE — PROGRESS NOTES
Hospitalist Progress Note        Synopsis: Patient admitted on 5/1/2024. The patient is a 63-year-old male with past medical history of A-fib, hypertension, obesity, type II DM, AAA who presented to [facility] for cardiac arrest. On EMS arrival reported to be in V. tach/V-fib arrest and received amiodarone and shocked x 3. Patient was noted to have ROSC prior to ED arrival. CT of the head was done in the emergency department without any intracranial findings. Further imaging revealed a small pneumothorax for which a right-sided chest tube was placed: Plan for LHC. Placed on heparin drip and transferred. CT of the chest negative for PE. CT of the abdomen and pelvis showed presence of 4.3 x 4 cm aneurysm. Patient had persistent myoclonic jerking. Placed on Clonazepam and Depakote. EEG showed a potential for generalized seizures but no associated epileptiform activity was found and myoclonic jerks were captured. Patent with aspiration pneumonia. Placed on Unasyn.  Myoclonic jerks continued. Keppra added.  Clonazepam increased to 1.5 mg every 6 hours. Heparin stopped. Transitioned to Eliquis. Completed course of Unasyn. Repeat CT showed subdural hematoma. Eliquis stopped. Repeat CTH showed stability of subdural.      GOC have been discussed with girlfriend, but patient has no NOK as per his girlfriend. Girlfriend is trying to access his safe to see if he has a living will. Needs 2 doctor sign off for medical decisions at present. Critical care doc will not sign for withdrawal of care and would like to proceed with Trach/PEG. General surgery does not feel it is ethical to place Trach/PEG as long standing girlfriend who has been involved with his care, stated that he would not want that. Critical care and Neurology did sign off for DNR-CC and compassionate extubation considering grave prognosis. Case management asking for parental nutrition. However, patient is comfort care. He is non responsive, and I feel this  would only prolong his suffering. Ethic has been consulted. I do feel the patient will likely pass within the next couple days. Has some modeling of the bilateral lower extremities.      Assessment and Plan      Cardiac arrest   ACS  Vfib/Vtach   Aspiration PNA  Seizures  Acute Hypoxic respiratory failure  GENE  PTX  Type 2 DM   AAA  Subdural Hematoma  Anoxic Brain Injury     Patient no comfort measures only  Provide comfort medications and care   Again I do not feel the patient would benefit from parental nutrition as he is comfort measures only, he is actively dying. He is full comfort measures only and appears very comfortable. Ethics consulted.  At this point, I feel the patient would benefit from emergent guardianship to have someone help make decisions on his behalf.   Will participate in ethics meeting at [facility] today. Discussed extensively at IDS rounds. Spent well over 75 minutes in review, evaluation, and dicussion of goals for this patient.     Disposition: Unable to be placed with no guardian.        Subjective     Unresponsive      Exam:  General appearance: Unresponsive   Respiratory: Decreased respiratory rate, crackles   Cardiovascular: Bradycardic   Abdomen: Soft, non-tender, non-distended with normal bowel sounds.  Skin:  No rashes, mottling   Neurologic: Non responsive. Does not follow commands

## 2024-05-22 NOTE — PROGRESS NOTES
Called Pharmacy spoke with Charles. Pulled Ativan out of [employer], wasted it with Shelia MAX, placed the waste 1.5mg  in sharps container before scanning vial. Charles pharmacist advised to waste Ativan again under miscellaneous waste in [employer]. (Same vial wasted twice)    Administering Ativan 0.5mg to patient under barcode override considering vial is already in sharps container.

## 2024-05-22 NOTE — PLAN OF CARE
Problem: Discharge Planning  Goal: Discharge to home or other facility with appropriate resources  Outcome: Not Progressing     Problem: Safety - Adult  Goal: Free from fall injury  Outcome: Progressing     Problem: Discharge Planning  Goal: Discharge to home or other facility with appropriate resources  Outcome: Not Progressing     Problem: Skin/Tissue Integrity  Goal: Absence of new skin breakdown  Description: 1.  Monitor for areas of redness and/or skin breakdown  2.  Assess vascular access sites hourly  3.  Every 4-6 hours minimum:  Change oxygen saturation probe site  4.  Every 4-6 hours:  If on nasal continuous positive airway pressure, respiratory therapy assess nares and determine need for appliance change or resting period.  Outcome: Not Progressing

## 2024-05-22 NOTE — PROGRESS NOTES
Chart reviewed. The patient continues to remains unresponsive. Palliative medicine continues to follow for support. Continue to medicate for symptoms. Palliative will follow.

## 2024-05-22 NOTE — PLAN OF CARE
Problem: Discharge Planning  Goal: Discharge to home or other facility with appropriate resources  5/21/2024 2215 by Yolanda Blanchard RN  Outcome: Progressing  5/21/2024 2008 by Oseas Mahmood, RN  Outcome: Not Progressing     Problem: Skin/Tissue Integrity  Goal: Absence of new skin breakdown  Description: 1.  Monitor for areas of redness and/or skin breakdown  2.  Assess vascular access sites hourly  3.  Every 4-6 hours minimum:  Change oxygen saturation probe site  4.  Every 4-6 hours:  If on nasal continuous positive airway pressure, respiratory therapy assess nares and determine need for appliance change or resting period.  5/21/2024 2215 by Yolanda Blanchard RN  Outcome: Progressing  5/21/2024 2008 by Oseas Mahmood, RN  Outcome: Not Progressing

## 2024-05-22 NOTE — CARE COORDINATION
05/22/24 Update CM Note: spoke via phone with  Patria. He states patient is in a stable environment, impending death cannot be determined in next 7 days. Therefore the expedited/emergent guardianship is not needed. He states the guardianship process will take approximately 30 business days after he received the mailed documents of expert eval/guardianship paperwork . This was mailed yesterday.  Patria did ask about the patient not being fed/no source of nutrition. He was told there eis no ng tube or gtube placed and patient does not have iv flds at this time.  [facility] is reviewed by him. He states he will call CM if any thing changes.Electronically signed by Loree Riley RN CM on 5/22/2024 at 11:25 AM

## 2024-05-22 NOTE — PLAN OF CARE
Problem: Discharge Planning  Goal: Discharge to home or other facility with appropriate resources  Outcome: Not Progressing     Problem: Safety - Adult  Goal: Free from fall injury  Outcome: Not Progressing     Problem: Skin/Tissue Integrity  Goal: Absence of new skin breakdown  Description: 1.  Monitor for areas of redness and/or skin breakdown  2.  Assess vascular access sites hourly  3.  Every 4-6 hours minimum:  Change oxygen saturation probe site  4.  Every 4-6 hours:  If on nasal continuous positive airway pressure, respiratory therapy assess nares and determine need for appliance change or resting period.  Outcome: Not Progressing

## 2024-05-22 NOTE — CONSULTS
Clinical Ethics Consultation Note    Contacted by CM for ethics assistance. I will review chart, confer with care team and convene a care team meeting to discuss plan of care. Please call [phone] or notify via perfect serve for any immediate needs. Thank you.

## 2024-05-22 NOTE — CARE COORDINATION
05/22/24 Update CM Note: Patient remains DNRCC. He is no longer being followed by hospice of [facility]. He is being followed by Palliative care. He has been denied snf acceptance due to no guardian to sign patient into facility, into hospice or to allow snf to do financial checks. He is unresponsive. He has minimal gag reflex when suctioned. He does not react to tactile or painful stimuli. He is NPO with no means of nutrition currently. He does have oxygen for comfort. Request for an Ethics committee meeting has been sent via email. Electronically signed by Loree Riley RN CM on 5/22/2024 at 9:28 AM    Addendum: Message left for  Patria at [facility] [phone] requesting a return call to further answer questions in regards to need for emergency guardianship. Electronically signed by Loree Riley RN on 5/22/2024 at 9:46 AM

## 2024-05-23 PROCEDURE — 6360000002 HC RX W HCPCS: Performed by: PSYCHIATRY & NEUROLOGY

## 2024-05-23 PROCEDURE — 99231 SBSQ HOSP IP/OBS SF/LOW 25: CPT | Performed by: INTERNAL MEDICINE

## 2024-05-23 PROCEDURE — 1200000000 HC SEMI PRIVATE

## 2024-05-23 PROCEDURE — 6360000002 HC RX W HCPCS: Performed by: NURSE PRACTITIONER

## 2024-05-23 PROCEDURE — 99231 SBSQ HOSP IP/OBS SF/LOW 25: CPT | Performed by: NURSE PRACTITIONER

## 2024-05-23 PROCEDURE — 6360000002 HC RX W HCPCS: Performed by: INTERNAL MEDICINE

## 2024-05-23 RX ADMIN — MORPHINE SULFATE 2 MG: 2 INJECTION, SOLUTION INTRAMUSCULAR; INTRAVENOUS at 18:36

## 2024-05-23 RX ADMIN — LORAZEPAM 0.5 MG: 2 INJECTION INTRAMUSCULAR; INTRAVENOUS at 18:38

## 2024-05-23 RX ADMIN — MORPHINE SULFATE 2 MG: 2 INJECTION, SOLUTION INTRAMUSCULAR; INTRAVENOUS at 06:40

## 2024-05-23 RX ADMIN — GLYCOPYRROLATE 0.2 MG: 0.2 INJECTION INTRAMUSCULAR; INTRAVENOUS at 06:40

## 2024-05-23 RX ADMIN — LORAZEPAM 0.5 MG: 2 INJECTION INTRAMUSCULAR; INTRAVENOUS at 13:13

## 2024-05-23 RX ADMIN — LORAZEPAM 1 MG: 2 INJECTION INTRAMUSCULAR; INTRAVENOUS at 21:34

## 2024-05-23 RX ADMIN — LORAZEPAM 0.5 MG: 2 INJECTION INTRAMUSCULAR; INTRAVENOUS at 03:55

## 2024-05-23 RX ADMIN — MORPHINE SULFATE 2 MG: 2 INJECTION, SOLUTION INTRAMUSCULAR; INTRAVENOUS at 09:34

## 2024-05-23 RX ADMIN — GLYCOPYRROLATE 0.2 MG: 0.2 INJECTION INTRAMUSCULAR; INTRAVENOUS at 03:55

## 2024-05-23 RX ADMIN — GLYCOPYRROLATE 0.2 MG: 0.2 INJECTION INTRAMUSCULAR; INTRAVENOUS at 09:50

## 2024-05-23 RX ADMIN — LORAZEPAM 0.5 MG: 2 INJECTION INTRAMUSCULAR; INTRAVENOUS at 06:40

## 2024-05-23 RX ADMIN — GLYCOPYRROLATE 0.2 MG: 0.2 INJECTION INTRAMUSCULAR; INTRAVENOUS at 18:38

## 2024-05-23 RX ADMIN — MORPHINE SULFATE 2 MG: 2 INJECTION, SOLUTION INTRAMUSCULAR; INTRAVENOUS at 16:09

## 2024-05-23 RX ADMIN — MORPHINE SULFATE 2 MG: 2 INJECTION, SOLUTION INTRAMUSCULAR; INTRAVENOUS at 03:56

## 2024-05-23 RX ADMIN — MORPHINE SULFATE 2 MG: 2 INJECTION, SOLUTION INTRAMUSCULAR; INTRAVENOUS at 13:10

## 2024-05-23 RX ADMIN — LEVETIRACETAM 1500 MG: 500 INJECTION, SOLUTION, CONCENTRATE INTRAVENOUS at 21:22

## 2024-05-23 RX ADMIN — GLYCOPYRROLATE 0.2 MG: 0.2 INJECTION INTRAMUSCULAR; INTRAVENOUS at 13:15

## 2024-05-23 RX ADMIN — MORPHINE SULFATE 4 MG: 4 INJECTION, SOLUTION INTRAMUSCULAR; INTRAVENOUS at 21:35

## 2024-05-23 RX ADMIN — LEVETIRACETAM 1500 MG: 500 INJECTION, SOLUTION, CONCENTRATE INTRAVENOUS at 09:29

## 2024-05-23 NOTE — PROGRESS NOTES
Hospitalist Progress Note        Synopsis: Patient admitted on 5/1/2024. The patient is a 63-year-old male with past medical history of A-fib, hypertension, obesity, type II DM, AAA who presented to [facility] for cardiac arrest. On EMS arrival reported to be in V. tach/V-fib arrest and received amiodarone and shocked x 3. Patient was noted to have ROSC prior to ED arrival. CT of the head was done in the emergency department without any intracranial findings. Further imaging revealed a small pneumothorax for which a right-sided chest tube was placed: Plan for LHC. Placed on heparin drip and transferred. CT of the chest negative for PE. CT of the abdomen and pelvis showed presence of 4.3 x 4 cm aneurysm. Patient had persistent myoclonic jerking. Placed on Clonazepam and Depakote. EEG showed a potential for generalized seizures but no associated epileptiform activity was found and myoclonic jerks were captured. Patent with aspiration pneumonia. Placed on Unasyn.  Myoclonic jerks continued. Keppra added.  Clonazepam increased to 1.5 mg every 6 hours. Heparin stopped. Transitioned to Eliquis. Completed course of Unasyn. Repeat CT showed subdural hematoma. Eliquis stopped. Repeat CTH showed stability of subdural.      GOC have been discussed with girlfriend, but patient has no NOK as per his girlfriend. Girlfriend is trying to access his safe to see if he has a living will. Needs 2 doctor sign off for medical decisions at present. Critical care doc will not sign for withdrawal of care and would like to proceed with Trach/PEG. General surgery does not feel it is ethical to place Trach/PEG as long standing girlfriend who has been involved with his care, stated that he would not want that. Critical care and Neurology did sign off for DNR-CC and compassionate extubation considering grave prognosis. Case management asking for parental nutrition. However, patient is comfort care. He is non responsive, and I feel this

## 2024-05-23 NOTE — PROGRESS NOTES
Coordination of care discussion and pt seen with PM NP. Pt appears comfortable. Per unit Rn Spiritual care has visited as did his significant other last evening. Message left for Tisha his significant other to offer ongoing support.

## 2024-05-23 NOTE — PLAN OF CARE
Problem: Discharge Planning  Goal: Discharge to home or other facility with appropriate resources  5/22/2024 2254 by Yolanda Blanchard RN  Outcome: Progressing  5/22/2024 1819 by Oseas Mahmood RN  Outcome: Not Progressing     Problem: Safety - Adult  Goal: Free from fall injury  5/22/2024 2254 by Yolanda Blanchard RN  Outcome: Progressing  5/22/2024 1819 by Oseas Mahmood RN  Outcome: Not Progressing     Problem: Skin/Tissue Integrity  Goal: Absence of new skin breakdown  Description: 1.  Monitor for areas of redness and/or skin breakdown  2.  Assess vascular access sites hourly  3.  Every 4-6 hours minimum:  Change oxygen saturation probe site  4.  Every 4-6 hours:  If on nasal continuous positive airway pressure, respiratory therapy assess nares and determine need for appliance change or resting period.  5/22/2024 2254 by Yolanda Blanchard RN  Outcome: Progressing  5/22/2024 1819 by Oseas Mahmood RN  Outcome: Not Progressing

## 2024-05-23 NOTE — CARE COORDINATION
05/23/24 Update Cm Note: Per nursing staff patients girlfriend did visit last pm. She was very emotional. He remains unchanged. Oxygen sat room air 86% but patient is comfortable. He is not gasping, not restless. He appears comfortable. Still with no response. Will continue to follow.Electronically signed by Loree Riley RN CM on 5/23/2024 at 8:31 AM

## 2024-05-23 NOTE — PROGRESS NOTES
Called Dr. Perla Hodges to review clonazepam tab 1.5mg PO. Patient is comfort care and does not have a NGT.

## 2024-05-23 NOTE — PROGRESS NOTES
Palliative Care Department  [phone]  Palliative Care Progress Note  Provider Jana Bass, APRN - CNP     Nabeel Appiah  [id number]  Hospital Day: 23  Date of Initial Consult: 5/1/24  Referring Provider: Ashley Rivera MD   Palliative Medicine was consulted for assistance with: goals of care    HPI:   Nabeel Appiah is a 63 y.o. with a medical history of HTN, HLD, atrial fibrillation on Eliquis, DM, AAA who was admitted on 5/1/2024 from home with a CHIEF COMPLAINT of cardiac arrest.  Patient originally presented to [facility] after a witnessed fall at work 4/29 and was unresponsive.  ROSC achieved in the field and patient was intubated prior to arriving to [facility] ED.  Patient was started on lidocaine drip for ventricular arrhythmias.  CXR showed small right pneumothorax, right-sided chest tube placed.  Lactic acid 6.1, creatinine 1.5, mild transaminitis, temperature increasing and Arctic sun applied.  Patient was progressively becoming more hypertensive requiring nitroglycerin drip.  CT head showed no acute intracranial abnormality, area of focal contusion of the scalp of the left occipital area.  CTA chest showed multiple displaced fractures of the right and left anterior rib cages, small right pneumothorax, no pulmonary emboli.  CT A/P showed no indication for acute trauma.  Cardiology consulted.  Heparin drip started and patient transferred to [place] for possible need for cardiac catheterization.  Echo done while at [facility] showed EF 50-55%, severely dilated left atrium, normal wall motion of left ventricle.  Vecuronium drip stopped early a.m. of 5/1.  Palliative medicine consulted for goals of care.    ASSESSMENT/PLAN:     Pertinent Hospital Diagnoses     Cardiac arrest  Acute hypoxic respiratory failure  Lactic acidosis  Multiple bilateral rib fractures  Small right pneumothorax  Hypertensive urgency    Palliative Care Encounter / Counseling Regarding Goals of Care  Please see detailed goals of  care discussion as below  At this time, Nabeel Appiah, Does Not have capacity for medical decision-making.  Capacity is time limited and situation/question specific  During encounter there was no surrogate medical decision-maker  Outcome of goals of care meeting:   Continue comfort measures  Code status DNR-CC  Advanced Directives: no POA or living will in epic  Surrogate/Legal NOK:  Tisha Coulter (significant other): [phone]-not able to make medical decisions    Spiritual assessment: no spiritual distress identified  Bereavement and grief: to be determined  Referrals to: none today  SUBJECTIVE:     Current medical issues leading to Palliative Medicine involvement include   Active Hospital Problems    Diagnosis Date Noted    Palliative care encounter [Z51.5] 05/16/2024    ACP (advance care planning) [Z71.89] 05/16/2024    Acute respiratory failure with hypoxia (HCC) [J96.01] 05/16/2024    Myoclonus [G25.3] 05/03/2024    Anoxic brain injury (HCC) [G93.1] 05/03/2024    Cardiac arrest (HCC) [I46.9] 05/01/2024    Goals of care, counseling/discussion [Z71.89] 05/01/2024    Palliative care by specialist [Z51.5] 05/01/2024       Details of Conversation:    Chart review. Patient seen at the bedside, unresponsive.  Update provided by bedside RN and case management.  Continue comfort measures only as the patient is actively dying.  The patient appears to be comfortable and comfort medications have been given appropriately.  The patient currently does not have a legal next of kin or guardian so he remains in the hospital setting to be provided comfort measures.  Palliative medicine will continue to follow peripherally.     OBJECTIVE:   Prognosis: Poor    ROS:   DEMAR, intubated/sedated    Physical Exam:  /88   Pulse 64   Temp 97.5 °F (36.4 °C) (Temporal)   Resp 28 Comment: RDOS score =6  Ht 1.905 m (6' 3\")   Wt (!) 153.6 kg (338 lb 11.2 oz)   SpO2 (!) 64%   BMI 42.33 kg/m²   Constitutional:

## 2024-05-24 LAB
SEND OUT REPORT: NORMAL
TEST NAME: NORMAL

## 2024-05-24 PROCEDURE — 6360000002 HC RX W HCPCS: Performed by: NURSE PRACTITIONER

## 2024-05-24 PROCEDURE — 6360000002 HC RX W HCPCS: Performed by: PSYCHIATRY & NEUROLOGY

## 2024-05-24 PROCEDURE — 99231 SBSQ HOSP IP/OBS SF/LOW 25: CPT | Performed by: HOSPITALIST

## 2024-05-24 PROCEDURE — 6360000002 HC RX W HCPCS: Performed by: INTERNAL MEDICINE

## 2024-05-24 PROCEDURE — 1200000000 HC SEMI PRIVATE

## 2024-05-24 RX ORDER — MIDAZOLAM HYDROCHLORIDE 1 MG/ML
2 INJECTION INTRAMUSCULAR; INTRAVENOUS
Status: DISCONTINUED | OUTPATIENT
Start: 2024-05-24 | End: 2024-05-25 | Stop reason: HOSPADM

## 2024-05-24 RX ADMIN — LORAZEPAM 0.5 MG: 2 INJECTION INTRAMUSCULAR; INTRAVENOUS at 08:49

## 2024-05-24 RX ADMIN — MORPHINE SULFATE 2 MG: 2 INJECTION, SOLUTION INTRAMUSCULAR; INTRAVENOUS at 19:03

## 2024-05-24 RX ADMIN — MORPHINE SULFATE 2 MG: 2 INJECTION, SOLUTION INTRAMUSCULAR; INTRAVENOUS at 15:30

## 2024-05-24 RX ADMIN — GLYCOPYRROLATE 0.2 MG: 0.2 INJECTION INTRAMUSCULAR; INTRAVENOUS at 03:07

## 2024-05-24 RX ADMIN — LORAZEPAM 0.5 MG: 2 INJECTION INTRAMUSCULAR; INTRAVENOUS at 05:10

## 2024-05-24 RX ADMIN — MORPHINE SULFATE 2 MG: 2 INJECTION, SOLUTION INTRAMUSCULAR; INTRAVENOUS at 05:10

## 2024-05-24 RX ADMIN — MORPHINE SULFATE 2 MG: 2 INJECTION, SOLUTION INTRAMUSCULAR; INTRAVENOUS at 03:08

## 2024-05-24 RX ADMIN — LEVETIRACETAM 1500 MG: 500 INJECTION, SOLUTION, CONCENTRATE INTRAVENOUS at 20:54

## 2024-05-24 RX ADMIN — LORAZEPAM 0.5 MG: 2 INJECTION INTRAMUSCULAR; INTRAVENOUS at 15:28

## 2024-05-24 RX ADMIN — MORPHINE SULFATE 2 MG: 2 INJECTION, SOLUTION INTRAMUSCULAR; INTRAVENOUS at 08:49

## 2024-05-24 RX ADMIN — LEVETIRACETAM 1500 MG: 500 INJECTION, SOLUTION, CONCENTRATE INTRAVENOUS at 08:42

## 2024-05-24 RX ADMIN — LORAZEPAM 0.5 MG: 2 INJECTION INTRAMUSCULAR; INTRAVENOUS at 19:02

## 2024-05-24 ASSESSMENT — PAIN SCALES - GENERAL
PAINLEVEL_OUTOF10: 0
PAINLEVEL_OUTOF10: 0

## 2024-05-24 NOTE — PROGRESS NOTES
Internal Medicine Progress Note    Patient's name: Nabeel Appiah  : 1960  Admission date: 2024  Date of service: 2024   Room: [address] NEURO SPINE  Primary care physician: Jonh Allen DO  Synopsis: Patient admitted on 2024. The patient is a 63-year-old male with past medical history of A-fib, hypertension, obesity, type II DM, AAA who presented to [facility] for cardiac arrest. On EMS arrival reported to be in V. tach/V-fib arrest and received amiodarone and shocked x 3. Patient was noted to have ROSC prior to ED arrival. CT of the head was done in the emergency department without any intracranial findings. Further imaging revealed a small pneumothorax for which a right-sided chest tube was placed: Plan for LHC. Placed on heparin drip and transferred. CT of the chest negative for PE. CT of the abdomen and pelvis showed presence of 4.3 x 4 cm aneurysm. Patient had persistent myoclonic jerking. Placed on Clonazepam and Depakote. EEG showed a potential for generalized seizures but no associated epileptiform activity was found and myoclonic jerks were captured. Patent with aspiration pneumonia. Placed on Unasyn.  Myoclonic jerks continued. Keppra added.  Clonazepam increased to 1.5 mg every 6 hours. Heparin stopped. Transitioned to Eliquis. Completed course of Unasyn. Repeat CT showed subdural hematoma. Eliquis stopped. Repeat CTH showed stability of subdural.      GOC have been discussed with girlfriend, but patient has no NOK as per his girlfriend. Girlfriend is trying to access his safe to see if he has a living will. Needs 2 doctor sign off for medical decisions at present. Critical care doc will not sign for withdrawal of care and would like to proceed with Trach/PEG. General surgery does not feel it is ethical to place Trach/PEG as long standing girlfriend who has been involved with his care, stated that he would not want that. Critical care and Neurology did sign off for  hematoma.   2. No new intracranial hemorrhage.   3. Fluid opacification of the mastoid air cells is unchanged.         CT HEAD WO CONTRAST   Final Result   New right parasagittal subdural hematoma along the posterior falx without   significant mass effect or midline shift.      Worsening sinusitis involving maxillary, ethmoid and sphenoid sinuses.      New bilateral mastoiditis.         XR CHEST PORTABLE   Final Result   1. ETT 3.4 cm above the milla.   2. Small effusions.   3. Linear atelectasis in the right lower lobe.         XR CHEST PORTABLE   Final Result   1. The position and alignment of the endotracheal tube is within the normal   range.   2. Bilateral patchy parenchymal densities concerning for pneumonia and/or   atelectasis, stable.         XR CHEST PORTABLE   Final Result   1. Interval removal of the right-sided chest tube. No pneumothorax.   2. Otherwise, no significant interval change.         XR CHEST PORTABLE   Final Result   1. No significant interval change in the right lung opacities when compared   to the previous study performed earlier in the day.   2. Improved aeration in the left lower lung field.         XR CHEST PORTABLE   Final Result   1. No significant interval change when compared to the previous study   performed 1 day earlier.         XR CHEST PORTABLE   Final Result   Stable chest.         XR CHEST PORTABLE   Final Result   1. The position and alignment of the tubes and catheters are within normal   range.   2. The bibasilar parenchymal density felt to represent atelectasis appears   stable..   3. Tiny bilateral pleural effusions   4. No signs of pneumothorax.         XR CHEST ABDOMEN NG PLACEMENT   Final Result   Distal enteric tube in good position within the stomach.         XR CHEST PORTABLE   Final Result   1. Endotracheal tube in satisfactory position 4 cm above milla.   2. Orogastric tube in position.   3. Prominent pulmonary vascularity with bibasilar infiltrates.        specialist [Z51.5]        Patient now on comfort measures only  Continue comfort medications and care   Ethics consulted. Ethics agreed to provide comfort measures for the patient     Please be aware the attending physician normally change shift at 7am and 7pm, and name could not be updated timely, [facility] if staff has difficulty getting in touch with the attending name listed.     DVT Prophylaxis: not indicated []Lovenox []Heparin []PCD [] Warfarin/NOAC []Encouraged ambulation    Diet: No diet orders on file  Code Status: DNR-CC  Surrogate decision maker confirmed with patient:   Extended Emergency Contact Information  Primary Emergency Contact: Tisha Coulter  [facility]  Home Phone: [phone]  Mobile Phone: [phone]  Relation: Girlfriend    Admit status: [] Observation [x] Inpatient   Disposition/reason for continued hospitalization:  comfort care only inpatient, no alternative placement plan, pt unresponsive, no guardian.    +++++++++++++++++++++++++++++++++++++++++++++++++  Bryan Ha MD PhD  Hospital Medicine  [place], [place]  +++++++++++++++++++++++++++++++++++++++++++++++++  NOTE: Report could be transcribed using voice recognition software. Every effort was made to ensure accuracy; however, inadvertent computerized transcription errors may be present.    Electronically signed by Bryan Ha MD on 5/24/2024 at 6:10 PM    I can be reached through [employer].

## 2024-05-24 NOTE — CARE COORDINATION
05/24/24 Update CM Note: patient remains unchanged. He has no response to tactile/painful stimuli. He is on oxygen for comfort and receiving comfort medication around the clock. Will continue to follow. Electronically signed by Loree Riley RN CM on 5/24/2024 at 9:57 AM

## 2024-05-25 VITALS
HEART RATE: 92 BPM | TEMPERATURE: 98 F | RESPIRATION RATE: 44 BRPM | HEIGHT: 75 IN | SYSTOLIC BLOOD PRESSURE: 129 MMHG | BODY MASS INDEX: 39.17 KG/M2 | WEIGHT: 315 LBS | DIASTOLIC BLOOD PRESSURE: 84 MMHG | OXYGEN SATURATION: 75 %

## 2024-05-25 PROCEDURE — 6360000002 HC RX W HCPCS: Performed by: NURSE PRACTITIONER

## 2024-05-25 PROCEDURE — 6360000002 HC RX W HCPCS: Performed by: INTERNAL MEDICINE

## 2024-05-25 PROCEDURE — 99238 HOSP IP/OBS DSCHRG MGMT 30/<: CPT | Performed by: HOSPITALIST

## 2024-05-25 RX ADMIN — LORAZEPAM 0.5 MG: 2 INJECTION INTRAMUSCULAR; INTRAVENOUS at 04:01

## 2024-05-25 RX ADMIN — MORPHINE SULFATE 2 MG: 2 INJECTION, SOLUTION INTRAMUSCULAR; INTRAVENOUS at 01:56

## 2024-05-25 RX ADMIN — MORPHINE SULFATE 2 MG: 2 INJECTION, SOLUTION INTRAMUSCULAR; INTRAVENOUS at 05:28

## 2024-05-25 RX ADMIN — GLYCOPYRROLATE 0.2 MG: 0.2 INJECTION INTRAMUSCULAR; INTRAVENOUS at 01:56

## 2024-05-25 ASSESSMENT — PAIN DESCRIPTION - LOCATION
LOCATION: OTHER (COMMENT)
LOCATION: OTHER (COMMENT)

## 2024-05-25 ASSESSMENT — PAIN DESCRIPTION - DESCRIPTORS
DESCRIPTORS: OTHER (COMMENT)
DESCRIPTORS: OTHER (COMMENT)

## 2024-05-25 ASSESSMENT — PAIN DESCRIPTION - ORIENTATION
ORIENTATION: OTHER (COMMENT)
ORIENTATION: OTHER (COMMENT)

## 2024-05-25 NOTE — PROGRESS NOTES
0840- Went into room and unable to see chest rise and no heart beat no blood pressure no gag reflex . Second  Nurse  Oseas Mahmood RN checked .  I messages Dr. Ha and he came up to see the patient .  I called [facility] Postmortem care done . I called his girlfriend Tisha and she is aware of his passing and she stated she will not be coming up . 1225 patient transferred to the [facility]. His necklace I put in managers mail box

## 2024-05-25 NOTE — DISCHARGE SUMMARY
Hospital Medicine Discharge Summary    Patient ID: Nabeel Appiah      Patient's PCP: Jonh Allen DO    Admitting Physician: Chely Saenz MD  Discharge Physician: Bryan Ha MD     Admit Date: 2024     Disposition:     Discharge Diagnoses:   Principal Problem:    Cardiac arrest (HCC)  Active Problems:    Goals of care, counseling/discussion    Palliative care by specialist    Myoclonus    Anoxic brain injury (HCC)    Palliative care encounter    ACP (advance care planning)    Acute respiratory failure with hypoxia (HCC)  Resolved Problems:    * No resolved hospital problems. *      Date of Death:2024   Time of Death:08:46 AM    Immediate Cause of Death: Ventricular tachycardia  Underlying Conditions: Anoxic brain injury, Atrial fibrillation,   Other Contributing Conditions:HTN, T2DM, class 3 obesity    Hospital Course:   Patient admitted on 2024. The patient is a 63-year-old male with past medical history of A-fib, hypertension, obesity, type II DM, AAA who presented to [facility] for cardiac arrest. On EMS arrival reported to be in V. tach/V-fib arrest and received amiodarone and shocked x 3. Patient was noted to have ROSC prior to ED arrival. CT of the head was done in the emergency department without any intracranial findings. Further imaging revealed a small pneumothorax for which a right-sided chest tube was placed: Plan for LHC. Placed on heparin drip and transferred. CT of the chest negative for PE. CT of the abdomen and pelvis showed presence of 4.3 x 4 cm aneurysm. Patient had persistent myoclonic jerking. Placed on Clonazepam and Depakote. EEG showed a potential for generalized seizures but no associated epileptiform activity was found and myoclonic jerks were captured.  Myoclonic jerks continued.  Keppra added.  Clonazepam increased to 1.5 mg every 6 hours. Heparin stopped. Transitioned to Eliquis. . Repeat CT showed subdural hematoma. Eliquis stopped. Repeat
